# Patient Record
Sex: MALE | Race: WHITE | NOT HISPANIC OR LATINO | Employment: OTHER | URBAN - METROPOLITAN AREA
[De-identification: names, ages, dates, MRNs, and addresses within clinical notes are randomized per-mention and may not be internally consistent; named-entity substitution may affect disease eponyms.]

---

## 2017-01-20 ENCOUNTER — GENERIC CONVERSION - ENCOUNTER (OUTPATIENT)
Dept: OTHER | Facility: OTHER | Age: 82
End: 2017-01-20

## 2017-01-24 ENCOUNTER — ALLSCRIPTS OFFICE VISIT (OUTPATIENT)
Dept: OTHER | Facility: OTHER | Age: 82
End: 2017-01-24

## 2017-04-24 DIAGNOSIS — E78.5 HYPERLIPIDEMIA: ICD-10-CM

## 2017-04-24 DIAGNOSIS — I10 ESSENTIAL (PRIMARY) HYPERTENSION: ICD-10-CM

## 2017-04-24 DIAGNOSIS — E11.65 TYPE 2 DIABETES MELLITUS WITH HYPERGLYCEMIA (HCC): ICD-10-CM

## 2017-05-03 ENCOUNTER — ALLSCRIPTS OFFICE VISIT (OUTPATIENT)
Dept: OTHER | Facility: OTHER | Age: 82
End: 2017-05-03

## 2017-08-03 DIAGNOSIS — E78.5 HYPERLIPIDEMIA: ICD-10-CM

## 2017-08-03 DIAGNOSIS — I10 ESSENTIAL (PRIMARY) HYPERTENSION: ICD-10-CM

## 2017-08-03 DIAGNOSIS — E11.65 TYPE 2 DIABETES MELLITUS WITH HYPERGLYCEMIA (HCC): ICD-10-CM

## 2017-08-21 ENCOUNTER — ALLSCRIPTS OFFICE VISIT (OUTPATIENT)
Dept: OTHER | Facility: OTHER | Age: 82
End: 2017-08-21

## 2017-11-21 DIAGNOSIS — E11.65 TYPE 2 DIABETES MELLITUS WITH HYPERGLYCEMIA (HCC): ICD-10-CM

## 2017-11-21 DIAGNOSIS — I48.91 ATRIAL FIBRILLATION (HCC): ICD-10-CM

## 2017-12-05 ENCOUNTER — ALLSCRIPTS OFFICE VISIT (OUTPATIENT)
Dept: OTHER | Facility: OTHER | Age: 82
End: 2017-12-05

## 2017-12-07 NOTE — PROGRESS NOTES
Assessment    1  Hypertension (401 9) (I10)   · labile   2  Hyperlipidemia (272 4) (E78 5)   3  Atrial fibrillation (427 31) (I48 91)   4  Type 2 diabetes mellitus with hyperglycemia (250 00) (E11 65)    Plan  Atrial fibrillation, Hyperlipidemia, Hypertension, Type 2 diabetes mellitus with hyperglycemia    · (1) CBC/PLT/DIFF; Status:Active; Requested for:05Mar2018;    · (1) COMPREHENSIVE METABOLIC PANEL; Status:Active; Requested for:05Mar2018;    · (1) HEMOGLOBIN A1C; Status:Active; Requested for:05Mar2018;    · (1) LIPID PANEL, FASTING; Status:Active; Requested for:05Mar2018;    · Follow-up visit in 3 months Evaluation and Treatment  Follow-up  Status: Complete  Done:05Dec2017  Need for prophylactic vaccination and inoculation against influenza    · Fluzone High-Dose 0 5 ML Intramuscular Suspension Prefilled Syringe  Type 2 diabetes mellitus with hyperglycemia    · *VB - Foot Exam; Status:Active; Requested for:06Dec2017;     Discussion/Summary  Discussion Summary:   The patient is doing generally well  His blood pressure is well controlled  His heart rate is adequately controlled  He is on anticoagulation for stroke prophylaxis in light of his atrial fibrillation  His lipid status is favorable  His diabetic control is suboptimal  He is asymptomatic  He prefers to leave his therapy as it is for the moment  We will re-evaluate the patient in 3 months  He will update his lab work before his next visit  Chief Complaint  Chief Complaint Free Text Note Form: 3 month f/u for DMexam due todayflu vaccine todayto make an appointment with eye doctor for diabetic eye exam      History of Present Illness  HPI: The patient returned to the office for re-evaluation of hypertension, hyperlipidemia, type 2 diabetes, and chronic atrial fibrillation  Since his last visit he has been feeling well  He has no chest pain, shortness of breath, palpitations, or dizziness  He has had no symptoms of hypoglycemia or hyperglycemia   He is tolerating his medications well  Review of Systems  Complete-Male:  Constitutional: No fever or chills, feels well, no tiredness, no recent weight gain or weight loss  Eyes: No complaints of eye pain, no red eyes, no discharge from eyes, no itchy eyes  ENT: no complaints of earache, no hearing loss, no nosebleeds, no nasal discharge, no sore throat, no hoarseness  Cardiovascular: as noted in HPI  Respiratory: No complaints of shortness of breath, no wheezing, no cough, no SOB on exertion, no orthopnea or PND  Gastrointestinal: No complaints of abdominal pain, no constipation, no nausea or vomiting, no diarrhea or bloody stools  Genitourinary: No complaints of dysuria, no incontinence, no hesitancy, no nocturia, no genital lesion, no testicular pain  Musculoskeletal: No complaints of arthralgia, no myalgias, no joint swelling or stiffness, no limb pain or swelling  Neurological: No compliants of headache, no confusion, no convulsions, no numbness or tingling, no dizziness or fainting, no limb weakness, no difficulty walking  Psychiatric: as noted in HPI  Endocrine: muscle weakness, but-- No complaints of proptosis, no hot flashes, no muscle weakness, no erectile dysfunction, no deepening of the voice, no feelings of weakness-- and-- as noted in HPI  Hematologic/Lymphatic: no tendency for easy bruising  Active Problems  1  Atrial fibrillation (427 31) (I48 91)   2  Hyperlipidemia (272 4) (E78 5)   3  Hypertension (401 9) (I10)   4  Inguinal hernia (550 90) (K40 90)   5  Need for prophylactic vaccination and inoculation against influenza (V04 81) (Z23)   6  Screening for genitourinary condition (V81 6) (Z13 89)   7  Screening for neurological condition (V80 09) (Z13 89)   8  Type 2 diabetes mellitus with hyperglycemia (250 00) (E11 65)    Past Medical History  Active Problems And Past Medical History Reviewed: The active problems and past medical history were reviewed and updated today  Surgical History  1  History of Cataract Surgery    Family History  Mother    1  Family history of cerebrovascular accident (V17 1) (Z82 3)  Father    2  Family history of cerebrovascular accident (V17 1) (Z82 3)  Sister    3  Family history of atrial fibrillation (V17 49) (Z82 49)  Family History Reviewed: The family history was reviewed and updated today  Social History     · Former smoker (H41 27) (O02 659)   · Social alcohol use (Z78 9)  Social History Reviewed: The social history was reviewed and is unchanged  Current Meds   1  Daily Value Multivitamin TABS; Take one daily; Therapy: (Kateryna Nguyen) to Recorded   2  Metoprolol Succinate ER 50 MG Oral Tablet Extended Release 24 Hour; Take 1 tablet daily; Therapy: 19WDF1760 to (Last Rx:[id number])  Requested for: 58KTA7185 Ordered   3  Pioglitazone HCl - 30 MG Oral Tablet; Take 1 tablet daily; Therapy: 48PDT5024 to (Last Rx:54Uzs5922)  Requested for: 85HTS4079 Ordered   4  Pradaxa 150 MG Oral Capsule; Take one capsule twice a day; Therapy: 20YKI3965 to (Evaluate:09Jan2018)  Requested for: 79UET7488; Last Rx:49Vaw9763 Ordered   5  Simvastatin 40 MG Oral Tablet; Take 1 tablet daily; Therapy: 67Kzs3311 to (Last Rx:53Qkb8320)  Requested for: 08WNN0826 Ordered    Allergies  1  No Known Drug Allergies    Vitals  Vital Signs    Recorded: 35SSY7639 09:23AM   Temperature 96 3 F   Heart Rate 92   Pulse Quality Irregular   Respiration 16   Systolic 938   Diastolic 70   Height 5 ft 11 in   Weight 169 lb 8 oz   BMI Calculated 23 64   BSA Calculated 1 97       Physical Exam   Constitutional  General appearance: No acute distress, well appearing and well nourished  Pulmonary  Respiratory effort: No increased work of breathing or signs of respiratory distress  Auscultation of lungs: Clear to auscultation, equal breath sounds bilaterally, no wheezes, no rales, no rhonci  Cardiovascular  Palpation of heart: Normal PMI, no thrills     Auscultation of heart: Abnormal  -- The rhythm is irregular  I heard no murmur rub or gallop  Examination of extremities for edema and/or varicosities: Normal    Carotid pulses: Normal    Abdomen  Abdomen: Non-tender, no masses  Liver and spleen: No hepatomegaly or splenomegaly  Lymphatic  Palpation of lymph nodes in neck: No lymphadenopathy  Musculoskeletal  Gait and station: Normal    Inspection/palpation of joints, bones, and muscles: Normal    Psychiatric  Orientation to person, place and time: Normal    Mood and affect: Normal    Diabetic Foot Screen: Normal    Socks and shoes removed, the Right Foot: the foot was normal, no swelling, no erythema  The toes on the right were normal   The sensory exam showed  normal vibratory sensation at the level of the toes on the right  Normal tactile sensation with monofilament testing throughout the right foot  Socks and shoes removed, Left Foot: the foot was normal, no swelling, no erythema  The toes on the left were normal   The sensory exam showed  normal vibratory sensation at the level of the toes on the left , Normal tactile sensation with monofilament testing throughout the left foot  Pulses:  2+ in the posterior tibialis on the right  2+ in the dorsalis pedis on the right  Pulses:  2+ in the posterior tibialis on the left  2+ in the dorsalis pedis on the left  Assign Risk Category: 0: No loss of protective sensation, no deformity  No present risk  Future Appointments    Date/Time Provider Specialty Site   03/12/2018 09:45 AM BAYLEE Escobar   [facility]       Signatures   Electronically signed by : BAYLEE Stein ; Dec  6 2017  9:43AM EST                       (Author)

## 2018-01-12 VITALS
TEMPERATURE: 96.6 F | BODY MASS INDEX: 21.77 KG/M2 | DIASTOLIC BLOOD PRESSURE: 80 MMHG | HEIGHT: 71 IN | HEART RATE: 88 BPM | RESPIRATION RATE: 15 BRPM | WEIGHT: 155.5 LBS | SYSTOLIC BLOOD PRESSURE: 140 MMHG

## 2018-01-13 VITALS
RESPIRATION RATE: 16 BRPM | TEMPERATURE: 98.6 F | BODY MASS INDEX: 23.31 KG/M2 | DIASTOLIC BLOOD PRESSURE: 70 MMHG | SYSTOLIC BLOOD PRESSURE: 124 MMHG | HEIGHT: 71 IN | WEIGHT: 166.5 LBS | HEART RATE: 88 BPM

## 2018-01-15 VITALS
DIASTOLIC BLOOD PRESSURE: 52 MMHG | RESPIRATION RATE: 15 BRPM | BODY MASS INDEX: 23.14 KG/M2 | SYSTOLIC BLOOD PRESSURE: 102 MMHG | HEART RATE: 88 BPM | HEIGHT: 71 IN | TEMPERATURE: 97.5 F | WEIGHT: 165.25 LBS

## 2018-01-22 VITALS
HEIGHT: 71 IN | RESPIRATION RATE: 16 BRPM | BODY MASS INDEX: 23.73 KG/M2 | TEMPERATURE: 96.3 F | WEIGHT: 169.5 LBS | HEART RATE: 92 BPM | SYSTOLIC BLOOD PRESSURE: 142 MMHG | DIASTOLIC BLOOD PRESSURE: 70 MMHG

## 2018-03-05 DIAGNOSIS — I48.91 ATRIAL FIBRILLATION (HCC): ICD-10-CM

## 2018-03-05 DIAGNOSIS — E11.65 TYPE 2 DIABETES MELLITUS WITH HYPERGLYCEMIA (HCC): ICD-10-CM

## 2018-03-05 DIAGNOSIS — I10 ESSENTIAL (PRIMARY) HYPERTENSION: ICD-10-CM

## 2018-03-05 DIAGNOSIS — E78.5 HYPERLIPIDEMIA: ICD-10-CM

## 2018-03-08 RX ORDER — DABIGATRAN ETEXILATE 150 MG/1
1 CAPSULE, COATED PELLETS ORAL 2 TIMES DAILY
COMMUNITY
Start: 2013-03-12 | End: 2018-07-09 | Stop reason: SDUPTHER

## 2018-03-08 RX ORDER — ERGOCALCIFEROL (VITAMIN D2) 10 MCG
1 TABLET ORAL DAILY
COMMUNITY

## 2018-03-08 RX ORDER — SIMVASTATIN 40 MG
1 TABLET ORAL DAILY
COMMUNITY
Start: 2011-04-12 | End: 2018-10-31 | Stop reason: SDUPTHER

## 2018-03-08 RX ORDER — METOPROLOL SUCCINATE 50 MG/1
1 TABLET, EXTENDED RELEASE ORAL DAILY
COMMUNITY
Start: 2013-03-12 | End: 2018-10-31 | Stop reason: SDUPTHER

## 2018-03-08 RX ORDER — PIOGLITAZONEHYDROCHLORIDE 30 MG/1
1 TABLET ORAL DAILY
COMMUNITY
Start: 2012-01-09 | End: 2018-05-12 | Stop reason: SDUPTHER

## 2018-03-12 ENCOUNTER — OFFICE VISIT (OUTPATIENT)
Dept: INTERNAL MEDICINE CLINIC | Facility: CLINIC | Age: 83
End: 2018-03-12
Payer: MEDICARE

## 2018-03-12 VITALS
DIASTOLIC BLOOD PRESSURE: 82 MMHG | HEART RATE: 72 BPM | TEMPERATURE: 97 F | BODY MASS INDEX: 24.08 KG/M2 | WEIGHT: 172 LBS | HEIGHT: 71 IN | RESPIRATION RATE: 16 BRPM | SYSTOLIC BLOOD PRESSURE: 140 MMHG

## 2018-03-12 DIAGNOSIS — E78.2 MIXED HYPERLIPIDEMIA: ICD-10-CM

## 2018-03-12 DIAGNOSIS — E11.65 TYPE 2 DIABETES MELLITUS WITH HYPERGLYCEMIA, WITHOUT LONG-TERM CURRENT USE OF INSULIN (HCC): ICD-10-CM

## 2018-03-12 DIAGNOSIS — I48.21 PERMANENT ATRIAL FIBRILLATION (HCC): ICD-10-CM

## 2018-03-12 DIAGNOSIS — I10 ESSENTIAL HYPERTENSION: Primary | ICD-10-CM

## 2018-03-12 PROCEDURE — 99214 OFFICE O/P EST MOD 30 MIN: CPT | Performed by: INTERNAL MEDICINE

## 2018-03-12 NOTE — PROGRESS NOTES
[facility] [place]      NAME: Mitchell Duggan  AGE: 80 y o  SEX: male  : 1933   MRN: [id number]    DATE: 3/12/2018  TIME: 11:34 AM    Assessment and Plan   1  Essential hypertension    Well controlled    2  Type 2 diabetes mellitus with hyperglycemia, without long-term current use of insulin (HCC)   the patient's last hemoglobin A1c was somewhat elevated  He did not want to adjust his medications at that time  This will be repeated in the near future  He will maintain his current medications for now  - HEMOGLOBIN A1C W/ EAG ESTIMATION  - Comprehensive metabolic panel    3  Permanent atrial fibrillation (HCC)    On rate control and anticoagulation with Pradaxa    4  Mixed hyperlipidemia   well controlled on simvastatin  - Lipid panel          Return to office in:  3 months    Chief Complaint    routine interval follow-up    History of Present Illness      the patient returned to the office for re-evaluation of hypertension, hyperlipidemia, type 2 diabetes, and atrial fibrillation  Since his last visit he has been feeling well  He has had no chest pain, shortness of breath, palpitations, or dizziness  He has had no symptoms of hyperglycemia or hypoglycemia  He is tolerating his medications well  The following portions of the patient's history were reviewed and updated as appropriate: allergies, current medications, past family history, past medical history, past social history, past surgical history and problem list     Review of Systems   Review of Systems   Constitutional: Negative  HENT: Negative for congestion, ear pain, postnasal drip, rhinorrhea, sore throat and trouble swallowing  Eyes: Negative for pain, discharge, redness and visual disturbance  Respiratory: Negative for cough, shortness of breath and wheezing  Cardiovascular: Negative  Gastrointestinal: Negative  Endocrine: Negative      Genitourinary: Negative for difficulty urinating, dysuria, frequency, hematuria and urgency  Musculoskeletal: Negative for arthralgias, gait problem, joint swelling and myalgias  Skin: Negative for rash  Neurological: Negative for dizziness, speech difficulty, weakness, light-headedness, numbness and headaches  Hematological: Negative  Psychiatric/Behavioral: Negative for confusion, decreased concentration, dysphoric mood and sleep disturbance  The patient is not nervous/anxious  Active Problem List     Patient Active Problem List   Diagnosis    Hypertension    Type 2 diabetes mellitus with hyperglycemia (HCC)    Hyperlipidemia    Atrial fibrillation (HCC)    Inguinal hernia       Objective   /82 (BP Location: Left arm, Patient Position: Sitting, Cuff Size: Standard)   Pulse 72   Temp (!) 97 °F (36 1 °C) (Tympanic)   Resp 16   Ht 5' 11" (1 803 m)   Wt 78 kg (172 lb)   BMI 23 99 kg/m²     Physical Exam   Constitutional: He is oriented to person, place, and time  He appears well-developed and well-nourished  No distress  HENT:   Head: Normocephalic and atraumatic  Neck: Neck supple  No JVD present  No tracheal deviation present  No thyromegaly present  Cardiovascular: Normal rate, normal heart sounds and intact distal pulses  Exam reveals no gallop and no friction rub  No murmur heard  The cardiac rhythm is irregular  Pulmonary/Chest: Effort normal and breath sounds normal  He has no wheezes  He has no rales  He exhibits no tenderness  Abdominal: Soft  Bowel sounds are normal  He exhibits no distension and no mass  There is no tenderness  There is no rebound  Musculoskeletal: Normal range of motion  He exhibits no edema or tenderness  Lymphadenopathy:     He has no cervical adenopathy  Neurological: He is alert and oriented to person, place, and time  Skin: Skin is warm and dry  Psychiatric: He has a normal mood and affect   His behavior is normal  Judgment and thought content normal        Pertinent Laboratory/Diagnostic Studies:  No visits with results within 3 Month(s) from this visit  Latest known visit with results is:   No results found for any previous visit             Current Medications     Current Outpatient Prescriptions:     dabigatran etexilate (PRADAXA) 150 mg capsu, Take 1 capsule by mouth 2 (two) times a day, Disp: , Rfl:     metoprolol succinate (TOPROL-XL) 50 mg 24 hr tablet, Take 1 tablet by mouth daily, Disp: , Rfl:     Multiple Vitamin (DAILY VALUE MULTIVITAMIN) TABS, Take 1 tablet by mouth daily, Disp: , Rfl:     pioglitazone (ACTOS) 30 mg tablet, Take 1 tablet by mouth daily, Disp: , Rfl:     simvastatin (ZOCOR) 40 mg tablet, Take 1 tablet by mouth daily, Disp: , Rfl:       Josiephine Lundborg, MD

## 2018-05-12 DIAGNOSIS — E11.9 TYPE 2 DIABETES MELLITUS WITHOUT COMPLICATION, WITHOUT LONG-TERM CURRENT USE OF INSULIN (HCC): Primary | ICD-10-CM

## 2018-05-15 RX ORDER — PIOGLITAZONEHYDROCHLORIDE 30 MG/1
TABLET ORAL
Qty: 90 TABLET | Refills: 1 | Status: SHIPPED | OUTPATIENT
Start: 2018-05-15 | End: 2018-10-25 | Stop reason: SDUPTHER

## 2018-06-12 LAB
ALBUMIN SERPL BCP-MCNC: 3.3 G/DL (ref 3–5.2)
ALP SERPL-CCNC: 57 U/L (ref 43–122)
ALT SERPL W P-5'-P-CCNC: 22 U/L (ref 9–52)
ANION GAP SERPL CALCULATED.3IONS-SCNC: 8 MMOL/L (ref 5–14)
AST SERPL W P-5'-P-CCNC: 22 U/L (ref 17–59)
BILIRUB SERPL-MCNC: 0.7 MG/DL
BUN SERPL-MCNC: 23 MG/DL (ref 5–25)
CALCIUM SERPL-MCNC: 8.8 MG/DL (ref 8.4–10.2)
CHLORIDE SERPL-SCNC: 106 MEQ/L (ref 97–108)
CHOLEST SERPL-MCNC: 87 MG/DL
CHOLEST/HDLC SERPL: 2.9 {RATIO}
CO2 SERPL-SCNC: 27 MMOL/L (ref 22–30)
CREATINE, SERUM (HISTORICAL): 1.18 MG/DL (ref 0.7–1.5)
DEPRECATED RDW RBC AUTO: 12.9 %
EGFR (HISTORICAL): 59 ML/MIN/1.73 M2
EST. AVERAGE GLUCOSE BLD GHB EST-MCNC: 246 MG/DL
GLUCOSE FASTING (HISTORICAL): 188 MG/DL (ref 70–99)
HBA1C MFR BLD HPLC: 10.2 %
HCT VFR BLD AUTO: 37.2 % (ref 41–53)
HDLC SERPL-MCNC: 30 MG/DL
HGB BLD-MCNC: 12.6 G/DL (ref 13.5–17.5)
LDL/HDL RATIO (HISTORICAL): 1.4
LDLC SERPL CALC-MCNC: 43 MG/DL
MCH RBC QN AUTO: 34.7 PG (ref 26–34)
MCHC RBC AUTO-ENTMCNC: 33.9 % (ref 31–36)
MCV RBC AUTO: 103 FL (ref 80–100)
PLATELET # BLD AUTO: 175 K/MCL (ref 150–450)
POTASSIUM SERPL-SCNC: 4.7 MEQ/L (ref 3.6–5)
RBC # BLD AUTO: 3.63 M/MCL (ref 4.5–5.9)
SODIUM SERPL-SCNC: 142 MEQ/L (ref 137–147)
TOTAL PROTEIN (HISTORICAL): 6.5 G/DL (ref 5.9–8.4)
TRIGL SERPL-MCNC: 72 MG/DL
VLDLC SERPL CALC-MCNC: 14 MG/DL (ref 0–40)
WBC # BLD AUTO: 6.6 K/MCL (ref 4.5–11)

## 2018-06-14 LAB — HBA1C MFR BLD HPLC: 10.2 %

## 2018-06-20 ENCOUNTER — OFFICE VISIT (OUTPATIENT)
Dept: INTERNAL MEDICINE CLINIC | Facility: CLINIC | Age: 83
End: 2018-06-20
Payer: MEDICARE

## 2018-06-20 VITALS
BODY MASS INDEX: 23.24 KG/M2 | DIASTOLIC BLOOD PRESSURE: 70 MMHG | SYSTOLIC BLOOD PRESSURE: 142 MMHG | WEIGHT: 166 LBS | TEMPERATURE: 97.8 F | RESPIRATION RATE: 16 BRPM | HEART RATE: 92 BPM | HEIGHT: 71 IN

## 2018-06-20 DIAGNOSIS — E78.2 MIXED HYPERLIPIDEMIA: ICD-10-CM

## 2018-06-20 DIAGNOSIS — I10 ESSENTIAL HYPERTENSION: ICD-10-CM

## 2018-06-20 DIAGNOSIS — I48.21 PERMANENT ATRIAL FIBRILLATION (HCC): ICD-10-CM

## 2018-06-20 DIAGNOSIS — E11.65 TYPE 2 DIABETES MELLITUS WITH HYPERGLYCEMIA, WITHOUT LONG-TERM CURRENT USE OF INSULIN (HCC): Primary | ICD-10-CM

## 2018-06-20 PROCEDURE — 99214 OFFICE O/P EST MOD 30 MIN: CPT | Performed by: INTERNAL MEDICINE

## 2018-06-21 NOTE — PROGRESS NOTES
[facility] Internal Medicine [place]      NAME: Shaka Chino  AGE: 80 y o  SEX: male  : 1933   MRN: [id number]    DATE: 2018  TIME: 1:39 PM    Assessment and Plan   1  Type 2 diabetes mellitus with hyperglycemia, without long-term current use of insulin (HCC)    The patient's diabetic control has deteriorated  I advised him to start metformin  He watches his diet well and is at at his ideal body weight  - metFORMIN (GLUCOPHAGE) 500 mg tablet; Take 1 tablet (500 mg total) by mouth 2 (two) times a day with meals  Dispense: 180 tablet; Refill: 0  - Comprehensive metabolic panel  - Hemoglobin A1C; Future  - Microalbumin, Random Urine (W/Creatinine)    2  Essential hypertension    Well controlled  3  Permanent atrial fibrillation (HCC)    On rate control and anticoagulation  4  Mixed hyperlipidemia    At goal on simvastatin  Return to office in: Three months  Chief Complaint     Chief Complaint   Patient presents with    Follow-up     3 months, foot exam today, has not made an appointment with eye doctor       History of Present Illness       The patient returned to the office for re-evaluation of type 2 diabetes, hypertension, hyperlipidemia, and permanent atrial fibrillation  Since his last visit he has been feeling well  He denies chest pain, shortness of breath, palpitations, or dizziness  He denies polyuria, polydipsia, or polyphagia  He is tolerating his medications well  The following portions of the patient's history were reviewed and updated as appropriate: allergies, current medications, past family history, past medical history, past social history, past surgical history and problem list     Review of Systems   Review of Systems   Constitutional: Negative  HENT: Negative for congestion, ear pain, postnasal drip, rhinorrhea, sore throat and trouble swallowing  Eyes: Negative for pain, discharge, redness and visual disturbance     Respiratory: Negative for cough, shortness of breath and wheezing  Cardiovascular: Negative  Gastrointestinal: Negative  Endocrine: Negative  Genitourinary: Negative for difficulty urinating, dysuria, frequency, hematuria and urgency  Musculoskeletal: Negative for arthralgias, gait problem, joint swelling and myalgias  Skin: Negative for rash  Neurological: Negative for dizziness, speech difficulty, weakness, light-headedness, numbness and headaches  Hematological: Negative  Psychiatric/Behavioral: Negative for confusion, decreased concentration, dysphoric mood and sleep disturbance  The patient is not nervous/anxious  Active Problem List     Patient Active Problem List   Diagnosis    Hypertension    Type 2 diabetes mellitus with hyperglycemia (HCC)    Hyperlipidemia    Atrial fibrillation (HCC)    Inguinal hernia       Objective   /70 (BP Location: Left arm, Patient Position: Sitting, Cuff Size: Standard)   Pulse 92   Temp 97 8 °F (36 6 °C) (Tympanic)   Resp 16   Ht 5' 11" (1 803 m)   Wt 75 3 kg (166 lb)   BMI 23 15 kg/m²     Physical Exam   Constitutional: He is oriented to person, place, and time  He appears well-developed and well-nourished  No distress  HENT:   Head: Normocephalic and atraumatic  Neck: Neck supple  No JVD present  No tracheal deviation present  No thyromegaly present  Cardiovascular: Normal rate, regular rhythm, normal heart sounds and intact distal pulses  Exam reveals no gallop and no friction rub  Pulses are no weak pulses  No murmur heard  Pulses:       Dorsalis pedis pulses are 2+ on the right side, and 2+ on the left side  Posterior tibial pulses are 2+ on the right side, and 2+ on the left side  Pulmonary/Chest: Effort normal and breath sounds normal  He has no wheezes  He has no rales  He exhibits no tenderness  Abdominal: Soft  Bowel sounds are normal  He exhibits no distension and no mass  There is no tenderness  There is no rebound  Musculoskeletal: Normal range of motion  He exhibits no edema or tenderness  Feet:   Right Foot:   Skin Integrity: Negative for ulcer, skin breakdown, erythema, warmth, callus or dry skin  Left Foot:   Skin Integrity: Negative for ulcer, skin breakdown, erythema, warmth, callus or dry skin  Lymphadenopathy:     He has no cervical adenopathy  Neurological: He is alert and oriented to person, place, and time  Skin: Skin is warm and dry  Psychiatric: He has a normal mood and affect  His behavior is normal  Judgment and thought content normal      Patient's shoes and socks removed  Right Foot/Ankle   Right Foot Inspection  Skin Exam: skin normal and skin intact no dry skin, no warmth, no callus, no erythema, no maceration, no abnormal color, no pre-ulcer, no ulcer and no callus                          Toe Exam: ROM and strength within normal limitsno swelling, no tenderness, erythema and  no right toe deformity  Sensory   Vibration: intact  Proprioception: intact   Monofilament testing: intact  Vascular  Capillary refills: < 3 seconds  The right DP pulse is 2+  The right PT pulse is 2+  Left Foot/Ankle  Left Foot Inspection  Skin Exam: skin normal and skin intactno dry skin, no warmth, no erythema, no maceration, normal color, no pre-ulcer, no ulcer and no callus                         Toe Exam: ROM and strength within normal limitsno swelling, no tenderness, no erythema and no left toe deformity                   Sensory   Vibration: intact  Proprioception: intact  Monofilament: intact  Vascular  Capillary refills: < 3 seconds  The left DP pulse is 2+  The left PT pulse is 2+  Assign Risk Category:  No deformity present; No loss of protective sensation;  No weak pulses       Risk: 0    Pertinent Laboratory/Diagnostic Studies:  Orders Only on 06/14/2018   Component Date Value Ref Range Status    EXT Hemoglobin A1C 06/12/2018 10 2   Final           Current Medications     Current Outpatient Prescriptions:     dabigatran etexilate (PRADAXA) 150 mg capsu, Take 1 capsule by mouth 2 (two) times a day, Disp: , Rfl:     metoprolol succinate (TOPROL-XL) 50 mg 24 hr tablet, Take 1 tablet by mouth daily, Disp: , Rfl:     Multiple Vitamin (DAILY VALUE MULTIVITAMIN) TABS, Take 1 tablet by mouth daily, Disp: , Rfl:     pioglitazone (ACTOS) 30 mg tablet, TAKE 1 TABLET DAILY, Disp: 90 tablet, Rfl: 1    simvastatin (ZOCOR) 40 mg tablet, Take 1 tablet by mouth daily, Disp: , Rfl:     metFORMIN (GLUCOPHAGE) 500 mg tablet, Take 1 tablet (500 mg total) by mouth 2 (two) times a day with meals, Disp: 180 tablet, Rfl: 0      Yuly Yao MD

## 2018-07-09 DIAGNOSIS — I48.21 PERMANENT ATRIAL FIBRILLATION (HCC): Primary | ICD-10-CM

## 2018-07-10 RX ORDER — DABIGATRAN ETEXILATE 150 MG/1
150 CAPSULE, COATED PELLETS ORAL 2 TIMES DAILY
Qty: 180 CAPSULE | Refills: 1 | Status: SHIPPED | OUTPATIENT
Start: 2018-07-10 | End: 2019-01-08 | Stop reason: SDUPTHER

## 2018-08-29 DIAGNOSIS — E11.65 TYPE 2 DIABETES MELLITUS WITH HYPERGLYCEMIA, WITHOUT LONG-TERM CURRENT USE OF INSULIN (HCC): ICD-10-CM

## 2018-09-20 ENCOUNTER — TRANSCRIBE ORDERS (OUTPATIENT)
Dept: ADMINISTRATIVE | Facility: HOSPITAL | Age: 83
End: 2018-09-20

## 2018-09-20 ENCOUNTER — APPOINTMENT (OUTPATIENT)
Dept: LAB | Facility: HOSPITAL | Age: 83
End: 2018-09-20
Payer: MEDICARE

## 2018-09-20 DIAGNOSIS — E11.65 TYPE 2 DIABETES MELLITUS WITH HYPERGLYCEMIA, WITHOUT LONG-TERM CURRENT USE OF INSULIN (HCC): ICD-10-CM

## 2018-09-20 LAB
ALBUMIN SERPL BCP-MCNC: 3.7 G/DL (ref 3–5.2)
ALP SERPL-CCNC: 51 U/L (ref 43–122)
ALT SERPL W P-5'-P-CCNC: 23 U/L (ref 9–52)
ANION GAP SERPL CALCULATED.3IONS-SCNC: 6 MMOL/L (ref 5–14)
AST SERPL W P-5'-P-CCNC: 26 U/L (ref 17–59)
BILIRUB SERPL-MCNC: 0.7 MG/DL
BUN SERPL-MCNC: 16 MG/DL (ref 5–25)
CALCIUM SERPL-MCNC: 9.2 MG/DL (ref 8.4–10.2)
CHLORIDE SERPL-SCNC: 105 MMOL/L (ref 97–108)
CHOLEST SERPL-MCNC: 110 MG/DL
CO2 SERPL-SCNC: 29 MMOL/L (ref 22–30)
CREAT SERPL-MCNC: 1.2 MG/DL (ref 0.7–1.5)
CREAT UR-MCNC: 60.9 MG/DL
ERYTHROCYTE [DISTWIDTH] IN BLOOD BY AUTOMATED COUNT: 13.3 %
GFR SERPL CREATININE-BSD FRML MDRD: 55 ML/MIN/1.73SQ M
GLUCOSE P FAST SERPL-MCNC: 151 MG/DL (ref 70–99)
HCT VFR BLD AUTO: 36.4 % (ref 41–53)
HDLC SERPL-MCNC: 31 MG/DL (ref 40–59)
HGB BLD-MCNC: 12.3 G/DL (ref 13.5–17.5)
LDLC SERPL CALC-MCNC: 57 MG/DL
MCH RBC QN AUTO: 35.3 PG (ref 26.8–34.3)
MCHC RBC AUTO-ENTMCNC: 33.8 G/DL (ref 31.4–37.4)
MCV RBC AUTO: 105 FL (ref 80–100)
MICROALBUMIN UR-MCNC: 6.5 MG/L (ref 0–20)
MICROALBUMIN/CREAT 24H UR: 11 MG/G CREATININE (ref 0–30)
NONHDLC SERPL-MCNC: 79 MG/DL
PLATELET # BLD AUTO: 183 THOUSANDS/UL (ref 150–450)
PMV BLD AUTO: 8.9 FL (ref 8.9–12.7)
POTASSIUM SERPL-SCNC: 5.3 MMOL/L (ref 3.6–5)
PROT SERPL-MCNC: 6.9 G/DL (ref 5.9–8.4)
RBC # BLD AUTO: 3.48 MILLION/UL (ref 4.5–5.9)
SODIUM SERPL-SCNC: 140 MMOL/L (ref 137–147)
TRIGL SERPL-MCNC: 111 MG/DL
WBC # BLD AUTO: 7.3 THOUSAND/UL (ref 4.31–10.16)

## 2018-09-20 PROCEDURE — 85027 COMPLETE CBC AUTOMATED: CPT | Performed by: INTERNAL MEDICINE

## 2018-09-20 PROCEDURE — 82570 ASSAY OF URINE CREATININE: CPT

## 2018-09-20 PROCEDURE — 80053 COMPREHEN METABOLIC PANEL: CPT | Performed by: INTERNAL MEDICINE

## 2018-09-20 PROCEDURE — 36415 COLL VENOUS BLD VENIPUNCTURE: CPT | Performed by: INTERNAL MEDICINE

## 2018-09-20 PROCEDURE — 82043 UR ALBUMIN QUANTITATIVE: CPT

## 2018-09-20 PROCEDURE — 80061 LIPID PANEL: CPT | Performed by: INTERNAL MEDICINE

## 2018-09-20 PROCEDURE — 83036 HEMOGLOBIN GLYCOSYLATED A1C: CPT | Performed by: INTERNAL MEDICINE

## 2018-09-21 LAB
EST. AVERAGE GLUCOSE BLD GHB EST-MCNC: 177 MG/DL
HBA1C MFR BLD: 7.8 % (ref 4.2–6.3)

## 2018-09-26 ENCOUNTER — OFFICE VISIT (OUTPATIENT)
Dept: INTERNAL MEDICINE CLINIC | Facility: CLINIC | Age: 83
End: 2018-09-26
Payer: MEDICARE

## 2018-09-26 VITALS
SYSTOLIC BLOOD PRESSURE: 142 MMHG | TEMPERATURE: 97.2 F | RESPIRATION RATE: 15 BRPM | BODY MASS INDEX: 23.1 KG/M2 | WEIGHT: 165 LBS | HEIGHT: 71 IN | DIASTOLIC BLOOD PRESSURE: 80 MMHG | HEART RATE: 88 BPM

## 2018-09-26 DIAGNOSIS — I10 ESSENTIAL HYPERTENSION: ICD-10-CM

## 2018-09-26 DIAGNOSIS — E78.2 MIXED HYPERLIPIDEMIA: ICD-10-CM

## 2018-09-26 DIAGNOSIS — I48.21 PERMANENT ATRIAL FIBRILLATION (HCC): ICD-10-CM

## 2018-09-26 DIAGNOSIS — Z23 NEED FOR INFLUENZA VACCINATION: Primary | ICD-10-CM

## 2018-09-26 DIAGNOSIS — D64.9 ANEMIA, UNSPECIFIED TYPE: ICD-10-CM

## 2018-09-26 DIAGNOSIS — E11.65 TYPE 2 DIABETES MELLITUS WITH HYPERGLYCEMIA, WITHOUT LONG-TERM CURRENT USE OF INSULIN (HCC): ICD-10-CM

## 2018-09-26 PROCEDURE — 90662 IIV NO PRSV INCREASED AG IM: CPT | Performed by: INTERNAL MEDICINE

## 2018-09-26 PROCEDURE — 99214 OFFICE O/P EST MOD 30 MIN: CPT | Performed by: INTERNAL MEDICINE

## 2018-09-26 PROCEDURE — G0008 ADMIN INFLUENZA VIRUS VAC: HCPCS | Performed by: INTERNAL MEDICINE

## 2018-09-26 NOTE — PROGRESS NOTES
[address] [facility] Internal Medicine [place]      NAME: Kathie Recinos  AGE: 80 y o  SEX: male  : 1933   MRN: [id number]    DATE: 2018  TIME: 5:42 PM    Assessment and Plan   1  Need for influenza vaccination  - influenza vaccine, [id number], high-dose, PF 0 5 mL, for patients 72 yr+ (FLUZONE HIGH-DOSE)    2  Essential hypertension  Well controlled  3  Mixed hyperlipidemia  At goal on simvastatin  4  Permanent atrial fibrillation (HCC)  On metoprolol for rate control and Pradaxa for stroke prophylaxis    5  Type 2 diabetes mellitus with hyperglycemia, without long-term current use of insulin ([address] )  Satisfactorily controlled  - Comprehensive metabolic panel  - Hemoglobin A1C    6  Anemia, unspecified type  Anemia studies will be checked with the patient's next lab work  - CBC  - Vitamin B12  - Folate    The patient is doing generally well  His blood pressure, lipids, and diabetes are under good control  He is tolerating his medications  He will continue his current medications for the moment  Return to office in:   3 months    Chief Complaint     Chief Complaint   Patient presents with    Follow-up     3 months, reminded again to make an appointment with eye doctor for diabetic eye exam       History of Present Illness     The patient returned to the office for re-evaluation of hypertension, hyperlipidemia, type 2 diabetes, and atrial fibrillation  Since his last visit he has been feeling well  He has had no chest pain, shortness of breath, palpitations, or dizziness  He has been tolerating his medications well  His last lab work showed mild anemia  He has noticed no evidence of any blood loss      The following portions of the patient's history were reviewed and updated as appropriate: allergies, current medications, past family history, past medical history, past social history, past surgical history and problem list     Review of Systems   Review of Systems   Constitutional: Negative  HENT: Negative for congestion, ear pain, postnasal drip, rhinorrhea, sore throat and trouble swallowing  Eyes: Negative for pain, discharge, redness and visual disturbance  Respiratory: Negative for cough, shortness of breath and wheezing  Cardiovascular: Negative  Gastrointestinal: Negative  Endocrine: Negative  Genitourinary: Negative for difficulty urinating, dysuria, frequency, hematuria and urgency  Musculoskeletal: Negative for arthralgias, gait problem, joint swelling and myalgias  Skin: Negative for rash  Neurological: Negative for dizziness, speech difficulty, weakness, light-headedness, numbness and headaches  Hematological: Negative  Psychiatric/Behavioral: Negative for confusion, decreased concentration, dysphoric mood and sleep disturbance  The patient is not nervous/anxious  Active Problem List     Patient Active Problem List   Diagnosis    Hypertension    Type 2 diabetes mellitus with hyperglycemia (HCC)    Hyperlipidemia    Atrial fibrillation (HCC)    Inguinal hernia    Anemia       Objective   /80 (BP Location: Left arm, Patient Position: Sitting, Cuff Size: Standard)   Pulse 88 Comment: irregular  Temp (!) 97 2 °F (36 2 °C) (Tympanic)   Resp 15   Ht 5' 11" (1 803 m)   Wt 74 8 kg (165 lb)   BMI 23 01 kg/m²     Physical Exam   Constitutional: He is oriented to person, place, and time  He appears well-developed and well-nourished  No distress  HENT:   Head: Normocephalic and atraumatic  Neck: Neck supple  No JVD present  No tracheal deviation present  No thyromegaly present  Cardiovascular: Normal rate, normal heart sounds and intact distal pulses  Exam reveals no gallop and no friction rub  No murmur heard  The cardiac rhythm is irregular  Pulmonary/Chest: Effort normal and breath sounds normal  He has no wheezes  He has no rales  He exhibits no tenderness  Abdominal: Soft   Bowel sounds are normal  He exhibits no distension and no mass  There is no tenderness  There is no rebound  Musculoskeletal: Normal range of motion  He exhibits no edema or tenderness  Lymphadenopathy:     He has no cervical adenopathy  Neurological: He is alert and oriented to person, place, and time  Skin: Skin is warm and dry  Psychiatric: He has a normal mood and affect  His behavior is normal  Judgment and thought content normal        Pertinent Laboratory/Diagnostic Studies:  Appointment on 09/20/2018   Component Date Value Ref Range Status    Creatinine, Ur 09/20/2018 60 9  mg/dL Final    Microalbum  ,U,Random 09/20/2018 6 5  0 0 - 20 0 mg/L Final    Microalb Creat Ratio 09/20/2018 11  0 - 30 mg/g creatinine Final           Current Medications     Current Outpatient Prescriptions:     dabigatran etexilate (PRADAXA) 150 mg capsu, Take 1 capsule (150 mg total) by mouth 2 (two) times a day, Disp: 180 capsule, Rfl: 1    metFORMIN (GLUCOPHAGE) 500 mg tablet, TAKE 1 TABLET TWICE A DAY WITH MEALS, Disp: 180 tablet, Rfl: 0    metoprolol succinate (TOPROL-XL) 50 mg 24 hr tablet, Take 1 tablet by mouth daily, Disp: , Rfl:     Multiple Vitamin (DAILY VALUE MULTIVITAMIN) TABS, Take 1 tablet by mouth daily, Disp: , Rfl:     pioglitazone (ACTOS) 30 mg tablet, TAKE 1 TABLET DAILY, Disp: 90 tablet, Rfl: 1    simvastatin (ZOCOR) 40 mg tablet, Take 1 tablet by mouth daily, Disp: , Rfl:       Carmel Fernandez MD

## 2018-10-25 DIAGNOSIS — E11.9 TYPE 2 DIABETES MELLITUS WITHOUT COMPLICATION, WITHOUT LONG-TERM CURRENT USE OF INSULIN (HCC): ICD-10-CM

## 2018-10-25 RX ORDER — PIOGLITAZONEHYDROCHLORIDE 30 MG/1
TABLET ORAL
Qty: 90 TABLET | Refills: 1 | Status: SHIPPED | OUTPATIENT
Start: 2018-10-25 | End: 2019-05-20 | Stop reason: SDUPTHER

## 2018-10-31 DIAGNOSIS — I48.21 PERMANENT ATRIAL FIBRILLATION (HCC): Primary | ICD-10-CM

## 2018-10-31 DIAGNOSIS — E78.2 MIXED HYPERLIPIDEMIA: ICD-10-CM

## 2018-11-01 RX ORDER — METOPROLOL SUCCINATE 50 MG/1
TABLET, EXTENDED RELEASE ORAL
Qty: 90 TABLET | Refills: 3 | Status: SHIPPED | OUTPATIENT
Start: 2018-11-01 | End: 2019-12-11 | Stop reason: SDUPTHER

## 2018-11-01 RX ORDER — SIMVASTATIN 40 MG
TABLET ORAL
Qty: 90 TABLET | Refills: 3 | Status: SHIPPED | OUTPATIENT
Start: 2018-11-01 | End: 2019-12-11 | Stop reason: SDUPTHER

## 2018-11-27 DIAGNOSIS — E11.65 TYPE 2 DIABETES MELLITUS WITH HYPERGLYCEMIA, WITHOUT LONG-TERM CURRENT USE OF INSULIN (HCC): ICD-10-CM

## 2019-01-08 ENCOUNTER — TRANSCRIBE ORDERS (OUTPATIENT)
Dept: ADMINISTRATIVE | Facility: HOSPITAL | Age: 84
End: 2019-01-08

## 2019-01-08 ENCOUNTER — APPOINTMENT (OUTPATIENT)
Dept: LAB | Facility: HOSPITAL | Age: 84
End: 2019-01-08
Payer: MEDICARE

## 2019-01-08 DIAGNOSIS — I48.91 ATRIAL FIBRILLATION (HCC): ICD-10-CM

## 2019-01-08 DIAGNOSIS — E78.5 HYPERLIPIDEMIA: ICD-10-CM

## 2019-01-08 DIAGNOSIS — I48.21 PERMANENT ATRIAL FIBRILLATION (HCC): ICD-10-CM

## 2019-01-08 DIAGNOSIS — E11.65 TYPE 2 DIABETES MELLITUS WITH HYPERGLYCEMIA (HCC): ICD-10-CM

## 2019-01-08 DIAGNOSIS — I10 ESSENTIAL (PRIMARY) HYPERTENSION: ICD-10-CM

## 2019-01-08 LAB
ALBUMIN SERPL BCP-MCNC: 3.9 G/DL (ref 3–5.2)
ALP SERPL-CCNC: 52 U/L (ref 43–122)
ALT SERPL W P-5'-P-CCNC: 21 U/L (ref 9–52)
ANION GAP SERPL CALCULATED.3IONS-SCNC: 8 MMOL/L (ref 5–14)
AST SERPL W P-5'-P-CCNC: 25 U/L (ref 17–59)
BILIRUB SERPL-MCNC: 0.5 MG/DL
BUN SERPL-MCNC: 23 MG/DL (ref 5–25)
CALCIUM SERPL-MCNC: 9.4 MG/DL (ref 8.4–10.2)
CHLORIDE SERPL-SCNC: 101 MMOL/L (ref 97–108)
CHOLEST SERPL-MCNC: 118 MG/DL
CO2 SERPL-SCNC: 29 MMOL/L (ref 22–30)
CREAT SERPL-MCNC: 1.3 MG/DL (ref 0.7–1.5)
ERYTHROCYTE [DISTWIDTH] IN BLOOD BY AUTOMATED COUNT: 12.5 %
EST. AVERAGE GLUCOSE BLD GHB EST-MCNC: 206 MG/DL
FOLATE SERPL-MCNC: >20 NG/ML (ref 3.1–17.5)
GFR SERPL CREATININE-BSD FRML MDRD: 50 ML/MIN/1.73SQ M
GLUCOSE P FAST SERPL-MCNC: 293 MG/DL (ref 70–99)
HBA1C MFR BLD: 8.8 % (ref 4.2–6.3)
HCT VFR BLD AUTO: 38.8 % (ref 41–53)
HDLC SERPL-MCNC: 34 MG/DL (ref 40–59)
HGB BLD-MCNC: 12.8 G/DL (ref 13.5–17.5)
LDLC SERPL CALC-MCNC: 59 MG/DL
MCH RBC QN AUTO: 34.3 PG (ref 26.8–34.3)
MCHC RBC AUTO-ENTMCNC: 33.1 G/DL (ref 31.4–37.4)
MCV RBC AUTO: 104 FL (ref 80–100)
NONHDLC SERPL-MCNC: 84 MG/DL
PLATELET # BLD AUTO: 186 THOUSANDS/UL (ref 150–450)
PMV BLD AUTO: 8.4 FL (ref 8.9–12.7)
POTASSIUM SERPL-SCNC: 4.9 MMOL/L (ref 3.6–5)
PROT SERPL-MCNC: 7.2 G/DL (ref 5.9–8.4)
RBC # BLD AUTO: 3.74 MILLION/UL (ref 4.5–5.9)
SODIUM SERPL-SCNC: 138 MMOL/L (ref 137–147)
TRIGL SERPL-MCNC: 125 MG/DL
VIT B12 SERPL-MCNC: 456 PG/ML (ref 100–900)
WBC # BLD AUTO: 7 THOUSAND/UL (ref 4.31–10.16)

## 2019-01-08 PROCEDURE — 82746 ASSAY OF FOLIC ACID SERUM: CPT | Performed by: INTERNAL MEDICINE

## 2019-01-08 PROCEDURE — 85027 COMPLETE CBC AUTOMATED: CPT | Performed by: INTERNAL MEDICINE

## 2019-01-08 PROCEDURE — 80053 COMPREHEN METABOLIC PANEL: CPT | Performed by: INTERNAL MEDICINE

## 2019-01-08 PROCEDURE — 83036 HEMOGLOBIN GLYCOSYLATED A1C: CPT | Performed by: INTERNAL MEDICINE

## 2019-01-08 PROCEDURE — 36415 COLL VENOUS BLD VENIPUNCTURE: CPT | Performed by: INTERNAL MEDICINE

## 2019-01-08 PROCEDURE — 80061 LIPID PANEL: CPT

## 2019-01-08 PROCEDURE — 82607 VITAMIN B-12: CPT | Performed by: INTERNAL MEDICINE

## 2019-01-08 RX ORDER — DABIGATRAN ETEXILATE 150 MG/1
150 CAPSULE, COATED PELLETS ORAL 2 TIMES DAILY
Qty: 180 CAPSULE | Refills: 1 | Status: SHIPPED | OUTPATIENT
Start: 2019-01-08 | End: 2019-07-08 | Stop reason: SDUPTHER

## 2019-01-14 ENCOUNTER — OFFICE VISIT (OUTPATIENT)
Dept: INTERNAL MEDICINE CLINIC | Facility: CLINIC | Age: 84
End: 2019-01-14
Payer: MEDICARE

## 2019-01-14 VITALS
WEIGHT: 168 LBS | TEMPERATURE: 97 F | HEART RATE: 96 BPM | BODY MASS INDEX: 23.52 KG/M2 | SYSTOLIC BLOOD PRESSURE: 124 MMHG | DIASTOLIC BLOOD PRESSURE: 72 MMHG | RESPIRATION RATE: 15 BRPM | HEIGHT: 71 IN

## 2019-01-14 DIAGNOSIS — E11.65 TYPE 2 DIABETES MELLITUS WITH HYPERGLYCEMIA, WITHOUT LONG-TERM CURRENT USE OF INSULIN (HCC): Primary | ICD-10-CM

## 2019-01-14 DIAGNOSIS — I48.21 PERMANENT ATRIAL FIBRILLATION (HCC): ICD-10-CM

## 2019-01-14 DIAGNOSIS — I10 ESSENTIAL HYPERTENSION: ICD-10-CM

## 2019-01-14 DIAGNOSIS — E78.2 MIXED HYPERLIPIDEMIA: ICD-10-CM

## 2019-01-14 PROCEDURE — G0439 PPPS, SUBSEQ VISIT: HCPCS | Performed by: INTERNAL MEDICINE

## 2019-01-14 PROCEDURE — 99214 OFFICE O/P EST MOD 30 MIN: CPT | Performed by: INTERNAL MEDICINE

## 2019-01-14 NOTE — PROGRESS NOTES
Assessment and Plan:    Problem List Items Addressed This Visit     None        Health Maintenance Due   Topic Date Due    Medicare Annual Wellness Visit (AWV)  1933    DM Eye Exam  04/22/1943    DTaP,Tdap,and Td Vaccines (1 - Tdap) 04/22/1954         HPI:  Nisha Romero  is a 80 y o  male here for his Subsequent Wellness Visit  Patient Active Problem List   Diagnosis    Hypertension    Type 2 diabetes mellitus with hyperglycemia (HCC)    Hyperlipidemia    Atrial fibrillation ([address] )    Inguinal hernia    Anemia     No past medical history on file  Past Surgical History:   Procedure Laterality Date    CATARACT EXTRACTION      HERNIA REPAIR  1985    HERNIA REPAIR  1999     Family History   Problem Relation Age of Onset    Stroke Mother     Stroke Father     Atrial fibrillation Sister      History   Smoking Status    Former Smoker    Quit date: 1970   Smokeless Tobacco    Never Used     History   Alcohol Use    Yes     Comment: Social       History   Drug use: Unknown       Current Outpatient Prescriptions   Medication Sig Dispense Refill    dabigatran etexilate (PRADAXA) 150 mg capsu Take 1 capsule (150 mg total) by mouth 2 (two) times a day 180 capsule 1    metFORMIN (GLUCOPHAGE) 500 mg tablet TAKE 1 TABLET TWICE A DAY WITH MEALS 180 tablet 0    metoprolol succinate (TOPROL-XL) 50 mg 24 hr tablet TAKE 1 TABLET DAILY 90 tablet 3    Multiple Vitamin (DAILY VALUE MULTIVITAMIN) TABS Take 1 tablet by mouth daily      pioglitazone (ACTOS) 30 mg tablet TAKE 1 TABLET DAILY 90 tablet 1    simvastatin (ZOCOR) 40 mg tablet TAKE 1 TABLET DAILY 90 tablet 3     No current facility-administered medications for this visit        No Known Allergies  Immunization History   Administered Date(s) Administered    Influenza 11/25/2014, 09/09/2015, 10/18/2016, 12/05/2017    Influenza Quadrivalent Preservative Free 3 years and older IM 11/25/2014    Influenza Quadrivalent, 6-35 Months IM 09/09/2015  Influenza Split High Dose Preservative Free IM 10/18/2016, 12/05/2017    Influenza TIV (IM) 11/06/2012, 10/24/2013    Influenza, high dose seasonal 0 5 mL 09/26/2018    Pneumococcal Conjugate 13-Valent 07/12/2016    Pneumococcal Polysaccharide PPV23 08/27/2014       Patient Care Team:  Farhana Ram MD as PCP - General  Farhana Ram MD    Medicare Screening Tests and Risk Assessments:  Naveed Sutton is here for his Subsequent Wellness visit  Health Risk Assessment:  Patient rates overall health as very good  Patient feels that their physical health rating is Same  Eyesight was rated as Same  Hearing was rated as Same  Patient feels that their emotional and mental health rating is Same  Pain experienced by patient in the last 7 days has been None  Patient states that he has experienced no weight loss or gain in last 6 months  Emotional/Mental Health:  Patient has been feeling nervous/anxious  PHQ-9 Depression Screening:    Frequency of the following problems over the past two weeks:      1  Little interest or pleasure in doing things: 0 - not at all      2  Feeling down, depressed, or hopeless: 0 - not at all  PHQ-2 Score: 0          Broken Bones/Falls: Fall Risk Assessment:    In the past year, patient has experienced: No history of falling in past year          Bladder/Bowel:  Patient has not leaked urine accidently in the last six months  Patient reports no loss of bowel control  Immunizations:  Patient has had a flu vaccination within the last year  Patient has received a pneumonia shot  Patient has not received a shingles shot  Patient has not received tetanus/diphtheria shot  Home Safety:  Patient does not have trouble with stairs inside or outside of their home  Patient currently reports that there are no safety hazards present in home, working smoke alarms, no working carbon monoxide detectors        Preventative Screenings:   no prostate cancer screen performed, no colon cancer screen completed, cholesterol screen completed, no glaucoma eye exam completed    Nutrition:  Current diet: Regular with servings of the following:    Medications:  Patient is currently taking over-the-counter supplements  List of OTC medications includes: MVI  Patient is able to manage medications  Lifestyle Choices:  Patient reports no tobacco use  Patient has smoked or used tobacco in the past   Patient has stopped his tobacco use  Tobacco use quit date: 1970  Patient reports alcohol use  Alcohol use per week: 2 drinks/wk  Patient drives a vehicle  Patient wears seat belt  Activities of Daily Living:  Can get out of bed by his or her self, able to dress self, able to make own meals, able to do own shopping, able to bathe self, can do own laundry/housekeeping, can manage own money, pay bills and track expenses    Previous Hospitalizations:  No hospitalization or ED visit in past 12 months        Advanced Directives:  Patient has decided on a power of   Patient has spoken to designated power of   Patient has not completed advanced directive  Preventative Screening/Counseling:      Cardiovascular:      General: Screening Current          Diabetes:      General: Screening Current          Colorectal Cancer:      General: Screening Not Indicated          Prostate Cancer:      General: Screening Not Indicated          Osteoporosis:      General: Screening Not Indicated          AAA:      General: Screening Not Indicated          Glaucoma:      General: Risks and Benefits Discussed          HIV:      General: Screening Not Indicated          Hepatitis C:      General: Screening Not Indicated        Advanced Directives:   Patient has living will for healthcare, has durable POA for healthcare, Information on ACP and/or AD provided  End of life assessment reviewed with patient  Provider agrees with end of life decisions        Immunizations:      Influenza: Influenza UTD This Year      Pneumococcal: Lifetime Vaccine Completed      Shingrix: Risks & Benefits Discussed      TDAP: Risks & Benefits Discussed        Referrals: Additional Comments: The patient is doing well  He is maintaining a healthy lifestyle  He has no symptoms of psychiatric or cognitive dysfunction  His home environment is safe  He has not fallen  He is not due for any health screening studies at present  He is up-to-date with his pneumococcal and influenza vaccines  I discussed zoster vaccine with him  I reviewed tetanus booster recommendations with him  I discussed advance care planning with the patient  He does have a living will  His nephew will be his durable power of

## 2019-01-14 NOTE — PROGRESS NOTES
[facility] Medicine [place]      NAME: Ryan Perez  AGE: 80 y o  SEX: male  : 1933   MRN: [id number]    DATE: 2019  TIME: 3:28 PM    Assessment and Plan   1  Type 2 diabetes mellitus with hyperglycemia, without long-term current use of insulin ([address] )  This is adequately controlled considering the patient's age in overall health  - CBC; Future  - Comprehensive metabolic panel; Future  - Hemoglobin A1C; Future    2  Essential hypertension  Well controlled    3  Permanent atrial fibrillation (HCC)  On rate control and anticoagulation    4  Mixed hyperlipidemia  Well controlled on simvastatin  - Lipid panel; Future    The patient is doing well overall  His hemoglobin A1c is not optimal but I think is adequate all things considered  His blood pressure and lipids are good  He is tolerating anticoagulation well  Return to office in:   3 months    Chief Complaint     Chief Complaint   Patient presents with    Medicare Wellness Visit    Follow-up     3 months, reminded to make appt for diabetic eye exam       History of Present Illness     The patient returned to the office for re-evaluation of hypertension, hypercholesterolemia, type 2 diabetes, and permanent atrial fibrillation  Since his last visit he has been feeling well  He has had no chest pain, shortness of breath, palpitations, or dizziness  He has had no symptoms of hyperglycemia or hypoglycemia  He is tolerating his medications well  The following portions of the patient's history were reviewed and updated as appropriate: allergies, current medications, past family history, past medical history, past social history, past surgical history and problem list     Review of Systems   Review of Systems   Constitutional: Negative  HENT: Negative for congestion, ear pain, postnasal drip, rhinorrhea, sore throat and trouble swallowing  Eyes: Negative for pain, discharge, redness and visual disturbance     Respiratory: Negative for cough, shortness of breath and wheezing  Cardiovascular: Negative  Gastrointestinal: Negative  Endocrine: Negative  Genitourinary: Negative for difficulty urinating, dysuria, frequency, hematuria and urgency  Musculoskeletal: Negative for arthralgias, gait problem, joint swelling and myalgias  Skin: Negative for rash  Neurological: Negative for dizziness, speech difficulty, weakness, light-headedness, numbness and headaches  Hematological: Negative  Psychiatric/Behavioral: Negative for confusion, decreased concentration, dysphoric mood and sleep disturbance  The patient is not nervous/anxious  Active Problem List     Patient Active Problem List   Diagnosis    Hypertension    Type 2 diabetes mellitus with hyperglycemia (HCC)    Hyperlipidemia    Atrial fibrillation (HCC)    Inguinal hernia    Anemia       Objective   /72 (BP Location: Left arm, Patient Position: Sitting, Cuff Size: Standard)   Pulse 96   Temp (!) 97 °F (36 1 °C) (Tympanic)   Resp 15   Ht 5' 11" (1 803 m)   Wt 76 2 kg (168 lb)   BMI 23 43 kg/m²     Physical Exam   Constitutional: He is oriented to person, place, and time  He appears well-developed and well-nourished  HENT:   Head: Normocephalic and atraumatic  Neck: Neck supple  No JVD present  No tracheal deviation present  No thyromegaly present  Cardiovascular: Normal rate, normal heart sounds and intact distal pulses  An irregular rhythm present  Exam reveals no gallop and no friction rub  No murmur heard  Pulmonary/Chest: Effort normal and breath sounds normal  He has no wheezes  He has no rales  He exhibits no tenderness  Abdominal: Soft  Bowel sounds are normal  He exhibits no distension and no mass  There is no tenderness  There is no rebound  Musculoskeletal: Normal range of motion  He exhibits no edema or tenderness  Lymphadenopathy:     He has no cervical adenopathy     Neurological: He is alert and oriented to person, place, and time  Skin: Skin is warm and dry  Psychiatric: He has a normal mood and affect         Pertinent Laboratory/Diagnostic Studies:  Appointment on 01/08/2019   Component Date Value Ref Range Status    Cholesterol 01/08/2019 118  <200 mg/dL Final    Triglycerides 01/08/2019 125  <150 mg/dL Final    HDL, Direct 01/08/2019 34* 40 - 59 mg/dL Final    LDL Calculated 01/08/2019 59  <130 mg/dL Final    Non-HDL-Chol (CHOL-HDL) 01/08/2019 84  mg/dl Final           Current Medications     Current Outpatient Prescriptions:     dabigatran etexilate (PRADAXA) 150 mg capsu, Take 1 capsule (150 mg total) by mouth 2 (two) times a day, Disp: 180 capsule, Rfl: 1    metFORMIN (GLUCOPHAGE) 500 mg tablet, TAKE 1 TABLET TWICE A DAY WITH MEALS, Disp: 180 tablet, Rfl: 0    metoprolol succinate (TOPROL-XL) 50 mg 24 hr tablet, TAKE 1 TABLET DAILY, Disp: 90 tablet, Rfl: 3    Multiple Vitamin (DAILY VALUE MULTIVITAMIN) TABS, Take 1 tablet by mouth daily, Disp: , Rfl:     pioglitazone (ACTOS) 30 mg tablet, TAKE 1 TABLET DAILY, Disp: 90 tablet, Rfl: 1    simvastatin (ZOCOR) 40 mg tablet, TAKE 1 TABLET DAILY, Disp: 90 tablet, Rfl: 3      Alexis Cee MD

## 2019-02-26 DIAGNOSIS — E11.65 TYPE 2 DIABETES MELLITUS WITH HYPERGLYCEMIA, WITHOUT LONG-TERM CURRENT USE OF INSULIN (HCC): ICD-10-CM

## 2019-04-09 ENCOUNTER — TRANSCRIBE ORDERS (OUTPATIENT)
Dept: ADMINISTRATIVE | Facility: HOSPITAL | Age: 84
End: 2019-04-09

## 2019-04-09 ENCOUNTER — APPOINTMENT (OUTPATIENT)
Dept: LAB | Facility: HOSPITAL | Age: 84
End: 2019-04-09
Payer: MEDICARE

## 2019-04-09 DIAGNOSIS — E78.2 MIXED HYPERLIPIDEMIA: ICD-10-CM

## 2019-04-09 DIAGNOSIS — E11.65 TYPE 2 DIABETES MELLITUS WITH HYPERGLYCEMIA, WITHOUT LONG-TERM CURRENT USE OF INSULIN (HCC): ICD-10-CM

## 2019-04-09 LAB
ALBUMIN SERPL BCP-MCNC: 3.7 G/DL (ref 3–5.2)
ALP SERPL-CCNC: 48 U/L (ref 43–122)
ALT SERPL W P-5'-P-CCNC: 14 U/L (ref 9–52)
ANION GAP SERPL CALCULATED.3IONS-SCNC: 8 MMOL/L (ref 5–14)
AST SERPL W P-5'-P-CCNC: 26 U/L (ref 17–59)
BILIRUB SERPL-MCNC: 0.8 MG/DL
BUN SERPL-MCNC: 20 MG/DL (ref 5–25)
CALCIUM SERPL-MCNC: 9.4 MG/DL (ref 8.4–10.2)
CHLORIDE SERPL-SCNC: 103 MMOL/L (ref 97–108)
CHOLEST SERPL-MCNC: 106 MG/DL
CO2 SERPL-SCNC: 27 MMOL/L (ref 22–30)
CREAT SERPL-MCNC: 1.26 MG/DL (ref 0.7–1.5)
ERYTHROCYTE [DISTWIDTH] IN BLOOD BY AUTOMATED COUNT: 13 %
EST. AVERAGE GLUCOSE BLD GHB EST-MCNC: 186 MG/DL
GFR SERPL CREATININE-BSD FRML MDRD: 52 ML/MIN/1.73SQ M
GLUCOSE P FAST SERPL-MCNC: 170 MG/DL (ref 70–99)
HBA1C MFR BLD: 8.1 % (ref 4.2–6.3)
HCT VFR BLD AUTO: 37.5 % (ref 41–53)
HDLC SERPL-MCNC: 32 MG/DL (ref 40–59)
HGB BLD-MCNC: 12.5 G/DL (ref 13.5–17.5)
LDLC SERPL CALC-MCNC: 52 MG/DL
MCH RBC QN AUTO: 34.7 PG (ref 26.8–34.3)
MCHC RBC AUTO-ENTMCNC: 33.4 G/DL (ref 31.4–37.4)
MCV RBC AUTO: 104 FL (ref 80–100)
NONHDLC SERPL-MCNC: 74 MG/DL
PLATELET # BLD AUTO: 186 THOUSANDS/UL (ref 150–450)
PMV BLD AUTO: 8.3 FL (ref 8.9–12.7)
POTASSIUM SERPL-SCNC: 4.8 MMOL/L (ref 3.6–5)
PROT SERPL-MCNC: 6.7 G/DL (ref 5.9–8.4)
RBC # BLD AUTO: 3.6 MILLION/UL (ref 4.5–5.9)
SODIUM SERPL-SCNC: 138 MMOL/L (ref 137–147)
TRIGL SERPL-MCNC: 112 MG/DL
WBC # BLD AUTO: 6.8 THOUSAND/UL (ref 4.31–10.16)

## 2019-04-09 PROCEDURE — 83036 HEMOGLOBIN GLYCOSYLATED A1C: CPT

## 2019-04-09 PROCEDURE — 80053 COMPREHEN METABOLIC PANEL: CPT

## 2019-04-09 PROCEDURE — 80061 LIPID PANEL: CPT

## 2019-04-09 PROCEDURE — 36415 COLL VENOUS BLD VENIPUNCTURE: CPT

## 2019-04-09 PROCEDURE — 85027 COMPLETE CBC AUTOMATED: CPT

## 2019-04-16 ENCOUNTER — OFFICE VISIT (OUTPATIENT)
Dept: INTERNAL MEDICINE CLINIC | Facility: CLINIC | Age: 84
End: 2019-04-16
Payer: MEDICARE

## 2019-04-16 VITALS
SYSTOLIC BLOOD PRESSURE: 124 MMHG | BODY MASS INDEX: 23.1 KG/M2 | DIASTOLIC BLOOD PRESSURE: 80 MMHG | HEIGHT: 71 IN | HEART RATE: 88 BPM | RESPIRATION RATE: 15 BRPM | TEMPERATURE: 96.8 F | WEIGHT: 165 LBS

## 2019-04-16 DIAGNOSIS — E11.65 TYPE 2 DIABETES MELLITUS WITH HYPERGLYCEMIA, WITHOUT LONG-TERM CURRENT USE OF INSULIN (HCC): Primary | ICD-10-CM

## 2019-04-16 DIAGNOSIS — E78.2 MIXED HYPERLIPIDEMIA: ICD-10-CM

## 2019-04-16 DIAGNOSIS — D64.9 ANEMIA, UNSPECIFIED TYPE: ICD-10-CM

## 2019-04-16 DIAGNOSIS — I48.21 PERMANENT ATRIAL FIBRILLATION (HCC): ICD-10-CM

## 2019-04-16 DIAGNOSIS — I10 ESSENTIAL HYPERTENSION: ICD-10-CM

## 2019-04-16 PROCEDURE — 99214 OFFICE O/P EST MOD 30 MIN: CPT | Performed by: INTERNAL MEDICINE

## 2019-05-07 DIAGNOSIS — E11.65 TYPE 2 DIABETES MELLITUS WITH HYPERGLYCEMIA, WITHOUT LONG-TERM CURRENT USE OF INSULIN (HCC): ICD-10-CM

## 2019-05-20 DIAGNOSIS — E11.9 TYPE 2 DIABETES MELLITUS WITHOUT COMPLICATION, WITHOUT LONG-TERM CURRENT USE OF INSULIN (HCC): ICD-10-CM

## 2019-05-20 RX ORDER — PIOGLITAZONEHYDROCHLORIDE 30 MG/1
TABLET ORAL
Qty: 90 TABLET | Refills: 1 | Status: SHIPPED | OUTPATIENT
Start: 2019-05-20 | End: 2019-11-26 | Stop reason: SDUPTHER

## 2019-07-08 DIAGNOSIS — I48.21 PERMANENT ATRIAL FIBRILLATION (HCC): ICD-10-CM

## 2019-07-09 RX ORDER — ATENOLOL 100 MG/1
TABLET ORAL
Qty: 180 CAPSULE | Refills: 1 | Status: SHIPPED | OUTPATIENT
Start: 2019-07-09 | End: 2020-01-07

## 2019-07-11 ENCOUNTER — APPOINTMENT (OUTPATIENT)
Dept: LAB | Facility: HOSPITAL | Age: 84
End: 2019-07-11
Attending: INTERNAL MEDICINE
Payer: MEDICARE

## 2019-07-11 LAB
ALBUMIN SERPL BCP-MCNC: 3.8 G/DL (ref 3–5.2)
ALP SERPL-CCNC: 50 U/L (ref 43–122)
ALT SERPL W P-5'-P-CCNC: 21 U/L (ref 9–52)
ANION GAP SERPL CALCULATED.3IONS-SCNC: 9 MMOL/L (ref 5–14)
AST SERPL W P-5'-P-CCNC: 23 U/L (ref 17–59)
BILIRUB SERPL-MCNC: 0.7 MG/DL
BUN SERPL-MCNC: 22 MG/DL (ref 5–25)
CALCIUM SERPL-MCNC: 9.2 MG/DL (ref 8.4–10.2)
CHLORIDE SERPL-SCNC: 106 MMOL/L (ref 97–108)
CHOLEST SERPL-MCNC: 106 MG/DL
CO2 SERPL-SCNC: 27 MMOL/L (ref 22–30)
CREAT SERPL-MCNC: 1.41 MG/DL (ref 0.7–1.5)
ERYTHROCYTE [DISTWIDTH] IN BLOOD BY AUTOMATED COUNT: 12.7 %
GFR SERPL CREATININE-BSD FRML MDRD: 45 ML/MIN/1.73SQ M
GLUCOSE P FAST SERPL-MCNC: 172 MG/DL (ref 70–99)
HCT VFR BLD AUTO: 37.2 % (ref 41–53)
HDLC SERPL-MCNC: 28 MG/DL (ref 40–59)
HGB BLD-MCNC: 12.4 G/DL (ref 13.5–17.5)
LDLC SERPL CALC-MCNC: 57 MG/DL
MCH RBC QN AUTO: 34.6 PG (ref 26.8–34.3)
MCHC RBC AUTO-ENTMCNC: 33.2 G/DL (ref 31.4–37.4)
MCV RBC AUTO: 104 FL (ref 80–100)
NONHDLC SERPL-MCNC: 78 MG/DL
PLATELET # BLD AUTO: 173 THOUSANDS/UL (ref 150–450)
PMV BLD AUTO: 8.8 FL (ref 8.9–12.7)
POTASSIUM SERPL-SCNC: 5.2 MMOL/L (ref 3.6–5)
PROT SERPL-MCNC: 7.1 G/DL (ref 5.9–8.4)
RBC # BLD AUTO: 3.58 MILLION/UL (ref 4.5–5.9)
SODIUM SERPL-SCNC: 142 MMOL/L (ref 137–147)
TRIGL SERPL-MCNC: 105 MG/DL
WBC # BLD AUTO: 6.7 THOUSAND/UL (ref 4.31–10.16)

## 2019-07-11 PROCEDURE — 36415 COLL VENOUS BLD VENIPUNCTURE: CPT | Performed by: INTERNAL MEDICINE

## 2019-07-11 PROCEDURE — 85027 COMPLETE CBC AUTOMATED: CPT | Performed by: INTERNAL MEDICINE

## 2019-07-11 PROCEDURE — 80053 COMPREHEN METABOLIC PANEL: CPT | Performed by: INTERNAL MEDICINE

## 2019-07-11 PROCEDURE — 83036 HEMOGLOBIN GLYCOSYLATED A1C: CPT | Performed by: INTERNAL MEDICINE

## 2019-07-11 PROCEDURE — 80061 LIPID PANEL: CPT | Performed by: INTERNAL MEDICINE

## 2019-07-12 LAB
EST. AVERAGE GLUCOSE BLD GHB EST-MCNC: 192 MG/DL
HBA1C MFR BLD: 8.3 % (ref 4.2–6.3)

## 2019-07-18 ENCOUNTER — OFFICE VISIT (OUTPATIENT)
Dept: INTERNAL MEDICINE CLINIC | Facility: CLINIC | Age: 84
End: 2019-07-18
Payer: MEDICARE

## 2019-07-18 VITALS
BODY MASS INDEX: 22.9 KG/M2 | WEIGHT: 163.6 LBS | DIASTOLIC BLOOD PRESSURE: 70 MMHG | HEIGHT: 71 IN | SYSTOLIC BLOOD PRESSURE: 110 MMHG | HEART RATE: 97 BPM | OXYGEN SATURATION: 98 %

## 2019-07-18 DIAGNOSIS — E11.65 TYPE 2 DIABETES MELLITUS WITH HYPERGLYCEMIA, WITHOUT LONG-TERM CURRENT USE OF INSULIN (HCC): ICD-10-CM

## 2019-07-18 DIAGNOSIS — I48.21 PERMANENT ATRIAL FIBRILLATION (HCC): ICD-10-CM

## 2019-07-18 DIAGNOSIS — I10 ESSENTIAL HYPERTENSION: Primary | ICD-10-CM

## 2019-07-18 DIAGNOSIS — E78.2 MIXED HYPERLIPIDEMIA: ICD-10-CM

## 2019-07-18 DIAGNOSIS — D64.9 ANEMIA, UNSPECIFIED TYPE: ICD-10-CM

## 2019-07-18 PROCEDURE — 99214 OFFICE O/P EST MOD 30 MIN: CPT | Performed by: INTERNAL MEDICINE

## 2019-07-19 NOTE — PROGRESS NOTES
[facility] Medicine [place]      NAME: Rowan Rivas  AGE: 80 y o  SEX: male  : 1933   MRN: [id number]    DATE: 2019  TIME: 6:44 PM    Assessment and Plan   1  Essential hypertension  Well controlled  2  Type 2 diabetes mellitus with hyperglycemia, without long-term current use of insulin (HCC)  Not well controlled  I recommend that he increase metformin  - Comprehensive metabolic panel  - Hemoglobin A1C  - metFORMIN (GLUCOPHAGE) 1000 MG tablet; Take 1 tablet (1,000 mg total) by mouth 2 (two) times a day with meals  Dispense: 180 tablet; Refill: 3    3  Mixed hyperlipidemia  Stable on simvastatin    4  Permanent atrial fibrillation (HCC)  On metoprolol for rate control and Pradaxa for stroke prophylaxis    5  Anemia, unspecified type  This is mild and has been stable  Clinically, the patient continues to do well  His hemoglobin A1c was significantly elevated so he will increase his metformin  We will repeat his labs at the appropriate interval       Return to office in:  3 months    Chief Complaint     Chief Complaint   Patient presents with    Follow-up     3 month follow up        History of Present Illness     The patient returned to the office for re-evaluation of hypertension, hypercholesterolemia, type 2 diabetes, and chronic atrial fibrillation  Since his last visit he has been feeling well  He denies chest pain, shortness of breath, palpitations, or dizziness  He has no symptoms of hyperglycemia or hypoglycemia  He is tolerating his medications well  The following portions of the patient's history were reviewed and updated as appropriate: allergies, current medications, past family history, past medical history, past social history, past surgical history and problem list     Review of Systems   Review of Systems   Constitutional: Negative  HENT: Negative for congestion, ear pain, postnasal drip, rhinorrhea, sore throat and trouble swallowing      Eyes: Negative for pain, discharge, redness and visual disturbance  Respiratory: Negative for cough, shortness of breath and wheezing  Cardiovascular: Negative  Gastrointestinal: Negative  Endocrine: Negative  Genitourinary: Negative for difficulty urinating, dysuria, frequency, hematuria and urgency  Musculoskeletal: Negative for arthralgias, gait problem, joint swelling and myalgias  Skin: Negative for rash  Neurological: Negative for dizziness, speech difficulty, weakness, light-headedness, numbness and headaches  Hematological: Negative  Psychiatric/Behavioral: Negative for confusion, decreased concentration, dysphoric mood and sleep disturbance  The patient is not nervous/anxious  Active Problem List     Patient Active Problem List   Diagnosis    Hypertension    Type 2 diabetes mellitus with hyperglycemia (HCC)    Hyperlipidemia    Atrial fibrillation (HCC)    Inguinal hernia    Anemia       Objective   /70 (BP Location: Left arm, Patient Position: Sitting, Cuff Size: Standard)   Pulse 97   Ht 5' 11" (1 803 m)   Wt 74 2 kg (163 lb 9 6 oz)   SpO2 98%   BMI 22 82 kg/m²     Physical Exam   Constitutional: He is oriented to person, place, and time  He appears well-developed and well-nourished  No distress  HENT:   Head: Normocephalic and atraumatic  Neck: Neck supple  No JVD present  No tracheal deviation present  No thyromegaly present  Cardiovascular: Normal rate, regular rhythm, normal heart sounds and intact distal pulses  Exam reveals no gallop and no friction rub  Pulses are no weak pulses  No murmur heard  Pulses:       Dorsalis pedis pulses are 2+ on the right side, and 2+ on the left side  Posterior tibial pulses are 2+ on the right side, and 2+ on the left side  Pulmonary/Chest: Effort normal and breath sounds normal  He has no wheezes  He has no rales  He exhibits no tenderness  Abdominal: Soft   Bowel sounds are normal  He exhibits no distension and no mass  There is no tenderness  There is no rebound  Musculoskeletal: Normal range of motion  He exhibits no edema or tenderness  Feet:   Right Foot:   Skin Integrity: Negative for ulcer, skin breakdown, erythema, warmth, callus or dry skin  Left Foot:   Skin Integrity: Negative for ulcer, skin breakdown, erythema, warmth, callus or dry skin  Lymphadenopathy:     He has no cervical adenopathy  Neurological: He is alert and oriented to person, place, and time  Skin: Skin is warm and dry  Psychiatric: He has a normal mood and affect  His behavior is normal  Judgment and thought content normal    Patient's shoes and socks removed  Right Foot/Ankle   Right Foot Inspection  Skin Exam: skin normal and skin intact no dry skin, no warmth, no callus, no erythema, no maceration, no abnormal color, no pre-ulcer, no ulcer and no callus                          Toe Exam: ROM and strength within normal limitsno swelling, no tenderness, erythema and  no right toe deformity  Sensory   Vibration: intact  Proprioception: intact   Monofilament testing: intact  Vascular  Capillary refills: < 3 seconds  The right DP pulse is 2+  The right PT pulse is 2+  Left Foot/Ankle  Left Foot Inspection  Skin Exam: skin normal and skin intactno dry skin, no warmth, no erythema, no maceration, normal color, no pre-ulcer, no ulcer and no callus                         Toe Exam: ROM and strength within normal limitsno swelling, no tenderness, no erythema and no left toe deformity                   Sensory   Vibration: intact  Proprioception: intact  Monofilament: intact  Vascular  Capillary refills: < 3 seconds  The left DP pulse is 2+  The left PT pulse is 2+  Assign Risk Category:  No deformity present; No loss of protective sensation; No weak pulses       Risk: 0    Pertinent Laboratory/Diagnostic Studies:  No visits with results within 3 Month(s) from this visit     Latest known visit with results is:   Office Visit on 04/16/2019   Component Date Value Ref Range Status    WBC 07/11/2019 6 70  4 31 - 10 16 Thousand/uL Final    RBC 07/11/2019 3 58* 4 50 - 5 90 Million/uL Final    Hemoglobin 07/11/2019 12 4* 13 5 - 17 5 g/dL Final    Hematocrit 07/11/2019 37 2* 41 0 - 53 0 % Final    MCV 07/11/2019 104* 80 - 100 fL Final    MCH 07/11/2019 34 6* 26 8 - 34 3 pg Final    MCHC 07/11/2019 33 2  31 4 - 37 4 g/dL Final    RDW 07/11/2019 12 7  <15 3 % Final    Platelets 81/58/5424 173  150 - 450 Thousands/uL Final    MPV 07/11/2019 8 8* 8 9 - 12 7 fL Final    Sodium 07/11/2019 142  137 - 147 mmol/L Final    Potassium 07/11/2019 5 2* 3 6 - 5 0 mmol/L Final    Chloride 07/11/2019 106  97 - 108 mmol/L Final    CO2 07/11/2019 27  22 - 30 mmol/L Final    ANION GAP 07/11/2019 9  5 - 14 mmol/L Final    BUN 07/11/2019 22  5 - 25 mg/dL Final    Creatinine 07/11/2019 1 41  0 70 - 1 50 mg/dL Final    Glucose, Fasting 07/11/2019 172* 70 - 99 mg/dL Final    Calcium 07/11/2019 9 2  8 4 - 10 2 mg/dL Final    AST 07/11/2019 23  17 - 59 U/L Final    ALT 07/11/2019 21  9 - 52 U/L Final    Alkaline Phosphatase 07/11/2019 50  43 - 122 U/L Final    Total Protein 07/11/2019 7 1  5 9 - 8 4 g/dL Final    Albumin 07/11/2019 3 8  3 0 - 5 2 g/dL Final    Total Bilirubin 07/11/2019 0 70  <1 30 mg/dL Final    eGFR 07/11/2019 45* >60 ml/min/1 73sq m Final    Hemoglobin A1C 07/11/2019 8 3* 4 2 - 6 3 % Final    EAG 07/11/2019 192  mg/dl Final    Cholesterol 07/11/2019 106  <200 mg/dL Final    Triglycerides 07/11/2019 105  <150 mg/dL Final    HDL, Direct 07/11/2019 28* 40 - 59 mg/dL Final    LDL Calculated 07/11/2019 57  <130 mg/dL Final    Non-HDL-Chol (CHOL-HDL) 07/11/2019 78  mg/dl Final           Current Medications     Current Outpatient Medications:     metFORMIN (GLUCOPHAGE) 1000 MG tablet, Take 1 tablet (1,000 mg total) by mouth 2 (two) times a day with meals, Disp: 180 tablet, Rfl: 3    metoprolol succinate (TOPROL-XL) 50 mg 24 hr tablet, TAKE 1 TABLET DAILY, Disp: 90 tablet, Rfl: 3    Multiple Vitamin (DAILY VALUE MULTIVITAMIN) TABS, Take 1 tablet by mouth daily, Disp: , Rfl:     pioglitazone (ACTOS) 30 mg tablet, TAKE 1 TABLET DAILY, Disp: 90 tablet, Rfl: 1    PRADAXA 150 MG capsu, TAKE 1 CAPSULE TWICE A DAY, Disp: 180 capsule, Rfl: 1    simvastatin (ZOCOR) 40 mg tablet, TAKE 1 TABLET DAILY, Disp: 90 tablet, Rfl: 3      Tatum Devine MD

## 2019-08-06 DIAGNOSIS — E11.65 TYPE 2 DIABETES MELLITUS WITH HYPERGLYCEMIA, WITHOUT LONG-TERM CURRENT USE OF INSULIN (HCC): Primary | ICD-10-CM

## 2019-09-18 DIAGNOSIS — E11.65 TYPE 2 DIABETES MELLITUS WITH HYPERGLYCEMIA, WITHOUT LONG-TERM CURRENT USE OF INSULIN (HCC): ICD-10-CM

## 2019-09-18 NOTE — TELEPHONE ENCOUNTER
Patient requested this medication be sent to [facility] not [facility]

## 2019-10-16 ENCOUNTER — APPOINTMENT (OUTPATIENT)
Dept: LAB | Facility: HOSPITAL | Age: 84
End: 2019-10-16
Attending: INTERNAL MEDICINE
Payer: MEDICARE

## 2019-10-16 LAB
ALBUMIN SERPL BCP-MCNC: 3.9 G/DL (ref 3–5.2)
ALP SERPL-CCNC: 51 U/L (ref 43–122)
ALT SERPL W P-5'-P-CCNC: 19 U/L (ref 9–52)
ANION GAP SERPL CALCULATED.3IONS-SCNC: 7 MMOL/L (ref 5–14)
AST SERPL W P-5'-P-CCNC: 32 U/L (ref 17–59)
BILIRUB SERPL-MCNC: 0.6 MG/DL
BUN SERPL-MCNC: 26 MG/DL (ref 5–25)
CALCIUM SERPL-MCNC: 9.1 MG/DL (ref 8.4–10.2)
CHLORIDE SERPL-SCNC: 104 MMOL/L (ref 97–108)
CO2 SERPL-SCNC: 28 MMOL/L (ref 22–30)
CREAT SERPL-MCNC: 1.32 MG/DL (ref 0.7–1.5)
EST. AVERAGE GLUCOSE BLD GHB EST-MCNC: 171 MG/DL
GFR SERPL CREATININE-BSD FRML MDRD: 49 ML/MIN/1.73SQ M
GLUCOSE P FAST SERPL-MCNC: 162 MG/DL (ref 70–99)
HBA1C MFR BLD: 7.6 % (ref 4.2–6.3)
POTASSIUM SERPL-SCNC: 5 MMOL/L (ref 3.6–5)
PROT SERPL-MCNC: 7.2 G/DL (ref 5.9–8.4)
SODIUM SERPL-SCNC: 139 MMOL/L (ref 137–147)

## 2019-10-16 PROCEDURE — 36415 COLL VENOUS BLD VENIPUNCTURE: CPT | Performed by: INTERNAL MEDICINE

## 2019-10-16 PROCEDURE — 83036 HEMOGLOBIN GLYCOSYLATED A1C: CPT | Performed by: INTERNAL MEDICINE

## 2019-10-16 PROCEDURE — 80053 COMPREHEN METABOLIC PANEL: CPT | Performed by: INTERNAL MEDICINE

## 2019-10-24 ENCOUNTER — OFFICE VISIT (OUTPATIENT)
Dept: INTERNAL MEDICINE CLINIC | Facility: CLINIC | Age: 84
End: 2019-10-24
Payer: MEDICARE

## 2019-10-24 VITALS
OXYGEN SATURATION: 99 % | SYSTOLIC BLOOD PRESSURE: 131 MMHG | HEART RATE: 84 BPM | BODY MASS INDEX: 22.85 KG/M2 | DIASTOLIC BLOOD PRESSURE: 77 MMHG | WEIGHT: 163.2 LBS | TEMPERATURE: 97.6 F | HEIGHT: 71 IN

## 2019-10-24 DIAGNOSIS — E11.65 TYPE 2 DIABETES MELLITUS WITH HYPERGLYCEMIA, WITHOUT LONG-TERM CURRENT USE OF INSULIN (HCC): ICD-10-CM

## 2019-10-24 DIAGNOSIS — E78.2 MIXED HYPERLIPIDEMIA: ICD-10-CM

## 2019-10-24 DIAGNOSIS — I10 ESSENTIAL HYPERTENSION: ICD-10-CM

## 2019-10-24 DIAGNOSIS — I48.21 PERMANENT ATRIAL FIBRILLATION (HCC): ICD-10-CM

## 2019-10-24 DIAGNOSIS — Z23 FLU VACCINE NEED: Primary | ICD-10-CM

## 2019-10-24 PROCEDURE — 99214 OFFICE O/P EST MOD 30 MIN: CPT | Performed by: INTERNAL MEDICINE

## 2019-10-24 PROCEDURE — G0008 ADMIN INFLUENZA VIRUS VAC: HCPCS | Performed by: INTERNAL MEDICINE

## 2019-10-24 PROCEDURE — 90662 IIV NO PRSV INCREASED AG IM: CPT | Performed by: INTERNAL MEDICINE

## 2019-10-24 NOTE — PROGRESS NOTES
[facility] [place]      NAME: Hipolito Mills  AGE: 80 y o  SEX: male  : 1933   MRN: [id number]    DATE: 10/24/2019  TIME: 2:24 PM    Assessment and Plan   1  Flu vaccine need  - influenza vaccine, [id number], high-dose, PF 0 5 mL (FLUZONE HIGH-DOSE)    2  Essential hypertension  Well controlled  - CBC  - Comprehensive metabolic panel    3  Type 2 diabetes mellitus with hyperglycemia, without long-term current use of insulin (HCC)  The patient's hemoglobin A1c has improved  Considering his age and other comorbidities, I believe that his diabetic control is appropriate  - Hemoglobin A1C  - Microalbumin, Random Urine (W/Creatinine)    4  Mixed hyperlipidemia  Stable on simvastatin  - Lipid panel    5  Permanent atrial fibrillation  On rate control and Pradaxa for stroke prophylaxis    Overall, the patient is doing rather well  His blood pressure, lipids, and glucose are all satisfactory  He had his flu shot today  I reminded him to schedule an eye exam as soon as possible  Return to office in:  3 months    Chief Complaint     Chief Complaint   Patient presents with    Follow-up     3 month        History of Present Illness     The patient returned to the office for re-evaluation of hypertension, hypercholesterolemia, type 2 diabetes, and permanent atrial fibrillation  Since his last visit he has been feeling well  He has had no chest pain, shortness of breath, palpitations, or dizziness  He has had no symptoms to suggest hypoglycemia or hyperglycemia  He is tolerating his medications well  The following portions of the patient's history were reviewed and updated as appropriate: allergies, current medications, past family history, past medical history, past social history, past surgical history and problem list     Review of Systems   Review of Systems   Constitutional: Negative      HENT: Negative for congestion, ear pain, postnasal drip, rhinorrhea, sore throat and trouble swallowing  Eyes: Negative for pain, discharge, redness and visual disturbance  Respiratory: Negative for cough, shortness of breath and wheezing  Cardiovascular: Negative  Gastrointestinal: Negative  Endocrine: Negative  Genitourinary: Negative for difficulty urinating, dysuria, frequency, hematuria and urgency  Musculoskeletal: Negative for arthralgias, gait problem, joint swelling and myalgias  Skin: Negative for rash  Neurological: Negative for dizziness, speech difficulty, weakness, light-headedness, numbness and headaches  Hematological: Negative  Psychiatric/Behavioral: Negative for confusion, decreased concentration, dysphoric mood and sleep disturbance  The patient is not nervous/anxious  Active Problem List     Patient Active Problem List   Diagnosis    Hypertension    Type 2 diabetes mellitus with hyperglycemia (HCC)    Hyperlipidemia    Atrial fibrillation (HCC)    Inguinal hernia    Anemia       Objective   /77 (BP Location: Left arm, Patient Position: Sitting, Cuff Size: Standard)   Pulse 84   Temp 97 6 °F (36 4 °C) (Tympanic)   Ht 5' 11" (1 803 m)   Wt 74 kg (163 lb 3 2 oz)   SpO2 99%   BMI 22 76 kg/m²     Physical Exam   Constitutional: He is oriented to person, place, and time  He appears well-developed and well-nourished  No distress  HENT:   Head: Normocephalic and atraumatic  Neck: Neck supple  No JVD present  No tracheal deviation present  No thyromegaly present  Cardiovascular: Normal rate, normal heart sounds and intact distal pulses  Exam reveals no gallop and no friction rub  No murmur heard  Cardiac rhythm is irregular  Pulmonary/Chest: Effort normal and breath sounds normal  He has no wheezes  He has no rales  He exhibits no tenderness  Abdominal: Soft  Bowel sounds are normal  He exhibits no distension and no mass  There is no tenderness  There is no rebound  Musculoskeletal: Normal range of motion   He exhibits no edema or tenderness  Lymphadenopathy:     He has no cervical adenopathy  Neurological: He is alert and oriented to person, place, and time  Skin: Skin is warm and dry  Psychiatric: He has a normal mood and affect  His behavior is normal  Judgment and thought content normal        Pertinent Laboratory/Diagnostic Studies:  No visits with results within 3 Month(s) from this visit     Latest known visit with results is:   Office Visit on 07/18/2019   Component Date Value Ref Range Status    Sodium 10/16/2019 139  137 - 147 mmol/L Final    Potassium 10/16/2019 5 0  3 6 - 5 0 mmol/L Final    Chloride 10/16/2019 104  97 - 108 mmol/L Final    CO2 10/16/2019 28  22 - 30 mmol/L Final    ANION GAP 10/16/2019 7  5 - 14 mmol/L Final    BUN 10/16/2019 26* 5 - 25 mg/dL Final    Creatinine 10/16/2019 1 32  0 70 - 1 50 mg/dL Final    Glucose, Fasting 10/16/2019 162* 70 - 99 mg/dL Final    Calcium 10/16/2019 9 1  8 4 - 10 2 mg/dL Final    AST 10/16/2019 32  17 - 59 U/L Final    ALT 10/16/2019 19  9 - 52 U/L Final    Alkaline Phosphatase 10/16/2019 51  43 - 122 U/L Final    Total Protein 10/16/2019 7 2  5 9 - 8 4 g/dL Final    Albumin 10/16/2019 3 9  3 0 - 5 2 g/dL Final    Total Bilirubin 10/16/2019 0 60  <1 30 mg/dL Final    eGFR 10/16/2019 49* >60 ml/min/1 73sq m Final    Hemoglobin A1C 10/16/2019 7 6* 4 2 - 6 3 % Final    EAG 10/16/2019 171  mg/dl Final           Current Medications     Current Outpatient Medications:     metFORMIN (GLUCOPHAGE) 1000 MG tablet, Take 1 tablet (1,000 mg total) by mouth 2 (two) times a day with meals, Disp: 180 tablet, Rfl: 1    metoprolol succinate (TOPROL-XL) 50 mg 24 hr tablet, TAKE 1 TABLET DAILY, Disp: 90 tablet, Rfl: 3    Multiple Vitamin (DAILY VALUE MULTIVITAMIN) TABS, Take 1 tablet by mouth daily, Disp: , Rfl:     pioglitazone (ACTOS) 30 mg tablet, TAKE 1 TABLET DAILY, Disp: 90 tablet, Rfl: 1    PRADAXA 150 MG capsu, TAKE 1 CAPSULE TWICE A DAY, Disp: 180 capsule, Rfl: 1    simvastatin (ZOCOR) 40 mg tablet, TAKE 1 TABLET DAILY, Disp: 90 tablet, Rfl: 3      Porsche Benson MD

## 2019-11-26 DIAGNOSIS — E11.9 TYPE 2 DIABETES MELLITUS WITHOUT COMPLICATION, WITHOUT LONG-TERM CURRENT USE OF INSULIN (HCC): ICD-10-CM

## 2019-11-26 RX ORDER — PIOGLITAZONEHYDROCHLORIDE 30 MG/1
TABLET ORAL
Qty: 90 TABLET | Refills: 4 | Status: SHIPPED | OUTPATIENT
Start: 2019-11-26 | End: 2021-01-28 | Stop reason: SDUPTHER

## 2019-12-11 DIAGNOSIS — I48.21 PERMANENT ATRIAL FIBRILLATION (HCC): ICD-10-CM

## 2019-12-11 DIAGNOSIS — E78.2 MIXED HYPERLIPIDEMIA: ICD-10-CM

## 2019-12-11 RX ORDER — METOPROLOL SUCCINATE 50 MG/1
TABLET, EXTENDED RELEASE ORAL
Qty: 90 TABLET | Refills: 4 | Status: SHIPPED | OUTPATIENT
Start: 2019-12-11 | End: 2021-02-22 | Stop reason: SDUPTHER

## 2019-12-11 RX ORDER — SIMVASTATIN 40 MG
TABLET ORAL
Qty: 90 TABLET | Refills: 4 | Status: SHIPPED | OUTPATIENT
Start: 2019-12-11 | End: 2021-02-22 | Stop reason: SDUPTHER

## 2020-01-06 DIAGNOSIS — I48.21 PERMANENT ATRIAL FIBRILLATION (HCC): ICD-10-CM

## 2020-01-07 RX ORDER — ATENOLOL 100 MG/1
TABLET ORAL
Qty: 180 CAPSULE | Refills: 3 | Status: SHIPPED | OUTPATIENT
Start: 2020-01-07 | End: 2020-04-07 | Stop reason: ALTCHOICE

## 2020-01-22 ENCOUNTER — APPOINTMENT (OUTPATIENT)
Dept: LAB | Facility: HOSPITAL | Age: 85
End: 2020-01-22
Attending: INTERNAL MEDICINE
Payer: MEDICARE

## 2020-01-22 DIAGNOSIS — E11.65 TYPE II DIABETES MELLITUS WITH HYPEROSMOLARITY, UNCONTROLLED (HCC): Primary | ICD-10-CM

## 2020-01-22 DIAGNOSIS — E11.00 TYPE II DIABETES MELLITUS WITH HYPEROSMOLARITY, UNCONTROLLED (HCC): Primary | ICD-10-CM

## 2020-01-22 LAB
ALBUMIN SERPL BCP-MCNC: 3.9 G/DL (ref 3–5.2)
ALP SERPL-CCNC: 53 U/L (ref 43–122)
ALT SERPL W P-5'-P-CCNC: 26 U/L (ref 9–52)
ANION GAP SERPL CALCULATED.3IONS-SCNC: 8 MMOL/L (ref 5–14)
AST SERPL W P-5'-P-CCNC: 24 U/L (ref 17–59)
BILIRUB SERPL-MCNC: 0.6 MG/DL
BUN SERPL-MCNC: 21 MG/DL (ref 5–25)
CALCIUM SERPL-MCNC: 9.2 MG/DL (ref 8.4–10.2)
CHLORIDE SERPL-SCNC: 105 MMOL/L (ref 97–108)
CHOLEST SERPL-MCNC: 106 MG/DL
CO2 SERPL-SCNC: 27 MMOL/L (ref 22–30)
CREAT SERPL-MCNC: 1.29 MG/DL (ref 0.7–1.5)
CREAT UR-MCNC: 54.4 MG/DL
ERYTHROCYTE [DISTWIDTH] IN BLOOD BY AUTOMATED COUNT: 12.9 %
EST. AVERAGE GLUCOSE BLD GHB EST-MCNC: 192 MG/DL
GFR SERPL CREATININE-BSD FRML MDRD: 50 ML/MIN/1.73SQ M
GLUCOSE P FAST SERPL-MCNC: 181 MG/DL (ref 70–99)
HBA1C MFR BLD: 8.3 % (ref 4.2–6.3)
HCT VFR BLD AUTO: 38.1 % (ref 41–53)
HDLC SERPL-MCNC: 37 MG/DL
HGB BLD-MCNC: 12.7 G/DL (ref 13.5–17.5)
LDLC SERPL CALC-MCNC: 51 MG/DL
MCH RBC QN AUTO: 34.5 PG (ref 26.8–34.3)
MCHC RBC AUTO-ENTMCNC: 33.3 G/DL (ref 31.4–37.4)
MCV RBC AUTO: 104 FL (ref 80–100)
MICROALBUMIN UR-MCNC: 10.5 MG/L (ref 0–20)
MICROALBUMIN/CREAT 24H UR: 19 MG/G CREATININE (ref 0–30)
NONHDLC SERPL-MCNC: 69 MG/DL
PLATELET # BLD AUTO: 201 THOUSANDS/UL (ref 150–450)
PMV BLD AUTO: 9.1 FL (ref 8.9–12.7)
POTASSIUM SERPL-SCNC: 4.6 MMOL/L (ref 3.6–5)
PROT SERPL-MCNC: 7.2 G/DL (ref 5.9–8.4)
RBC # BLD AUTO: 3.67 MILLION/UL (ref 4.5–5.9)
SODIUM SERPL-SCNC: 140 MMOL/L (ref 137–147)
TRIGL SERPL-MCNC: 89 MG/DL
WBC # BLD AUTO: 7.3 THOUSAND/UL (ref 4.31–10.16)

## 2020-01-22 PROCEDURE — 83036 HEMOGLOBIN GLYCOSYLATED A1C: CPT | Performed by: INTERNAL MEDICINE

## 2020-01-22 PROCEDURE — 85027 COMPLETE CBC AUTOMATED: CPT | Performed by: INTERNAL MEDICINE

## 2020-01-22 PROCEDURE — 80061 LIPID PANEL: CPT | Performed by: INTERNAL MEDICINE

## 2020-01-22 PROCEDURE — 36415 COLL VENOUS BLD VENIPUNCTURE: CPT | Performed by: INTERNAL MEDICINE

## 2020-01-22 PROCEDURE — 82570 ASSAY OF URINE CREATININE: CPT

## 2020-01-22 PROCEDURE — 82043 UR ALBUMIN QUANTITATIVE: CPT

## 2020-01-22 PROCEDURE — 80053 COMPREHEN METABOLIC PANEL: CPT | Performed by: INTERNAL MEDICINE

## 2020-01-30 ENCOUNTER — OFFICE VISIT (OUTPATIENT)
Dept: INTERNAL MEDICINE CLINIC | Facility: CLINIC | Age: 85
End: 2020-01-30
Payer: MEDICARE

## 2020-01-30 VITALS
SYSTOLIC BLOOD PRESSURE: 142 MMHG | HEIGHT: 71 IN | BODY MASS INDEX: 22.73 KG/M2 | HEART RATE: 80 BPM | DIASTOLIC BLOOD PRESSURE: 80 MMHG | WEIGHT: 162.4 LBS | TEMPERATURE: 98 F | OXYGEN SATURATION: 95 %

## 2020-01-30 DIAGNOSIS — I48.21 PERMANENT ATRIAL FIBRILLATION (HCC): ICD-10-CM

## 2020-01-30 DIAGNOSIS — D64.9 ANEMIA, UNSPECIFIED TYPE: ICD-10-CM

## 2020-01-30 DIAGNOSIS — E11.65 TYPE 2 DIABETES MELLITUS WITH HYPERGLYCEMIA, WITHOUT LONG-TERM CURRENT USE OF INSULIN (HCC): Primary | ICD-10-CM

## 2020-01-30 DIAGNOSIS — E78.2 MIXED HYPERLIPIDEMIA: ICD-10-CM

## 2020-01-30 DIAGNOSIS — I10 ESSENTIAL HYPERTENSION: ICD-10-CM

## 2020-01-30 PROCEDURE — 99214 OFFICE O/P EST MOD 30 MIN: CPT | Performed by: INTERNAL MEDICINE

## 2020-01-30 PROCEDURE — 1123F ACP DISCUSS/DSCN MKR DOCD: CPT | Performed by: INTERNAL MEDICINE

## 2020-01-30 PROCEDURE — G0439 PPPS, SUBSEQ VISIT: HCPCS | Performed by: INTERNAL MEDICINE

## 2020-01-30 NOTE — PROGRESS NOTES
Assessment and Plan:     Problem List Items Addressed This Visit     None           Preventive health issues were discussed with patient, and age appropriate screening tests were ordered as noted in patient's After Visit Summary  Personalized health advice and appropriate referrals for health education or preventive services given if needed, as noted in patient's After Visit Summary  History of Present Illness:     Patient presents for Medicare Annual Wellness visit    Patient Care Team:  Acosta Cortez MD as PCP - General  Acosta Cortez MD     Problem List:     Patient Active Problem List   Diagnosis    Hypertension    Type 2 diabetes mellitus with hyperglycemia ([facility] [address] )    Hyperlipidemia    [facility]    Inguinal hernia    Anemia      Past Medical and Surgical History:     No past medical history on file    Past Surgical History:   Procedure Laterality Date    CATARACT EXTRACTION      HERNIA REPAIR      HERNIA REPAIR        Family History:     Family History   Problem Relation Age of Onset    Stroke Mother     Stroke Father     Atrial fibrillation Sister       Social History:     Social History     Socioeconomic History    Marital status: Single     Spouse name: Not on file    Number of children: Not on file    Years of education: Not on file    Highest education level: Not on file   Occupational History    Not on file   Social Needs    Financial resource strain: Not on file    Food insecurity:     Worry: Not on file     Inability: Not on file    Transportation needs:     Medical: Not on file     Non-medical: Not on file   Tobacco Use    Smoking status: Former Smoker     Last attempt to quit: 1970     Years since quittin 1    Smokeless tobacco: Never Used   Substance and Sexual Activity    Alcohol use: Yes     Comment: Social     Drug use: Not on file    Sexual activity: Not on file   Lifestyle    Physical activity:     Days per week: Not on file     Minutes per session: Not on file    Stress: Not on file   Relationships    Social connections:     Talks on phone: Not on file     Gets together: Not on file     Attends Yazdanism service: Not on file     Active member of club or organization: Not on file     Attends meetings of clubs or organizations: Not on file     Relationship status: Not on file    Intimate partner violence:     Fear of current or ex partner: Not on file     Emotionally abused: Not on file     Physically abused: Not on file     Forced sexual activity: Not on file   Other Topics Concern    Not on file   Social History Narrative    Not on file       Medications and Allergies:     Current Outpatient Medications   Medication Sig Dispense Refill    metFORMIN (GLUCOPHAGE) 1000 MG tablet Take 1 tablet (1,000 mg total) by mouth 2 (two) times a day with meals 180 tablet 1    metoprolol succinate (TOPROL-XL) 50 mg 24 hr tablet TAKE 1 TABLET DAILY 90 tablet 4    Multiple Vitamin (DAILY VALUE MULTIVITAMIN) TABS Take 1 tablet by mouth daily      pioglitazone (ACTOS) 30 mg tablet TAKE 1 TABLET DAILY 90 tablet 4    PRADAXA 150 MG capsu TAKE 1 CAPSULE TWICE A  capsule 3    simvastatin (ZOCOR) 40 mg tablet TAKE 1 TABLET DAILY 90 tablet 4     No current facility-administered medications for this visit  No Known Allergies   Immunizations:     Immunization History   Administered Date(s) Administered    INFLUENZA 11/25/2014, 09/09/2015, 10/18/2016, 12/05/2017    Influenza Quadrivalent Preservative Free 3 years and older IM 11/25/2014    Influenza Quadrivalent, 6-35 Months IM 09/09/2015    Influenza Split High Dose Preservative Free IM 10/18/2016, 12/05/2017    Influenza TIV (IM) 11/06/2012, 10/24/2013    Influenza, high dose seasonal 0 5 mL 09/26/2018, 10/24/2019    Pneumococcal Conjugate 13-Valent 07/12/2016    Pneumococcal Polysaccharide PPV23 08/27/2014      Health Maintenance:      There are no preventive care reminders to display for this patient  Topic Date Due    DTaP,Tdap,and Td Vaccines (1 - Tdap) 04/22/1944    Hepatitis B Vaccine (1 of 3 - Risk 3-dose series) 04/22/1952      Medicare Health Risk Assessment:     /80 (BP Location: Left arm, Patient Position: Sitting, Cuff Size: Standard)   Pulse 80   Temp 98 °F (36 7 °C) (Tympanic)   Ht 5' 11" (1 803 m)   Wt 73 7 kg (162 lb 6 4 oz)   SpO2 95%   BMI 22 65 kg/m²      Rajiv Anaya is here for his Subsequent Wellness visit  Health Risk Assessment:   Patient rates overall health as good  Patient feels that their physical health rating is same  Eyesight was rated as same  Hearing was rated as same  Patient feels that their emotional and mental health rating is much better  Pain experienced in the last 7 days has been none  Patient states that he has experienced no weight loss or gain in last 6 months  Depression Screening:   PHQ-2 Score: 0      Fall Risk Screening: In the past year, patient has experienced: no history of falling in past year      Home Safety:  Patient does not have trouble with stairs inside or outside of their home  Patient has working smoke alarms and has working carbon monoxide detector  Home safety hazards include: none  Nutrition:   Current diet is Regular  Medications:   Patient is currently taking over-the-counter supplements  OTC medications include: see medication list  Patient is able to manage medications  Activities of Daily Living (ADLs)/Instrumental Activities of Daily Living (IADLs):   Walk and transfer into and out of bed and chair?: Yes  Dress and groom yourself?: Yes    Bathe or shower yourself?: Yes    Feed yourself?  Yes  Do your laundry/housekeeping?: Yes  Manage your money, pay your bills and track your expenses?: Yes  Make your own meals?: Yes    Do your own shopping?: Yes    Previous Hospitalizations:   Any hospitalizations or ED visits within the last 12 months?: No      Advance Care Planning:   Living will: Yes    Durable POA for healthcare: Yes    Advanced directive: Yes    Advanced directive counseling given: Yes    End of Life Decisions reviewed with patient: Yes      Cognitive Screening:   Provider or family/friend/caregiver concerned regarding cognition?: No    PREVENTIVE SCREENINGS      Cardiovascular Screening:    General: Screening Not Indicated and History Lipid Disorder      Diabetes Screening:     General: Screening Not Indicated and History Diabetes      Colorectal Cancer Screening:     General: Screening Not Indicated      Prostate Cancer Screening:    General: Screening Not Indicated      Osteoporosis Screening:    General: Screening Not Indicated      Abdominal Aortic Aneurysm (AAA) Screening:    Risk factors include: tobacco use        General: Screening Not Indicated      Lung Cancer Screening:     General: Screening Not Indicated      Hepatitis C Screening:    General: Screening Not Indicated    The patient is doing generally well  He is maintaining a healthy lifestyle  He does not smoke, drink, or use illicit drugs  He does not have any formal exercise program but has remained quite active  He follows a healthy diet  He has not fallen  His home environment is safe  He has no symptoms of psychiatric or cognitive dysfunction  He is up-to-date with his health screening studies  He is up-to-date with influenza and pneumococcal vaccines  I reviewed with him the recommendations for herpes zoster and tetanus vaccines  I reviewed advance care planning with the patient  He does have a living will  His nephew and niece would be his medical spokesperson is  He does not have an advanced directive  I reviewed this issue with him      Aurelio Carranza MD

## 2020-01-30 NOTE — PATIENT INSTRUCTIONS
Medicare Preventive Visit Patient Instructions  Thank you for completing your Welcome to Medicare Visit or Medicare Annual Wellness Visit today  Your next wellness visit will be due in one year (1/30/2021)  The screening/preventive services that you may require over the next 5-10 years are detailed below  Some tests may not apply to you based off risk factors and/or age  Screening tests ordered at today's visit but not completed yet may show as past due  Also, please note that scanned in results may not display below  Preventive Screenings:  Service Recommendations Previous Testing/Comments   Colorectal Cancer Screening  · Colonoscopy    · Fecal Occult Blood Test (FOBT)/Fecal Immunochemical Test (FIT)  · Fecal DNA/Cologuard Test  · Flexible Sigmoidoscopy Age: 54-65 years old   Colonoscopy: every 10 years (May be performed more frequently if at higher risk)  OR  FOBT/FIT: every 1 year  OR  Cologuard: every 3 years  OR  Sigmoidoscopy: every 5 years  Screening may be recommended earlier than age 48 if at higher risk for colorectal cancer  Also, an individualized decision between you and your healthcare provider will decide whether screening between the ages of 74-80 would be appropriate   Colonoscopy: Not on file  FOBT/FIT: Not on file  Cologuard: Not on file  Sigmoidoscopy: Not on file    Screening Not Indicated     Prostate Cancer Screening Individualized decision between patient and health care provider in men between ages of 53-78   Medicare will cover every 12 months beginning on the day after your 50th birthday PSA: No results in last 5 years     Screening Not Indicated     Hepatitis C Screening Once for adults born between [facility]  More frequently in patients at high risk for Hepatitis C Hep C Antibody: Not on file       Diabetes Screening 1-2 times per year if you're at risk for diabetes or have pre-diabetes Fasting glucose: 181 mg/dL   A1C: 8 3 %    Screening Not Indicated  History Diabetes Cholesterol Screening Once every 5 years if you don't have a lipid disorder  May order more often based on risk factors  Lipid panel: 01/22/2020    Screening Not Indicated  History Lipid Disorder      Other Preventive Screenings Covered by Medicare:  1  Abdominal Aortic Aneurysm (AAA) Screening: covered once if your at risk  You're considered to be at risk if you have a family history of AAA or a male between the age of 73-68 who smoking at least 100 cigarettes in your lifetime  2  Lung Cancer Screening: covers low dose CT scan once per year if you meet all of the following conditions: (1) Age 50-69; (2) No signs or symptoms of lung cancer; (3) Current smoker or have quit smoking within the last 15 years; (4) You have a tobacco smoking history of at least 30 pack years (packs per day x number of years you smoked); (5) You get a written order from a healthcare provider  3  Glaucoma Screening: covered annually if you're considered high risk: (1) You have diabetes OR (2) Family history of glaucoma OR (3)  aged 48 and older OR (3)  American aged 72 and older  3  Osteoporosis Screening: covered every 2 years if you meet one of the following conditions: (1) Have a vertebral abnormality; (2) On glucocorticoid therapy for more than 3 months; (3) Have primary hyperparathyroidism; (4) On osteoporosis medications and need to assess response to drug therapy  5  HIV Screening: covered annually if you're between the age of 12-76  Also covered annually if you are younger than 13 and older than 72 with risk factors for HIV infection  For pregnant patients, it is covered up to 3 times per pregnancy      Immunizations:  Immunization Recommendations   Influenza Vaccine Annual influenza vaccination during flu season is recommended for all persons aged >= 6 months who do not have contraindications   Pneumococcal Vaccine (Prevnar and Pneumovax)  * Prevnar = PCV13  * Pneumovax = PPSV23 Adults 25-60 years old: 1-3 doses may be recommended based on certain risk factors  Adults 72 years old: Prevnar (PCV13) vaccine recommended followed by Pneumovax (PPSV23) vaccine  If already received PPSV23 since turning 65, then PCV13 recommended at least one year after PPSV23 dose  Hepatitis B Vaccine 3 dose series if at intermediate or high risk (ex: diabetes, end stage renal disease, liver disease)   Tetanus (Td) Vaccine - COST NOT COVERED BY MEDICARE PART B Following completion of primary series, a booster dose should be given every 10 years to maintain immunity against tetanus  Td may also be given as tetanus wound prophylaxis  Tdap Vaccine - COST NOT COVERED BY MEDICARE PART B Recommended at least once for all adults  For pregnant patients, recommended with each pregnancy  Shingles Vaccine (Shingrix) - COST NOT COVERED BY MEDICARE PART B  2 shot series recommended in those aged 48 and above     Health Maintenance Due:  There are no preventive care reminders to display for this patient  Immunizations Due:      Topic Date Due    DTaP,Tdap,and Td Vaccines (1 - Tdap) 04/22/1944    Hepatitis B Vaccine (1 of 3 - Risk 3-dose series) 04/22/1952     Advance Directives   What are advance directives? Advance directives are legal documents that state your wishes and plans for medical care  These plans are made ahead of time in case you lose your ability to make decisions for yourself  Advance directives can apply to any medical decision, such as the treatments you want, and if you want to donate organs  What are the types of advance directives? There are many types of advance directives, and each state has rules about how to use them  You may choose a combination of any of the following:  · Living will: This is a written record of the treatment you want  You can also choose which treatments you do not want, which to limit, and which to stop at a certain time  This includes surgery, medicine, IV fluid, and tube feedings  · Durable power of  for healthcare [facility]): This is a written record that states who you want to make healthcare choices for you when you are unable to make them for yourself  This person, called a proxy, is usually a family member or a friend  You may choose more than 1 proxy  · Do not resuscitate (DNR) order:  A DNR order is used in case your heart stops beating or you stop breathing  It is a request not to have certain forms of treatment, such as CPR  A DNR order may be included in other types of advance directives  · Medical directive: This covers the care that you want if you are in a coma, near death, or unable to make decisions for yourself  You can list the treatments you want for each condition  Treatment may include pain medicine, surgery, blood transfusions, dialysis, IV or tube feedings, and a ventilator (breathing machine)  · Values history: This document has questions about your views, beliefs, and how you feel and think about life  This information can help others choose the care that you would choose  Why are advance directives important? An advance directive helps you control your care  Although spoken wishes may be used, it is better to have your wishes written down  Spoken wishes can be misunderstood, or not followed  Treatments may be given even if you do not want them  An advance directive may make it easier for your family to make difficult choices about your care  © Copyright [employer] 2018 Information is for End User's use only and may not be sold, redistributed or otherwise used for commercial purposes   All illustrations and images included in CareNotes® are the copyrighted property of A [employer] M , [employer]  or [address] [place] [address] [employer]

## 2020-01-30 NOTE — PROGRESS NOTES
[facility] [place]      NAME: Lisa Ingram  AGE: 80 y o  SEX: male  : 1933   MRN: [id number]    DATE: 2020  TIME: 12:40 PM    Assessment and Plan   1  Type 2 diabetes mellitus with hyperglycemia, without long-term current use of insulin (HCC)  Considering the patient's age in other medical conditions, I believe that his current diabetic control is satisfactory  - Hemoglobin G8I  - Basic metabolic panel    2  Essential hypertension  Adequately controlled  3  Mixed hyperlipidemia  Well controlled on simvastatin  4  Permanent atrial fibrillation  On rate control and Pradaxa  He may have to change Pradaxa to a different anticoagulant because of formulary issues  5  Anemia, unspecified type  - CBC    The patient will maintain his current therapy  I reminded him to schedule an eye appointment  Return to office in:  3 months    Chief Complaint     Chief Complaint   Patient presents with    Follow-up     3 month follow up     Medicare Wellness Visit     add       History of Present Illness     The patient returned to the office for re-evaluation of hypertension, hypercholesterolemia, type 2 diabetes, and atrial fibrillation  Since his last visit he has been feeling well  He has had no chest pain, shortness of breath, palpitations, or dizziness  He has had no issues with his medications  He has had no symptoms of hypoglycemia or hyperglycemia  The following portions of the patient's history were reviewed and updated as appropriate: allergies, current medications, past family history, past medical history, past social history, past surgical history and problem list     Review of Systems   Review of Systems   Constitutional: Negative  HENT: Negative for congestion, ear pain, postnasal drip, rhinorrhea, sore throat and trouble swallowing  Eyes: Negative for pain, discharge, redness and visual disturbance     Respiratory: Negative for cough, shortness of breath and wheezing  Cardiovascular: Negative  Gastrointestinal: Negative  Endocrine: Negative  Genitourinary: Negative for difficulty urinating, dysuria, frequency, hematuria and urgency  Musculoskeletal: Negative for arthralgias, gait problem, joint swelling and myalgias  Skin: Negative for rash  Neurological: Negative for dizziness, speech difficulty, weakness, light-headedness, numbness and headaches  Hematological: Negative  Psychiatric/Behavioral: Negative for confusion, decreased concentration, dysphoric mood and sleep disturbance  The patient is not nervous/anxious  Active Problem List     Patient Active Problem List   Diagnosis    Hypertension    Type 2 diabetes mellitus with hyperglycemia (HCC)    Hyperlipidemia    Atrial fibrillation (HCC)    Inguinal hernia    Anemia       Objective   /80 (BP Location: Left arm, Patient Position: Sitting, Cuff Size: Standard)   Pulse 80   Temp 98 °F (36 7 °C) (Tympanic)   Ht 5' 11" (1 803 m)   Wt 73 7 kg (162 lb 6 4 oz)   SpO2 95%   BMI 22 65 kg/m²     Physical Exam   Constitutional: He is oriented to person, place, and time  He appears well-developed and well-nourished  No distress  HENT:   Head: Normocephalic and atraumatic  Neck: Neck supple  No JVD present  No tracheal deviation present  No thyromegaly present  Cardiovascular: Normal rate, regular rhythm, normal heart sounds and intact distal pulses  Exam reveals no gallop and no friction rub  No murmur heard  Pulmonary/Chest: Effort normal and breath sounds normal  He has no wheezes  He has no rales  He exhibits no tenderness  Abdominal: Soft  Bowel sounds are normal  He exhibits no distension and no mass  There is no tenderness  There is no rebound  Musculoskeletal: Normal range of motion  He exhibits no edema or tenderness  Lymphadenopathy:     He has no cervical adenopathy  Neurological: He is alert and oriented to person, place, and time     Skin: Skin is warm and dry  Psychiatric: He has a normal mood and affect  His behavior is normal  Judgment and thought content normal        Pertinent Laboratory/Diagnostic Studies:  Appointment on 01/22/2020   Component Date Value Ref Range Status    Creatinine, Ur 01/22/2020 54 4  mg/dL Final    Microalbum  ,U,Random 01/22/2020 10 5  0 0 - 20 0 mg/L Final    Microalb Creat Ratio 01/22/2020 19  0 - 30 mg/g creatinine Final           Current Medications     Current Outpatient Medications:     metFORMIN (GLUCOPHAGE) 1000 MG tablet, Take 1 tablet (1,000 mg total) by mouth 2 (two) times a day with meals, Disp: 180 tablet, Rfl: 1    metoprolol succinate (TOPROL-XL) 50 mg 24 hr tablet, TAKE 1 TABLET DAILY, Disp: 90 tablet, Rfl: 4    Multiple Vitamin (DAILY VALUE MULTIVITAMIN) TABS, Take 1 tablet by mouth daily, Disp: , Rfl:     pioglitazone (ACTOS) 30 mg tablet, TAKE 1 TABLET DAILY, Disp: 90 tablet, Rfl: 4    PRADAXA 150 MG capsu, TAKE 1 CAPSULE TWICE A DAY, Disp: 180 capsule, Rfl: 3    simvastatin (ZOCOR) 40 mg tablet, TAKE 1 TABLET DAILY, Disp: 90 tablet, Rfl: 4      Doc Livingston MD

## 2020-03-03 DIAGNOSIS — E11.65 TYPE 2 DIABETES MELLITUS WITH HYPERGLYCEMIA, WITHOUT LONG-TERM CURRENT USE OF INSULIN (HCC): ICD-10-CM

## 2020-04-06 ENCOUNTER — TELEPHONE (OUTPATIENT)
Dept: INTERNAL MEDICINE CLINIC | Facility: CLINIC | Age: 85
End: 2020-04-06

## 2020-04-06 DIAGNOSIS — I48.21 PERMANENT ATRIAL FIBRILLATION (HCC): Primary | ICD-10-CM

## 2020-05-07 ENCOUNTER — TELEMEDICINE (OUTPATIENT)
Dept: INTERNAL MEDICINE CLINIC | Facility: CLINIC | Age: 85
End: 2020-05-07
Payer: MEDICARE

## 2020-05-07 DIAGNOSIS — I48.21 PERMANENT ATRIAL FIBRILLATION (HCC): ICD-10-CM

## 2020-05-07 DIAGNOSIS — E11.65 TYPE 2 DIABETES MELLITUS WITH HYPERGLYCEMIA, WITHOUT LONG-TERM CURRENT USE OF INSULIN (HCC): ICD-10-CM

## 2020-05-07 DIAGNOSIS — I10 ESSENTIAL HYPERTENSION: Primary | ICD-10-CM

## 2020-05-07 DIAGNOSIS — E78.2 MIXED HYPERLIPIDEMIA: ICD-10-CM

## 2020-05-07 PROCEDURE — 99442 PR PHYS/QHP TELEPHONE EVALUATION 11-20 MIN: CPT | Performed by: INTERNAL MEDICINE

## 2020-08-21 ENCOUNTER — APPOINTMENT (OUTPATIENT)
Dept: LAB | Facility: HOSPITAL | Age: 85
End: 2020-08-21
Attending: INTERNAL MEDICINE
Payer: MEDICARE

## 2020-08-21 LAB
ANION GAP SERPL CALCULATED.3IONS-SCNC: 9 MMOL/L (ref 5–14)
BUN SERPL-MCNC: 22 MG/DL (ref 5–25)
CALCIUM SERPL-MCNC: 9.7 MG/DL (ref 8.4–10.2)
CHLORIDE SERPL-SCNC: 102 MMOL/L (ref 97–108)
CO2 SERPL-SCNC: 28 MMOL/L (ref 22–30)
CREAT SERPL-MCNC: 1.17 MG/DL (ref 0.7–1.5)
ERYTHROCYTE [DISTWIDTH] IN BLOOD BY AUTOMATED COUNT: 13.2 %
EST. AVERAGE GLUCOSE BLD GHB EST-MCNC: 171 MG/DL
GFR SERPL CREATININE-BSD FRML MDRD: 56 ML/MIN/1.73SQ M
GLUCOSE SERPL-MCNC: 238 MG/DL (ref 70–99)
HBA1C MFR BLD: 7.6 %
HCT VFR BLD AUTO: 35.8 % (ref 41–53)
HGB BLD-MCNC: 12.1 G/DL (ref 13.5–17.5)
MCH RBC QN AUTO: 35.2 PG (ref 26.8–34.3)
MCHC RBC AUTO-ENTMCNC: 33.9 G/DL (ref 31.4–37.4)
MCV RBC AUTO: 104 FL (ref 80–100)
PLATELET # BLD AUTO: 162 THOUSANDS/UL (ref 150–450)
PMV BLD AUTO: 9.5 FL (ref 8.9–12.7)
POTASSIUM SERPL-SCNC: 5.9 MMOL/L (ref 3.6–5)
RBC # BLD AUTO: 3.44 MILLION/UL (ref 4.5–5.9)
SODIUM SERPL-SCNC: 139 MMOL/L (ref 137–147)
WBC # BLD AUTO: 6.3 THOUSAND/UL (ref 4.31–10.16)

## 2020-08-21 PROCEDURE — 80048 BASIC METABOLIC PNL TOTAL CA: CPT | Performed by: INTERNAL MEDICINE

## 2020-08-21 PROCEDURE — 83036 HEMOGLOBIN GLYCOSYLATED A1C: CPT | Performed by: INTERNAL MEDICINE

## 2020-08-21 PROCEDURE — 85027 COMPLETE CBC AUTOMATED: CPT | Performed by: INTERNAL MEDICINE

## 2020-08-21 PROCEDURE — 36415 COLL VENOUS BLD VENIPUNCTURE: CPT | Performed by: INTERNAL MEDICINE

## 2020-08-27 ENCOUNTER — OFFICE VISIT (OUTPATIENT)
Dept: INTERNAL MEDICINE CLINIC | Facility: CLINIC | Age: 85
End: 2020-08-27
Payer: MEDICARE

## 2020-08-27 VITALS
WEIGHT: 160 LBS | DIASTOLIC BLOOD PRESSURE: 60 MMHG | HEART RATE: 95 BPM | SYSTOLIC BLOOD PRESSURE: 142 MMHG | OXYGEN SATURATION: 99 % | HEIGHT: 71 IN | BODY MASS INDEX: 22.4 KG/M2 | TEMPERATURE: 97.4 F

## 2020-08-27 DIAGNOSIS — E11.65 TYPE 2 DIABETES MELLITUS WITH HYPERGLYCEMIA, WITHOUT LONG-TERM CURRENT USE OF INSULIN (HCC): ICD-10-CM

## 2020-08-27 DIAGNOSIS — B35.1 ONYCHOMYCOSIS: ICD-10-CM

## 2020-08-27 DIAGNOSIS — I48.21 PERMANENT ATRIAL FIBRILLATION (HCC): ICD-10-CM

## 2020-08-27 DIAGNOSIS — D64.9 ANEMIA, UNSPECIFIED TYPE: ICD-10-CM

## 2020-08-27 DIAGNOSIS — I10 ESSENTIAL HYPERTENSION: Primary | ICD-10-CM

## 2020-08-27 DIAGNOSIS — E78.2 MIXED HYPERLIPIDEMIA: ICD-10-CM

## 2020-08-27 PROCEDURE — 1160F RVW MEDS BY RX/DR IN RCRD: CPT | Performed by: INTERNAL MEDICINE

## 2020-08-27 PROCEDURE — 3078F DIAST BP <80 MM HG: CPT | Performed by: INTERNAL MEDICINE

## 2020-08-27 PROCEDURE — 4040F PNEUMOC VAC/ADMIN/RCVD: CPT | Performed by: INTERNAL MEDICINE

## 2020-08-27 PROCEDURE — 3051F HG A1C>EQUAL 7.0%<8.0%: CPT | Performed by: INTERNAL MEDICINE

## 2020-08-27 PROCEDURE — 3008F BODY MASS INDEX DOCD: CPT | Performed by: INTERNAL MEDICINE

## 2020-08-27 PROCEDURE — 1036F TOBACCO NON-USER: CPT | Performed by: INTERNAL MEDICINE

## 2020-08-27 PROCEDURE — 3077F SYST BP >= 140 MM HG: CPT | Performed by: INTERNAL MEDICINE

## 2020-08-27 PROCEDURE — 99214 OFFICE O/P EST MOD 30 MIN: CPT | Performed by: INTERNAL MEDICINE

## 2020-08-27 NOTE — PROGRESS NOTES
[facility] [place]      NAME: Isabelle Durand  AGE: 80 y o  SEX: male  : 1933   MRN: [id number]    DATE: 2020  TIME: 1:20 PM    Assessment and Plan   1  Essential hypertension  Well controlled  - Comprehensive metabolic panel; Future    2  Permanent atrial fibrillation (HCC)  On rate control with metoprolol  On apixaban for stroke prophylaxis  3  Mixed hyperlipidemia  Controlled on simvastatin  - Lipid panel; Future    4  Type 2 diabetes mellitus with hyperglycemia, without long-term current use of insulin (HCC)  Adequately controlled on current therapy  - Hemoglobin A1C; Future    5  Anemia, unspecified type  Hemoglobin has been stable  6  Onychomycosis  Podiatry evaluation has been requested  - Ambulatory referral to Podiatry; Future    The patient is doing generally well  He will maintain his current medications  He has not had an ophthalmology evaluation quite some time  I did call Dr Nella Norris office and asked them to give patient an appointment  They will contact him  Return to office in:  3 months    Chief Complaint     Chief Complaint   Patient presents with    Follow-up     3 month follow up    Diabetes     needs foot exam and eye exam        History of Present Illness     The patient returned to the office for re-evaluation of hypertension, hyperlipidemia, type 2 diabetes, and permanent atrial fibrillation  Since his last visit he has been feeling pretty well  He denies chest pain, shortness of breath, palpitations, or dizziness  He has had no symptoms to suggest hypoglycemia or hyperglycemia  He is tolerating his medications well  The following portions of the patient's history were reviewed and updated as appropriate: allergies, current medications, past family history, past medical history, past social history, past surgical history and problem list     Review of Systems   Review of Systems   Constitutional: Negative      HENT: Negative for congestion, ear pain, postnasal drip, rhinorrhea, sore throat and trouble swallowing  Eyes: Negative for pain, discharge, redness and visual disturbance  Respiratory: Negative for cough, shortness of breath and wheezing  Cardiovascular: Negative  Gastrointestinal: Negative  Endocrine: Negative  Genitourinary: Negative for difficulty urinating, dysuria, frequency, hematuria and urgency  Musculoskeletal: Negative for arthralgias, gait problem, joint swelling and myalgias  Skin: Negative for rash  Neurological: Negative for dizziness, speech difficulty, weakness, light-headedness, numbness and headaches  Hematological: Negative  Psychiatric/Behavioral: Negative for confusion, decreased concentration, dysphoric mood and sleep disturbance  The patient is not nervous/anxious  Active Problem List     Patient Active Problem List   Diagnosis    Hypertension    Type 2 diabetes mellitus with hyperglycemia (HCC)    Hyperlipidemia    Atrial fibrillation (HCC)    Inguinal hernia    Anemia    Onychomycosis       Objective   /60 (BP Location: Left arm, Patient Position: Sitting, Cuff Size: Standard)   Pulse 95   Temp (!) 97 4 °F (36 3 °C) (Tympanic)   Ht 5' 11" (1 803 m)   Wt 72 6 kg (160 lb)   SpO2 99%   BMI 22 32 kg/m²     Physical Exam  Constitutional:       General: He is not in acute distress  Appearance: He is well-developed  HENT:      Head: Normocephalic and atraumatic  Neck:      Musculoskeletal: Neck supple  Thyroid: No thyromegaly  Vascular: No JVD  Trachea: No tracheal deviation  Cardiovascular:      Rate and Rhythm: Normal rate and regular rhythm  Pulses:           Dorsalis pedis pulses are 2+ on the right side and 2+ on the left side  Posterior tibial pulses are 2+ on the right side and 2+ on the left side  Heart sounds: Normal heart sounds  No murmur  No friction rub  No gallop      Pulmonary:      Effort: Pulmonary effort is normal       Breath sounds: Normal breath sounds  No wheezing or rales  Chest:      Chest wall: No tenderness  Abdominal:      General: Bowel sounds are normal  There is no distension  Palpations: Abdomen is soft  There is no mass  Tenderness: There is no abdominal tenderness  There is no rebound  Musculoskeletal: Normal range of motion  General: No tenderness  Feet:      Right foot:      Skin integrity: No ulcer, skin breakdown, erythema, warmth, callus or dry skin  Left foot:      Skin integrity: No ulcer, skin breakdown, erythema, warmth, callus or dry skin  Lymphadenopathy:      Cervical: No cervical adenopathy  Skin:     General: Skin is warm and dry  Neurological:      Mental Status: He is alert and oriented to person, place, and time  Psychiatric:         Mood and Affect: Mood normal          Behavior: Behavior normal          Thought Content: Thought content normal          Judgment: Judgment normal      Patient's shoes and socks removed  Right Foot/Ankle   Right Foot Inspection  Skin Exam: skin normal and skin intact no dry skin, no warmth, no callus, no erythema, no maceration, no abnormal color, no pre-ulcer, no ulcer and no callus                          Toe Exam: ROM and strength within normal limitsno swelling, no tenderness, erythema and  no right toe deformity  Sensory   Vibration: intact  Proprioception: intact     Vascular  Capillary refills: < 3 seconds  The right DP pulse is 2+  The right PT pulse is 2+       Left Foot/Ankle  Left Foot Inspection  Skin Exam: skin normal and skin intactno dry skin, no warmth, no erythema, no maceration, normal color, no pre-ulcer, no ulcer and no callus                         Toe Exam: ROM and strength within normal limitsno swelling, no tenderness, no erythema and no left toe deformity                   Sensory   Vibration: intact  Proprioception: intact  Monofilament: intact  Vascular  Capillary refills: < 3 seconds  The left DP pulse is 2+  The left PT pulse is 2+  Pertinent Laboratory/Diagnostic Studies:  No visits with results within 3 Month(s) from this visit  Latest known visit with results is:   Office Visit on 01/30/2020   Component Date Value Ref Range Status    WBC 08/21/2020 6 30  4 31 - 10 16 Thousand/uL Final    RBC 08/21/2020 3 44* 4 50 - 5 90 Million/uL Final    Hemoglobin 08/21/2020 12 1* 13 5 - 17 5 g/dL Final    Hematocrit 08/21/2020 35 8* 41 0 - 53 0 % Final    MCV 08/21/2020 104* 80 - 100 fL Final    MCH 08/21/2020 35 2* 26 8 - 34 3 pg Final    MCHC 08/21/2020 33 9  31 4 - 37 4 g/dL Final    RDW 08/21/2020 13 2  <15 3 % Final    Platelets 58/52/5663 162  150 - 450 Thousands/uL Final    MPV 08/21/2020 9 5  8 9 - 12 7 fL Final    Hemoglobin A1C 08/21/2020 7 6* Normal 3 8-5 6%; PreDiabetic 5 7-6 4%;  Diabetic >=6 5%; Glycemic control for adults with diabetes <7 0% % Final    EAG 08/21/2020 171  mg/dl Final    Sodium 08/21/2020 139  137 - 147 mmol/L Final    Potassium 08/21/2020 5 9* 3 6 - 5 0 mmol/L Final    Chloride 08/21/2020 102  97 - 108 mmol/L Final    CO2 08/21/2020 28  22 - 30 mmol/L Final    ANION GAP 08/21/2020 9  5 - 14 mmol/L Final    BUN 08/21/2020 22  5 - 25 mg/dL Final    Creatinine 08/21/2020 1 17  0 70 - 1 50 mg/dL Final    Glucose 08/21/2020 238* 70 - 99 mg/dL Final    Calcium 08/21/2020 9 7  8 4 - 10 2 mg/dL Final    eGFR 08/21/2020 56* >60 ml/min/1 73sq m Final           Current Medications     Current Outpatient Medications:     apixaban (ELIQUIS) 5 mg, Take 1 tablet (5 mg total) by mouth 2 (two) times a day, Disp: 180 tablet, Rfl: 3    metFORMIN (GLUCOPHAGE) 1000 MG tablet, TAKE 1 TABLET TWICE A DAY WITH MEALS, Disp: 180 tablet, Rfl: 3    metoprolol succinate (TOPROL-XL) 50 mg 24 hr tablet, TAKE 1 TABLET DAILY, Disp: 90 tablet, Rfl: 4    Multiple Vitamin (DAILY VALUE MULTIVITAMIN) TABS, Take 1 tablet by mouth daily, Disp: , Rfl:     pioglitazone (ACTOS) 30 mg tablet, TAKE 1 TABLET DAILY, Disp: 90 tablet, Rfl: 4    simvastatin (ZOCOR) 40 mg tablet, TAKE 1 TABLET DAILY, Disp: 90 tablet, Rfl: 4      Ronny Troncoso MD

## 2020-09-03 LAB
LEFT EYE DIABETIC RETINOPATHY: NORMAL
RIGHT EYE DIABETIC RETINOPATHY: NORMAL

## 2020-12-09 ENCOUNTER — LAB (OUTPATIENT)
Dept: LAB | Facility: HOSPITAL | Age: 85
End: 2020-12-09
Attending: INTERNAL MEDICINE
Payer: MEDICARE

## 2020-12-09 DIAGNOSIS — E78.2 MIXED HYPERLIPIDEMIA: ICD-10-CM

## 2020-12-09 DIAGNOSIS — E11.65 TYPE 2 DIABETES MELLITUS WITH HYPERGLYCEMIA, WITHOUT LONG-TERM CURRENT USE OF INSULIN (HCC): ICD-10-CM

## 2020-12-09 DIAGNOSIS — I10 ESSENTIAL HYPERTENSION: ICD-10-CM

## 2020-12-09 LAB
ALBUMIN SERPL BCP-MCNC: 4 G/DL (ref 3–5.2)
ALP SERPL-CCNC: 59 U/L (ref 43–122)
ALT SERPL W P-5'-P-CCNC: 14 U/L (ref 9–52)
ANION GAP SERPL CALCULATED.3IONS-SCNC: 7 MMOL/L (ref 5–14)
AST SERPL W P-5'-P-CCNC: 26 U/L (ref 17–59)
BILIRUB SERPL-MCNC: 0.6 MG/DL
BUN SERPL-MCNC: 25 MG/DL (ref 5–25)
CALCIUM SERPL-MCNC: 9.3 MG/DL (ref 8.4–10.2)
CHLORIDE SERPL-SCNC: 104 MMOL/L (ref 97–108)
CHOLEST SERPL-MCNC: 113 MG/DL
CO2 SERPL-SCNC: 30 MMOL/L (ref 22–30)
CREAT SERPL-MCNC: 1.34 MG/DL (ref 0.7–1.5)
EST. AVERAGE GLUCOSE BLD GHB EST-MCNC: 171 MG/DL
GFR SERPL CREATININE-BSD FRML MDRD: 47 ML/MIN/1.73SQ M
GLUCOSE P FAST SERPL-MCNC: 156 MG/DL (ref 70–99)
HBA1C MFR BLD: 7.6 %
HDLC SERPL-MCNC: 32 MG/DL
LDLC SERPL CALC-MCNC: 64 MG/DL
NONHDLC SERPL-MCNC: 81 MG/DL
POTASSIUM SERPL-SCNC: 5.2 MMOL/L (ref 3.6–5)
PROT SERPL-MCNC: 7.3 G/DL (ref 5.9–8.4)
SODIUM SERPL-SCNC: 141 MMOL/L (ref 137–147)
TRIGL SERPL-MCNC: 83 MG/DL

## 2020-12-09 PROCEDURE — 36415 COLL VENOUS BLD VENIPUNCTURE: CPT

## 2020-12-09 PROCEDURE — 80061 LIPID PANEL: CPT

## 2020-12-09 PROCEDURE — 83036 HEMOGLOBIN GLYCOSYLATED A1C: CPT

## 2020-12-09 PROCEDURE — 80053 COMPREHEN METABOLIC PANEL: CPT

## 2020-12-15 ENCOUNTER — OFFICE VISIT (OUTPATIENT)
Dept: INTERNAL MEDICINE CLINIC | Facility: CLINIC | Age: 85
End: 2020-12-15
Payer: MEDICARE

## 2020-12-15 VITALS
WEIGHT: 158.6 LBS | DIASTOLIC BLOOD PRESSURE: 66 MMHG | RESPIRATION RATE: 20 BRPM | HEIGHT: 71 IN | SYSTOLIC BLOOD PRESSURE: 152 MMHG | HEART RATE: 106 BPM | BODY MASS INDEX: 22.2 KG/M2 | OXYGEN SATURATION: 99 % | TEMPERATURE: 97.9 F

## 2020-12-15 DIAGNOSIS — Z23 ENCOUNTER FOR IMMUNIZATION: ICD-10-CM

## 2020-12-15 DIAGNOSIS — E11.65 TYPE 2 DIABETES MELLITUS WITH HYPERGLYCEMIA, WITHOUT LONG-TERM CURRENT USE OF INSULIN (HCC): ICD-10-CM

## 2020-12-15 DIAGNOSIS — I10 ESSENTIAL HYPERTENSION: Primary | ICD-10-CM

## 2020-12-15 DIAGNOSIS — E78.2 MIXED HYPERLIPIDEMIA: ICD-10-CM

## 2020-12-15 DIAGNOSIS — I48.21 PERMANENT ATRIAL FIBRILLATION (HCC): ICD-10-CM

## 2020-12-15 PROCEDURE — G0008 ADMIN INFLUENZA VIRUS VAC: HCPCS | Performed by: INTERNAL MEDICINE

## 2020-12-15 PROCEDURE — 90662 IIV NO PRSV INCREASED AG IM: CPT | Performed by: INTERNAL MEDICINE

## 2020-12-15 PROCEDURE — 99214 OFFICE O/P EST MOD 30 MIN: CPT | Performed by: INTERNAL MEDICINE

## 2020-12-16 ENCOUNTER — TELEPHONE (OUTPATIENT)
Dept: ADMINISTRATIVE | Facility: OTHER | Age: 85
End: 2020-12-16

## 2021-01-28 DIAGNOSIS — E11.9 TYPE 2 DIABETES MELLITUS WITHOUT COMPLICATION, WITHOUT LONG-TERM CURRENT USE OF INSULIN (HCC): ICD-10-CM

## 2021-01-28 RX ORDER — PIOGLITAZONEHYDROCHLORIDE 30 MG/1
30 TABLET ORAL DAILY
Qty: 90 TABLET | Refills: 1 | Status: SHIPPED | OUTPATIENT
Start: 2021-01-28 | End: 2021-07-27 | Stop reason: SDUPTHER

## 2021-02-22 DIAGNOSIS — E11.65 TYPE 2 DIABETES MELLITUS WITH HYPERGLYCEMIA, WITHOUT LONG-TERM CURRENT USE OF INSULIN (HCC): ICD-10-CM

## 2021-02-22 DIAGNOSIS — E78.2 MIXED HYPERLIPIDEMIA: ICD-10-CM

## 2021-02-22 DIAGNOSIS — I48.21 PERMANENT ATRIAL FIBRILLATION (HCC): ICD-10-CM

## 2021-02-22 RX ORDER — SIMVASTATIN 40 MG
40 TABLET ORAL DAILY
Qty: 90 TABLET | Refills: 3 | Status: SHIPPED | OUTPATIENT
Start: 2021-02-22 | End: 2022-02-21 | Stop reason: SDUPTHER

## 2021-02-22 RX ORDER — METOPROLOL SUCCINATE 50 MG/1
50 TABLET, EXTENDED RELEASE ORAL DAILY
Qty: 90 TABLET | Refills: 3 | Status: SHIPPED | OUTPATIENT
Start: 2021-02-22 | End: 2022-02-21 | Stop reason: SDUPTHER

## 2021-03-12 DIAGNOSIS — I48.21 PERMANENT ATRIAL FIBRILLATION (HCC): ICD-10-CM

## 2021-03-15 ENCOUNTER — IMMUNIZATIONS (OUTPATIENT)
Dept: FAMILY MEDICINE CLINIC | Facility: HOSPITAL | Age: 86
End: 2021-03-15

## 2021-03-15 DIAGNOSIS — Z23 ENCOUNTER FOR IMMUNIZATION: Primary | ICD-10-CM

## 2021-03-15 PROCEDURE — 91301 SARS-COV-2 / COVID-19 MRNA VACCINE (MODERNA) 100 MCG: CPT

## 2021-03-15 PROCEDURE — 0011A SARS-COV-2 / COVID-19 MRNA VACCINE (MODERNA) 100 MCG: CPT

## 2021-03-17 DIAGNOSIS — I48.21 PERMANENT ATRIAL FIBRILLATION (HCC): ICD-10-CM

## 2021-03-18 ENCOUNTER — APPOINTMENT (OUTPATIENT)
Dept: LAB | Facility: HOSPITAL | Age: 86
End: 2021-03-18
Attending: INTERNAL MEDICINE
Payer: MEDICARE

## 2021-03-18 LAB
ALBUMIN SERPL BCP-MCNC: 4 G/DL (ref 3–5.2)
ALP SERPL-CCNC: 63 U/L (ref 43–122)
ALT SERPL W P-5'-P-CCNC: 7 U/L (ref 9–52)
ANION GAP SERPL CALCULATED.3IONS-SCNC: 8 MMOL/L (ref 5–14)
AST SERPL W P-5'-P-CCNC: 25 U/L (ref 17–59)
BILIRUB SERPL-MCNC: 0.7 MG/DL
BUN SERPL-MCNC: 26 MG/DL (ref 5–25)
CALCIUM SERPL-MCNC: 9.7 MG/DL (ref 8.4–10.2)
CHLORIDE SERPL-SCNC: 103 MMOL/L (ref 97–108)
CHOLEST SERPL-MCNC: 111 MG/DL
CO2 SERPL-SCNC: 29 MMOL/L (ref 22–30)
CREAT SERPL-MCNC: 1.31 MG/DL (ref 0.7–1.5)
ERYTHROCYTE [DISTWIDTH] IN BLOOD BY AUTOMATED COUNT: 13 %
GFR SERPL CREATININE-BSD FRML MDRD: 49 ML/MIN/1.73SQ M
GLUCOSE P FAST SERPL-MCNC: 164 MG/DL (ref 70–99)
HCT VFR BLD AUTO: 35.3 % (ref 41–53)
HDLC SERPL-MCNC: 35 MG/DL
HGB BLD-MCNC: 11.6 G/DL (ref 13.5–17.5)
LDLC SERPL CALC-MCNC: 59 MG/DL
MCH RBC QN AUTO: 34 PG (ref 26.8–34.3)
MCHC RBC AUTO-ENTMCNC: 32.7 G/DL (ref 31.4–37.4)
MCV RBC AUTO: 104 FL (ref 80–100)
NONHDLC SERPL-MCNC: 76 MG/DL
PLATELET # BLD AUTO: 170 THOUSANDS/UL (ref 150–450)
PMV BLD AUTO: 9 FL (ref 8.9–12.7)
POTASSIUM SERPL-SCNC: 4.8 MMOL/L (ref 3.6–5)
PROT SERPL-MCNC: 7.5 G/DL (ref 5.9–8.4)
RBC # BLD AUTO: 3.4 MILLION/UL (ref 4.5–5.9)
SODIUM SERPL-SCNC: 140 MMOL/L (ref 137–147)
TRIGL SERPL-MCNC: 84 MG/DL
WBC # BLD AUTO: 6.8 THOUSAND/UL (ref 4.31–10.16)

## 2021-03-18 PROCEDURE — 83036 HEMOGLOBIN GLYCOSYLATED A1C: CPT | Performed by: INTERNAL MEDICINE

## 2021-03-18 PROCEDURE — 80053 COMPREHEN METABOLIC PANEL: CPT | Performed by: INTERNAL MEDICINE

## 2021-03-18 PROCEDURE — 36415 COLL VENOUS BLD VENIPUNCTURE: CPT | Performed by: INTERNAL MEDICINE

## 2021-03-18 PROCEDURE — 80061 LIPID PANEL: CPT | Performed by: INTERNAL MEDICINE

## 2021-03-18 PROCEDURE — 85027 COMPLETE CBC AUTOMATED: CPT | Performed by: INTERNAL MEDICINE

## 2021-03-19 LAB
EST. AVERAGE GLUCOSE BLD GHB EST-MCNC: 169 MG/DL
HBA1C MFR BLD: 7.5 %

## 2021-03-23 ENCOUNTER — OFFICE VISIT (OUTPATIENT)
Dept: INTERNAL MEDICINE CLINIC | Facility: CLINIC | Age: 86
End: 2021-03-23
Payer: MEDICARE

## 2021-03-23 VITALS
BODY MASS INDEX: 21.7 KG/M2 | RESPIRATION RATE: 20 BRPM | TEMPERATURE: 97.2 F | DIASTOLIC BLOOD PRESSURE: 72 MMHG | WEIGHT: 155 LBS | HEART RATE: 95 BPM | HEIGHT: 71 IN | SYSTOLIC BLOOD PRESSURE: 120 MMHG | OXYGEN SATURATION: 98 %

## 2021-03-23 DIAGNOSIS — I48.21 PERMANENT ATRIAL FIBRILLATION (HCC): ICD-10-CM

## 2021-03-23 DIAGNOSIS — E11.65 TYPE 2 DIABETES MELLITUS WITH HYPERGLYCEMIA, WITHOUT LONG-TERM CURRENT USE OF INSULIN (HCC): ICD-10-CM

## 2021-03-23 DIAGNOSIS — E78.2 MIXED HYPERLIPIDEMIA: ICD-10-CM

## 2021-03-23 DIAGNOSIS — I10 ESSENTIAL HYPERTENSION: Primary | ICD-10-CM

## 2021-03-23 PROCEDURE — 99214 OFFICE O/P EST MOD 30 MIN: CPT | Performed by: INTERNAL MEDICINE

## 2021-03-24 NOTE — PROGRESS NOTES
[facility] [place]      NAME: Harriett Ding  AGE: 80 y o  SEX: male  : 1933   MRN: [id number]    DATE: 3/24/2021  TIME: 7:31 AM    Assessment and Plan   1  Essential hypertension    Well controlled  - CBC  - Comprehensive metabolic panel    2  Type 2 diabetes mellitus with hyperglycemia, without long-term current use of insulin (HCC)    Adequately controlled on current therapy  - Hemoglobin A1C    3  Mixed hyperlipidemia    Controlled on simvastatin  4  Permanent atrial fibrillation (HCC)    On metoprolol for rate control  On apixaban for stroke prophylaxis  The patient is doing rather well  He will maintain his current therapy  He was successful in obtaining a COVID-19 vaccine  Return to office in: Three months    Chief Complaint     Chief Complaint   Patient presents with    Follow-up     3 month        History of Present Illness      the patient returned to the office for re-evaluation of hypertension, hypercholesterolemia, type 2 diabetes, and atrial fibrillation  Since his last visit he has been feeling well  He has had no chest pain, shortness of breath, palpitations, or dizziness  He has had no symptoms of hypoglycemia or hyperglycemia  He is having no problems with his medications  He is sad today because his nephew  suddenly  He had chronic kidney disease and had undergone a previous kidney transplant but his death was unexpected  The following portions of the patient's history were reviewed and updated as appropriate: allergies, current medications, past family history, past medical history, past social history, past surgical history and problem list     Review of Systems   Review of Systems   Constitutional: Negative  HENT: Negative for congestion, ear pain, postnasal drip, rhinorrhea, sore throat and trouble swallowing  Eyes: Negative for pain, discharge, redness and visual disturbance     Respiratory: Negative for cough, shortness of breath and wheezing  Cardiovascular: Negative  Gastrointestinal: Negative  Endocrine: Negative  Genitourinary: Negative for difficulty urinating, dysuria, frequency, hematuria and urgency  Musculoskeletal: Negative for arthralgias, gait problem, joint swelling and myalgias  Skin: Negative for rash  Neurological: Negative for dizziness, speech difficulty, weakness, light-headedness, numbness and headaches  Hematological: Negative  Psychiatric/Behavioral: Negative for confusion, decreased concentration, dysphoric mood and sleep disturbance  The patient is not nervous/anxious  Active Problem List     Patient Active Problem List   Diagnosis    Hypertension    Type 2 diabetes mellitus with hyperglycemia (HCC)    Hyperlipidemia    Atrial fibrillation (HCC)    Inguinal hernia    Anemia    Onychomycosis       Objective   /72 (BP Location: Left arm, Patient Position: Sitting, Cuff Size: Standard)   Pulse 95   Temp (!) 97 2 °F (36 2 °C)   Resp 20   Ht 5' 11" (1 803 m)   Wt 70 3 kg (155 lb)   SpO2 98%   BMI 21 62 kg/m²     Physical Exam  Constitutional:       General: He is not in acute distress  Appearance: He is well-developed  HENT:      Head: Normocephalic and atraumatic  Neck:      Musculoskeletal: Neck supple  Thyroid: No thyromegaly  Vascular: No JVD  Trachea: No tracheal deviation  Cardiovascular:      Rate and Rhythm: Normal rate and regular rhythm  Heart sounds: Normal heart sounds  No murmur  No friction rub  No gallop  Pulmonary:      Effort: Pulmonary effort is normal       Breath sounds: Normal breath sounds  No wheezing or rales  Chest:      Chest wall: No tenderness  Abdominal:      General: Bowel sounds are normal  There is no distension  Palpations: Abdomen is soft  There is no mass  Tenderness: There is no abdominal tenderness  There is no rebound  Musculoskeletal: Normal range of motion           General: No tenderness  Lymphadenopathy:      Cervical: No cervical adenopathy  Skin:     General: Skin is warm and dry  Neurological:      Mental Status: He is alert and oriented to person, place, and time  Psychiatric:         Mood and Affect: Mood normal          Behavior: Behavior normal          Thought Content: Thought content normal          Judgment: Judgment normal          Pertinent Laboratory/Diagnostic Studies:  No visits with results within 3 Month(s) from this visit  Latest known visit with results is:   Office Visit on 12/15/2020   Component Date Value Ref Range Status    Sodium 03/18/2021 140  137 - 147 mmol/L Final    Potassium 03/18/2021 4 8  3 6 - 5 0 mmol/L Final    Chloride 03/18/2021 103  97 - 108 mmol/L Final    CO2 03/18/2021 29  22 - 30 mmol/L Final    ANION GAP 03/18/2021 8  5 - 14 mmol/L Final    BUN 03/18/2021 26* 5 - 25 mg/dL Final    Creatinine 03/18/2021 1 31  0 70 - 1 50 mg/dL Final    Glucose, Fasting 03/18/2021 164* 70 - 99 mg/dL Final    Calcium 03/18/2021 9 7  8 4 - 10 2 mg/dL Final    AST 03/18/2021 25  17 - 59 U/L Final    ALT 03/18/2021 7* 9 - 52 U/L Final    Alkaline Phosphatase 03/18/2021 63  43 - 122 U/L Final    Total Protein 03/18/2021 7 5  5 9 - 8 4 g/dL Final    Albumin 03/18/2021 4 0  3 0 - 5 2 g/dL Final    Total Bilirubin 03/18/2021 0 70  <1 30 mg/dL Final    eGFR 03/18/2021 49* >60 ml/min/1 73sq m Final    Hemoglobin A1C 03/18/2021 7 5* Normal 3 8-5 6%; PreDiabetic 5 7-6 4%;  Diabetic >=6 5%; Glycemic control for adults with diabetes <7 0% % Final    EAG 03/18/2021 169  mg/dl Final    Cholesterol 03/18/2021 111  <200 mg/dL Final    Triglycerides 03/18/2021 84  <150 mg/dL Final    HDL, Direct 03/18/2021 35* >=40 mg/dL Final    LDL Calculated 03/18/2021 59  <130 mg/dL Final    Non-HDL-Chol (CHOL-HDL) 03/18/2021 76  mg/dl Final    WBC 03/18/2021 6 80  4 31 - 10 16 Thousand/uL Final    RBC 03/18/2021 3 40* 4 50 - 5 90 Million/uL Final    Hemoglobin 03/18/2021 11 6* 13 5 - 17 5 g/dL Final    Hematocrit 03/18/2021 35 3* 41 0 - 53 0 % Final    MCV 03/18/2021 104* 80 - 100 fL Final    MCH 03/18/2021 34 0  26 8 - 34 3 pg Final    MCHC 03/18/2021 32 7  31 4 - 37 4 g/dL Final    RDW 03/18/2021 13 0  <15 3 % Final    Platelets 02/74/1717 170  150 - 450 Thousands/uL Final    MPV 03/18/2021 9 0  8 9 - 12 7 fL Final           Current Medications     Current Outpatient Medications:     apixaban (ELIQUIS) 5 mg, Take 1 tablet (5 mg total) by mouth 2 (two) times a day, Disp: 180 tablet, Rfl: 3    metFORMIN (GLUCOPHAGE) 1000 MG tablet, Take 1 tablet (1,000 mg total) by mouth 2 (two) times a day with meals, Disp: 180 tablet, Rfl: 3    metoprolol succinate (TOPROL-XL) 50 mg 24 hr tablet, Take 1 tablet (50 mg total) by mouth daily, Disp: 90 tablet, Rfl: 3    Multiple Vitamin (DAILY VALUE MULTIVITAMIN) TABS, Take 1 tablet by mouth daily, Disp: , Rfl:     pioglitazone (ACTOS) 30 mg tablet, Take 1 tablet (30 mg total) by mouth daily, Disp: 90 tablet, Rfl: 1    simvastatin (ZOCOR) 40 mg tablet, Take 1 tablet (40 mg total) by mouth daily, Disp: 90 tablet, Rfl: 3      Davin Hutchinson MD

## 2021-04-14 ENCOUNTER — IMMUNIZATIONS (OUTPATIENT)
Dept: FAMILY MEDICINE CLINIC | Facility: HOSPITAL | Age: 86
End: 2021-04-14

## 2021-04-14 DIAGNOSIS — Z23 ENCOUNTER FOR IMMUNIZATION: Primary | ICD-10-CM

## 2021-04-14 PROCEDURE — 0012A SARS-COV-2 / COVID-19 MRNA VACCINE (MODERNA) 100 MCG: CPT

## 2021-04-14 PROCEDURE — 91301 SARS-COV-2 / COVID-19 MRNA VACCINE (MODERNA) 100 MCG: CPT

## 2021-06-22 ENCOUNTER — APPOINTMENT (OUTPATIENT)
Dept: LAB | Facility: HOSPITAL | Age: 86
End: 2021-06-22
Attending: INTERNAL MEDICINE
Payer: MEDICARE

## 2021-06-22 LAB
ALBUMIN SERPL BCP-MCNC: 3.9 G/DL (ref 3–5.2)
ALP SERPL-CCNC: 53 U/L (ref 43–122)
ALT SERPL W P-5'-P-CCNC: 11 U/L
ANION GAP SERPL CALCULATED.3IONS-SCNC: 6 MMOL/L (ref 5–14)
AST SERPL W P-5'-P-CCNC: 29 U/L (ref 17–59)
BILIRUB SERPL-MCNC: 0.65 MG/DL
BUN SERPL-MCNC: 25 MG/DL (ref 5–25)
CALCIUM SERPL-MCNC: 9.5 MG/DL (ref 8.4–10.2)
CHLORIDE SERPL-SCNC: 109 MMOL/L (ref 97–108)
CO2 SERPL-SCNC: 28 MMOL/L (ref 22–30)
CREAT SERPL-MCNC: 1.45 MG/DL (ref 0.7–1.5)
ERYTHROCYTE [DISTWIDTH] IN BLOOD BY AUTOMATED COUNT: 13.1 %
EST. AVERAGE GLUCOSE BLD GHB EST-MCNC: 171 MG/DL
GFR SERPL CREATININE-BSD FRML MDRD: 43 ML/MIN/1.73SQ M
GLUCOSE P FAST SERPL-MCNC: 158 MG/DL (ref 70–99)
HBA1C MFR BLD: 7.6 %
HCT VFR BLD AUTO: 35.2 % (ref 41–53)
HGB BLD-MCNC: 11.8 G/DL (ref 13.5–17.5)
MCH RBC QN AUTO: 35.1 PG (ref 26.8–34.3)
MCHC RBC AUTO-ENTMCNC: 33.6 G/DL (ref 31.4–37.4)
MCV RBC AUTO: 105 FL (ref 80–100)
PLATELET # BLD AUTO: 158 THOUSANDS/UL (ref 150–450)
PMV BLD AUTO: 8.3 FL (ref 8.9–12.7)
POTASSIUM SERPL-SCNC: 5.2 MMOL/L (ref 3.6–5)
PROT SERPL-MCNC: 7.3 G/DL (ref 5.9–8.4)
RBC # BLD AUTO: 3.37 MILLION/UL (ref 4.5–5.9)
SODIUM SERPL-SCNC: 143 MMOL/L (ref 137–147)
WBC # BLD AUTO: 6.6 THOUSAND/UL (ref 4.31–10.16)

## 2021-06-22 PROCEDURE — 80053 COMPREHEN METABOLIC PANEL: CPT | Performed by: INTERNAL MEDICINE

## 2021-06-22 PROCEDURE — 36415 COLL VENOUS BLD VENIPUNCTURE: CPT | Performed by: INTERNAL MEDICINE

## 2021-06-22 PROCEDURE — 83036 HEMOGLOBIN GLYCOSYLATED A1C: CPT | Performed by: INTERNAL MEDICINE

## 2021-06-22 PROCEDURE — 85027 COMPLETE CBC AUTOMATED: CPT | Performed by: INTERNAL MEDICINE

## 2021-06-24 ENCOUNTER — TELEPHONE (OUTPATIENT)
Dept: INTERNAL MEDICINE CLINIC | Facility: CLINIC | Age: 86
End: 2021-06-24

## 2021-06-24 NOTE — TELEPHONE ENCOUNTER
----- Message from Justen Long MD sent at 6/23/2021  3:36 PM EDT -----  Please call patient   Labs satisfactory

## 2021-06-30 ENCOUNTER — OFFICE VISIT (OUTPATIENT)
Dept: INTERNAL MEDICINE CLINIC | Facility: CLINIC | Age: 86
End: 2021-06-30
Payer: MEDICARE

## 2021-06-30 VITALS
BODY MASS INDEX: 21.11 KG/M2 | DIASTOLIC BLOOD PRESSURE: 70 MMHG | TEMPERATURE: 97.1 F | HEART RATE: 109 BPM | HEIGHT: 71 IN | SYSTOLIC BLOOD PRESSURE: 120 MMHG | WEIGHT: 150.8 LBS | RESPIRATION RATE: 12 BRPM

## 2021-06-30 DIAGNOSIS — I48.21 PERMANENT ATRIAL FIBRILLATION (HCC): ICD-10-CM

## 2021-06-30 DIAGNOSIS — E78.2 MIXED HYPERLIPIDEMIA: ICD-10-CM

## 2021-06-30 DIAGNOSIS — E11.65 TYPE 2 DIABETES MELLITUS WITH HYPERGLYCEMIA, WITHOUT LONG-TERM CURRENT USE OF INSULIN (HCC): Primary | ICD-10-CM

## 2021-06-30 DIAGNOSIS — I10 ESSENTIAL HYPERTENSION: ICD-10-CM

## 2021-06-30 DIAGNOSIS — D64.9 ANEMIA, UNSPECIFIED TYPE: ICD-10-CM

## 2021-06-30 PROCEDURE — G0439 PPPS, SUBSEQ VISIT: HCPCS | Performed by: INTERNAL MEDICINE

## 2021-06-30 PROCEDURE — 1123F ACP DISCUSS/DSCN MKR DOCD: CPT | Performed by: INTERNAL MEDICINE

## 2021-06-30 PROCEDURE — 99214 OFFICE O/P EST MOD 30 MIN: CPT | Performed by: INTERNAL MEDICINE

## 2021-06-30 NOTE — PATIENT INSTRUCTIONS
Medicare Preventive Visit Patient Instructions  Thank you for completing your Welcome to Medicare Visit or Medicare Annual Wellness Visit today  Your next wellness visit will be due in one year (7/1/2022)  The screening/preventive services that you may require over the next 5-10 years are detailed below  Some tests may not apply to you based off risk factors and/or age  Screening tests ordered at today's visit but not completed yet may show as past due  Also, please note that scanned in results may not display below  Preventive Screenings:  Service Recommendations Previous Testing/Comments   Colorectal Cancer Screening  · Colonoscopy    · Fecal Occult Blood Test (FOBT)/Fecal Immunochemical Test (FIT)  · Fecal DNA/Cologuard Test  · Flexible Sigmoidoscopy Age: 54-65 years old   Colonoscopy: every 10 years (May be performed more frequently if at higher risk)  OR  FOBT/FIT: every 1 year  OR  Cologuard: every 3 years  OR  Sigmoidoscopy: every 5 years  Screening may be recommended earlier than age 48 if at higher risk for colorectal cancer  Also, an individualized decision between you and your healthcare provider will decide whether screening between the ages of 74-80 would be appropriate   Colonoscopy: Not on file  FOBT/FIT: Not on file  Cologuard: Not on file  Sigmoidoscopy: Not on file    Screening Not Indicated     Prostate Cancer Screening Individualized decision between patient and health care provider in men between ages of 53-78   Medicare will cover every 12 months beginning on the day after your 50th birthday PSA: No results in last 5 years     Screening Not Indicated     Hepatitis C Screening Once for adults born between [facility]  More frequently in patients at high risk for Hepatitis C Hep C Antibody: Not on file        Diabetes Screening 1-2 times per year if you're at risk for diabetes or have pre-diabetes Fasting glucose: 158 mg/dL   A1C: 7 6 %    Screening Not Indicated  History Diabetes Cholesterol Screening Once every 5 years if you don't have a lipid disorder  May order more often based on risk factors  Lipid panel: 03/18/2021    Screening Not Indicated  History Lipid Disorder      Other Preventive Screenings Covered by Medicare:  1  Abdominal Aortic Aneurysm (AAA) Screening: covered once if your at risk  You're considered to be at risk if you have a family history of AAA or a male between the age of 73-68 who smoking at least 100 cigarettes in your lifetime  2  Lung Cancer Screening: covers low dose CT scan once per year if you meet all of the following conditions: (1) Age 50-69; (2) No signs or symptoms of lung cancer; (3) Current smoker or have quit smoking within the last 15 years; (4) You have a tobacco smoking history of at least 30 pack years (packs per day x number of years you smoked); (5) You get a written order from a healthcare provider  3  Glaucoma Screening: covered annually if you're considered high risk: (1) You have diabetes OR (2) Family history of glaucoma OR (3)  aged 48 and older OR (3)  American aged 72 and older  3  Osteoporosis Screening: covered every 2 years if you meet one of the following conditions: (1) Have a vertebral abnormality; (2) On glucocorticoid therapy for more than 3 months; (3) Have primary hyperparathyroidism; (4) On osteoporosis medications and need to assess response to drug therapy  5  HIV Screening: covered annually if you're between the age of 12-76  Also covered annually if you are younger than 13 and older than 72 with risk factors for HIV infection  For pregnant patients, it is covered up to 3 times per pregnancy      Immunizations:  Immunization Recommendations   Influenza Vaccine Annual influenza vaccination during flu season is recommended for all persons aged >= 6 months who do not have contraindications   Pneumococcal Vaccine (Prevnar and Pneumovax)  * Prevnar = PCV13  * Pneumovax = PPSV23 Adults 25-60 years old: 1-3 doses may be recommended based on certain risk factors  Adults 72 years old: Prevnar (PCV13) vaccine recommended followed by Pneumovax (PPSV23) vaccine  If already received PPSV23 since turning 65, then PCV13 recommended at least one year after PPSV23 dose  Hepatitis B Vaccine 3 dose series if at intermediate or high risk (ex: diabetes, end stage renal disease, liver disease)   Tetanus (Td) Vaccine - COST NOT COVERED BY MEDICARE PART B Following completion of primary series, a booster dose should be given every 10 years to maintain immunity against tetanus  Td may also be given as tetanus wound prophylaxis  Tdap Vaccine - COST NOT COVERED BY MEDICARE PART B Recommended at least once for all adults  For pregnant patients, recommended with each pregnancy  Shingles Vaccine (Shingrix) - COST NOT COVERED BY MEDICARE PART B  2 shot series recommended in those aged 48 and above     Health Maintenance Due:  There are no preventive care reminders to display for this patient  Immunizations Due:      Topic Date Due    DTaP,Tdap,and Td Vaccines (1 - Tdap) Never done     Advance Directives   What are advance directives? Advance directives are legal documents that state your wishes and plans for medical care  These plans are made ahead of time in case you lose your ability to make decisions for yourself  Advance directives can apply to any medical decision, such as the treatments you want, and if you want to donate organs  What are the types of advance directives? There are many types of advance directives, and each state has rules about how to use them  You may choose a combination of any of the following:  · Living will: This is a written record of the treatment you want  You can also choose which treatments you do not want, which to limit, and which to stop at a certain time  This includes surgery, medicine, IV fluid, and tube feedings  · Durable power of  for healthcare [facility]):   This is a written record that states who you want to make healthcare choices for you when you are unable to make them for yourself  This person, called a proxy, is usually a family member or a friend  You may choose more than 1 proxy  · Do not resuscitate (DNR) order:  A DNR order is used in case your heart stops beating or you stop breathing  It is a request not to have certain forms of treatment, such as CPR  A DNR order may be included in other types of advance directives  · Medical directive: This covers the care that you want if you are in a coma, near death, or unable to make decisions for yourself  You can list the treatments you want for each condition  Treatment may include pain medicine, surgery, blood transfusions, dialysis, IV or tube feedings, and a ventilator (breathing machine)  · Values history: This document has questions about your views, beliefs, and how you feel and think about life  This information can help others choose the care that you would choose  Why are advance directives important? An advance directive helps you control your care  Although spoken wishes may be used, it is better to have your wishes written down  Spoken wishes can be misunderstood, or not followed  Treatments may be given even if you do not want them  An advance directive may make it easier for your family to make difficult choices about your care  © Copyright [employer] 2018 Information is for End User's use only and may not be sold, redistributed or otherwise used for commercial purposes   All illustrations and images included in CareNotes® are the copyrighted property of [employer]  or [address] [place] [address] [employer]

## 2021-06-30 NOTE — PROGRESS NOTES
Assessment and Plan:      1  Medicare wellness evaluation     The patient is doing reasonably well  He is maintaining a healthy lifestyle  He does not smoke, drink alcohol to excess, or use illicit drugs  He watches his diet carefully  He is at his ideal body weight  He has remained quite active though he does not do much formal exercise  He has not fallen  He has no symptoms of psychiatric or cognitive dysfunction  His home environment is safe  He has no problems with his activities of daily living  He is up-to-date with appropriate health screening studies  He is up-to-date with influenza, pneumococcal, and COVID-19 vaccinations  I reviewed with him the recommendations for herpes zoster and tetanus vaccinations  I discussed advance care planning with the patient  He does have a living will  His niece Segun Farley will be his durable power of   We reviewed advance directives  Preventive health issues were discussed with patient, and age appropriate screening tests were ordered as noted in patient's After Visit Summary  Personalized health advice and appropriate referrals for health education or preventive services given if needed, as noted in patient's After Visit Summary  History of Present Illness:     Patient presents for Medicare Annual Wellness visit    Patient Care Team:  Carmel Fernandez MD as PCP - General  Carmel Fernandez MD     Problem List:     Patient Active Problem List   Diagnosis    Hypertension    Type 2 diabetes mellitus with hyperglycemia ([facility] [address] )    Hyperlipidemia    Atrial fibrillation ([facility] [address] )    Inguinal hernia    Anemia    Onychomycosis      Past Medical and Surgical History:     No past medical history on file    Past Surgical History:   Procedure Laterality Date    CATARACT EXTRACTION      HERNIA REPAIR  1985    HERNIA REPAIR  1999      Family History:     Family History   Problem Relation Age of Onset    Stroke Mother     Stroke Father     Atrial fibrillation Sister       Social History:     Social History     Socioeconomic History    Marital status: Single     Spouse name: Not on file    Number of children: Not on file    Years of education: Not on file    Highest education level: Not on file   Occupational History    Not on file   Tobacco Use    Smoking status: Former Smoker     Quit date: 1970     Years since quittin 5    Smokeless tobacco: Never Used   Substance and Sexual Activity    Alcohol use: Yes     Comment: Social     Drug use: Not on file    Sexual activity: Not on file   Other Topics Concern    Not on file   Social History Narrative    Not on file     Social Determinants of Health     Financial Resource Strain:     Difficulty of Paying Living Expenses:    Food Insecurity:     Worried About Running Out of Food in the Last Year:     [address] in the Last Year:    Transportation Needs:     Lack of Transportation (Medical):      Lack of Transportation (Non-Medical):    Physical Activity:     Days of Exercise per Week:     Minutes of Exercise per Session:    Stress:     Feeling of Stress :    Social Connections:     Frequency of Communication with Friends and Family:     Frequency of Social Gatherings with Friends and Family:     Attends [employer] Services:     Active Member of Clubs or Organizations:     Attends Club or Organization Meetings:     Marital Status:    Intimate Partner Violence:     Fear of Current or Ex-Partner:     Emotionally Abused:     Physically Abused:     Sexually Abused:       Medications and Allergies:     Current Outpatient Medications   Medication Sig Dispense Refill    apixaban (ELIQUIS) 5 mg Take 1 tablet (5 mg total) by mouth 2 (two) times a day 180 tablet 3    metFORMIN (GLUCOPHAGE) 1000 MG tablet Take 1 tablet (1,000 mg total) by mouth 2 (two) times a day with meals 180 tablet 3    metoprolol succinate (TOPROL-XL) 50 mg 24 hr tablet Take 1 tablet (50 mg total) by mouth daily 90 tablet 3    Multiple Vitamin (DAILY VALUE MULTIVITAMIN) TABS Take 1 tablet by mouth daily      pioglitazone (ACTOS) 30 mg tablet Take 1 tablet (30 mg total) by mouth daily 90 tablet 1    simvastatin (ZOCOR) 40 mg tablet Take 1 tablet (40 mg total) by mouth daily 90 tablet 3     No current facility-administered medications for this visit  Allergies   Allergen Reactions    Penicillin G       Immunizations:     Immunization History   Administered Date(s) Administered    INFLUENZA 11/25/2014, 09/09/2015, 10/18/2016, 12/05/2017    Influenza Quadrivalent Preservative Free 3 years and older IM 11/25/2014    Influenza Quadrivalent, 6-35 Months IM 09/09/2015    Influenza Split High Dose Preservative Free IM 10/18/2016, 12/05/2017    Influenza, high dose seasonal 0 7 mL 09/26/2018, 10/24/2019, 12/15/2020    Influenza, seasonal, injectable 11/06/2012, 10/24/2013, 09/20/2019    Pneumococcal Conjugate 13-Valent 07/12/2016    Pneumococcal Polysaccharide PPV23 08/27/2014    SARS-CoV-2 / COVID-19 mRNA IM ([facility]) 03/15/2021, 04/14/2021      Health Maintenance: There are no preventive care reminders to display for this patient  Topic Date Due    DTaP,Tdap,and Td Vaccines (1 - Tdap) Never done      Medicare Health Risk Assessment:     There were no vitals taken for this visit  Uziel Griffin is here for his Subsequent Wellness visit  Health Risk Assessment:   Patient rates overall health as good  Patient feels that their physical health rating is same  Patient is satisfied with their life  Eyesight was rated as same  Hearing was rated as same  Patient feels that their emotional and mental health rating is same  Patients states they are never, rarely angry  Patient states they are never, rarely unusually tired/fatigued  Pain experienced in the last 7 days has been none  Patient states that he has experienced no weight loss or gain in last 6 months       Depression Screening:   PHQ-2 Score: 0      Fall Risk Screening: In the past year, patient has experienced: no history of falling in past year      Home Safety:  Patient does not have trouble with stairs inside or outside of their home  Patient has working smoke alarms and has working carbon monoxide detector  Home safety hazards include: none  Nutrition:   Current diet is Regular  Medications:   Patient is currently taking over-the-counter supplements  OTC medications include: see medication list  Patient is not able to manage medications  Activities of Daily Living (ADLs)/Instrumental Activities of Daily Living (IADLs):   Walk and transfer into and out of bed and chair?: Yes  Dress and groom yourself?: Yes    Bathe or shower yourself?: Yes    Feed yourself? Yes  Do your laundry/housekeeping?: Yes  Manage your money, pay your bills and track your expenses?: Yes  Make your own meals?: Yes    Do your own shopping?: Yes    Previous Hospitalizations:   Any hospitalizations or ED visits within the last 12 months?: No      Advance Care Planning:   Living will: Yes    Durable POA for healthcare:  Yes    Advanced directive: No    Advanced directive counseling given: Yes    End of Life Decisions reviewed with patient: Yes      Cognitive Screening:   Provider or family/friend/caregiver concerned regarding cognition?: No    PREVENTIVE SCREENINGS      Cardiovascular Screening:    General: Screening Not Indicated and History Lipid Disorder      Diabetes Screening:     General: Screening Not Indicated and History Diabetes      Colorectal Cancer Screening:     General: Screening Not Indicated      Prostate Cancer Screening:    General: Screening Not Indicated      Osteoporosis Screening:    General: Screening Not Indicated      Abdominal Aortic Aneurysm (AAA) Screening:    Risk factors include: tobacco use        Lung Cancer Screening:     General: Screening Not Indicated      Hepatitis C Screening:    General: Screening Not Indicated    Screening, Brief Intervention, and Referral to Treatment (SBIRT)    Screening  Typical number of drinks in a day: 0  Typical number of drinks in a week: 1  Interpretation: Low risk drinking behavior        Melissa Yang MD

## 2021-07-07 NOTE — PROGRESS NOTES
[facility]      NAME: Victorina Phillips  AGE: 80 y o  SEX: male  : 1933   MRN: [id number]    DATE: 2021  TIME: 7:46 AM    Assessment and Plan   1  Type 2 diabetes mellitus with hyperglycemia, without long-term current use of insulin (HCC)    Adequately controlled  - Comprehensive metabolic panel; Future  - Hemoglobin A1C; Future  - Microalbumin / creatinine urine ratio; Future    2  Permanent atrial fibrillation (HCC)    On metoprolol for rate control  On apixaban for stroke prophylaxis  3  Essential hypertension    Adequately controlled on metoprolol  4  Mixed hyperlipidemia    Controlled on simvastatin  - Lipid panel; Future    5  Anemia, unspecified type    This has been stable  The patient is doing generally well  He will maintain his current medications  He will update his lab work before his next visit  Return to office in: Three months    Chief Complaint     Chief Complaint   Patient presents with   [facility] Wellness Visit     Subsequent       History of Present Illness      the patient returned to the office for re-evaluation of type 2 diabetes, hypertension, hypercholesterolemia, and permanent atrial fibrillation  Since his last visit he has been feeling well  He denies chest pain, shortness of breath, palpitations, or dizziness  He is having no issues with his medications  The following portions of the patient's history were reviewed and updated as appropriate: allergies, current medications, past family history, past medical history, past social history, past surgical history and problem list     Review of Systems   Review of Systems   Constitutional: Negative  HENT: Negative for congestion, ear pain, postnasal drip, rhinorrhea, sore throat and trouble swallowing  Eyes: Negative for pain, discharge, redness and visual disturbance  Respiratory: Negative for cough, shortness of breath and wheezing  Cardiovascular: Negative  Gastrointestinal: Negative  Endocrine: Negative  Genitourinary: Negative for difficulty urinating, dysuria, frequency, hematuria and urgency  Musculoskeletal: Negative for arthralgias, gait problem, joint swelling and myalgias  Skin: Negative for rash  Neurological: Negative for dizziness, speech difficulty, weakness, light-headedness, numbness and headaches  Hematological: Negative  Psychiatric/Behavioral: Negative for confusion, decreased concentration, dysphoric mood and sleep disturbance  The patient is not nervous/anxious  Active Problem List     Patient Active Problem List   Diagnosis    Hypertension    Type 2 diabetes mellitus with hyperglycemia (HCC)    Hyperlipidemia    Atrial fibrillation (HCC)    Inguinal hernia    Anemia    Onychomycosis       Objective   /70 (BP Location: Left arm, Patient Position: Sitting, Cuff Size: Standard)   Pulse (!) 109   Temp (!) 97 1 °F (36 2 °C)   Resp 12   Ht 5' 11" (1 803 m)   Wt 68 4 kg (150 lb 12 8 oz)   BMI 21 03 kg/m²     Physical Exam  Constitutional:       Appearance: He is well-developed  HENT:      Head: Normocephalic and atraumatic  Neck:      Thyroid: No thyromegaly  Vascular: No JVD  Trachea: No tracheal deviation  Cardiovascular:      Rate and Rhythm: Normal rate  Rhythm irregular  Pulses: no weak pulses          Dorsalis pedis pulses are 2+ on the right side and 2+ on the left side  Posterior tibial pulses are 2+ on the right side and 2+ on the left side  Heart sounds: Normal heart sounds  No murmur heard  No friction rub  No gallop  Pulmonary:      Effort: Pulmonary effort is normal       Breath sounds: Normal breath sounds  No wheezing or rales  Chest:      Chest wall: No tenderness  Abdominal:      General: Bowel sounds are normal  There is no distension  Palpations: Abdomen is soft  There is no mass  Tenderness: There is no abdominal tenderness   There is no rebound  Musculoskeletal:         General: No tenderness  Normal range of motion  Cervical back: Neck supple  Feet:      Right foot:      Skin integrity: No ulcer, skin breakdown, erythema, warmth, callus or dry skin  Left foot:      Skin integrity: No ulcer, skin breakdown, erythema, warmth, callus or dry skin  Lymphadenopathy:      Cervical: No cervical adenopathy  Skin:     General: Skin is warm and dry  Neurological:      Mental Status: He is alert and oriented to person, place, and time  Psychiatric:         Mood and Affect: Mood normal          Behavior: Behavior normal          Thought Content: Thought content normal          Judgment: Judgment normal      Patient's shoes and socks removed  Right Foot/Ankle   Right Foot Inspection  Skin Exam: skin normal and skin intact no dry skin, no warmth, no callus, no erythema, no maceration, no abnormal color, no pre-ulcer, no ulcer and no callus                          Toe Exam: no swelling, no tenderness, erythema and  no right toe deformity  Sensory   Vibration: intact  Proprioception: intact   Monofilament testing: intact  Vascular  Capillary refills: < 3 seconds  The right DP pulse is 2+  The right PT pulse is 2+  Left Foot/Ankle  Left Foot Inspection  Skin Exam: skin normal and skin intactno dry skin, no warmth, no erythema, no maceration, normal color, no pre-ulcer, no ulcer and no callus                         Toe Exam: ROM and strength within normal limitsno swelling, no tenderness, no erythema and no left toe deformity                   Sensory   Vibration: intact  Proprioception: intact  Monofilament: intact  Vascular  Capillary refills: < 3 seconds  The left DP pulse is 2+  The left PT pulse is 2+  Assign Risk Category:  No deformity present; No loss of protective sensation; No weak pulses       Risk: 0      Pertinent Laboratory/Diagnostic Studies:  No visits with results within 3 Month(s) from this visit     Latest known visit with results is:   Office Visit on 03/23/2021   Component Date Value Ref Range Status    WBC 06/22/2021 6 60  4 31 - 10 16 Thousand/uL Final    RBC 06/22/2021 3 37* 4 50 - 5 90 Million/uL Final    Hemoglobin 06/22/2021 11 8* 13 5 - 17 5 g/dL Final    Hematocrit 06/22/2021 35 2* 41 0 - 53 0 % Final    MCV 06/22/2021 105* 80 - 100 fL Final    MCH 06/22/2021 35 1* 26 8 - 34 3 pg Final    MCHC 06/22/2021 33 6  31 4 - 37 4 g/dL Final    RDW 06/22/2021 13 1  <15 3 % Final    Platelets 84/71/6573 158  150 - 450 Thousands/uL Final    MPV 06/22/2021 8 3* 8 9 - 12 7 fL Final    Sodium 06/22/2021 143  137 - 147 mmol/L Final    Potassium 06/22/2021 5 2* 3 6 - 5 0 mmol/L Final    Chloride 06/22/2021 109* 97 - 108 mmol/L Final    CO2 06/22/2021 28  22 - 30 mmol/L Final    ANION GAP 06/22/2021 6  5 - 14 mmol/L Final    BUN 06/22/2021 25  5 - 25 mg/dL Final    Creatinine 06/22/2021 1 45  0 70 - 1 50 mg/dL Final    Glucose, Fasting 06/22/2021 158* 70 - 99 mg/dL Final    Calcium 06/22/2021 9 5  8 4 - 10 2 mg/dL Final    AST 06/22/2021 29  17 - 59 U/L Final    ALT 06/22/2021 11  <50 U/L Final    Alkaline Phosphatase 06/22/2021 53  43 - 122 U/L Final    Total Protein 06/22/2021 7 3  5 9 - 8 4 g/dL Final    Albumin 06/22/2021 3 9  3 0 - 5 2 g/dL Final    Total Bilirubin 06/22/2021 0 65  <1 30 mg/dL Final    eGFR 06/22/2021 43* >60 ml/min/1 73sq m Final    Hemoglobin A1C 06/22/2021 7 6* Normal 3 8-5 6%; PreDiabetic 5 7-6 4%;  Diabetic >=6 5%; Glycemic control for adults with diabetes <7 0% % Final    EAG 06/22/2021 171  mg/dl Final           Current Medications     Current Outpatient Medications:     apixaban (ELIQUIS) 5 mg, Take 1 tablet (5 mg total) by mouth 2 (two) times a day, Disp: 180 tablet, Rfl: 3    metFORMIN (GLUCOPHAGE) 1000 MG tablet, Take 1 tablet (1,000 mg total) by mouth 2 (two) times a day with meals, Disp: 180 tablet, Rfl: 3    metoprolol succinate (TOPROL-XL) 50 mg 24 hr tablet, Take 1 tablet (50 mg total) by mouth daily, Disp: 90 tablet, Rfl: 3    Multiple Vitamin (DAILY VALUE MULTIVITAMIN) TABS, Take 1 tablet by mouth daily, Disp: , Rfl:     pioglitazone (ACTOS) 30 mg tablet, Take 1 tablet (30 mg total) by mouth daily, Disp: 90 tablet, Rfl: 1    simvastatin (ZOCOR) 40 mg tablet, Take 1 tablet (40 mg total) by mouth daily, Disp: 90 tablet, Rfl: 3      Tatum Devine MD

## 2021-07-27 DIAGNOSIS — E11.9 TYPE 2 DIABETES MELLITUS WITHOUT COMPLICATION, WITHOUT LONG-TERM CURRENT USE OF INSULIN (HCC): ICD-10-CM

## 2021-07-28 RX ORDER — PIOGLITAZONEHYDROCHLORIDE 30 MG/1
30 TABLET ORAL DAILY
Qty: 90 TABLET | Refills: 3 | Status: SHIPPED | OUTPATIENT
Start: 2021-07-28 | End: 2022-07-25 | Stop reason: SDUPTHER

## 2021-09-27 ENCOUNTER — LAB (OUTPATIENT)
Dept: LAB | Facility: HOSPITAL | Age: 86
End: 2021-09-27
Attending: INTERNAL MEDICINE
Payer: MEDICARE

## 2021-09-27 DIAGNOSIS — E78.2 MIXED HYPERLIPIDEMIA: ICD-10-CM

## 2021-09-27 DIAGNOSIS — E11.65 TYPE 2 DIABETES MELLITUS WITH HYPERGLYCEMIA, WITHOUT LONG-TERM CURRENT USE OF INSULIN (HCC): ICD-10-CM

## 2021-09-27 LAB
ALBUMIN SERPL BCP-MCNC: 4 G/DL (ref 3–5.2)
ALP SERPL-CCNC: 58 U/L (ref 43–122)
ALT SERPL W P-5'-P-CCNC: 12 U/L
ANION GAP SERPL CALCULATED.3IONS-SCNC: 7 MMOL/L (ref 5–14)
AST SERPL W P-5'-P-CCNC: 25 U/L (ref 17–59)
BILIRUB SERPL-MCNC: 0.48 MG/DL
BUN SERPL-MCNC: 28 MG/DL (ref 5–25)
CALCIUM SERPL-MCNC: 9.4 MG/DL (ref 8.4–10.2)
CHLORIDE SERPL-SCNC: 107 MMOL/L (ref 97–108)
CHOLEST SERPL-MCNC: 103 MG/DL
CO2 SERPL-SCNC: 28 MMOL/L (ref 22–30)
CREAT SERPL-MCNC: 1.4 MG/DL (ref 0.7–1.5)
CREAT UR-MCNC: 43.1 MG/DL
EST. AVERAGE GLUCOSE BLD GHB EST-MCNC: 171 MG/DL
GFR SERPL CREATININE-BSD FRML MDRD: 45 ML/MIN/1.73SQ M
GLUCOSE P FAST SERPL-MCNC: 148 MG/DL (ref 70–99)
HBA1C MFR BLD: 7.6 %
HDLC SERPL-MCNC: 34 MG/DL
LDLC SERPL CALC-MCNC: 50 MG/DL
MICROALBUMIN UR-MCNC: 10.3 MG/L (ref 0–20)
MICROALBUMIN/CREAT 24H UR: 24 MG/G CREATININE (ref 0–30)
NONHDLC SERPL-MCNC: 69 MG/DL
POTASSIUM SERPL-SCNC: 5 MMOL/L (ref 3.6–5)
PROT SERPL-MCNC: 7.1 G/DL (ref 5.9–8.4)
SODIUM SERPL-SCNC: 142 MMOL/L (ref 137–147)
TRIGL SERPL-MCNC: 96 MG/DL

## 2021-09-27 PROCEDURE — 80053 COMPREHEN METABOLIC PANEL: CPT

## 2021-09-27 PROCEDURE — 82570 ASSAY OF URINE CREATININE: CPT

## 2021-09-27 PROCEDURE — 36415 COLL VENOUS BLD VENIPUNCTURE: CPT

## 2021-09-27 PROCEDURE — 83036 HEMOGLOBIN GLYCOSYLATED A1C: CPT

## 2021-09-27 PROCEDURE — 82043 UR ALBUMIN QUANTITATIVE: CPT

## 2021-09-27 PROCEDURE — 80061 LIPID PANEL: CPT

## 2021-10-06 ENCOUNTER — OFFICE VISIT (OUTPATIENT)
Dept: INTERNAL MEDICINE CLINIC | Facility: CLINIC | Age: 86
End: 2021-10-06
Payer: MEDICARE

## 2021-10-06 VITALS
TEMPERATURE: 98.6 F | SYSTOLIC BLOOD PRESSURE: 120 MMHG | WEIGHT: 150 LBS | DIASTOLIC BLOOD PRESSURE: 72 MMHG | RESPIRATION RATE: 16 BRPM | HEIGHT: 71 IN | BODY MASS INDEX: 21 KG/M2 | HEART RATE: 94 BPM

## 2021-10-06 DIAGNOSIS — I10 PRIMARY HYPERTENSION: Primary | ICD-10-CM

## 2021-10-06 DIAGNOSIS — E11.65 TYPE 2 DIABETES MELLITUS WITH HYPERGLYCEMIA, WITHOUT LONG-TERM CURRENT USE OF INSULIN (HCC): ICD-10-CM

## 2021-10-06 DIAGNOSIS — Z23 ENCOUNTER FOR IMMUNIZATION: ICD-10-CM

## 2021-10-06 DIAGNOSIS — E78.2 MIXED HYPERLIPIDEMIA: ICD-10-CM

## 2021-10-06 DIAGNOSIS — I48.21 PERMANENT ATRIAL FIBRILLATION (HCC): ICD-10-CM

## 2021-10-06 PROCEDURE — 99214 OFFICE O/P EST MOD 30 MIN: CPT | Performed by: INTERNAL MEDICINE

## 2021-10-06 PROCEDURE — 90662 IIV NO PRSV INCREASED AG IM: CPT | Performed by: INTERNAL MEDICINE

## 2021-10-06 PROCEDURE — G0008 ADMIN INFLUENZA VIRUS VAC: HCPCS | Performed by: INTERNAL MEDICINE

## 2021-10-26 ENCOUNTER — HOSPITAL ENCOUNTER (INPATIENT)
Facility: HOSPITAL | Age: 86
LOS: 4 days | Discharge: HOME HEALTH | DRG: 394 | End: 2021-10-30
Attending: EMERGENCY MEDICINE | Admitting: INTERNAL MEDICINE
Payer: MEDICARE

## 2021-10-26 ENCOUNTER — APPOINTMENT (EMERGENCY)
Dept: RADIOLOGY | Facility: HOSPITAL | Age: 86
DRG: 394 | End: 2021-10-26
Payer: MEDICARE

## 2021-10-26 DIAGNOSIS — D64.9 ANEMIA, UNSPECIFIED TYPE: Primary | ICD-10-CM

## 2021-10-26 DIAGNOSIS — K92.0 HEMATEMESIS: ICD-10-CM

## 2021-10-26 DIAGNOSIS — K92.2 UPPER GI BLEED: ICD-10-CM

## 2021-10-26 DIAGNOSIS — I48.21 PERMANENT ATRIAL FIBRILLATION (HCC): ICD-10-CM

## 2021-10-26 DIAGNOSIS — K92.0 COFFEE GROUND EMESIS: ICD-10-CM

## 2021-10-26 DIAGNOSIS — D62 ABLA (ACUTE BLOOD LOSS ANEMIA): ICD-10-CM

## 2021-10-26 DIAGNOSIS — R31.0 GROSS HEMATURIA: ICD-10-CM

## 2021-10-26 PROBLEM — N18.9 ACUTE RENAL FAILURE SUPERIMPOSED ON CHRONIC KIDNEY DISEASE (HCC): Status: ACTIVE | Noted: 2021-10-26

## 2021-10-26 PROBLEM — I95.9 HYPOTENSION: Status: ACTIVE | Noted: 2021-10-26

## 2021-10-26 PROBLEM — N17.9 ACUTE RENAL FAILURE SUPERIMPOSED ON CHRONIC KIDNEY DISEASE (HCC): Status: ACTIVE | Noted: 2021-10-26

## 2021-10-26 LAB
ABO GROUP BLD: NORMAL
ABO GROUP BLD: NORMAL
ALBUMIN SERPL BCP-MCNC: 2.4 G/DL (ref 3.5–5)
ALP SERPL-CCNC: 38 U/L (ref 46–116)
ALT SERPL W P-5'-P-CCNC: 11 U/L (ref 12–78)
ANION GAP SERPL CALCULATED.3IONS-SCNC: 17 MMOL/L (ref 4–13)
AST SERPL W P-5'-P-CCNC: 13 U/L (ref 5–45)
ATRIAL RATE: 416 BPM
BASOPHILS # BLD AUTO: 0.02 THOUSANDS/ΜL (ref 0–0.1)
BASOPHILS NFR BLD AUTO: 0 % (ref 0–1)
BILIRUB SERPL-MCNC: 0.39 MG/DL (ref 0.2–1)
BLD GP AB SCN SERPL QL: NEGATIVE
BUN SERPL-MCNC: 60 MG/DL (ref 5–25)
CALCIUM ALBUM COR SERPL-MCNC: 10.1 MG/DL (ref 8.3–10.1)
CALCIUM SERPL-MCNC: 8.8 MG/DL (ref 8.3–10.1)
CHLORIDE SERPL-SCNC: 105 MMOL/L (ref 100–108)
CO2 SERPL-SCNC: 21 MMOL/L (ref 21–32)
CREAT SERPL-MCNC: 1.95 MG/DL (ref 0.6–1.3)
EOSINOPHIL # BLD AUTO: 0 THOUSAND/ΜL (ref 0–0.61)
EOSINOPHIL NFR BLD AUTO: 0 % (ref 0–6)
ERYTHROCYTE [DISTWIDTH] IN BLOOD BY AUTOMATED COUNT: 12.8 % (ref 11.6–15.1)
GFR SERPL CREATININE-BSD FRML MDRD: 30 ML/MIN/1.73SQ M
GLUCOSE SERPL-MCNC: 369 MG/DL (ref 65–140)
HCT VFR BLD AUTO: 22.9 % (ref 36.5–49.3)
HGB BLD-MCNC: 7.3 G/DL (ref 12–17)
IMM GRANULOCYTES # BLD AUTO: 0.05 THOUSAND/UL (ref 0–0.2)
IMM GRANULOCYTES NFR BLD AUTO: 1 % (ref 0–2)
LYMPHOCYTES # BLD AUTO: 0.92 THOUSANDS/ΜL (ref 0.6–4.47)
LYMPHOCYTES NFR BLD AUTO: 10 % (ref 14–44)
MCH RBC QN AUTO: 35.3 PG (ref 26.8–34.3)
MCHC RBC AUTO-ENTMCNC: 31.9 G/DL (ref 31.4–37.4)
MCV RBC AUTO: 111 FL (ref 82–98)
MONOCYTES # BLD AUTO: 0.39 THOUSAND/ΜL (ref 0.17–1.22)
MONOCYTES NFR BLD AUTO: 4 % (ref 4–12)
NEUTROPHILS # BLD AUTO: 8.35 THOUSANDS/ΜL (ref 1.85–7.62)
NEUTS SEG NFR BLD AUTO: 85 % (ref 43–75)
NRBC BLD AUTO-RTO: 0 /100 WBCS
PLATELET # BLD AUTO: 137 THOUSANDS/UL (ref 149–390)
PMV BLD AUTO: 11.1 FL (ref 8.9–12.7)
POTASSIUM SERPL-SCNC: 5.4 MMOL/L (ref 3.5–5.3)
PROT SERPL-MCNC: 6.1 G/DL (ref 6.4–8.2)
QRS AXIS: -22 DEGREES
QRSD INTERVAL: 70 MS
QT INTERVAL: 270 MS
QTC INTERVAL: 398 MS
RBC # BLD AUTO: 2.07 MILLION/UL (ref 3.88–5.62)
RH BLD: POSITIVE
RH BLD: POSITIVE
SODIUM SERPL-SCNC: 143 MMOL/L (ref 136–145)
SPECIMEN EXPIRATION DATE: NORMAL
T WAVE AXIS: -61 DEGREES
TROPONIN I SERPL-MCNC: 0.02 NG/ML
VENTRICULAR RATE: 131 BPM
WBC # BLD AUTO: 9.73 THOUSAND/UL (ref 4.31–10.16)

## 2021-10-26 PROCEDURE — 99291 CRITICAL CARE FIRST HOUR: CPT | Performed by: EMERGENCY MEDICINE

## 2021-10-26 PROCEDURE — 30233N1 TRANSFUSION OF NONAUTOLOGOUS RED BLOOD CELLS INTO PERIPHERAL VEIN, PERCUTANEOUS APPROACH: ICD-10-PCS | Performed by: INTERNAL MEDICINE

## 2021-10-26 PROCEDURE — 85025 COMPLETE CBC W/AUTO DIFF WBC: CPT | Performed by: EMERGENCY MEDICINE

## 2021-10-26 PROCEDURE — 36415 COLL VENOUS BLD VENIPUNCTURE: CPT | Performed by: EMERGENCY MEDICINE

## 2021-10-26 PROCEDURE — 86900 BLOOD TYPING SEROLOGIC ABO: CPT | Performed by: EMERGENCY MEDICINE

## 2021-10-26 PROCEDURE — C9113 INJ PANTOPRAZOLE SODIUM, VIA: HCPCS | Performed by: EMERGENCY MEDICINE

## 2021-10-26 PROCEDURE — 86901 BLOOD TYPING SEROLOGIC RH(D): CPT | Performed by: EMERGENCY MEDICINE

## 2021-10-26 PROCEDURE — 85730 THROMBOPLASTIN TIME PARTIAL: CPT | Performed by: PHYSICIAN ASSISTANT

## 2021-10-26 PROCEDURE — 71046 X-RAY EXAM CHEST 2 VIEWS: CPT

## 2021-10-26 PROCEDURE — P9016 RBC LEUKOCYTES REDUCED: HCPCS

## 2021-10-26 PROCEDURE — 99285 EMERGENCY DEPT VISIT HI MDM: CPT

## 2021-10-26 PROCEDURE — 36430 TRANSFUSION BLD/BLD COMPNT: CPT

## 2021-10-26 PROCEDURE — 86920 COMPATIBILITY TEST SPIN: CPT

## 2021-10-26 PROCEDURE — 96365 THER/PROPH/DIAG IV INF INIT: CPT

## 2021-10-26 PROCEDURE — 99223 1ST HOSP IP/OBS HIGH 75: CPT | Performed by: INTERNAL MEDICINE

## 2021-10-26 PROCEDURE — 80053 COMPREHEN METABOLIC PANEL: CPT | Performed by: EMERGENCY MEDICINE

## 2021-10-26 PROCEDURE — 93010 ELECTROCARDIOGRAM REPORT: CPT | Performed by: INTERNAL MEDICINE

## 2021-10-26 PROCEDURE — 96375 TX/PRO/DX INJ NEW DRUG ADDON: CPT

## 2021-10-26 PROCEDURE — 86850 RBC ANTIBODY SCREEN: CPT | Performed by: EMERGENCY MEDICINE

## 2021-10-26 PROCEDURE — 85610 PROTHROMBIN TIME: CPT | Performed by: PHYSICIAN ASSISTANT

## 2021-10-26 PROCEDURE — 84484 ASSAY OF TROPONIN QUANT: CPT | Performed by: EMERGENCY MEDICINE

## 2021-10-26 PROCEDURE — 93005 ELECTROCARDIOGRAM TRACING: CPT

## 2021-10-26 RX ORDER — METOPROLOL TARTRATE 5 MG/5ML
2.5 INJECTION INTRAVENOUS EVERY 6 HOURS PRN
Status: DISCONTINUED | OUTPATIENT
Start: 2021-10-26 | End: 2021-10-30

## 2021-10-26 RX ORDER — ONDANSETRON 2 MG/ML
4 INJECTION INTRAMUSCULAR; INTRAVENOUS ONCE
Status: COMPLETED | OUTPATIENT
Start: 2021-10-26 | End: 2021-10-26

## 2021-10-26 RX ADMIN — SODIUM CHLORIDE 80 MG: 9 INJECTION, SOLUTION INTRAVENOUS at 20:12

## 2021-10-26 RX ADMIN — SODIUM CHLORIDE 500 ML: 0.9 INJECTION, SOLUTION INTRAVENOUS at 19:52

## 2021-10-26 RX ADMIN — ONDANSETRON 4 MG: 2 INJECTION INTRAMUSCULAR; INTRAVENOUS at 19:49

## 2021-10-26 RX ADMIN — SODIUM CHLORIDE 8 MG/HR: 9 INJECTION, SOLUTION INTRAVENOUS at 22:28

## 2021-10-27 LAB
ANION GAP SERPL CALCULATED.3IONS-SCNC: 11 MMOL/L (ref 4–13)
ANION GAP SERPL CALCULATED.3IONS-SCNC: 12 MMOL/L (ref 4–13)
ANION GAP SERPL CALCULATED.3IONS-SCNC: 12 MMOL/L (ref 4–13)
APTT PPP: 31 SECONDS (ref 23–37)
BUN SERPL-MCNC: 71 MG/DL (ref 5–25)
BUN SERPL-MCNC: 71 MG/DL (ref 5–25)
BUN SERPL-MCNC: 73 MG/DL (ref 5–25)
CALCIUM SERPL-MCNC: 7.4 MG/DL (ref 8.3–10.1)
CALCIUM SERPL-MCNC: 7.9 MG/DL (ref 8.3–10.1)
CALCIUM SERPL-MCNC: 7.9 MG/DL (ref 8.3–10.1)
CHLORIDE SERPL-SCNC: 109 MMOL/L (ref 100–108)
CHLORIDE SERPL-SCNC: 109 MMOL/L (ref 100–108)
CHLORIDE SERPL-SCNC: 114 MMOL/L (ref 100–108)
CO2 SERPL-SCNC: 20 MMOL/L (ref 21–32)
CO2 SERPL-SCNC: 22 MMOL/L (ref 21–32)
CO2 SERPL-SCNC: 24 MMOL/L (ref 21–32)
CREAT SERPL-MCNC: 2.14 MG/DL (ref 0.6–1.3)
CREAT SERPL-MCNC: 2.15 MG/DL (ref 0.6–1.3)
CREAT SERPL-MCNC: 2.23 MG/DL (ref 0.6–1.3)
GFR SERPL CREATININE-BSD FRML MDRD: 25 ML/MIN/1.73SQ M
GFR SERPL CREATININE-BSD FRML MDRD: 27 ML/MIN/1.73SQ M
GFR SERPL CREATININE-BSD FRML MDRD: 27 ML/MIN/1.73SQ M
GLUCOSE SERPL-MCNC: 150 MG/DL (ref 65–140)
GLUCOSE SERPL-MCNC: 197 MG/DL (ref 65–140)
GLUCOSE SERPL-MCNC: 259 MG/DL (ref 65–140)
GLUCOSE SERPL-MCNC: 300 MG/DL (ref 65–140)
GLUCOSE SERPL-MCNC: 306 MG/DL (ref 65–140)
GLUCOSE SERPL-MCNC: 362 MG/DL (ref 65–140)
HCT VFR BLD AUTO: 22.1 % (ref 36.5–49.3)
HCT VFR BLD AUTO: 24 % (ref 36.5–49.3)
HCT VFR BLD AUTO: 28.2 % (ref 36.5–49.3)
HGB BLD-MCNC: 7.4 G/DL (ref 12–17)
HGB BLD-MCNC: 7.8 G/DL (ref 12–17)
HGB BLD-MCNC: 8.8 G/DL (ref 12–17)
INR PPP: 1.77 (ref 0.84–1.19)
POTASSIUM SERPL-SCNC: 4.3 MMOL/L (ref 3.5–5.3)
POTASSIUM SERPL-SCNC: 6.6 MMOL/L (ref 3.5–5.3)
POTASSIUM SERPL-SCNC: 6.7 MMOL/L (ref 3.5–5.3)
PROTHROMBIN TIME: 20.1 SECONDS (ref 11.6–14.5)
SODIUM SERPL-SCNC: 141 MMOL/L (ref 136–145)
SODIUM SERPL-SCNC: 143 MMOL/L (ref 136–145)
SODIUM SERPL-SCNC: 149 MMOL/L (ref 136–145)

## 2021-10-27 PROCEDURE — 85014 HEMATOCRIT: CPT | Performed by: PHYSICIAN ASSISTANT

## 2021-10-27 PROCEDURE — C9113 INJ PANTOPRAZOLE SODIUM, VIA: HCPCS | Performed by: PHYSICIAN ASSISTANT

## 2021-10-27 PROCEDURE — 85018 HEMOGLOBIN: CPT | Performed by: PHYSICIAN ASSISTANT

## 2021-10-27 PROCEDURE — 99223 1ST HOSP IP/OBS HIGH 75: CPT | Performed by: INTERNAL MEDICINE

## 2021-10-27 PROCEDURE — 82948 REAGENT STRIP/BLOOD GLUCOSE: CPT

## 2021-10-27 PROCEDURE — 80048 BASIC METABOLIC PNL TOTAL CA: CPT | Performed by: PHYSICIAN ASSISTANT

## 2021-10-27 PROCEDURE — P9016 RBC LEUKOCYTES REDUCED: HCPCS

## 2021-10-27 PROCEDURE — 99232 SBSQ HOSP IP/OBS MODERATE 35: CPT | Performed by: INTERNAL MEDICINE

## 2021-10-27 RX ORDER — METOCLOPRAMIDE HYDROCHLORIDE 5 MG/ML
10 INJECTION INTRAMUSCULAR; INTRAVENOUS ONCE
Status: COMPLETED | OUTPATIENT
Start: 2021-10-27 | End: 2021-10-27

## 2021-10-27 RX ORDER — METOPROLOL TARTRATE 5 MG/5ML
2.5 INJECTION INTRAVENOUS ONCE
Status: COMPLETED | OUTPATIENT
Start: 2021-10-27 | End: 2021-10-27

## 2021-10-27 RX ORDER — CALCIUM GLUCONATE 20 MG/ML
1 INJECTION, SOLUTION INTRAVENOUS ONCE
Status: COMPLETED | OUTPATIENT
Start: 2021-10-27 | End: 2021-10-27

## 2021-10-27 RX ORDER — PRAVASTATIN SODIUM 80 MG/1
80 TABLET ORAL
Status: DISCONTINUED | OUTPATIENT
Start: 2021-10-27 | End: 2021-10-30 | Stop reason: HOSPADM

## 2021-10-27 RX ORDER — ACETAMINOPHEN 325 MG/1
650 TABLET ORAL EVERY 6 HOURS PRN
Status: DISCONTINUED | OUTPATIENT
Start: 2021-10-27 | End: 2021-10-30

## 2021-10-27 RX ORDER — ONDANSETRON 2 MG/ML
4 INJECTION INTRAMUSCULAR; INTRAVENOUS EVERY 6 HOURS PRN
Status: DISCONTINUED | OUTPATIENT
Start: 2021-10-27 | End: 2021-10-30

## 2021-10-27 RX ORDER — DEXTROSE MONOHYDRATE 25 G/50ML
25 INJECTION, SOLUTION INTRAVENOUS ONCE
Status: COMPLETED | OUTPATIENT
Start: 2021-10-27 | End: 2021-10-27

## 2021-10-27 RX ADMIN — PRAVASTATIN SODIUM 80 MG: 80 TABLET ORAL at 17:30

## 2021-10-27 RX ADMIN — SODIUM CHLORIDE 500 ML: 0.9 INJECTION, SOLUTION INTRAVENOUS at 01:00

## 2021-10-27 RX ADMIN — METOROPROLOL TARTRATE 2.5 MG: 5 INJECTION, SOLUTION INTRAVENOUS at 04:41

## 2021-10-27 RX ADMIN — SODIUM CHLORIDE 8 MG/HR: 9 INJECTION, SOLUTION INTRAVENOUS at 17:23

## 2021-10-27 RX ADMIN — CALCIUM GLUCONATE 1 G: 20 INJECTION, SOLUTION INTRAVENOUS at 06:41

## 2021-10-27 RX ADMIN — INSULIN LISPRO 1 UNITS: 100 INJECTION, SOLUTION INTRAVENOUS; SUBCUTANEOUS at 13:38

## 2021-10-27 RX ADMIN — SODIUM CHLORIDE 10 UNITS: 9 INJECTION INTRAMUSCULAR; INTRAVENOUS; SUBCUTANEOUS at 06:40

## 2021-10-27 RX ADMIN — METOROPROLOL TARTRATE 2.5 MG: 5 INJECTION, SOLUTION INTRAVENOUS at 01:15

## 2021-10-27 RX ADMIN — INSULIN LISPRO 1 UNITS: 100 INJECTION, SOLUTION INTRAVENOUS; SUBCUTANEOUS at 17:32

## 2021-10-27 RX ADMIN — SODIUM CHLORIDE 8 MG/HR: 9 INJECTION, SOLUTION INTRAVENOUS at 06:47

## 2021-10-27 RX ADMIN — DEXTROSE MONOHYDRATE 25 ML: 25 INJECTION, SOLUTION INTRAVENOUS at 06:33

## 2021-10-27 RX ADMIN — SODIUM CHLORIDE 250 ML: 0.9 INJECTION, SOLUTION INTRAVENOUS at 02:26

## 2021-10-27 RX ADMIN — SODIUM BICARBONATE 50 MEQ: 84 INJECTION INTRAVENOUS at 06:33

## 2021-10-27 RX ADMIN — METOCLOPRAMIDE 10 MG: 5 INJECTION, SOLUTION INTRAMUSCULAR; INTRAVENOUS at 08:42

## 2021-10-27 RX ADMIN — METOROPROLOL TARTRATE 2.5 MG: 5 INJECTION, SOLUTION INTRAVENOUS at 21:00

## 2021-10-27 RX ADMIN — METOCLOPRAMIDE 10 MG: 5 INJECTION, SOLUTION INTRAMUSCULAR; INTRAVENOUS at 06:03

## 2021-10-27 RX ADMIN — SODIUM CHLORIDE 250 ML: 0.9 INJECTION, SOLUTION INTRAVENOUS at 05:36

## 2021-10-28 ENCOUNTER — APPOINTMENT (INPATIENT)
Dept: GASTROENTEROLOGY | Facility: HOSPITAL | Age: 86
DRG: 394 | End: 2021-10-28
Payer: MEDICARE

## 2021-10-28 ENCOUNTER — ANESTHESIA EVENT (INPATIENT)
Dept: GASTROENTEROLOGY | Facility: HOSPITAL | Age: 86
DRG: 394 | End: 2021-10-28
Payer: MEDICARE

## 2021-10-28 ENCOUNTER — ANESTHESIA (INPATIENT)
Dept: GASTROENTEROLOGY | Facility: HOSPITAL | Age: 86
DRG: 394 | End: 2021-10-28
Payer: MEDICARE

## 2021-10-28 PROBLEM — I95.9 HYPOTENSION: Status: RESOLVED | Noted: 2021-10-26 | Resolved: 2021-10-28

## 2021-10-28 PROBLEM — R31.0 GROSS HEMATURIA: Status: ACTIVE | Noted: 2021-10-28

## 2021-10-28 LAB
ABO GROUP BLD BPU: NORMAL
ANION GAP SERPL CALCULATED.3IONS-SCNC: 10 MMOL/L (ref 4–13)
BACTERIA UR QL AUTO: ABNORMAL /HPF
BASOPHILS # BLD AUTO: 0.03 THOUSANDS/ΜL (ref 0–0.1)
BASOPHILS NFR BLD AUTO: 0 % (ref 0–1)
BILIRUB UR QL STRIP: NEGATIVE
BPU ID: NORMAL
BUN SERPL-MCNC: 77 MG/DL (ref 5–25)
CALCIUM SERPL-MCNC: 8 MG/DL (ref 8.3–10.1)
CHLORIDE SERPL-SCNC: 111 MMOL/L (ref 100–108)
CLARITY UR: CLEAR
CO2 SERPL-SCNC: 25 MMOL/L (ref 21–32)
COLOR UR: YELLOW
CREAT SERPL-MCNC: 2.16 MG/DL (ref 0.6–1.3)
CROSSMATCH: NORMAL
EOSINOPHIL # BLD AUTO: 0.05 THOUSAND/ΜL (ref 0–0.61)
EOSINOPHIL NFR BLD AUTO: 0 % (ref 0–6)
ERYTHROCYTE [DISTWIDTH] IN BLOOD BY AUTOMATED COUNT: 17.6 % (ref 11.6–15.1)
GFR SERPL CREATININE-BSD FRML MDRD: 26 ML/MIN/1.73SQ M
GLUCOSE SERPL-MCNC: 172 MG/DL (ref 65–140)
GLUCOSE SERPL-MCNC: 182 MG/DL (ref 65–140)
GLUCOSE SERPL-MCNC: 187 MG/DL (ref 65–140)
GLUCOSE SERPL-MCNC: 188 MG/DL (ref 65–140)
GLUCOSE SERPL-MCNC: 191 MG/DL (ref 65–140)
GLUCOSE SERPL-MCNC: 227 MG/DL (ref 65–140)
GLUCOSE UR STRIP-MCNC: NEGATIVE MG/DL
HCT VFR BLD AUTO: 21.8 % (ref 36.5–49.3)
HCT VFR BLD AUTO: 26.4 % (ref 36.5–49.3)
HGB BLD-MCNC: 7.3 G/DL (ref 12–17)
HGB BLD-MCNC: 8.7 G/DL (ref 12–17)
HGB UR QL STRIP.AUTO: ABNORMAL
IMM GRANULOCYTES # BLD AUTO: 0.07 THOUSAND/UL (ref 0–0.2)
IMM GRANULOCYTES NFR BLD AUTO: 1 % (ref 0–2)
KETONES UR STRIP-MCNC: NEGATIVE MG/DL
LEUKOCYTE ESTERASE UR QL STRIP: NEGATIVE
LYMPHOCYTES # BLD AUTO: 2.43 THOUSANDS/ΜL (ref 0.6–4.47)
LYMPHOCYTES NFR BLD AUTO: 20 % (ref 14–44)
MCH RBC QN AUTO: 32 PG (ref 26.8–34.3)
MCHC RBC AUTO-ENTMCNC: 33.5 G/DL (ref 31.4–37.4)
MCV RBC AUTO: 96 FL (ref 82–98)
MONOCYTES # BLD AUTO: 1.5 THOUSAND/ΜL (ref 0.17–1.22)
MONOCYTES NFR BLD AUTO: 12 % (ref 4–12)
NEUTROPHILS # BLD AUTO: 8.06 THOUSANDS/ΜL (ref 1.85–7.62)
NEUTS SEG NFR BLD AUTO: 67 % (ref 43–75)
NITRITE UR QL STRIP: NEGATIVE
NON-SQ EPI CELLS URNS QL MICRO: ABNORMAL /HPF
NRBC BLD AUTO-RTO: 0 /100 WBCS
PH UR STRIP.AUTO: 7 [PH]
PLATELET # BLD AUTO: 75 THOUSANDS/UL (ref 149–390)
PMV BLD AUTO: 10.7 FL (ref 8.9–12.7)
POTASSIUM SERPL-SCNC: 4.1 MMOL/L (ref 3.5–5.3)
PROT UR STRIP-MCNC: NEGATIVE MG/DL
RBC # BLD AUTO: 2.28 MILLION/UL (ref 3.88–5.62)
RBC #/AREA URNS AUTO: ABNORMAL /HPF
SODIUM SERPL-SCNC: 146 MMOL/L (ref 136–145)
SP GR UR STRIP.AUTO: 1.01 (ref 1–1.03)
UNIT DISPENSE STATUS: NORMAL
UNIT PRODUCT CODE: NORMAL
UNIT PRODUCT VOLUME: 350 ML
UNIT RH: NORMAL
UROBILINOGEN UR QL STRIP.AUTO: 0.2 E.U./DL
WBC # BLD AUTO: 12.14 THOUSAND/UL (ref 4.31–10.16)
WBC #/AREA URNS AUTO: ABNORMAL /HPF

## 2021-10-28 PROCEDURE — 81001 URINALYSIS AUTO W/SCOPE: CPT | Performed by: PHYSICIAN ASSISTANT

## 2021-10-28 PROCEDURE — 0DJ08ZZ INSPECTION OF UPPER INTESTINAL TRACT, VIA NATURAL OR ARTIFICIAL OPENING ENDOSCOPIC: ICD-10-PCS | Performed by: INTERNAL MEDICINE

## 2021-10-28 PROCEDURE — 43235 EGD DIAGNOSTIC BRUSH WASH: CPT | Performed by: INTERNAL MEDICINE

## 2021-10-28 PROCEDURE — 99222 1ST HOSP IP/OBS MODERATE 55: CPT | Performed by: PHYSICIAN ASSISTANT

## 2021-10-28 PROCEDURE — 85025 COMPLETE CBC W/AUTO DIFF WBC: CPT | Performed by: INTERNAL MEDICINE

## 2021-10-28 PROCEDURE — P9016 RBC LEUKOCYTES REDUCED: HCPCS

## 2021-10-28 PROCEDURE — 80048 BASIC METABOLIC PNL TOTAL CA: CPT | Performed by: INTERNAL MEDICINE

## 2021-10-28 PROCEDURE — 85014 HEMATOCRIT: CPT | Performed by: PHYSICIAN ASSISTANT

## 2021-10-28 PROCEDURE — 82948 REAGENT STRIP/BLOOD GLUCOSE: CPT

## 2021-10-28 PROCEDURE — 97163 PT EVAL HIGH COMPLEX 45 MIN: CPT

## 2021-10-28 PROCEDURE — NC001 PR NO CHARGE: Performed by: PHYSICIAN ASSISTANT

## 2021-10-28 PROCEDURE — 99232 SBSQ HOSP IP/OBS MODERATE 35: CPT | Performed by: INTERNAL MEDICINE

## 2021-10-28 PROCEDURE — 85018 HEMOGLOBIN: CPT | Performed by: PHYSICIAN ASSISTANT

## 2021-10-28 RX ORDER — METOCLOPRAMIDE HYDROCHLORIDE 5 MG/ML
10 INJECTION INTRAMUSCULAR; INTRAVENOUS ONCE
Status: COMPLETED | OUTPATIENT
Start: 2021-10-28 | End: 2021-10-28

## 2021-10-28 RX ORDER — PANTOPRAZOLE SODIUM 40 MG/1
40 TABLET, DELAYED RELEASE ORAL
Status: DISCONTINUED | OUTPATIENT
Start: 2021-10-29 | End: 2021-10-29

## 2021-10-28 RX ORDER — SODIUM CHLORIDE 9 MG/ML
75 INJECTION, SOLUTION INTRAVENOUS CONTINUOUS
Status: DISCONTINUED | OUTPATIENT
Start: 2021-10-28 | End: 2021-10-29

## 2021-10-28 RX ORDER — PROPOFOL 10 MG/ML
INJECTION, EMULSION INTRAVENOUS AS NEEDED
Status: DISCONTINUED | OUTPATIENT
Start: 2021-10-28 | End: 2021-10-28

## 2021-10-28 RX ORDER — POLYETHYLENE GLYCOL 3350, SODIUM CHLORIDE, SODIUM BICARBONATE, POTASSIUM CHLORIDE 420; 11.2; 5.72; 1.48 G/4L; G/4L; G/4L; G/4L
4000 POWDER, FOR SOLUTION ORAL ONCE
Status: COMPLETED | OUTPATIENT
Start: 2021-10-28 | End: 2021-10-28

## 2021-10-28 RX ADMIN — INSULIN LISPRO 2 UNITS: 100 INJECTION, SOLUTION INTRAVENOUS; SUBCUTANEOUS at 11:28

## 2021-10-28 RX ADMIN — PRAVASTATIN SODIUM 80 MG: 80 TABLET ORAL at 16:48

## 2021-10-28 RX ADMIN — SODIUM CHLORIDE 75 ML/HR: 0.9 INJECTION, SOLUTION INTRAVENOUS at 10:09

## 2021-10-28 RX ADMIN — LIDOCAINE HYDROCHLORIDE 60 MG: 20 INJECTION INTRAVENOUS at 14:29

## 2021-10-28 RX ADMIN — METOCLOPRAMIDE 10 MG: 5 INJECTION, SOLUTION INTRAMUSCULAR; INTRAVENOUS at 12:52

## 2021-10-28 RX ADMIN — INSULIN LISPRO 2 UNITS: 100 INJECTION, SOLUTION INTRAVENOUS; SUBCUTANEOUS at 00:37

## 2021-10-28 RX ADMIN — BISACODYL 10 MG: 5 TABLET, COATED ORAL at 16:48

## 2021-10-28 RX ADMIN — PROPOFOL 20 MG: 10 INJECTION, EMULSION INTRAVENOUS at 14:31

## 2021-10-28 RX ADMIN — PROPOFOL 50 MG: 10 INJECTION, EMULSION INTRAVENOUS at 14:29

## 2021-10-28 RX ADMIN — POLYETHYLENE GLYCOL 3350, SODIUM CHLORIDE, SODIUM BICARBONATE AND POTASSIUM CHLORIDE 4000 ML: 420; 5.72; 11.2; 1.48 POWDER, FOR SOLUTION ORAL at 16:48

## 2021-10-29 ENCOUNTER — ANESTHESIA EVENT (INPATIENT)
Dept: GASTROENTEROLOGY | Facility: HOSPITAL | Age: 86
DRG: 394 | End: 2021-10-29
Payer: MEDICARE

## 2021-10-29 ENCOUNTER — ANESTHESIA (INPATIENT)
Dept: GASTROENTEROLOGY | Facility: HOSPITAL | Age: 86
DRG: 394 | End: 2021-10-29
Payer: MEDICARE

## 2021-10-29 ENCOUNTER — APPOINTMENT (INPATIENT)
Dept: GASTROENTEROLOGY | Facility: HOSPITAL | Age: 86
DRG: 394 | End: 2021-10-29
Payer: MEDICARE

## 2021-10-29 LAB
ABO GROUP BLD BPU: NORMAL
ANION GAP SERPL CALCULATED.3IONS-SCNC: 9 MMOL/L (ref 4–13)
BASOPHILS # BLD AUTO: 0.02 THOUSANDS/ΜL (ref 0–0.1)
BASOPHILS # BLD AUTO: 0.05 THOUSANDS/ΜL (ref 0–0.1)
BASOPHILS NFR BLD AUTO: 0 % (ref 0–1)
BASOPHILS NFR BLD AUTO: 1 % (ref 0–1)
BPU ID: NORMAL
BUN SERPL-MCNC: 48 MG/DL (ref 5–25)
CALCIUM SERPL-MCNC: 8.2 MG/DL (ref 8.3–10.1)
CHLORIDE SERPL-SCNC: 114 MMOL/L (ref 100–108)
CO2 SERPL-SCNC: 26 MMOL/L (ref 21–32)
CREAT SERPL-MCNC: 1.72 MG/DL (ref 0.6–1.3)
CROSSMATCH: NORMAL
EOSINOPHIL # BLD AUTO: 0.05 THOUSAND/ΜL (ref 0–0.61)
EOSINOPHIL # BLD AUTO: 0.06 THOUSAND/ΜL (ref 0–0.61)
EOSINOPHIL NFR BLD AUTO: 1 % (ref 0–6)
EOSINOPHIL NFR BLD AUTO: 1 % (ref 0–6)
ERYTHROCYTE [DISTWIDTH] IN BLOOD BY AUTOMATED COUNT: 17.6 % (ref 11.6–15.1)
ERYTHROCYTE [DISTWIDTH] IN BLOOD BY AUTOMATED COUNT: 17.7 % (ref 11.6–15.1)
GFR SERPL CREATININE-BSD FRML MDRD: 35 ML/MIN/1.73SQ M
GLUCOSE SERPL-MCNC: 163 MG/DL (ref 65–140)
GLUCOSE SERPL-MCNC: 166 MG/DL (ref 65–140)
GLUCOSE SERPL-MCNC: 167 MG/DL (ref 65–140)
GLUCOSE SERPL-MCNC: 178 MG/DL (ref 65–140)
GLUCOSE SERPL-MCNC: 182 MG/DL (ref 65–140)
GLUCOSE SERPL-MCNC: 298 MG/DL (ref 65–140)
HCT VFR BLD AUTO: 24.6 % (ref 36.5–49.3)
HCT VFR BLD AUTO: 28.8 % (ref 36.5–49.3)
HGB BLD-MCNC: 8.1 G/DL (ref 12–17)
HGB BLD-MCNC: 9.5 G/DL (ref 12–17)
IMM GRANULOCYTES # BLD AUTO: 0.07 THOUSAND/UL (ref 0–0.2)
IMM GRANULOCYTES # BLD AUTO: 0.07 THOUSAND/UL (ref 0–0.2)
IMM GRANULOCYTES NFR BLD AUTO: 1 % (ref 0–2)
IMM GRANULOCYTES NFR BLD AUTO: 1 % (ref 0–2)
LYMPHOCYTES # BLD AUTO: 1.59 THOUSANDS/ΜL (ref 0.6–4.47)
LYMPHOCYTES # BLD AUTO: 1.72 THOUSANDS/ΜL (ref 0.6–4.47)
LYMPHOCYTES NFR BLD AUTO: 15 % (ref 14–44)
LYMPHOCYTES NFR BLD AUTO: 17 % (ref 14–44)
MCH RBC QN AUTO: 30.9 PG (ref 26.8–34.3)
MCH RBC QN AUTO: 31.8 PG (ref 26.8–34.3)
MCHC RBC AUTO-ENTMCNC: 32.9 G/DL (ref 31.4–37.4)
MCHC RBC AUTO-ENTMCNC: 33 G/DL (ref 31.4–37.4)
MCV RBC AUTO: 94 FL (ref 82–98)
MCV RBC AUTO: 96 FL (ref 82–98)
MONOCYTES # BLD AUTO: 1.33 THOUSAND/ΜL (ref 0.17–1.22)
MONOCYTES # BLD AUTO: 1.38 THOUSAND/ΜL (ref 0.17–1.22)
MONOCYTES NFR BLD AUTO: 13 % (ref 4–12)
MONOCYTES NFR BLD AUTO: 14 % (ref 4–12)
NEUTROPHILS # BLD AUTO: 6.65 THOUSANDS/ΜL (ref 1.85–7.62)
NEUTROPHILS # BLD AUTO: 7.43 THOUSANDS/ΜL (ref 1.85–7.62)
NEUTS SEG NFR BLD AUTO: 67 % (ref 43–75)
NEUTS SEG NFR BLD AUTO: 69 % (ref 43–75)
NRBC BLD AUTO-RTO: 0 /100 WBCS
NRBC BLD AUTO-RTO: 1 /100 WBCS
PLATELET # BLD AUTO: 75 THOUSANDS/UL (ref 149–390)
PLATELET # BLD AUTO: 78 THOUSANDS/UL (ref 149–390)
PMV BLD AUTO: 10.2 FL (ref 8.9–12.7)
PMV BLD AUTO: 10.6 FL (ref 8.9–12.7)
POTASSIUM SERPL-SCNC: 4.2 MMOL/L (ref 3.5–5.3)
RBC # BLD AUTO: 2.62 MILLION/UL (ref 3.88–5.62)
RBC # BLD AUTO: 2.99 MILLION/UL (ref 3.88–5.62)
SODIUM SERPL-SCNC: 149 MMOL/L (ref 136–145)
UNIT DISPENSE STATUS: NORMAL
UNIT PRODUCT CODE: NORMAL
UNIT PRODUCT VOLUME: 350 ML
UNIT RH: NORMAL
WBC # BLD AUTO: 10.52 THOUSAND/UL (ref 4.31–10.16)
WBC # BLD AUTO: 9.9 THOUSAND/UL (ref 4.31–10.16)

## 2021-10-29 PROCEDURE — 99232 SBSQ HOSP IP/OBS MODERATE 35: CPT | Performed by: INTERNAL MEDICINE

## 2021-10-29 PROCEDURE — 0DJD8ZZ INSPECTION OF LOWER INTESTINAL TRACT, VIA NATURAL OR ARTIFICIAL OPENING ENDOSCOPIC: ICD-10-PCS | Performed by: INTERNAL MEDICINE

## 2021-10-29 PROCEDURE — 97166 OT EVAL MOD COMPLEX 45 MIN: CPT

## 2021-10-29 PROCEDURE — 85025 COMPLETE CBC W/AUTO DIFF WBC: CPT | Performed by: PHYSICIAN ASSISTANT

## 2021-10-29 PROCEDURE — 97116 GAIT TRAINING THERAPY: CPT | Performed by: PHYSICAL THERAPIST

## 2021-10-29 PROCEDURE — 82948 REAGENT STRIP/BLOOD GLUCOSE: CPT

## 2021-10-29 PROCEDURE — 85025 COMPLETE CBC W/AUTO DIFF WBC: CPT | Performed by: INTERNAL MEDICINE

## 2021-10-29 PROCEDURE — 45378 DIAGNOSTIC COLONOSCOPY: CPT | Performed by: INTERNAL MEDICINE

## 2021-10-29 PROCEDURE — 99232 SBSQ HOSP IP/OBS MODERATE 35: CPT | Performed by: NURSE PRACTITIONER

## 2021-10-29 PROCEDURE — 97535 SELF CARE MNGMENT TRAINING: CPT | Performed by: PHYSICAL THERAPIST

## 2021-10-29 PROCEDURE — 80048 BASIC METABOLIC PNL TOTAL CA: CPT | Performed by: INTERNAL MEDICINE

## 2021-10-29 RX ORDER — PANTOPRAZOLE SODIUM 20 MG/1
20 TABLET, DELAYED RELEASE ORAL
Status: DISCONTINUED | OUTPATIENT
Start: 2021-10-29 | End: 2021-10-30 | Stop reason: HOSPADM

## 2021-10-29 RX ORDER — PROPOFOL 10 MG/ML
INJECTION, EMULSION INTRAVENOUS CONTINUOUS PRN
Status: DISCONTINUED | OUTPATIENT
Start: 2021-10-29 | End: 2021-10-29

## 2021-10-29 RX ORDER — SODIUM CHLORIDE 9 MG/ML
75 INJECTION, SOLUTION INTRAVENOUS CONTINUOUS
Status: DISCONTINUED | OUTPATIENT
Start: 2021-10-29 | End: 2021-10-30

## 2021-10-29 RX ORDER — PROPOFOL 10 MG/ML
INJECTION, EMULSION INTRAVENOUS AS NEEDED
Status: DISCONTINUED | OUTPATIENT
Start: 2021-10-29 | End: 2021-10-29

## 2021-10-29 RX ADMIN — PANTOPRAZOLE SODIUM 40 MG: 40 TABLET, DELAYED RELEASE ORAL at 06:21

## 2021-10-29 RX ADMIN — METOPROLOL TARTRATE 25 MG: 25 TABLET, FILM COATED ORAL at 22:23

## 2021-10-29 RX ADMIN — PROPOFOL 90 MCG/KG/MIN: 10 INJECTION, EMULSION INTRAVENOUS at 12:54

## 2021-10-29 RX ADMIN — SODIUM CHLORIDE 75 ML/HR: 0.9 INJECTION, SOLUTION INTRAVENOUS at 15:35

## 2021-10-29 RX ADMIN — PROPOFOL 50 MG: 10 INJECTION, EMULSION INTRAVENOUS at 12:54

## 2021-10-29 RX ADMIN — PRAVASTATIN SODIUM 80 MG: 80 TABLET ORAL at 16:44

## 2021-10-29 RX ADMIN — METOPROLOL TARTRATE 25 MG: 25 TABLET, FILM COATED ORAL at 15:35

## 2021-10-29 RX ADMIN — PANTOPRAZOLE SODIUM 20 MG: 20 TABLET, DELAYED RELEASE ORAL at 16:44

## 2021-10-30 VITALS
DIASTOLIC BLOOD PRESSURE: 59 MMHG | WEIGHT: 150.79 LBS | RESPIRATION RATE: 20 BRPM | SYSTOLIC BLOOD PRESSURE: 122 MMHG | OXYGEN SATURATION: 93 % | HEART RATE: 96 BPM | TEMPERATURE: 98 F | BODY MASS INDEX: 21.03 KG/M2

## 2021-10-30 LAB
ABO GROUP BLD BPU: NORMAL
ABO GROUP BLD BPU: NORMAL
ANION GAP SERPL CALCULATED.3IONS-SCNC: 7 MMOL/L (ref 4–13)
BASOPHILS # BLD AUTO: 0.03 THOUSANDS/ΜL (ref 0–0.1)
BASOPHILS NFR BLD AUTO: 0 % (ref 0–1)
BPU ID: NORMAL
BPU ID: NORMAL
BUN SERPL-MCNC: 37 MG/DL (ref 5–25)
CALCIUM SERPL-MCNC: 7.9 MG/DL (ref 8.3–10.1)
CHLORIDE SERPL-SCNC: 113 MMOL/L (ref 100–108)
CO2 SERPL-SCNC: 26 MMOL/L (ref 21–32)
CREAT SERPL-MCNC: 1.49 MG/DL (ref 0.6–1.3)
CROSSMATCH: NORMAL
CROSSMATCH: NORMAL
EOSINOPHIL # BLD AUTO: 0.15 THOUSAND/ΜL (ref 0–0.61)
EOSINOPHIL NFR BLD AUTO: 2 % (ref 0–6)
ERYTHROCYTE [DISTWIDTH] IN BLOOD BY AUTOMATED COUNT: 17.1 % (ref 11.6–15.1)
GFR SERPL CREATININE-BSD FRML MDRD: 41 ML/MIN/1.73SQ M
GLUCOSE SERPL-MCNC: 156 MG/DL (ref 65–140)
GLUCOSE SERPL-MCNC: 166 MG/DL (ref 65–140)
GLUCOSE SERPL-MCNC: 226 MG/DL (ref 65–140)
HCT VFR BLD AUTO: 26.2 % (ref 36.5–49.3)
HGB BLD-MCNC: 8.4 G/DL (ref 12–17)
IMM GRANULOCYTES # BLD AUTO: 0.08 THOUSAND/UL (ref 0–0.2)
IMM GRANULOCYTES NFR BLD AUTO: 1 % (ref 0–2)
LYMPHOCYTES # BLD AUTO: 2.01 THOUSANDS/ΜL (ref 0.6–4.47)
LYMPHOCYTES NFR BLD AUTO: 22 % (ref 14–44)
MCH RBC QN AUTO: 31.3 PG (ref 26.8–34.3)
MCHC RBC AUTO-ENTMCNC: 32.1 G/DL (ref 31.4–37.4)
MCV RBC AUTO: 98 FL (ref 82–98)
MONOCYTES # BLD AUTO: 1.2 THOUSAND/ΜL (ref 0.17–1.22)
MONOCYTES NFR BLD AUTO: 13 % (ref 4–12)
NEUTROPHILS # BLD AUTO: 5.82 THOUSANDS/ΜL (ref 1.85–7.62)
NEUTS SEG NFR BLD AUTO: 62 % (ref 43–75)
NRBC BLD AUTO-RTO: 0 /100 WBCS
PLATELET # BLD AUTO: 82 THOUSANDS/UL (ref 149–390)
PMV BLD AUTO: 10.5 FL (ref 8.9–12.7)
POTASSIUM SERPL-SCNC: 3.5 MMOL/L (ref 3.5–5.3)
RBC # BLD AUTO: 2.68 MILLION/UL (ref 3.88–5.62)
SODIUM SERPL-SCNC: 146 MMOL/L (ref 136–145)
UNIT DISPENSE STATUS: NORMAL
UNIT DISPENSE STATUS: NORMAL
UNIT PRODUCT CODE: NORMAL
UNIT PRODUCT CODE: NORMAL
UNIT PRODUCT VOLUME: 350 ML
UNIT PRODUCT VOLUME: 350 ML
UNIT RH: NORMAL
UNIT RH: NORMAL
WBC # BLD AUTO: 9.29 THOUSAND/UL (ref 4.31–10.16)

## 2021-10-30 PROCEDURE — 80048 BASIC METABOLIC PNL TOTAL CA: CPT | Performed by: INTERNAL MEDICINE

## 2021-10-30 PROCEDURE — 99239 HOSP IP/OBS DSCHRG MGMT >30: CPT | Performed by: INTERNAL MEDICINE

## 2021-10-30 PROCEDURE — 85025 COMPLETE CBC W/AUTO DIFF WBC: CPT | Performed by: INTERNAL MEDICINE

## 2021-10-30 PROCEDURE — 99232 SBSQ HOSP IP/OBS MODERATE 35: CPT | Performed by: INTERNAL MEDICINE

## 2021-10-30 PROCEDURE — 82948 REAGENT STRIP/BLOOD GLUCOSE: CPT

## 2021-10-30 RX ORDER — PANTOPRAZOLE SODIUM 20 MG/1
20 TABLET, DELAYED RELEASE ORAL
Qty: 30 TABLET | Refills: 0 | Status: SHIPPED | OUTPATIENT
Start: 2021-10-30 | End: 2021-11-08 | Stop reason: SDUPTHER

## 2021-10-30 RX ADMIN — METOPROLOL TARTRATE 25 MG: 25 TABLET, FILM COATED ORAL at 06:55

## 2021-10-30 RX ADMIN — APIXABAN 2.5 MG: 2.5 TABLET, FILM COATED ORAL at 09:44

## 2021-10-30 RX ADMIN — SODIUM CHLORIDE 75 ML/HR: 0.9 INJECTION, SOLUTION INTRAVENOUS at 04:11

## 2021-10-30 RX ADMIN — PANTOPRAZOLE SODIUM 20 MG: 20 TABLET, DELAYED RELEASE ORAL at 06:55

## 2021-11-01 ENCOUNTER — TRANSITIONAL CARE MANAGEMENT (OUTPATIENT)
Dept: INTERNAL MEDICINE CLINIC | Facility: CLINIC | Age: 86
End: 2021-11-01

## 2021-11-02 ENCOUNTER — OFFICE VISIT (OUTPATIENT)
Dept: INTERNAL MEDICINE CLINIC | Facility: CLINIC | Age: 86
End: 2021-11-02
Payer: MEDICARE

## 2021-11-02 VITALS
OXYGEN SATURATION: 99 % | HEIGHT: 71 IN | BODY MASS INDEX: 22.4 KG/M2 | SYSTOLIC BLOOD PRESSURE: 130 MMHG | RESPIRATION RATE: 16 BRPM | TEMPERATURE: 97.8 F | WEIGHT: 160 LBS | DIASTOLIC BLOOD PRESSURE: 72 MMHG | HEART RATE: 115 BPM

## 2021-11-02 DIAGNOSIS — E78.2 MIXED HYPERLIPIDEMIA: ICD-10-CM

## 2021-11-02 DIAGNOSIS — D62 ABLA (ACUTE BLOOD LOSS ANEMIA): Primary | ICD-10-CM

## 2021-11-02 DIAGNOSIS — I48.21 PERMANENT ATRIAL FIBRILLATION (HCC): ICD-10-CM

## 2021-11-02 DIAGNOSIS — D69.6 THROMBOCYTOPENIA (HCC): ICD-10-CM

## 2021-11-02 DIAGNOSIS — I10 PRIMARY HYPERTENSION: ICD-10-CM

## 2021-11-02 DIAGNOSIS — E11.65 TYPE 2 DIABETES MELLITUS WITH HYPERGLYCEMIA, WITHOUT LONG-TERM CURRENT USE OF INSULIN (HCC): ICD-10-CM

## 2021-11-02 DIAGNOSIS — K92.0 HEMATEMESIS WITHOUT NAUSEA: ICD-10-CM

## 2021-11-02 PROCEDURE — 99496 TRANSJ CARE MGMT HIGH F2F 7D: CPT | Performed by: INTERNAL MEDICINE

## 2021-11-03 ENCOUNTER — TELEPHONE (OUTPATIENT)
Dept: GASTROENTEROLOGY | Facility: MEDICAL CENTER | Age: 86
End: 2021-11-03

## 2021-11-08 DIAGNOSIS — D64.9 ANEMIA, UNSPECIFIED TYPE: ICD-10-CM

## 2021-11-08 RX ORDER — PANTOPRAZOLE SODIUM 20 MG/1
20 TABLET, DELAYED RELEASE ORAL
Qty: 180 TABLET | Refills: 0 | Status: SHIPPED | OUTPATIENT
Start: 2021-11-08 | End: 2021-11-08 | Stop reason: SDUPTHER

## 2021-11-08 RX ORDER — PANTOPRAZOLE SODIUM 20 MG/1
20 TABLET, DELAYED RELEASE ORAL
Qty: 15 TABLET | Refills: 0 | Status: SHIPPED | OUTPATIENT
Start: 2021-11-08 | End: 2021-11-11 | Stop reason: SDUPTHER

## 2021-11-11 DIAGNOSIS — D64.9 ANEMIA, UNSPECIFIED TYPE: ICD-10-CM

## 2021-11-13 RX ORDER — PANTOPRAZOLE SODIUM 20 MG/1
20 TABLET, DELAYED RELEASE ORAL
Qty: 15 TABLET | Refills: 0 | Status: SHIPPED | OUTPATIENT
Start: 2021-11-13 | End: 2021-11-24 | Stop reason: SDUPTHER

## 2021-11-24 DIAGNOSIS — D64.9 ANEMIA, UNSPECIFIED TYPE: ICD-10-CM

## 2021-11-24 RX ORDER — PANTOPRAZOLE SODIUM 20 MG/1
20 TABLET, DELAYED RELEASE ORAL
Qty: 180 TABLET | Refills: 0 | Status: SHIPPED | OUTPATIENT
Start: 2021-11-24 | End: 2022-02-24 | Stop reason: DRUGHIGH

## 2021-12-08 ENCOUNTER — CONSULT (OUTPATIENT)
Dept: UROLOGY | Facility: MEDICAL CENTER | Age: 86
End: 2021-12-08
Payer: MEDICARE

## 2021-12-08 VITALS
BODY MASS INDEX: 22.4 KG/M2 | DIASTOLIC BLOOD PRESSURE: 60 MMHG | HEIGHT: 71 IN | HEART RATE: 111 BPM | WEIGHT: 160 LBS | SYSTOLIC BLOOD PRESSURE: 110 MMHG

## 2021-12-08 DIAGNOSIS — R31.29 OTHER MICROSCOPIC HEMATURIA: Primary | ICD-10-CM

## 2021-12-08 LAB
SL AMB  POCT GLUCOSE, UA: ABNORMAL
SL AMB LEUKOCYTE ESTERASE,UA: ABNORMAL
SL AMB POCT BILIRUBIN,UA: ABNORMAL
SL AMB POCT BLOOD,UA: ABNORMAL
SL AMB POCT CLARITY,UA: ABNORMAL
SL AMB POCT COLOR,UA: YELLOW
SL AMB POCT KETONES,UA: ABNORMAL
SL AMB POCT NITRITE,UA: ABNORMAL
SL AMB POCT PH,UA: 6.5
SL AMB POCT SPECIFIC GRAVITY,UA: 1.01
SL AMB POCT URINE PROTEIN: ABNORMAL
SL AMB POCT UROBILINOGEN: 0.2

## 2021-12-08 PROCEDURE — 81003 URINALYSIS AUTO W/O SCOPE: CPT | Performed by: PHYSICIAN ASSISTANT

## 2021-12-08 PROCEDURE — 99214 OFFICE O/P EST MOD 30 MIN: CPT | Performed by: PHYSICIAN ASSISTANT

## 2021-12-13 ENCOUNTER — OFFICE VISIT (OUTPATIENT)
Dept: CARDIOLOGY CLINIC | Facility: CLINIC | Age: 86
End: 2021-12-13
Payer: MEDICARE

## 2021-12-13 VITALS
WEIGHT: 154.6 LBS | SYSTOLIC BLOOD PRESSURE: 140 MMHG | BODY MASS INDEX: 21.56 KG/M2 | DIASTOLIC BLOOD PRESSURE: 80 MMHG | HEART RATE: 94 BPM

## 2021-12-13 DIAGNOSIS — E78.2 MIXED HYPERLIPIDEMIA: ICD-10-CM

## 2021-12-13 DIAGNOSIS — I12.9 HYPERTENSION WITH RENAL DISEASE: ICD-10-CM

## 2021-12-13 DIAGNOSIS — I48.21 PERMANENT ATRIAL FIBRILLATION (HCC): ICD-10-CM

## 2021-12-13 PROCEDURE — 99203 OFFICE O/P NEW LOW 30 MIN: CPT | Performed by: INTERNAL MEDICINE

## 2021-12-13 PROCEDURE — 93000 ELECTROCARDIOGRAM COMPLETE: CPT | Performed by: INTERNAL MEDICINE

## 2022-01-01 ENCOUNTER — HOME CARE VISIT (OUTPATIENT)
Dept: HOME HEALTH SERVICES | Facility: HOME HEALTHCARE | Age: 87
End: 2022-01-01

## 2022-01-01 VITALS — RESPIRATION RATE: 16 BRPM | HEART RATE: 130 BPM

## 2022-02-10 DIAGNOSIS — D64.9 ANEMIA, UNSPECIFIED TYPE: ICD-10-CM

## 2022-02-10 DIAGNOSIS — D69.6 THROMBOCYTOPENIA (HCC): ICD-10-CM

## 2022-02-10 DIAGNOSIS — D62 ABLA (ACUTE BLOOD LOSS ANEMIA): ICD-10-CM

## 2022-02-10 DIAGNOSIS — E78.2 MIXED HYPERLIPIDEMIA: ICD-10-CM

## 2022-02-10 DIAGNOSIS — I12.9 HYPERTENSION WITH RENAL DISEASE: ICD-10-CM

## 2022-02-10 DIAGNOSIS — E11.65 TYPE 2 DIABETES MELLITUS WITH HYPERGLYCEMIA, WITHOUT LONG-TERM CURRENT USE OF INSULIN (HCC): Primary | ICD-10-CM

## 2022-02-10 DIAGNOSIS — I48.21 PERMANENT ATRIAL FIBRILLATION (HCC): ICD-10-CM

## 2022-02-16 ENCOUNTER — APPOINTMENT (OUTPATIENT)
Dept: LAB | Facility: HOSPITAL | Age: 87
End: 2022-02-16
Attending: INTERNAL MEDICINE
Payer: MEDICARE

## 2022-02-16 DIAGNOSIS — D64.9 ANEMIA, UNSPECIFIED TYPE: ICD-10-CM

## 2022-02-16 DIAGNOSIS — I48.21 PERMANENT ATRIAL FIBRILLATION (HCC): ICD-10-CM

## 2022-02-16 DIAGNOSIS — E78.2 MIXED HYPERLIPIDEMIA: ICD-10-CM

## 2022-02-16 DIAGNOSIS — D69.6 THROMBOCYTOPENIA (HCC): ICD-10-CM

## 2022-02-16 DIAGNOSIS — E11.65 TYPE 2 DIABETES MELLITUS WITH HYPERGLYCEMIA, WITHOUT LONG-TERM CURRENT USE OF INSULIN (HCC): ICD-10-CM

## 2022-02-16 DIAGNOSIS — I12.9 HYPERTENSION WITH RENAL DISEASE: ICD-10-CM

## 2022-02-16 LAB
ALBUMIN SERPL BCP-MCNC: 3.9 G/DL (ref 3–5.2)
ALP SERPL-CCNC: 64 U/L (ref 43–122)
ALT SERPL W P-5'-P-CCNC: 12 U/L
ANION GAP SERPL CALCULATED.3IONS-SCNC: 7 MMOL/L (ref 5–14)
ANISOCYTOSIS BLD QL SMEAR: PRESENT
AST SERPL W P-5'-P-CCNC: 28 U/L (ref 17–59)
BASOPHILS # BLD AUTO: 0.12 THOUSAND/UL (ref 0–0.1)
BASOPHILS NFR MAR MANUAL: 2 % (ref 0–1)
BILIRUB SERPL-MCNC: 0.6 MG/DL
BUN SERPL-MCNC: 33 MG/DL (ref 5–25)
CALCIUM SERPL-MCNC: 9 MG/DL (ref 8.4–10.2)
CHLORIDE SERPL-SCNC: 107 MMOL/L (ref 97–108)
CHOLEST SERPL-MCNC: 123 MG/DL
CO2 SERPL-SCNC: 27 MMOL/L (ref 22–30)
CREAT SERPL-MCNC: 1.54 MG/DL (ref 0.7–1.5)
EOSINOPHIL # BLD AUTO: 0.12 THOUSAND/UL (ref 0–0.4)
EOSINOPHIL NFR BLD MANUAL: 2 % (ref 0–6)
ERYTHROCYTE [DISTWIDTH] IN BLOOD BY AUTOMATED COUNT: 13.9 %
EST. AVERAGE GLUCOSE BLD GHB EST-MCNC: 192 MG/DL
GFR SERPL CREATININE-BSD FRML MDRD: 39 ML/MIN/1.73SQ M
GLUCOSE P FAST SERPL-MCNC: 171 MG/DL (ref 70–99)
HBA1C MFR BLD: 8.3 %
HCT VFR BLD AUTO: 32.9 % (ref 41–53)
HDLC SERPL-MCNC: 33 MG/DL
HGB BLD-MCNC: 10.8 G/DL (ref 13.5–17.5)
LDLC SERPL CALC-MCNC: 70 MG/DL
LYMPHOCYTES # BLD AUTO: 1.62 THOUSAND/UL (ref 0.5–4)
LYMPHOCYTES # BLD AUTO: 28 % (ref 25–45)
MACROCYTES BLD QL AUTO: PRESENT
MCH RBC QN AUTO: 33.3 PG (ref 26.8–34.3)
MCHC RBC AUTO-ENTMCNC: 32.7 G/DL (ref 31.4–37.4)
MCV RBC AUTO: 102 FL (ref 80–100)
MONOCYTES # BLD AUTO: 0.93 THOUSAND/UL (ref 0.2–0.9)
MONOCYTES NFR BLD AUTO: 16 % (ref 1–10)
NEUTS SEG # BLD: 3.02 THOUSAND/UL (ref 1.8–7.8)
NEUTS SEG NFR BLD AUTO: 52 %
PLATELET # BLD AUTO: 145 THOUSANDS/UL (ref 150–450)
PLATELET BLD QL SMEAR: ABNORMAL
PMV BLD AUTO: 9.2 FL (ref 8.9–12.7)
POTASSIUM SERPL-SCNC: 5.4 MMOL/L (ref 3.6–5)
PROT SERPL-MCNC: 7.7 G/DL (ref 5.9–8.4)
RBC # BLD AUTO: 3.24 MILLION/UL (ref 4.5–5.9)
RBC MORPH BLD: ABNORMAL
SODIUM SERPL-SCNC: 141 MMOL/L (ref 137–147)
TOTAL CELLS COUNTED SPEC: 100
TRIGL SERPL-MCNC: 101 MG/DL
WBC # BLD AUTO: 5.8 THOUSAND/UL (ref 4.31–10.16)

## 2022-02-16 PROCEDURE — 80053 COMPREHEN METABOLIC PANEL: CPT

## 2022-02-16 PROCEDURE — 85027 COMPLETE CBC AUTOMATED: CPT

## 2022-02-16 PROCEDURE — 80061 LIPID PANEL: CPT

## 2022-02-16 PROCEDURE — 83036 HEMOGLOBIN GLYCOSYLATED A1C: CPT

## 2022-02-16 PROCEDURE — 85007 BL SMEAR W/DIFF WBC COUNT: CPT

## 2022-02-16 PROCEDURE — 36415 COLL VENOUS BLD VENIPUNCTURE: CPT

## 2022-02-21 DIAGNOSIS — E11.65 TYPE 2 DIABETES MELLITUS WITH HYPERGLYCEMIA, WITHOUT LONG-TERM CURRENT USE OF INSULIN (HCC): ICD-10-CM

## 2022-02-21 DIAGNOSIS — I48.21 PERMANENT ATRIAL FIBRILLATION (HCC): ICD-10-CM

## 2022-02-21 DIAGNOSIS — E78.2 MIXED HYPERLIPIDEMIA: ICD-10-CM

## 2022-02-21 RX ORDER — METOPROLOL SUCCINATE 50 MG/1
50 TABLET, EXTENDED RELEASE ORAL DAILY
Qty: 90 TABLET | Refills: 0 | Status: SHIPPED | OUTPATIENT
Start: 2022-02-21 | End: 2022-02-24 | Stop reason: SDUPTHER

## 2022-02-21 RX ORDER — SIMVASTATIN 40 MG
40 TABLET ORAL DAILY
Qty: 90 TABLET | Refills: 0 | Status: SHIPPED | OUTPATIENT
Start: 2022-02-21 | End: 2022-02-24 | Stop reason: SDUPTHER

## 2022-02-24 ENCOUNTER — OFFICE VISIT (OUTPATIENT)
Dept: INTERNAL MEDICINE CLINIC | Facility: CLINIC | Age: 87
End: 2022-02-24
Payer: MEDICARE

## 2022-02-24 VITALS
HEART RATE: 100 BPM | SYSTOLIC BLOOD PRESSURE: 144 MMHG | TEMPERATURE: 95.5 F | OXYGEN SATURATION: 99 % | WEIGHT: 152.6 LBS | DIASTOLIC BLOOD PRESSURE: 82 MMHG | BODY MASS INDEX: 21.36 KG/M2 | HEIGHT: 71 IN

## 2022-02-24 DIAGNOSIS — I12.9 HYPERTENSION WITH RENAL DISEASE: Primary | ICD-10-CM

## 2022-02-24 DIAGNOSIS — D69.6 THROMBOCYTOPENIA (HCC): ICD-10-CM

## 2022-02-24 DIAGNOSIS — D50.0 IRON DEFICIENCY ANEMIA DUE TO CHRONIC BLOOD LOSS: ICD-10-CM

## 2022-02-24 DIAGNOSIS — E11.65 TYPE 2 DIABETES MELLITUS WITH HYPERGLYCEMIA, WITHOUT LONG-TERM CURRENT USE OF INSULIN (HCC): ICD-10-CM

## 2022-02-24 DIAGNOSIS — I48.21 PERMANENT ATRIAL FIBRILLATION (HCC): ICD-10-CM

## 2022-02-24 DIAGNOSIS — N18.31 STAGE 3A CHRONIC KIDNEY DISEASE (HCC): ICD-10-CM

## 2022-02-24 DIAGNOSIS — E78.2 MIXED HYPERLIPIDEMIA: ICD-10-CM

## 2022-02-24 PROCEDURE — 99214 OFFICE O/P EST MOD 30 MIN: CPT | Performed by: INTERNAL MEDICINE

## 2022-02-24 RX ORDER — PANTOPRAZOLE SODIUM 20 MG/1
20 TABLET, DELAYED RELEASE ORAL
Qty: 180 TABLET | Refills: 0 | Status: CANCELLED | OUTPATIENT
Start: 2022-02-24

## 2022-02-24 RX ORDER — METOPROLOL SUCCINATE 50 MG/1
50 TABLET, EXTENDED RELEASE ORAL DAILY
Qty: 90 TABLET | Refills: 3 | Status: SHIPPED | OUTPATIENT
Start: 2022-02-24

## 2022-02-24 RX ORDER — SIMVASTATIN 40 MG
40 TABLET ORAL DAILY
Qty: 90 TABLET | Refills: 3 | Status: SHIPPED | OUTPATIENT
Start: 2022-02-24

## 2022-02-24 RX ORDER — PANTOPRAZOLE SODIUM 20 MG/1
20 TABLET, DELAYED RELEASE ORAL DAILY
Qty: 90 TABLET | Refills: 3 | Status: SHIPPED | OUTPATIENT
Start: 2022-02-24

## 2022-02-24 NOTE — PROGRESS NOTES
[facility] [place]      NAME: Ryan Perez  AGE: 80 y o  SEX: male  : 1933   MRN: [id number]    DATE: 2022  TIME: 4:44 PM    Assessment and Plan   1  Iron deficiency anemia due to chronic blood loss    The patient's hemoglobin has been improving  He has had no further sign of bleeding  2  Type 2 diabetes mellitus with hyperglycemia, without long-term current use of insulin (HCC)    Currently asymptomatic  The patient's hemoglobin A1c is somewhat elevated  He does not desire any change in therapy at the moment  3  Permanent atrial fibrillation (HCC)    On metoprolol for rate control  On apixaban for stroke prophylaxis  4  Mixed hyperlipidemia    Controlled on simvastatin  5  Hypertension with renal disease    Well controlled  6  Thrombocytopenia ([address] )    This has improved on the patient's last CBC  Platelet count will be recheck with his next lab work  7  Stage 3a chronic kidney disease (HCC)    Creatinine has been generally stable  The patient is doing pretty well overall  He will maintain his current regimen  We did discuss COVID-19 vaccination  He has had 2 doses of the Moderna vaccine  He was unsure about the benefits a booster  I strongly encouraged him to proceed with this at his earliest convenience  Return to office in: Three months  Chief Complaint     Chief Complaint   Patient presents with    Follow-up     3 month       History of Present Illness       The patient returned to the office for re-evaluation of hypertension, type 2 diabetes, hyperlipidemia, and iron deficiency anemia related to GI bleeding  He had hematemesis and was admitted to the hospital   EGD and colonoscopy were unrevealing  The patient has had no further sign of bleeding since his discharge  He has no chest pain, shortness of breath, palpitations, or dizziness  He has been tolerating his medications well      The following portions of the patient's history were reviewed and updated as appropriate: allergies, current medications, past family history, past medical history, past social history, past surgical history and problem list     Review of Systems   Review of Systems   Constitutional: Negative  HENT: Negative for congestion, ear pain, postnasal drip, rhinorrhea, sore throat and trouble swallowing  Eyes: Negative for pain, discharge, redness and visual disturbance  Respiratory: Negative for cough, shortness of breath and wheezing  Cardiovascular: Negative  Gastrointestinal: Negative  Endocrine: Negative  Genitourinary: Negative for difficulty urinating, dysuria, frequency, hematuria and urgency  Musculoskeletal: Negative for arthralgias, gait problem, joint swelling and myalgias  Skin: Negative for rash  Neurological: Negative for dizziness, speech difficulty, weakness, light-headedness, numbness and headaches  Hematological: Negative  Psychiatric/Behavioral: Negative for confusion, decreased concentration, dysphoric mood and sleep disturbance  The patient is not nervous/anxious  Active Problem List     Patient Active Problem List   Diagnosis    Hypertension with renal disease    Type 2 diabetes mellitus with hyperglycemia (HCC)    Hyperlipidemia    Atrial fibrillation (HCC)    Inguinal hernia    Anemia    Onychomycosis    Hematemesis    Gross hematuria    Thrombocytopenia (HCC)    Stage 3a chronic kidney disease (HCC)       Objective   /82 (BP Location: Left arm, Patient Position: Sitting, Cuff Size: Adult)   Pulse 100   Temp (!) 95 5 °F (35 3 °C)   Ht 5' 11" (1 803 m)   Wt 69 2 kg (152 lb 9 6 oz)   SpO2 99%   BMI 21 28 kg/m²     Physical Exam  Constitutional:       General: He is not in acute distress  Appearance: He is well-developed  HENT:      Head: Normocephalic and atraumatic  Neck:      Thyroid: No thyromegaly  Vascular: No JVD  Trachea: No tracheal deviation  Cardiovascular:      Rate and Rhythm: Normal rate  Rhythm irregular  Heart sounds: Normal heart sounds  No murmur heard  No friction rub  No gallop  Pulmonary:      Effort: Pulmonary effort is normal       Breath sounds: Normal breath sounds  No wheezing or rales  Chest:      Chest wall: No tenderness  Abdominal:      General: Bowel sounds are normal  There is no distension  Palpations: Abdomen is soft  There is no mass  Tenderness: There is no abdominal tenderness  There is no rebound  Musculoskeletal:         General: No tenderness  Normal range of motion  Cervical back: Neck supple  Lymphadenopathy:      Cervical: No cervical adenopathy  Skin:     General: Skin is warm and dry  Neurological:      Mental Status: He is alert and oriented to person, place, and time  Psychiatric:         Mood and Affect: Mood normal          Behavior: Behavior normal          Thought Content:  Thought content normal          Judgment: Judgment normal          Pertinent Laboratory/Diagnostic Studies:  Appointment on 02/16/2022   Component Date Value Ref Range Status    WBC 02/16/2022 5 80  4 31 - 10 16 Thousand/uL Final    RBC 02/16/2022 3 24* 4 50 - 5 90 Million/uL Final    Hemoglobin 02/16/2022 10 8* 13 5 - 17 5 g/dL Final    Hematocrit 02/16/2022 32 9* 41 0 - 53 0 % Final    MCV 02/16/2022 102* 80 - 100 fL Final    MCH 02/16/2022 33 3  26 8 - 34 3 pg Final    MCHC 02/16/2022 32 7  31 4 - 37 4 g/dL Final    RDW 02/16/2022 13 9  <15 3 % Final    MPV 02/16/2022 9 2  8 9 - 12 7 fL Final    Platelets 81/16/9497 145* 150 - 450 Thousands/uL Final    Sodium 02/16/2022 141  137 - 147 mmol/L Final    Potassium 02/16/2022 5 4* 3 6 - 5 0 mmol/L Final    Chloride 02/16/2022 107  97 - 108 mmol/L Final    CO2 02/16/2022 27  22 - 30 mmol/L Final    ANION GAP 02/16/2022 7  5 - 14 mmol/L Final    BUN 02/16/2022 33* 5 - 25 mg/dL Final    Creatinine 02/16/2022 1 54* 0 70 - 1 50 mg/dL Final    Glucose, Fasting 02/16/2022 171* 70 - 99 mg/dL Final    Calcium 02/16/2022 9 0  8 4 - 10 2 mg/dL Final    AST 02/16/2022 28  17 - 59 U/L Final    ALT 02/16/2022 12  <50 U/L Final    Alkaline Phosphatase 02/16/2022 64  43 - 122 U/L Final    Total Protein 02/16/2022 7 7  5 9 - 8 4 g/dL Final    Albumin 02/16/2022 3 9  3 0 - 5 2 g/dL Final    Total Bilirubin 02/16/2022 0 60  <1 30 mg/dL Final    eGFR 02/16/2022 39  ml/min/1 73sq m Final    Cholesterol 02/16/2022 123  See Comment mg/dL Final    Triglycerides 02/16/2022 101  See Comment mg/dL Final    HDL, Direct 02/16/2022 33* >=40 mg/dL Final    LDL Calculated 02/16/2022 70  <130 mg/dL Final    Hemoglobin A1C 02/16/2022 8 3* Normal 3 8-5 6%; PreDiabetic 5 7-6 4%;  Diabetic >=6 5%; Glycemic control for adults with diabetes <7 0% % Final    EAG 02/16/2022 192  mg/dl Final    Segmented % 02/16/2022 52  45 - 65 % Final    Lymphocytes % 02/16/2022 28  25 - 45 % Final    Monocytes % 02/16/2022 16* 1 - 10 % Final    Eosinophils, % 02/16/2022 2  0 - 6 % Final    Basophils % 02/16/2022 2* 0 - 1 % Final    Neutrophils Absolute 02/16/2022 3 02  1 80 - 7 80 Thousand/uL Final    Lymphocytes Absolute 02/16/2022 1 62  0 50 - 4 00 Thousand/uL Final    Monocytes Absolute 02/16/2022 0 93* 0 20 - 0 90 Thousand/uL Final    Eosinophils Absolute 02/16/2022 0 12  0 00 - 0 40 Thousand/uL Final    Basophils Absolute 02/16/2022 0 12* 0 00 - 0 10 Thousand/uL Final    Total Counted 02/16/2022 100   Final    RBC Morphology 02/16/2022 abnormal   Final    Anisocytosis 02/16/2022 Present   Final    Macrocytes 02/16/2022 Present   Final    Platelet Estimate 81/26/6400 Borderline* Adequate Final   Consult on 12/08/2021   Component Date Value Ref Range Status     COLOR,UA 12/08/2021 yellow   Final    CLARITY,UA 12/08/2021 jez   Final    SPECIFIC GRAVITY,UA 12/08/2021 1 015   Final     PH,UA 12/08/2021 6 5   Final    LEUKOCYTE ESTERASE,UA 12/08/2021 trace   Final    NITRITE,UA 12/08/2021 neg   Final    GLUCOSE, UA 12/08/2021 neg   Final    KETONES,UA 12/08/2021 neg   Final    BILIRUBIN,UA 12/08/2021 neg   Final    BLOOD,UA 12/08/2021 neg   Final    POCT URINE PROTEIN 12/08/2021 neg   Final    SL AMB POCT UROBILINOGEN 12/08/2021 0 2   Final           Current Medications     Current Outpatient Medications:     apixaban (ELIQUIS) 2 5 mg, Take 1 tablet (2 5 mg total) by mouth 2 (two) times a day, Disp: 60 tablet, Rfl: 0    metoprolol succinate (TOPROL-XL) 50 mg 24 hr tablet, Take 1 tablet (50 mg total) by mouth daily, Disp: 90 tablet, Rfl: 3    Multiple Vitamin (DAILY VALUE MULTIVITAMIN) TABS, Take 1 tablet by mouth daily, Disp: , Rfl:     pantoprazole (PROTONIX) 20 mg tablet, Take 1 tablet (20 mg total) by mouth daily, Disp: 90 tablet, Rfl: 3    pioglitazone (ACTOS) 30 mg tablet, Take 1 tablet (30 mg total) by mouth daily, Disp: 90 tablet, Rfl: 3    simvastatin (ZOCOR) 40 mg tablet, Take 1 tablet (40 mg total) by mouth daily, Disp: 90 tablet, Rfl: 3    metFORMIN (GLUCOPHAGE) 1000 MG tablet, Take 1 tablet (1,000 mg total) by mouth 2 (two) times a day with meals, Disp: 180 tablet, Rfl: 3      Alvarez Conner MD

## 2022-05-09 DIAGNOSIS — I48.21 PERMANENT ATRIAL FIBRILLATION (HCC): ICD-10-CM

## 2022-05-11 ENCOUNTER — APPOINTMENT (OUTPATIENT)
Dept: LAB | Facility: HOSPITAL | Age: 87
End: 2022-05-11
Attending: INTERNAL MEDICINE
Payer: MEDICARE

## 2022-05-11 ENCOUNTER — RA CDI HCC (OUTPATIENT)
Dept: OTHER | Facility: HOSPITAL | Age: 87
End: 2022-05-11

## 2022-05-11 LAB
ALBUMIN SERPL BCP-MCNC: 4.2 G/DL (ref 3–5.2)
ALP SERPL-CCNC: 73 U/L (ref 43–122)
ALT SERPL W P-5'-P-CCNC: 13 U/L
ANION GAP SERPL CALCULATED.3IONS-SCNC: 9 MMOL/L (ref 5–14)
AST SERPL W P-5'-P-CCNC: 28 U/L (ref 17–59)
BASOPHILS # BLD AUTO: 0.05 THOUSANDS/ΜL (ref 0–0.1)
BASOPHILS NFR BLD AUTO: 1 % (ref 0–1)
BILIRUB SERPL-MCNC: 0.69 MG/DL
BUN SERPL-MCNC: 30 MG/DL (ref 5–25)
CALCIUM SERPL-MCNC: 9.2 MG/DL (ref 8.4–10.2)
CHLORIDE SERPL-SCNC: 105 MMOL/L (ref 97–108)
CO2 SERPL-SCNC: 27 MMOL/L (ref 22–30)
CREAT SERPL-MCNC: 1.37 MG/DL (ref 0.7–1.5)
EOSINOPHIL # BLD AUTO: 0.2 THOUSAND/ΜL (ref 0–0.61)
EOSINOPHIL NFR BLD AUTO: 3 % (ref 0–6)
ERYTHROCYTE [DISTWIDTH] IN BLOOD BY AUTOMATED COUNT: 13 % (ref 11.6–15.1)
GFR SERPL CREATININE-BSD FRML MDRD: 45 ML/MIN/1.73SQ M
GLUCOSE P FAST SERPL-MCNC: 166 MG/DL (ref 70–99)
HCT VFR BLD AUTO: 34.6 % (ref 36.5–49.3)
HGB BLD-MCNC: 11.1 G/DL (ref 12–17)
IMM GRANULOCYTES # BLD AUTO: 0.02 THOUSAND/UL (ref 0–0.2)
IMM GRANULOCYTES NFR BLD AUTO: 0 % (ref 0–2)
LYMPHOCYTES # BLD AUTO: 1.6 THOUSANDS/ΜL (ref 0.6–4.47)
LYMPHOCYTES NFR BLD AUTO: 23 % (ref 14–44)
MCH RBC QN AUTO: 33.4 PG (ref 26.8–34.3)
MCHC RBC AUTO-ENTMCNC: 32.1 G/DL (ref 31.4–37.4)
MCV RBC AUTO: 104 FL (ref 82–98)
MONOCYTES # BLD AUTO: 1.02 THOUSAND/ΜL (ref 0.17–1.22)
MONOCYTES NFR BLD AUTO: 14 % (ref 4–12)
NEUTROPHILS # BLD AUTO: 4.21 THOUSANDS/ΜL (ref 1.85–7.62)
NEUTS SEG NFR BLD AUTO: 59 % (ref 43–75)
NRBC BLD AUTO-RTO: 0 /100 WBCS
PLATELET # BLD AUTO: 162 THOUSANDS/UL (ref 149–390)
PMV BLD AUTO: 10.5 FL (ref 8.9–12.7)
POTASSIUM SERPL-SCNC: 4.7 MMOL/L (ref 3.6–5)
PROT SERPL-MCNC: 8 G/DL (ref 5.9–8.4)
RBC # BLD AUTO: 3.32 MILLION/UL (ref 3.88–5.62)
SODIUM SERPL-SCNC: 141 MMOL/L (ref 137–147)
WBC # BLD AUTO: 7.1 THOUSAND/UL (ref 4.31–10.16)

## 2022-05-11 PROCEDURE — 85025 COMPLETE CBC W/AUTO DIFF WBC: CPT | Performed by: INTERNAL MEDICINE

## 2022-05-11 PROCEDURE — 80053 COMPREHEN METABOLIC PANEL: CPT | Performed by: INTERNAL MEDICINE

## 2022-05-11 PROCEDURE — 36415 COLL VENOUS BLD VENIPUNCTURE: CPT | Performed by: INTERNAL MEDICINE

## 2022-05-11 PROCEDURE — 83036 HEMOGLOBIN GLYCOSYLATED A1C: CPT | Performed by: INTERNAL MEDICINE

## 2022-05-11 NOTE — PROGRESS NOTES
[address]  coding opportunities     E11 22     Chart Reviewed number of suggestions sent to Provider: 1     Patients Insurance     Medicare Insurance: [employer]

## 2022-05-12 LAB
EST. AVERAGE GLUCOSE BLD GHB EST-MCNC: 192 MG/DL
HBA1C MFR BLD: 8.3 %

## 2022-05-17 ENCOUNTER — OFFICE VISIT (OUTPATIENT)
Dept: INTERNAL MEDICINE CLINIC | Facility: CLINIC | Age: 87
End: 2022-05-17
Payer: MEDICARE

## 2022-05-17 VITALS
SYSTOLIC BLOOD PRESSURE: 128 MMHG | BODY MASS INDEX: 20.75 KG/M2 | DIASTOLIC BLOOD PRESSURE: 80 MMHG | HEART RATE: 96 BPM | HEIGHT: 71 IN | WEIGHT: 148.2 LBS | OXYGEN SATURATION: 92 % | TEMPERATURE: 97.2 F

## 2022-05-17 DIAGNOSIS — N18.31 STAGE 3A CHRONIC KIDNEY DISEASE (HCC): ICD-10-CM

## 2022-05-17 DIAGNOSIS — E78.2 MIXED HYPERLIPIDEMIA: ICD-10-CM

## 2022-05-17 DIAGNOSIS — E11.65 TYPE 2 DIABETES MELLITUS WITH HYPERGLYCEMIA, WITHOUT LONG-TERM CURRENT USE OF INSULIN (HCC): Primary | ICD-10-CM

## 2022-05-17 DIAGNOSIS — I10 PRIMARY HYPERTENSION: ICD-10-CM

## 2022-05-17 DIAGNOSIS — I48.21 PERMANENT ATRIAL FIBRILLATION (HCC): ICD-10-CM

## 2022-05-17 PROCEDURE — 99214 OFFICE O/P EST MOD 30 MIN: CPT | Performed by: INTERNAL MEDICINE

## 2022-05-17 NOTE — PROGRESS NOTES
[facility] [place]      NAME: Luisa Brown  AGE: 80 y o  SEX: male  : 1933   MRN: [id number]    DATE: 2022  TIME: 1:23 PM    Assessment and Plan   1  Type 2 diabetes mellitus with hyperglycemia, without long-term current use of insulin (HCC)   Adequately controlled considering the patient's age in overall condition   - Basic metabolic panel  - Hemoglobin A1C    2  Permanent atrial fibrillation (HCC)    On apixaban for stroke prophylaxis  On metoprolol for rate control  3  Stage 3a chronic kidney disease (HCC)    Creatinine has been stable  4  Primary hypertension    Well controlled  - CBC and differential    5  Mixed hyperlipidemia    Controlled on simvastatin  The patient is doing well overall  His blood pressure, lipids, and diabetes are satisfactorily controlled  He is on appropriate therapy for atrial fibrillation  The patient just received his 3rd COVID vaccination last week  He will consider a 4th vaccination in the fall depending on the circumstances  Return to office in: Three months    Chief Complaint     Chief Complaint   Patient presents with    Follow-up     3 month    Results     Labs 22       History of Present Illness      the patient returned to the office for re-evaluation of hypertension, hyperlipidemia, permanent atrial fibrillation, type 2 diabetes, and chronic kidney disease stage 3  Since his last visit he has been feeling well  He has had no chest pain, shortness of breath, palpitations, or dizziness  His appetite has been good  He has had no symptoms of hypoglycemia or hyperglycemia  He is tolerating his medications  The following portions of the patient's history were reviewed and updated as appropriate: allergies, current medications, past family history, past medical history, past social history, past surgical history and problem list     Review of Systems   Review of Systems   Constitutional: Negative      HENT: Negative for congestion, ear pain, postnasal drip, rhinorrhea, sore throat and trouble swallowing  Eyes: Negative for pain, discharge, redness and visual disturbance  Respiratory: Negative for cough, shortness of breath and wheezing  Cardiovascular: Negative  Gastrointestinal: Negative  Endocrine: Negative  Genitourinary: Negative for difficulty urinating, dysuria, frequency, hematuria and urgency  Musculoskeletal: Negative for arthralgias, gait problem, joint swelling and myalgias  Skin: Negative for rash  Neurological: Negative for dizziness, speech difficulty, weakness, light-headedness, numbness and headaches  Hematological: Negative  Psychiatric/Behavioral: Negative for confusion, decreased concentration, dysphoric mood and sleep disturbance  The patient is not nervous/anxious  Active Problem List     Patient Active Problem List   Diagnosis    Primary hypertension    Type 2 diabetes mellitus with hyperglycemia (HCC)    Hyperlipidemia    Atrial fibrillation (HCC)    Inguinal hernia    Anemia    Onychomycosis    Hematemesis    Gross hematuria    Thrombocytopenia (HCC)    Stage 3a chronic kidney disease (HCC)       Objective   /80 (BP Location: Left arm, Patient Position: Sitting, Cuff Size: Standard)   Pulse 96   Temp (!) 97 2 °F (36 2 °C) (Tympanic)   Ht 5' 11" (1 803 m)   Wt 67 2 kg (148 lb 3 2 oz)   SpO2 92%   BMI 20 67 kg/m²     Physical Exam  Constitutional:       General: He is not in acute distress  Appearance: He is well-developed  HENT:      Head: Normocephalic and atraumatic  Neck:      Thyroid: No thyromegaly  Vascular: No JVD  Trachea: No tracheal deviation  Cardiovascular:      Rate and Rhythm: Normal rate  Rhythm irregular  Heart sounds: Normal heart sounds  No murmur heard  No friction rub  No gallop  Pulmonary:      Effort: Pulmonary effort is normal       Breath sounds: Normal breath sounds   No wheezing or rales    Chest:      Chest wall: No tenderness  Abdominal:      General: Bowel sounds are normal  There is no distension  Palpations: Abdomen is soft  There is no mass  Tenderness: There is no abdominal tenderness  There is no rebound  Musculoskeletal:         General: No tenderness  Normal range of motion  Cervical back: Neck supple  Lymphadenopathy:      Cervical: No cervical adenopathy  Skin:     General: Skin is warm and dry  Neurological:      Mental Status: He is alert and oriented to person, place, and time  Psychiatric:         Mood and Affect: Mood normal          Behavior: Behavior normal          Thought Content: Thought content normal          Judgment: Judgment normal          Pertinent Laboratory/Diagnostic Studies:  Office Visit on 02/24/2022   Component Date Value Ref Range Status    Sodium 05/11/2022 141  137 - 147 mmol/L Final    Potassium 05/11/2022 4 7  3 6 - 5 0 mmol/L Final    Chloride 05/11/2022 105  97 - 108 mmol/L Final    CO2 05/11/2022 27  22 - 30 mmol/L Final    ANION GAP 05/11/2022 9  5 - 14 mmol/L Final    BUN 05/11/2022 30 (A) 5 - 25 mg/dL Final    Creatinine 05/11/2022 1 37  0 70 - 1 50 mg/dL Final    Glucose, Fasting 05/11/2022 166 (A) 70 - 99 mg/dL Final    Calcium 05/11/2022 9 2  8 4 - 10 2 mg/dL Final    AST 05/11/2022 28  17 - 59 U/L Final    ALT 05/11/2022 13  <50 U/L Final    Alkaline Phosphatase 05/11/2022 73  43 - 122 U/L Final    Total Protein 05/11/2022 8 0  5 9 - 8 4 g/dL Final    Albumin 05/11/2022 4 2  3 0 - 5 2 g/dL Final    Total Bilirubin 05/11/2022 0 69  <1 30 mg/dL Final    eGFR 05/11/2022 45  ml/min/1 73sq m Final    Hemoglobin A1C 05/11/2022 8 3 (A) Normal 3 8-5 6%; PreDiabetic 5 7-6 4%;  Diabetic >=6 5%; Glycemic control for adults with diabetes <7 0% % Final    EAG 05/11/2022 192  mg/dl Final           Current Medications     Current Outpatient Medications:     apixaban (ELIQUIS) 2 5 mg, Take 1 tablet (2 5 mg total) by mouth 2 (two) times a day, Disp: 180 tablet, Rfl: 0    metFORMIN (GLUCOPHAGE) 1000 MG tablet, Take 1 tablet (1,000 mg total) by mouth 2 (two) times a day with meals, Disp: 180 tablet, Rfl: 3    metoprolol succinate (TOPROL-XL) 50 mg 24 hr tablet, Take 1 tablet (50 mg total) by mouth daily, Disp: 90 tablet, Rfl: 3    Multiple Vitamin (DAILY VALUE MULTIVITAMIN) TABS, Take 1 tablet by mouth daily, Disp: , Rfl:     pantoprazole (PROTONIX) 20 mg tablet, Take 1 tablet (20 mg total) by mouth daily, Disp: 90 tablet, Rfl: 3    pioglitazone (ACTOS) 30 mg tablet, Take 1 tablet (30 mg total) by mouth daily, Disp: 90 tablet, Rfl: 3    simvastatin (ZOCOR) 40 mg tablet, Take 1 tablet (40 mg total) by mouth daily, Disp: 90 tablet, Rfl: 3      Ching Forman MD

## 2022-07-25 DIAGNOSIS — E11.9 TYPE 2 DIABETES MELLITUS WITHOUT COMPLICATION, WITHOUT LONG-TERM CURRENT USE OF INSULIN (HCC): ICD-10-CM

## 2022-07-25 RX ORDER — PIOGLITAZONEHYDROCHLORIDE 30 MG/1
30 TABLET ORAL DAILY
Qty: 90 TABLET | Refills: 0 | Status: SHIPPED | OUTPATIENT
Start: 2022-07-25 | End: 2022-09-28

## 2022-08-04 DIAGNOSIS — I48.21 PERMANENT ATRIAL FIBRILLATION (HCC): ICD-10-CM

## 2022-08-09 ENCOUNTER — APPOINTMENT (OUTPATIENT)
Dept: LAB | Facility: HOSPITAL | Age: 87
End: 2022-08-09
Attending: INTERNAL MEDICINE
Payer: MEDICARE

## 2022-08-09 LAB
ANION GAP SERPL CALCULATED.3IONS-SCNC: 9 MMOL/L (ref 5–14)
BASOPHILS # BLD AUTO: 0.05 THOUSANDS/ΜL (ref 0–0.1)
BASOPHILS NFR BLD AUTO: 1 % (ref 0–1)
BUN SERPL-MCNC: 32 MG/DL (ref 5–25)
CALCIUM SERPL-MCNC: 9 MG/DL (ref 8.4–10.2)
CHLORIDE SERPL-SCNC: 103 MMOL/L (ref 96–108)
CO2 SERPL-SCNC: 26 MMOL/L (ref 21–32)
CREAT SERPL-MCNC: 1.54 MG/DL (ref 0.7–1.5)
EOSINOPHIL # BLD AUTO: 0.19 THOUSAND/ΜL (ref 0–0.61)
EOSINOPHIL NFR BLD AUTO: 2 % (ref 0–6)
ERYTHROCYTE [DISTWIDTH] IN BLOOD BY AUTOMATED COUNT: 12.8 % (ref 11.6–15.1)
EST. AVERAGE GLUCOSE BLD GHB EST-MCNC: 186 MG/DL
GFR SERPL CREATININE-BSD FRML MDRD: 39 ML/MIN/1.73SQ M
GLUCOSE SERPL-MCNC: 287 MG/DL (ref 70–99)
HBA1C MFR BLD: 8.1 %
HCT VFR BLD AUTO: 37 % (ref 36.5–49.3)
HGB BLD-MCNC: 12.1 G/DL (ref 12–17)
IMM GRANULOCYTES # BLD AUTO: 0.04 THOUSAND/UL (ref 0–0.2)
IMM GRANULOCYTES NFR BLD AUTO: 1 % (ref 0–2)
LYMPHOCYTES # BLD AUTO: 1.78 THOUSANDS/ΜL (ref 0.6–4.47)
LYMPHOCYTES NFR BLD AUTO: 20 % (ref 14–44)
MCH RBC QN AUTO: 34.5 PG (ref 26.8–34.3)
MCHC RBC AUTO-ENTMCNC: 32.7 G/DL (ref 31.4–37.4)
MCV RBC AUTO: 105 FL (ref 82–98)
MONOCYTES # BLD AUTO: 1.05 THOUSAND/ΜL (ref 0.17–1.22)
MONOCYTES NFR BLD AUTO: 12 % (ref 4–12)
NEUTROPHILS # BLD AUTO: 5.7 THOUSANDS/ΜL (ref 1.85–7.62)
NEUTS SEG NFR BLD AUTO: 64 % (ref 43–75)
NRBC BLD AUTO-RTO: 0 /100 WBCS
PLATELET # BLD AUTO: 169 THOUSANDS/UL (ref 149–390)
PMV BLD AUTO: 10.5 FL (ref 8.9–12.7)
POTASSIUM SERPL-SCNC: 5 MMOL/L (ref 3.5–5.3)
RBC # BLD AUTO: 3.51 MILLION/UL (ref 3.88–5.62)
SODIUM SERPL-SCNC: 138 MMOL/L (ref 135–147)
WBC # BLD AUTO: 8.81 THOUSAND/UL (ref 4.31–10.16)

## 2022-08-09 PROCEDURE — 80048 BASIC METABOLIC PNL TOTAL CA: CPT | Performed by: INTERNAL MEDICINE

## 2022-08-09 PROCEDURE — 85025 COMPLETE CBC W/AUTO DIFF WBC: CPT | Performed by: INTERNAL MEDICINE

## 2022-08-09 PROCEDURE — 36415 COLL VENOUS BLD VENIPUNCTURE: CPT | Performed by: INTERNAL MEDICINE

## 2022-08-09 PROCEDURE — 83036 HEMOGLOBIN GLYCOSYLATED A1C: CPT | Performed by: INTERNAL MEDICINE

## 2022-08-16 NOTE — ASSESSMENT & PLAN NOTE
Had prior hospitalization at [facility]  Admitted with hematemesis/anemia  Underwent EGD and colonoscopy which showed only hemorrhoids    No evidence of bleeding noted

## 2022-08-16 NOTE — ASSESSMENT & PLAN NOTE
Baseline creatinine seems to be around 1 3 - 1 5  Continue to monitor  He was not familiar with this diagnosis today, had a brief discussion about causes of CKD, potential for progression over time, importance of control of diabetes, hypertension, avoiding nephrotoxins, staying adequately hydrated

## 2022-08-16 NOTE — PROGRESS NOTES
INTERNAL MEDICINE OFFICE VISIT  [facility] Internal Medicine- [place]    NAME: Hao Santa  AGE: 80 y o  SEX: male    DATE OF ENCOUNTER: 8/23/2022    Assessment and Plan     1  Type 2 diabetes mellitus with hyperglycemia, without long-term current use of insulin [facility])  Assessment & Plan:  HbA1c:  8 1 August 2022, previously 8 3 May 2022  Current medications:  Metformin 1000 mg 2 times daily with meals, pioglitazone 30 mg daily  Statin and ACE-inhibitor:  Simvastatin, not on ACEi/ARB  Urine MACR:  24 September 2021  Foot exam:  Completed today  Eye Exam:  Due soon, no evidence of retinopathy on prior exam    A1c acceptable given his age and other comorbidities  Continue with current regimen for now  Most recent GFR 39, closely monitor kidney function in the setting of metformin use    Orders:  -     Microalbumin / creatinine urine ratio  -     Hemoglobin A1C; Future    2  Primary hypertension  Assessment & Plan:  Well controlled      3  Permanent atrial fibrillation (HCC)  Assessment & Plan:  -Rate initially in the 110s today  In EPIC, it is stated he is taking Toprol-XL 50 mg daily  However, the patient states he is actually taking 50 mg 2 times daily  -ECG completed today redemonstrates Afib, rate improved to 96  -Will continue with current dose of Toprol-XL for now  -Continue Eliquis for stroke prevention    Orders:  -     POCT ECG  -     metoprolol succinate (TOPROL-XL) 50 mg 24 hr tablet; Take 1 tablet (50 mg total) by mouth 2 (two) times a day    4  Stage 3a chronic kidney disease ([address] )  Assessment & Plan:  Baseline creatinine seems to be around 1 3 - 1 5  Continue to monitor  He was not familiar with this diagnosis today, had a brief discussion about causes of CKD, potential for progression over time, importance of control of diabetes, hypertension, avoiding nephrotoxins, staying adequately hydrated    Orders:  -     Basic metabolic panel; Future    5   Mixed hyperlipidemia  Assessment & Plan:  Well controlled, continue statin      6  Tachycardia               Orders Placed This Encounter   Procedures    Microalbumin / creatinine urine ratio    Basic metabolic panel    Hemoglobin A1C    POCT ECG         Chief Complaint     Chief Complaint   Patient presents with    Medicare Wellness Visit       History of Present Illness     Here today for follow-up  He has a medical history of hypertension, type 2 diabetes, hyperlipidemia, iron deficiency anemia in the setting of GI bleed, permanent atrial fibrillation, chronic kidney disease stage 3    No major concerns today  He lives alone  Has 2 cousins in [place] and [place]  Also has grand nephews and a niece who visit him      The following portions of the patient's history were reviewed and updated as appropriate: allergies, current medications, past family history, past medical history, past social history, past surgical history and problem list     Review of Systems     10 point ROS negative except per HPI    Active Problem List     Patient Active Problem List   Diagnosis    Primary hypertension    Type 2 diabetes mellitus with hyperglycemia ([address] )    Hyperlipidemia    Atrial fibrillation (HCC)    Inguinal hernia    Anemia    Onychomycosis    Hematemesis    Gross hematuria    Thrombocytopenia (HCC)    Stage 3a chronic kidney disease (HCC)       Objective     /80 (BP Location: Left arm, Patient Position: Sitting, Cuff Size: Standard)   Pulse (!) 110   Temp 98 4 °F (36 9 °C)   Resp 13   Ht 5' 11" (1 803 m)   Wt 61 7 kg (136 lb)   SpO2 99%   BMI 18 97 kg/m²     Physical Exam  Constitutional:       Appearance: Normal appearance  He is not ill-appearing  HENT:      Head: Normocephalic and atraumatic  Eyes:      General: No scleral icterus  Right eye: No discharge  Left eye: No discharge  Cardiovascular:      Rate and Rhythm: Normal rate and regular rhythm        Pulses: no weak pulses          Dorsalis pedis pulses are 2+ on the right side and 2+ on the left side  Posterior tibial pulses are 2+ on the right side and 2+ on the left side  Heart sounds: No murmur heard  No friction rub  Pulmonary:      Effort: Pulmonary effort is normal       Breath sounds: Normal breath sounds  No wheezing or rales  Abdominal:      General: Abdomen is flat  There is no distension  Palpations: Abdomen is soft  Tenderness: There is no abdominal tenderness  Musculoskeletal:         General: No swelling or tenderness  Feet:      Right foot:      Skin integrity: No ulcer, skin breakdown, erythema, warmth, callus or dry skin  Left foot:      Skin integrity: No ulcer, skin breakdown, erythema, warmth, callus or dry skin  Skin:     General: Skin is warm and dry  Findings: No erythema  Neurological:      Mental Status: He is alert  Mental status is at baseline  Motor: No weakness  Psychiatric:         Mood and Affect: Mood normal          Behavior: Behavior normal      Diabetic Foot Exam    Patient's shoes and socks removed  Right Foot/Ankle   Right Foot Inspection  Skin Exam: skin normal and skin intact  No dry skin, no warmth, no callus, no erythema, no maceration, no abnormal color, no pre-ulcer, no ulcer and no callus  Toe Exam: ROM and strength within normal limits  Sensory   Vibration: intact  Monofilament testing: intact    Vascular  The right DP pulse is 2+  The right PT pulse is 2+  Left Foot/Ankle  Left Foot Inspection  Skin Exam: skin normal and skin intact  No dry skin, no warmth, no erythema, no maceration, normal color, no pre-ulcer, no ulcer and no callus  Toe Exam: ROM and strength within normal limits  Sensory   Vibration: intact  Monofilament testing: intact    Vascular  The left DP pulse is 2+  The left PT pulse is 2+       Assign Risk Category  No deformity present  No loss of protective sensation  No weak pulses  Risk: 0      Pertinent Laboratory/Diagnostic Studies:  EGD    Result Date: 10/28/2021  Impression: No upper GI bleeding  No explanation for reported CGE  Likely duodenal polyp, not resected  RECOMMENDATION: Continue to follow CBC and resuscitate patient  Prep for colonoscopy (I will discuss with him)  Clear liquid diet assuming he agrees to prep  Once daily PPI  Outpatient follow up re  duodenal findings  Yoni Messer MD     Colonoscopy    Result Date: 10/29/2021  Impression: Two subcentimeter rectal polyps not removed  RECOMMENDATION: Will resume diet now and can resume oral anticoagulant tomorrow as long as Hb remains stable  Randal Oneil DO     XR chest 2 views    Result Date: 10/27/2021  Impression:  Bibasilar scarring or atelectasis   Workstation performed: [id number]       Images and diagnostics reviewed     Current Medications     Current Outpatient Medications:     apixaban (ELIQUIS) 2 5 mg, Take 1 tablet (2 5 mg total) by mouth 2 (two) times a day, Disp: 180 tablet, Rfl: 0    metFORMIN (GLUCOPHAGE) 1000 MG tablet, Take 1 tablet (1,000 mg total) by mouth 2 (two) times a day with meals, Disp: 180 tablet, Rfl: 3    metoprolol succinate (TOPROL-XL) 50 mg 24 hr tablet, Take 1 tablet (50 mg total) by mouth 2 (two) times a day, Disp: 90 tablet, Rfl: 3    Multiple Vitamin (DAILY VALUE MULTIVITAMIN) TABS, Take 1 tablet by mouth daily, Disp: , Rfl:     pantoprazole (PROTONIX) 20 mg tablet, Take 1 tablet (20 mg total) by mouth daily, Disp: 90 tablet, Rfl: 3    pioglitazone (ACTOS) 30 mg tablet, Take 1 tablet (30 mg total) by mouth daily, Disp: 90 tablet, Rfl: 0    simvastatin (ZOCOR) 40 mg tablet, Take 1 tablet (40 mg total) by mouth daily, Disp: 90 tablet, Rfl: 3    Health Maintenance     Health Maintenance   Topic Date Due    COVID-19 Vaccine (3 - Booster for Moderna series) 09/14/2021    Influenza Vaccine (1) 09/01/2022    DM Eye Exam  09/03/2022    URINE MICROALBUMIN  09/27/2022    HEMOGLOBIN A1C  02/09/2023  Fall Risk  08/17/2023    Depression Screening  08/17/2023    Medicare Annual Wellness Visit (AWV)  08/17/2023    BMI: Adult  08/17/2023    Diabetic Foot Exam  08/17/2023    Pneumococcal Vaccine: 65+ Years  Completed    HIB Vaccine  Aged Out    Hepatitis B Vaccine  Aged Out    IPV Vaccine  Aged Out    Hepatitis A Vaccine  Aged Out    Meningococcal ACWY Vaccine  Aged Out    HPV Vaccine  Aged Out     Immunization History   Administered Date(s) Administered    COVID-19 MODERNA VACC 0 5 ML IM 03/15/2021, 04/14/2021    INFLUENZA 11/25/2014, 09/09/2015, 10/18/2016, 12/05/2017    Influenza Quadrivalent Preservative Free 3 years and older IM 11/25/2014    Influenza Quadrivalent, 6-35 Months IM 09/09/2015    Influenza Split High Dose Preservative Free IM 10/18/2016, 12/05/2017    Influenza, high dose seasonal 0 7 mL 09/26/2018, 10/24/2019, 12/15/2020, 10/06/2021    Influenza, seasonal, injectable 11/06/2012, 10/24/2013, 09/20/2019    Pneumococcal Conjugate 13-Valent 07/12/2016    Pneumococcal Polysaccharide PPV23 08/27/2014       DONTE Richmond  Internal Medicine - [place] [id number], [facility] [address]  [facility], [address]  Office: [phone]  Fax: [phone]

## 2022-08-16 NOTE — ASSESSMENT & PLAN NOTE
-Rate initially in the 110s today  In EPIC, it is stated he is taking Toprol-XL 50 mg daily   However, the patient states he is actually taking 50 mg 2 times daily  -ECG completed today redemonstrates Afib, rate improved to 96  -Will continue with current dose of Toprol-XL for now  -Continue Eliquis for stroke prevention

## 2022-08-16 NOTE — ASSESSMENT & PLAN NOTE
HbA1c:  8 1 August 2022, previously 8 3 May 2022  Current medications:  Metformin 1000 mg 2 times daily with meals, pioglitazone 30 mg daily  Statin and ACE-inhibitor:  Simvastatin, not on ACEi/ARB  Urine MACR:  24 September 2021  Foot exam:  Completed today  Eye Exam:  Due soon, no evidence of retinopathy on prior exam    A1c acceptable given his age and other comorbidities  Continue with current regimen for now    Most recent GFR 39, closely monitor kidney function in the setting of metformin use

## 2022-08-17 ENCOUNTER — OFFICE VISIT (OUTPATIENT)
Dept: INTERNAL MEDICINE CLINIC | Facility: CLINIC | Age: 87
End: 2022-08-17
Payer: MEDICARE

## 2022-08-17 VITALS
DIASTOLIC BLOOD PRESSURE: 80 MMHG | WEIGHT: 136 LBS | HEIGHT: 71 IN | TEMPERATURE: 98.4 F | SYSTOLIC BLOOD PRESSURE: 148 MMHG | OXYGEN SATURATION: 99 % | BODY MASS INDEX: 19.04 KG/M2 | HEART RATE: 110 BPM | RESPIRATION RATE: 13 BRPM

## 2022-08-17 DIAGNOSIS — I48.21 PERMANENT ATRIAL FIBRILLATION (HCC): ICD-10-CM

## 2022-08-17 DIAGNOSIS — I10 PRIMARY HYPERTENSION: ICD-10-CM

## 2022-08-17 DIAGNOSIS — R00.0 TACHYCARDIA: ICD-10-CM

## 2022-08-17 DIAGNOSIS — E11.65 TYPE 2 DIABETES MELLITUS WITH HYPERGLYCEMIA, WITHOUT LONG-TERM CURRENT USE OF INSULIN (HCC): Primary | ICD-10-CM

## 2022-08-17 DIAGNOSIS — N18.31 STAGE 3A CHRONIC KIDNEY DISEASE (HCC): ICD-10-CM

## 2022-08-17 DIAGNOSIS — E78.2 MIXED HYPERLIPIDEMIA: ICD-10-CM

## 2022-08-17 PROCEDURE — 99214 OFFICE O/P EST MOD 30 MIN: CPT | Performed by: INTERNAL MEDICINE

## 2022-08-17 PROCEDURE — G0439 PPPS, SUBSEQ VISIT: HCPCS | Performed by: INTERNAL MEDICINE

## 2022-08-17 PROCEDURE — 93000 ELECTROCARDIOGRAM COMPLETE: CPT | Performed by: INTERNAL MEDICINE

## 2022-08-17 RX ORDER — METOPROLOL SUCCINATE 50 MG/1
50 TABLET, EXTENDED RELEASE ORAL 2 TIMES DAILY
Qty: 90 TABLET | Refills: 3
Start: 2022-08-17 | End: 2022-10-01

## 2022-08-17 NOTE — PROGRESS NOTES
Assessment and Plan:     Problem List Items Addressed This Visit        Endocrine    Type 2 diabetes mellitus with hyperglycemia ([facility] ) - Primary     HbA1c:  8 1 August 2022, previously 8 3 May 2022  Current medications:  Metformin 1000 mg 2 times daily with meals, pioglitazone 30 mg daily  Statin and ACE-inhibitor:  Simvastatin, not on ACEi/ARB  Urine MACR:  24 September 2021  Foot exam:  Completed today  Eye Exam:  Due soon, no evidence of retinopathy on prior exam    A1c acceptable given his age and other comorbidities  Continue with current regimen for now  Most recent GFR 39, closely monitor kidney function in the setting of metformin use         Relevant Orders    Microalbumin / creatinine urine ratio    Hemoglobin A1C       Cardiovascular and Mediastinum    Primary hypertension     Well controlled         Relevant Medications    metoprolol succinate (TOPROL-XL) 50 mg 24 hr tablet    Atrial fibrillation (HCC)     -Rate initially in the 110s today  In EPIC, it is stated he is taking Toprol-XL 50 mg daily   However, the patient states he is actually taking 50 mg 2 times daily  -ECG completed today redemonstrates Afib, rate of 96  -Will continue with current dose of Toprol-XL for now  -Continue Eliquis for stroke prevention         Relevant Medications    metoprolol succinate (TOPROL-XL) 50 mg 24 hr tablet    Other Relevant Orders    POCT ECG       Genitourinary    Stage 3a chronic kidney disease ([facility] )     Baseline creatinine seems to be around 1 3 - 1 5  Continue to monitor  He was not familiar with this diagnosis today, had a brief discussion about causes of CKD, potential for progression over time, importance of control of diabetes, hypertension, avoiding nephrotoxins, staying adequately hydrated         Relevant Orders    Basic metabolic panel       Other    Hyperlipidemia     Well controlled, continue statin           Other Visit Diagnoses     Tachycardia               Preventive health issues were discussed with patient, and age appropriate screening tests were ordered as noted in patient's After Visit Summary  Personalized health advice and appropriate referrals for health education or preventive services given if needed, as noted in patient's After Visit Summary  History of Present Illness:     Patient presents for a Medicare Wellness Visit    HPI   Patient Care Team:  Dwayne Levin DO as PCP - General (Internal Medicine)  Felipe Borrero DPM (Podiatry)  Cyndie Page MD (Ophthalmology)     Review of Systems:     Review of Systems     Problem List:     Patient Active Problem List   Diagnosis    Primary hypertension    Type 2 diabetes mellitus with hyperglycemia ([facility] )    Hyperlipidemia    Atrial fibrillation ([facility] )    Inguinal hernia    Anemia    Onychomycosis    Hematemesis    Gross hematuria    Thrombocytopenia ([facility] )    Stage 3a chronic kidney disease ([facility] )      Past Medical and Surgical History:     No past medical history on file    Past Surgical History:   Procedure Laterality Date    CATARACT EXTRACTION      HERNIA REPAIR  1985    HERNIA REPAIR        Family History:     Family History   Problem Relation Age of Onset    Stroke Mother     Stroke Father     Atrial fibrillation Sister       Social History:     Social History     Socioeconomic History    Marital status: Single     Spouse name: None    Number of children: None    Years of education: None    Highest education level: None   Occupational History    None   Tobacco Use    Smoking status: Former Smoker     Quit date:      Years since quittin 6    Smokeless tobacco: Never Used   Vaping Use    Vaping Use: Never used   Substance and Sexual Activity    Alcohol use: Yes     Comment: Social     Drug use: None    Sexual activity: None   Other Topics Concern    None   Social History Narrative    None     Social Determinants of Health     Financial Resource Strain: Low Risk     Difficulty of Paying Living Expenses: Not hard at all   Food Insecurity: Not on file   Transportation Needs: No Transportation Needs    Lack of Transportation (Medical): No    Lack of Transportation (Non-Medical): No   Physical Activity: Not on file   Stress: Not on file   Social Connections: Not on file   Intimate Partner Violence: Not on file   Housing Stability: Not on file      Medications and Allergies:     Current Outpatient Medications   Medication Sig Dispense Refill    apixaban (ELIQUIS) 2 5 mg Take 1 tablet (2 5 mg total) by mouth 2 (two) times a day 180 tablet 0    metFORMIN (GLUCOPHAGE) 1000 MG tablet Take 1 tablet (1,000 mg total) by mouth 2 (two) times a day with meals 180 tablet 3    metoprolol succinate (TOPROL-XL) 50 mg 24 hr tablet Take 1 tablet (50 mg total) by mouth 2 (two) times a day 90 tablet 3    Multiple Vitamin (DAILY VALUE MULTIVITAMIN) TABS Take 1 tablet by mouth daily      pantoprazole (PROTONIX) 20 mg tablet Take 1 tablet (20 mg total) by mouth daily 90 tablet 3    pioglitazone (ACTOS) 30 mg tablet Take 1 tablet (30 mg total) by mouth daily 90 tablet 0    simvastatin (ZOCOR) 40 mg tablet Take 1 tablet (40 mg total) by mouth daily 90 tablet 3     No current facility-administered medications for this visit       Allergies   Allergen Reactions    Penicillin G       Immunizations:     Immunization History   Administered Date(s) Administered    COVID-19 MODERNA VACC 0 5 ML IM 03/15/2021, 04/14/2021    INFLUENZA 11/25/2014, 09/09/2015, 10/18/2016, 12/05/2017    Influenza Quadrivalent Preservative Free 3 years and older IM 11/25/2014    Influenza Quadrivalent, 6-35 Months IM 09/09/2015    Influenza Split High Dose Preservative Free IM 10/18/2016, 12/05/2017    Influenza, high dose seasonal 0 7 mL 09/26/2018, 10/24/2019, 12/15/2020, 10/06/2021    Influenza, seasonal, injectable 11/06/2012, 10/24/2013, 09/20/2019    Pneumococcal Conjugate 13-Valent 07/12/2016    Pneumococcal Polysaccharide PPV23 08/27/2014      Health Maintenance: There are no preventive care reminders to display for this patient  Topic Date Due    COVID-19 Vaccine (3 - Booster for Moderna series) 09/14/2021    Influenza Vaccine (1) 09/01/2022      Medicare Screening Tests and Risk Assessments:     Simon Bradley is here for his Subsequent Wellness visit  Health Risk Assessment:   Patient rates overall health as very good  Patient feels that their physical health rating is slightly worse  Patient is satisfied with their life  Eyesight was rated as slightly worse  Hearing was rated as same  Patient feels that their emotional and mental health rating is same  Patients states they are sometimes angry  Patient states they are sometimes unusually tired/fatigued  Pain experienced in the last 7 days has been none  Patient states that he has experienced weight loss or gain in last 6 months  Fall Risk Screening: In the past year, patient has experienced: no history of falling in past year      Home Safety:  Patient has trouble with stairs inside or outside of their home  Patient has working smoke alarms and has working carbon monoxide detector  Home safety hazards include: none  Nutrition:   Current diet is Regular and Limited junk food  Medications:   Patient is currently taking over-the-counter supplements  OTC medications include: see medication list  Patient is able to manage medications  Activities of Daily Living (ADLs)/Instrumental Activities of Daily Living (IADLs):   Walk and transfer into and out of bed and chair?: Yes  Dress and groom yourself?: Yes    Bathe or shower yourself?: Yes    Feed yourself?  Yes  Do your laundry/housekeeping?: Yes  Manage your money, pay your bills and track your expenses?: Yes  Make your own meals?: Yes    Do your own shopping?: Yes    Previous Hospitalizations:   Any hospitalizations or ED visits within the last 12 months?: Yes    How many hospitalizations have you had in the last year?: 1-2    Advance Care Planning:   Living will: No    Durable POA for healthcare: No    Advanced directive: No      Comments: Rajesh oDzier is working on [address]      Cardiovascular Screening:    General: Screening Not Indicated and History Lipid Disorder      Diabetes Screening:     General: Screening Not Indicated and History Diabetes      Colorectal Cancer Screening:     General: Screening Not Indicated      Prostate Cancer Screening:    General: Screening Not Indicated      Abdominal Aortic Aneurysm (AAA) Screening:    Risk factors include: tobacco use        Lung Cancer Screening:     General: Screening Not Indicated    Screening, Brief Intervention, and Referral to Treatment (SBIRT)    Screening  Typical number of drinks in a day: 0  Typical number of drinks in a week: 0  Interpretation: Low risk drinking behavior      Single Item Drug Screening:  How often have you used an illegal drug (including marijuana) or a prescription medication for non-medical reasons in the past year? never    Single Item Drug Screen Score: 0  Interpretation: Negative screen for possible drug use disorder    No exam data present     Physical Exam:     /80 (BP Location: Left arm, Patient Position: Sitting, Cuff Size: Standard)   Pulse (!) 110   Temp 98 4 °F (36 9 °C)   Resp 13   Ht 5' 11" (1 803 m)   Wt 61 7 kg (136 lb)   SpO2 99%   BMI 18 97 kg/m²     Physical Exam     Max Zuly Ramirez, DO

## 2022-08-17 NOTE — PATIENT INSTRUCTIONS
Please increase your metoprolol succinate (Toprol-XL) from 50 mg daily to 100 mg daily       Medicare Preventive Visit Patient Instructions  Thank you for completing your Welcome to Medicare Visit or Medicare Annual Wellness Visit today  Your next wellness visit will be due in one year (8/18/2023)  The screening/preventive services that you may require over the next 5-10 years are detailed below  Some tests may not apply to you based off risk factors and/or age  Screening tests ordered at today's visit but not completed yet may show as past due  Also, please note that scanned in results may not display below  Preventive Screenings:  Service Recommendations Previous Testing/Comments   Colorectal Cancer Screening  Colonoscopy    Fecal Occult Blood Test (FOBT)/Fecal Immunochemical Test (FIT)  Fecal DNA/Cologuard Test  Flexible Sigmoidoscopy Age: 39-70 years old   Colonoscopy: every 10 years (May be performed more frequently if at higher risk)  OR  FOBT/FIT: every 1 year  OR  Cologuard: every 3 years  OR  Sigmoidoscopy: every 5 years  Screening may be recommended earlier than age 39 if at higher risk for colorectal cancer  Also, an individualized decision between you and your healthcare provider will decide whether screening between the ages of 74-80 would be appropriate   Colonoscopy: 10/29/2021  FOBT/FIT: Not on file  Cologuard: Not on file  Sigmoidoscopy: Not on file    Screening Not Indicated     Prostate Cancer Screening Individualized decision between patient and health care provider in men between ages of 53-78   Medicare will cover every 12 months beginning on the day after your 50th birthday PSA: No results in last 5 years     Screening Not Indicated     Hepatitis C Screening Once for adults born between [facility]  More frequently in patients at high risk for Hepatitis C Hep C Antibody: Not on file        Diabetes Screening 1-2 times per year if you're at risk for diabetes or have pre-diabetes Fasting glucose: 166 mg/dL (5/11/2022)  A1C: 8 1 % (8/9/2022)  Screening Not Indicated  History Diabetes   Cholesterol Screening Once every 5 years if you don't have a lipid disorder  May order more often based on risk factors  Lipid panel: 02/16/2022  Screening Not Indicated  History Lipid Disorder      Other Preventive Screenings Covered by Medicare:  Abdominal Aortic Aneurysm (AAA) Screening: covered once if your at risk  You're considered to be at risk if you have a family history of AAA or a male between the age of 73-68 who smoking at least 100 cigarettes in your lifetime  Lung Cancer Screening: covers low dose CT scan once per year if you meet all of the following conditions: (1) Age 50-69; (2) No signs or symptoms of lung cancer; (3) Current smoker or have quit smoking within the last 15 years; (4) You have a tobacco smoking history of at least 20 pack years (packs per day x number of years you smoked); (5) You get a written order from a healthcare provider  Glaucoma Screening: covered annually if you're considered high risk: (1) You have diabetes OR (2) Family history of glaucoma OR (3)  aged 48 and older OR (3)  American aged 72 and older  Osteoporosis Screening: covered every 2 years if you meet one of the following conditions: (1) Have a vertebral abnormality; (2) On glucocorticoid therapy for more than 3 months; (3) Have primary hyperparathyroidism; (4) On osteoporosis medications and need to assess response to drug therapy  HIV Screening: covered annually if you're between the age of 12-76  Also covered annually if you are younger than 13 and older than 72 with risk factors for HIV infection  For pregnant patients, it is covered up to 3 times per pregnancy      Immunizations:  Immunization Recommendations   Influenza Vaccine Annual influenza vaccination during flu season is recommended for all persons aged >= 6 months who do not have contraindications   Pneumococcal Vaccine   * Pneumococcal conjugate vaccine = PCV13 (Prevnar 13), PCV15 (Vaxneuvance), PCV20 (Prevnar 20)  * Pneumococcal polysaccharide vaccine = PPSV23 (Pneumovax) Adults 2364 years old: 1-3 doses may be recommended based on certain risk factors  Adults 72 years old: 1-2 doses may be recommended based off what pneumonia vaccine you previously received   Hepatitis B Vaccine 3 dose series if at intermediate or high risk (ex: diabetes, end stage renal disease, liver disease)   Tetanus (Td) Vaccine - COST NOT COVERED BY MEDICARE PART B Following completion of primary series, a booster dose should be given every 10 years to maintain immunity against tetanus  Td may also be given as tetanus wound prophylaxis  Tdap Vaccine - COST NOT COVERED BY MEDICARE PART B Recommended at least once for all adults  For pregnant patients, recommended with each pregnancy  Shingles Vaccine (Shingrix) - COST NOT COVERED BY MEDICARE PART B  2 shot series recommended in those aged 48 and above     Health Maintenance Due:  There are no preventive care reminders to display for this patient  Immunizations Due:      Topic Date Due    COVID-19 Vaccine (3 - Booster for Moderna series) 09/14/2021    Influenza Vaccine (1) 09/01/2022     Advance Directives   What are advance directives? Advance directives are legal documents that state your wishes and plans for medical care  These plans are made ahead of time in case you lose your ability to make decisions for yourself  Advance directives can apply to any medical decision, such as the treatments you want, and if you want to donate organs  What are the types of advance directives? There are many types of advance directives, and each state has rules about how to use them  You may choose a combination of any of the following:  Living will: This is a written record of the treatment you want  You can also choose which treatments you do not want, which to limit, and which to stop at a certain time   This includes surgery, medicine, IV fluid, and tube feedings  Durable power of  for healthcare [facility]): This is a written record that states who you want to make healthcare choices for you when you are unable to make them for yourself  This person, called a proxy, is usually a family member or a friend  You may choose more than 1 proxy  Do not resuscitate (DNR) order:  A DNR order is used in case your heart stops beating or you stop breathing  It is a request not to have certain forms of treatment, such as CPR  A DNR order may be included in other types of advance directives  Medical directive: This covers the care that you want if you are in a coma, near death, or unable to make decisions for yourself  You can list the treatments you want for each condition  Treatment may include pain medicine, surgery, blood transfusions, dialysis, IV or tube feedings, and a ventilator (breathing machine)  Values history: This document has questions about your views, beliefs, and how you feel and think about life  This information can help others choose the care that you would choose  Why are advance directives important? An advance directive helps you control your care  Although spoken wishes may be used, it is better to have your wishes written down  Spoken wishes can be misunderstood, or not followed  Treatments may be given even if you do not want them  An advance directive may make it easier for your family to make difficult choices about your care  © Copyright [employer] 2018 Information is for End User's use only and may not be sold, redistributed or otherwise used for commercial purposes   All illustrations and images included in CareNotes® are the copyrighted property of A [employer] , Inc  or [address] [place], [place] [address]Guide

## 2022-08-27 ENCOUNTER — APPOINTMENT (OUTPATIENT)
Dept: RADIOLOGY | Facility: HOSPITAL | Age: 87
DRG: 884 | End: 2022-08-27
Payer: MEDICARE

## 2022-08-27 ENCOUNTER — HOSPITAL ENCOUNTER (INPATIENT)
Facility: HOSPITAL | Age: 87
LOS: 25 days | Discharge: HOME HEALTH | DRG: 884 | End: 2022-09-21
Attending: EMERGENCY MEDICINE | Admitting: INTERNAL MEDICINE
Payer: MEDICARE

## 2022-08-27 DIAGNOSIS — R41.0 CONFUSION: ICD-10-CM

## 2022-08-27 DIAGNOSIS — R29.6 UNWITNESSED FALL: ICD-10-CM

## 2022-08-27 DIAGNOSIS — E83.42 HYPOMAGNESEMIA: ICD-10-CM

## 2022-08-27 DIAGNOSIS — F32.A DEPRESSION: ICD-10-CM

## 2022-08-27 DIAGNOSIS — R53.1 GENERALIZED WEAKNESS: Primary | ICD-10-CM

## 2022-08-27 DIAGNOSIS — R26.2 AMBULATORY DYSFUNCTION: ICD-10-CM

## 2022-08-27 LAB
2HR DELTA HS TROPONIN: -2 NG/L
ALBUMIN SERPL BCP-MCNC: 3.9 G/DL (ref 3.5–5)
ALP SERPL-CCNC: 56 U/L (ref 43–122)
ALT SERPL W P-5'-P-CCNC: 21 U/L
ANION GAP SERPL CALCULATED.3IONS-SCNC: 7 MMOL/L (ref 5–14)
AST SERPL W P-5'-P-CCNC: 29 U/L (ref 17–59)
ATRIAL RATE: 234 BPM
BACTERIA UR QL AUTO: ABNORMAL /HPF
BASOPHILS # BLD AUTO: 0.04 THOUSANDS/ΜL (ref 0–0.1)
BASOPHILS NFR BLD AUTO: 1 % (ref 0–1)
BILIRUB SERPL-MCNC: 0.73 MG/DL (ref 0.2–1)
BILIRUB UR QL STRIP: NEGATIVE
BUN SERPL-MCNC: 36 MG/DL (ref 5–25)
CALCIUM SERPL-MCNC: 9.1 MG/DL (ref 8.4–10.2)
CARDIAC TROPONIN I PNL SERPL HS: 14 NG/L
CARDIAC TROPONIN I PNL SERPL HS: 16 NG/L
CHLORIDE SERPL-SCNC: 104 MMOL/L (ref 96–108)
CLARITY UR: CLEAR
CO2 SERPL-SCNC: 28 MMOL/L (ref 21–32)
COLOR UR: YELLOW
CREAT SERPL-MCNC: 1.32 MG/DL (ref 0.7–1.5)
EOSINOPHIL # BLD AUTO: 0.16 THOUSAND/ΜL (ref 0–0.61)
EOSINOPHIL NFR BLD AUTO: 2 % (ref 0–6)
ERYTHROCYTE [DISTWIDTH] IN BLOOD BY AUTOMATED COUNT: 12.9 % (ref 11.6–15.1)
FLUAV RNA RESP QL NAA+PROBE: NEGATIVE
FLUBV RNA RESP QL NAA+PROBE: NEGATIVE
GFR SERPL CREATININE-BSD FRML MDRD: 47 ML/MIN/1.73SQ M
GLUCOSE SERPL-MCNC: 177 MG/DL (ref 65–140)
GLUCOSE SERPL-MCNC: 181 MG/DL (ref 65–140)
GLUCOSE SERPL-MCNC: 194 MG/DL (ref 70–99)
GLUCOSE SERPL-MCNC: 208 MG/DL (ref 65–140)
GLUCOSE SERPL-MCNC: 311 MG/DL (ref 65–140)
GLUCOSE UR STRIP-MCNC: ABNORMAL MG/DL
HCT VFR BLD AUTO: 33.3 % (ref 36.5–49.3)
HGB BLD-MCNC: 10.9 G/DL (ref 12–17)
HGB UR QL STRIP.AUTO: 50
IMM GRANULOCYTES # BLD AUTO: 0.03 THOUSAND/UL (ref 0–0.2)
IMM GRANULOCYTES NFR BLD AUTO: 0 % (ref 0–2)
KETONES UR STRIP-MCNC: NEGATIVE MG/DL
LEUKOCYTE ESTERASE UR QL STRIP: NEGATIVE
LYMPHOCYTES # BLD AUTO: 1.85 THOUSANDS/ΜL (ref 0.6–4.47)
LYMPHOCYTES NFR BLD AUTO: 24 % (ref 14–44)
MAGNESIUM SERPL-MCNC: 1.5 MG/DL (ref 1.6–2.3)
MCH RBC QN AUTO: 34.7 PG (ref 26.8–34.3)
MCHC RBC AUTO-ENTMCNC: 32.7 G/DL (ref 31.4–37.4)
MCV RBC AUTO: 106 FL (ref 82–98)
MONOCYTES # BLD AUTO: 1.08 THOUSAND/ΜL (ref 0.17–1.22)
MONOCYTES NFR BLD AUTO: 14 % (ref 4–12)
NEUTROPHILS # BLD AUTO: 4.68 THOUSANDS/ΜL (ref 1.85–7.62)
NEUTS SEG NFR BLD AUTO: 59 % (ref 43–75)
NITRITE UR QL STRIP: NEGATIVE
NON-SQ EPI CELLS URNS QL MICRO: ABNORMAL /HPF
NRBC BLD AUTO-RTO: 0 /100 WBCS
PH UR STRIP.AUTO: 6 [PH]
PLATELET # BLD AUTO: 137 THOUSANDS/UL (ref 149–390)
PMV BLD AUTO: 10.2 FL (ref 8.9–12.7)
POTASSIUM SERPL-SCNC: 4.6 MMOL/L (ref 3.5–5.3)
PROT SERPL-MCNC: 7.3 G/DL (ref 6.4–8.4)
PROT UR STRIP-MCNC: ABNORMAL MG/DL
QRS AXIS: -46 DEGREES
QRSD INTERVAL: 80 MS
QT INTERVAL: 326 MS
QTC INTERVAL: 420 MS
RBC # BLD AUTO: 3.14 MILLION/UL (ref 3.88–5.62)
RBC #/AREA URNS AUTO: ABNORMAL /HPF
RSV RNA RESP QL NAA+PROBE: NEGATIVE
SARS-COV-2 RNA RESP QL NAA+PROBE: NEGATIVE
SODIUM SERPL-SCNC: 139 MMOL/L (ref 135–147)
SP GR UR STRIP.AUTO: 1.01 (ref 1–1.04)
T WAVE AXIS: 29 DEGREES
UROBILINOGEN UA: NEGATIVE MG/DL
VENTRICULAR RATE: 100 BPM
WBC # BLD AUTO: 7.84 THOUSAND/UL (ref 4.31–10.16)
WBC #/AREA URNS AUTO: ABNORMAL /HPF

## 2022-08-27 PROCEDURE — 84484 ASSAY OF TROPONIN QUANT: CPT | Performed by: PHYSICIAN ASSISTANT

## 2022-08-27 PROCEDURE — 80053 COMPREHEN METABOLIC PANEL: CPT | Performed by: PHYSICIAN ASSISTANT

## 2022-08-27 PROCEDURE — 85025 COMPLETE CBC W/AUTO DIFF WBC: CPT | Performed by: PHYSICIAN ASSISTANT

## 2022-08-27 PROCEDURE — 99285 EMERGENCY DEPT VISIT HI MDM: CPT

## 2022-08-27 PROCEDURE — 93005 ELECTROCARDIOGRAM TRACING: CPT

## 2022-08-27 PROCEDURE — 93010 ELECTROCARDIOGRAM REPORT: CPT | Performed by: STUDENT IN AN ORGANIZED HEALTH CARE EDUCATION/TRAINING PROGRAM

## 2022-08-27 PROCEDURE — 81001 URINALYSIS AUTO W/SCOPE: CPT | Performed by: INTERNAL MEDICINE

## 2022-08-27 PROCEDURE — 99285 EMERGENCY DEPT VISIT HI MDM: CPT | Performed by: PHYSICIAN ASSISTANT

## 2022-08-27 PROCEDURE — 0241U HB NFCT DS VIR RESP RNA 4 TRGT: CPT | Performed by: PHYSICIAN ASSISTANT

## 2022-08-27 PROCEDURE — 36415 COLL VENOUS BLD VENIPUNCTURE: CPT | Performed by: PHYSICIAN ASSISTANT

## 2022-08-27 PROCEDURE — 71045 X-RAY EXAM CHEST 1 VIEW: CPT

## 2022-08-27 PROCEDURE — 81003 URINALYSIS AUTO W/O SCOPE: CPT | Performed by: INTERNAL MEDICINE

## 2022-08-27 PROCEDURE — 82948 REAGENT STRIP/BLOOD GLUCOSE: CPT

## 2022-08-27 PROCEDURE — 84484 ASSAY OF TROPONIN QUANT: CPT | Performed by: INTERNAL MEDICINE

## 2022-08-27 PROCEDURE — 83735 ASSAY OF MAGNESIUM: CPT | Performed by: PHYSICIAN ASSISTANT

## 2022-08-27 PROCEDURE — 99223 1ST HOSP IP/OBS HIGH 75: CPT | Performed by: INTERNAL MEDICINE

## 2022-08-27 RX ORDER — INSULIN LISPRO 100 [IU]/ML
1-5 INJECTION, SOLUTION INTRAVENOUS; SUBCUTANEOUS
Status: DISCONTINUED | OUTPATIENT
Start: 2022-08-27 | End: 2022-09-21 | Stop reason: HOSPADM

## 2022-08-27 RX ORDER — HYDRALAZINE HYDROCHLORIDE 20 MG/ML
5 INJECTION INTRAMUSCULAR; INTRAVENOUS EVERY 6 HOURS PRN
Status: DISCONTINUED | OUTPATIENT
Start: 2022-08-27 | End: 2022-09-21 | Stop reason: HOSPADM

## 2022-08-27 RX ORDER — ESCITALOPRAM OXALATE 5 MG/1
5 TABLET ORAL DAILY
Status: DISCONTINUED | OUTPATIENT
Start: 2022-08-27 | End: 2022-09-21 | Stop reason: HOSPADM

## 2022-08-27 RX ORDER — ACETAMINOPHEN 325 MG/1
650 TABLET ORAL EVERY 4 HOURS PRN
Status: DISCONTINUED | OUTPATIENT
Start: 2022-08-27 | End: 2022-09-21 | Stop reason: HOSPADM

## 2022-08-27 RX ORDER — METOPROLOL SUCCINATE 50 MG/1
50 TABLET, EXTENDED RELEASE ORAL 2 TIMES DAILY
Status: DISCONTINUED | OUTPATIENT
Start: 2022-08-27 | End: 2022-09-21 | Stop reason: HOSPADM

## 2022-08-27 RX ORDER — ONDANSETRON 2 MG/ML
4 INJECTION INTRAMUSCULAR; INTRAVENOUS EVERY 6 HOURS PRN
Status: DISCONTINUED | OUTPATIENT
Start: 2022-08-27 | End: 2022-09-21 | Stop reason: HOSPADM

## 2022-08-27 RX ORDER — PRAVASTATIN SODIUM 40 MG
80 TABLET ORAL
Status: DISCONTINUED | OUTPATIENT
Start: 2022-08-27 | End: 2022-09-21 | Stop reason: HOSPADM

## 2022-08-27 RX ORDER — LORAZEPAM 0.5 MG/1
0.5 TABLET ORAL
Status: DISCONTINUED | OUTPATIENT
Start: 2022-08-27 | End: 2022-08-28

## 2022-08-27 RX ORDER — PANTOPRAZOLE SODIUM 20 MG/1
20 TABLET, DELAYED RELEASE ORAL DAILY
Status: DISCONTINUED | OUTPATIENT
Start: 2022-08-27 | End: 2022-09-21 | Stop reason: HOSPADM

## 2022-08-27 RX ADMIN — INSULIN LISPRO 1 UNITS: 100 INJECTION, SOLUTION INTRAVENOUS; SUBCUTANEOUS at 16:46

## 2022-08-27 RX ADMIN — PANTOPRAZOLE SODIUM 20 MG: 20 TABLET, DELAYED RELEASE ORAL at 09:37

## 2022-08-27 RX ADMIN — APIXABAN 2.5 MG: 2.5 TABLET, FILM COATED ORAL at 09:37

## 2022-08-27 RX ADMIN — METOPROLOL SUCCINATE 50 MG: 50 TABLET, EXTENDED RELEASE ORAL at 09:37

## 2022-08-27 RX ADMIN — LORAZEPAM 0.5 MG: 0.5 TABLET ORAL at 21:15

## 2022-08-27 RX ADMIN — INSULIN LISPRO 3 UNITS: 100 INJECTION, SOLUTION INTRAVENOUS; SUBCUTANEOUS at 12:22

## 2022-08-27 RX ADMIN — INSULIN LISPRO 1 UNITS: 100 INJECTION, SOLUTION INTRAVENOUS; SUBCUTANEOUS at 21:15

## 2022-08-27 RX ADMIN — METOPROLOL SUCCINATE 50 MG: 50 TABLET, EXTENDED RELEASE ORAL at 21:15

## 2022-08-27 RX ADMIN — INSULIN LISPRO 1 UNITS: 100 INJECTION, SOLUTION INTRAVENOUS; SUBCUTANEOUS at 09:37

## 2022-08-27 RX ADMIN — APIXABAN 2.5 MG: 2.5 TABLET, FILM COATED ORAL at 17:08

## 2022-08-27 RX ADMIN — PRAVASTATIN SODIUM 80 MG: 40 TABLET ORAL at 16:46

## 2022-08-27 RX ADMIN — ESCITALOPRAM OXALATE 5 MG: 5 TABLET, FILM COATED ORAL at 16:46

## 2022-08-27 NOTE — ASSESSMENT & PLAN NOTE
· Patient states he lives at home alone and has been having difficulty with ambulation  · No recent traumatic events  · Laboratory analysis grossly unremarkable  · Hemodynamically stable saturating well on room air  · Case management consultation for safe disposition planning  · PT/OT evaluation and treatment

## 2022-08-27 NOTE — ASSESSMENT & PLAN NOTE
· Blood pressures elevated on presentation  · Continue home beta-blocker for atrial fibrillation  · Hydralazine 5 mg IV q6 hours PRN SBP > 180 mmHg  · Monitor blood pressures

## 2022-08-27 NOTE — ED PROVIDER NOTES
History  Chief Complaint   Patient presents with    Weakness - Generalized     Pt states he was recently discharged from the hospital at [address]  He was recommended to go to rehab post discharge but did not go  Today pt had a BM and felt weaker afterwards (since this morning)  Pt is stating he is having ambulatory issues and is unable to get around his home  Pt answers questions appropriately but seems to veer off topic frequently  In example: this RN asked pt about his home medications and pt told me about a property his family has owned since 26       81 yo male PMH of HTN, DM, HLD, atrial fibrillation, CKD presents via EMS for evaluation of difficulty ambulating  Patient states he had a bowel movement this morning and then afterwards had difficulty ambulating throughout his house  States this has been progressing now for the past month or so and "no one believes me"  This is distressing to him  He denies any pain or injury  No falls at home  He is AOx3  Denies any chest pain, shortness of breath, headache, dizziness, syncope, palpitations, nausea, vomiting, diarrhea, blood in stool, numbness  He lives by himself  He states he does not have any additional help at home  Prior to Admission Medications   Prescriptions Last Dose Informant Patient Reported? Taking?    Multiple Vitamin (DAILY VALUE MULTIVITAMIN) TABS  Self Yes No   Sig: Take 1 tablet by mouth daily   apixaban (ELIQUIS) 2 5 mg   No No   Sig: Take 1 tablet (2 5 mg total) by mouth 2 (two) times a day   metFORMIN (GLUCOPHAGE) 1000 MG tablet  Self No No   Sig: Take 1 tablet (1,000 mg total) by mouth 2 (two) times a day with meals   metoprolol succinate (TOPROL-XL) 50 mg 24 hr tablet   No No   Sig: Take 1 tablet (50 mg total) by mouth 2 (two) times a day   pantoprazole (PROTONIX) 20 mg tablet  Self No No   Sig: Take 1 tablet (20 mg total) by mouth daily   pioglitazone (ACTOS) 30 mg tablet   No No   Sig: Take 1 tablet (30 mg total) by mouth daily simvastatin (ZOCOR) 40 mg tablet  Self No No   Sig: Take 1 tablet (40 mg total) by mouth daily      Facility-Administered Medications: None       History reviewed  No pertinent past medical history  Past Surgical History:   Procedure Laterality Date    CATARACT EXTRACTION      HERNIA REPAIR      HERNIA REPAIR         Family History   Problem Relation Age of Onset    Stroke Mother     Stroke Father     Atrial fibrillation Sister      I have reviewed and agree with the history as documented  E-Cigarette/Vaping    E-Cigarette Use Never User      E-Cigarette/Vaping Substances    Nicotine No     THC No     CBD No     Flavoring No     Other No     Unknown No      Social History     Tobacco Use    Smoking status: Former Smoker     Quit date:      Years since quittin 6    Smokeless tobacco: Never Used   Vaping Use    Vaping Use: Never used   Substance Use Topics    Alcohol use: Yes     Comment: Social        Review of Systems   Constitutional: Negative for chills and fever  HENT: Negative for congestion, ear pain and sore throat  Eyes: Negative for pain  Respiratory: Negative for cough and shortness of breath  Cardiovascular: Negative for chest pain  Gastrointestinal: Negative for abdominal pain, nausea and vomiting  Genitourinary: Negative for dysuria  Musculoskeletal: Negative for back pain  Skin: Negative for rash  Neurological: Positive for weakness  Negative for dizziness and numbness  Psychiatric/Behavioral: Negative for suicidal ideas  All other systems reviewed and are negative  Physical Exam  Physical Exam  Vitals reviewed  Constitutional:       General: He is not in acute distress  Appearance: Normal appearance  He is well-developed  He is not diaphoretic  HENT:      Head: Normocephalic and atraumatic        Right Ear: External ear normal       Left Ear: External ear normal       Nose: Nose normal       Mouth/Throat:      Mouth: Mucous membranes are moist       Pharynx: Oropharynx is clear  Eyes:      Pupils: Pupils are equal, round, and reactive to light  Cardiovascular:      Rate and Rhythm: Normal rate and regular rhythm  Heart sounds: Normal heart sounds  Pulmonary:      Effort: Pulmonary effort is normal       Breath sounds: Normal breath sounds  Abdominal:      General: Bowel sounds are normal       Palpations: Abdomen is soft  Tenderness: There is no abdominal tenderness  Musculoskeletal:         General: Normal range of motion  Cervical back: Normal range of motion and neck supple  Skin:     General: Skin is warm and dry  Neurological:      Mental Status: He is alert and oriented to person, place, and time           Vital Signs  ED Triage Vitals [08/27/22 0609]   Temperature Pulse Respirations Blood Pressure SpO2   99 1 °F (37 3 °C) 102 20 (!) 184/111 98 %      Temp Source Heart Rate Source Patient Position - Orthostatic VS BP Location FiO2 (%)   Tympanic Monitor Lying -- --      Pain Score       No Pain           Vitals:    08/27/22 0609   BP: (!) 184/111   Pulse: 102   Patient Position - Orthostatic VS: Lying         Visual Acuity  Visual Acuity    Flowsheet Row Most Recent Value   L Pupil Size (mm) 3   R Pupil Size (mm) 3   L Pupil Shape Round   R Pupil Shape Round          ED Medications  Medications - No data to display    Diagnostic Studies  Results Reviewed     Procedure Component Value Units Date/Time    HS Troponin I 2hr [[id number]]     Lab Status: No result Specimen: Blood     HS Troponin 0hr (reflex protocol) [[id number]]  (Normal) Collected: 08/27/22 0628    Lab Status: Final result Specimen: Blood from Arm, Left Updated: 08/27/22 0701     hs TnI 0hr 16 ng/L     Comprehensive metabolic panel [[id number]]  (Abnormal) Collected: 08/27/22 0628    Lab Status: Final result Specimen: Blood from Arm, Left Updated: 08/27/22 0653     Sodium 139 mmol/L      Potassium 4 6 mmol/L      Chloride 104 mmol/L      CO2 28 mmol/L      ANION GAP 7 mmol/L      BUN 36 mg/dL      Creatinine 1 32 mg/dL      Glucose 194 mg/dL      Calcium 9 1 mg/dL      AST 29 U/L      ALT 21 U/L      Alkaline Phosphatase 56 U/L      Total Protein 7 3 g/dL      Albumin 3 9 g/dL      Total Bilirubin 0 73 mg/dL      eGFR 47 ml/min/1 73sq m     Narrative:      Hemolysis  National Kidney Disease Foundation guidelines for Chronic Kidney Disease (CKD):     Stage 1 with normal or high GFR (GFR > 90 mL/min/1 73 square meters)    Stage 2 Mild CKD (GFR = 60-89 mL/min/1 73 square meters)    Stage 3A Moderate CKD (GFR = 45-59 mL/min/1 73 square meters)    Stage 3B Moderate CKD (GFR = 30-44 mL/min/1 73 square meters)    Stage 4 Severe CKD (GFR = 15-29 mL/min/1 73 square meters)    Stage 5 End Stage CKD (GFR <15 mL/min/1 73 square meters)  Note: GFR calculation is accurate only with a steady state creatinine    Magnesium [[id number]]  (Abnormal) Collected: 08/27/22 0628    Lab Status: Final result Specimen: Blood from Arm, Left Updated: 08/27/22 0651     Magnesium 1 5 mg/dL     Narrative:      Hemolysis    CBC and differential [[id number]]  (Abnormal) Collected: 08/27/22 0628    Lab Status: Final result Specimen: Blood from Arm, Left Updated: 08/27/22 0639     WBC 7 84 Thousand/uL      RBC 3 14 Million/uL      Hemoglobin 10 9 g/dL      Hematocrit 33 3 %       fL      MCH 34 7 pg      MCHC 32 7 g/dL      RDW 12 9 %      MPV 10 2 fL      Platelets 491 Thousands/uL      nRBC 0 /100 WBCs      Neutrophils Relative 59 %      Immat GRANS % 0 %      Lymphocytes Relative 24 %      Monocytes Relative 14 %      Eosinophils Relative 2 %      Basophils Relative 1 %      Neutrophils Absolute 4 68 Thousands/µL      Immature Grans Absolute 0 03 Thousand/uL      Lymphocytes Absolute 1 85 Thousands/µL      Monocytes Absolute 1 08 Thousand/µL      Eosinophils Absolute 0 16 Thousand/µL      Basophils Absolute 0 04 Thousands/µL     COVID19, Influenza A/B, RSV PCR, [facility] [[id number]] Collected: 08/27/22 0628    Lab Status: In process Specimen: Nares from Nose Updated: 08/27/22 0633    UA w Reflex to Microscopic w Reflex to Culture [[id number]]     Lab Status: No result Specimen: Urine, Clean Catch                  XR chest portable    (Results Pending)              Procedures  Procedures         ED Course  ED Course as of 08/27/22 2904   Sat Aug 27, 2022   0016 Procedure Note: EKG  Date/Time: 08/27/22 6:43 AM   Performed by: Rosio Leon  Authorized by: Rosio Leon  ECG interpreted by me, the ED Provider: yes   The EKG demonstrates:  Rate 100  Rhythm sinus arrhythmia  QTc 420  No ST elevations/depressions                                   SBIRT 20yo+    Flowsheet Row Most Recent Value   SBIRT (25 yo +)    In order to provide better care to our patients, we are screening all of our patients for alcohol and drug use  Would it be okay to ask you these screening questions? No Filed at: 08/27/2022 0631                    MDM  Number of Diagnoses or Management Options  Ambulatory dysfunction  Generalized weakness  Hypomagnesemia  Diagnosis management comments: Patient with progressive weakness worsening x1 month  Now having trouble ambulating in house  He is AOx3  Lives alone  Denies any falls  No additional help  Denies any pain during time in ED  Labs unremarkable  TT Dr Beasley who will admit   Patient agreeable with plan      Disposition  Final diagnoses:   Generalized weakness   Ambulatory dysfunction   Hypomagnesemia     Time reflects when diagnosis was documented in both MDM as applicable and the Disposition within this note     Time User Action Codes Description Comment    8/27/2022  7:10 AM Carlito Alexander Add [R53 1] Generalized weakness     8/27/2022  7:10 AM Vinayak Alexander Add [R26 2] Ambulatory dysfunction     8/27/2022  7:10 AM Vinayak Alexander Add [E83 42] Hypomagnesemia       ED Disposition     ED Disposition   Admit    Condition   Stable    Date/Time   Sat Aug 27, 2022  7:10 AM    Comment Case was discussed with BARB and the patient's admission status was agreed to be Admission Status: inpatient status to the service of Dr Mercedez Smith   Follow-up Information    None         Patient's Medications   Discharge Prescriptions    No medications on file       No discharge procedures on file      PDMP Review     None          ED Provider  Electronically Signed by           Nicole Grant PA-C  08/27/22 6516

## 2022-08-27 NOTE — ASSESSMENT & PLAN NOTE
Lab Results   Component Value Date    HGBA1C 8 1 (H) 08/09/2022       No results for input(s): POCGLU in the last 72 hours      Blood Sugar Average: Last 72 hrs:     · Well controlled for patient's age on oral hypoglycemic regimen  · Will utilize sliding scale insulin with Accu-Cheks while inpatient  · Diabetic diet

## 2022-08-27 NOTE — ASSESSMENT & PLAN NOTE
Lab Results   Component Value Date    EGFR 47 08/27/2022    EGFR 39 08/09/2022    EGFR 45 05/11/2022    CREATININE 1 32 08/27/2022    CREATININE 1 54 (H) 08/09/2022    CREATININE 1 37 05/11/2022   · Creatinine appears to be at baseline  · Trend BMP  · Avoid hypotension and nephrotoxic agents

## 2022-08-27 NOTE — H&P
[facility]  H&P- Diane Melendez  1933, 80 y o  male MRN: [id number]  Unit/Bed#: ED 03 Encounter: [id number]  Primary Care Provider: Anitra England DO   Date and time admitted to hospital: 8/27/2022  5:56 AM    * General weakness  Assessment & Plan  · Patient states he lives at home alone and has been having difficulty with ambulation  · No recent traumatic events  · Laboratory analysis grossly unremarkable  · Hemodynamically stable saturating well on room air  · Case management consultation for safe disposition planning  · PT/OT evaluation and treatment    Primary hypertension  Assessment & Plan  · Blood pressures elevated on presentation  · Continue home beta-blocker for atrial fibrillation  · Hydralazine 5 mg IV q6 hours PRN SBP > 180 mmHg  · Monitor blood pressures    Type 2 diabetes mellitus with hyperglycemia (HCC)  Assessment & Plan  Lab Results   Component Value Date    HGBA1C 8 1 (H) 08/09/2022       No results for input(s): POCGLU in the last 72 hours  Blood Sugar Average: Last 72 hrs:     · Well controlled for patient's age on oral hypoglycemic regimen  · Will utilize sliding scale insulin with Accu-Cheks while inpatient  · Diabetic diet    Hyperlipidemia  Assessment & Plan  · Continue home statin therapy    Atrial fibrillation [facility])  Assessment & Plan  · Rates are controlled  · Continue home Eliquis 2 5 mg PO BID    Stage 3a chronic kidney disease [facility])  Assessment & Plan  Lab Results   Component Value Date    EGFR 47 08/27/2022    EGFR 39 08/09/2022    EGFR 45 05/11/2022    CREATININE 1 32 08/27/2022    CREATININE 1 54 (H) 08/09/2022    CREATININE 1 37 05/11/2022   · Creatinine appears to be at baseline  · Trend BMP  · Avoid hypotension and nephrotoxic agents    VTE Prophylaxis:  Eliquis  Code Status:  Level 1 Full Code    Anticipated Length of Stay:  Patient will be admitted on an Inpatient basis with an anticipated length of stay of  2 midnights  Justification for Hospital Stay: Ambulatory dysfunction    Total Time for Visit, including Counseling / Coordination of Care: 60 minutes  Greater than 50% of this total time spent on direct patient counseling and coordination of care  Chief Complaint:  Ambulatory dysfunction      History of Present Illness:    Edison Perales  is a 80 y o  male with a past medical history significant for atrial fibrillation on Eliquis, hypertension, type 2 diabetes mellitus, hyperlipidemia, stage IIIA CKD who presents with difficult living situation  The patient states that he lives alone at home and has been difficulty with ambulating  In the ED, all vital signs were found to be stable  Laboratory analysis grossly unremarkable  Patient was admitted for PT/OT evaluation and treatment and potential placement by case management  Review of Systems:    Review of Systems   Constitutional: Negative for chills and fever  HENT: Negative for ear pain and sore throat  Eyes: Negative for pain and visual disturbance  Respiratory: Negative for cough and shortness of breath  Cardiovascular: Negative for chest pain and palpitations  Gastrointestinal: Negative for abdominal pain and vomiting  Genitourinary: Negative for dysuria and hematuria  Musculoskeletal: Negative for arthralgias and back pain  Skin: Negative for color change and rash  Neurological: Negative for seizures and syncope  All other systems reviewed and are negative  Past Medical and Surgical History:     History reviewed  No pertinent past medical history  Past Surgical History:   Procedure Laterality Date    CATARACT EXTRACTION      HERNIA REPAIR  1985    HERNIA REPAIR  1999       Meds/Allergies:    Prior to Admission medications    Medication Sig Start Date End Date Taking?  Authorizing Provider   apixaban (ELIQUIS) 2 5 mg Take 1 tablet (2 5 mg total) by mouth 2 (two) times a day 8/4/22   Dwayne Hoffman DO   metFORMIN (GLUCOPHAGE) 1000 MG tablet Take 1 tablet (1,000 mg total) by mouth 2 (two) times a day with meals 22   Ernst Ozuna MD   metoprolol succinate (TOPROL-XL) 50 mg 24 hr tablet Take 1 tablet (50 mg total) by mouth 2 (two) times a day 22   Dwayne Martinez DO   Multiple Vitamin (DAILY VALUE MULTIVITAMIN) TABS Take 1 tablet by mouth daily    Historical Provider, MD   pantoprazole (PROTONIX) 20 mg tablet Take 1 tablet (20 mg total) by mouth daily 22   Ernst Ozuna MD   pioglitazone (ACTOS) 30 mg tablet Take 1 tablet (30 mg total) by mouth daily 22   Dwayne Martinez DO   simvastatin (ZOCOR) 40 mg tablet Take 1 tablet (40 mg total) by mouth daily 22   Ernst Ozuna MD       Allergies: Allergies   Allergen Reactions    Penicillin G        Social History:     Marital Status: Single   Substance Use History:   Social History     Substance and Sexual Activity   Alcohol Use Yes    Comment: Social      Social History     Tobacco Use   Smoking Status Former Smoker    Quit date:     Years since quittin 6   Smokeless Tobacco Never Used     Social History     Substance and Sexual Activity   Drug Use Not on file       Family History:    Pertinent family history reviewed    Physical Exam:     Vitals:   Blood Pressure: (!) 184/111 (22)  Pulse: 102 (22)  Temperature: 99 1 °F (37 3 °C) (22)  Temp Source: Tympanic (22)  Respirations: 20 (22)  SpO2: 98 % (22)    Physical Exam  Vitals and nursing note reviewed  Constitutional:       Appearance: He is well-developed  HENT:      Head: Normocephalic and atraumatic  Eyes:      Conjunctiva/sclera: Conjunctivae normal    Cardiovascular:      Rate and Rhythm: Normal rate and regular rhythm  Heart sounds: No murmur heard  Pulmonary:      Effort: Pulmonary effort is normal  No respiratory distress  Breath sounds: Normal breath sounds     Abdominal:      Palpations: Abdomen is soft  Tenderness: There is no abdominal tenderness  Musculoskeletal:      Cervical back: Neck supple  Skin:     General: Skin is warm and dry  Neurological:      Mental Status: He is alert  Additional Data:     Lab Results: I have reviewed pertinent results     Results from last 7 days   Lab Units 08/27/22  0628   WBC Thousand/uL 7 84   HEMOGLOBIN g/dL 10 9*   HEMATOCRIT % 33 3*   PLATELETS Thousands/uL 137*   NEUTROS PCT % 59   LYMPHS PCT % 24   MONOS PCT % 14*   EOS PCT % 2     Results from last 7 days   Lab Units 08/27/22  0628   SODIUM mmol/L 139   POTASSIUM mmol/L 4 6   CHLORIDE mmol/L 104   CO2 mmol/L 28   BUN mg/dL 36*   CREATININE mg/dL 1 32   ANION GAP mmol/L 7   CALCIUM mg/dL 9 1   ALBUMIN g/dL 3 9   TOTAL BILIRUBIN mg/dL 0 73   ALK PHOS U/L 56   ALT U/L 21   AST U/L 29   GLUCOSE RANDOM mg/dL 194*                       Imaging: I have reviewed pertinent imaging     XR chest portable    (Results Pending)       EKG, Pathology, and Other Studies Reviewed on Admission:   · EKG: Reviewed     [facility] Reviewed    ** Please Note: This note has been constructed using a voice recognition system   **

## 2022-08-28 ENCOUNTER — APPOINTMENT (INPATIENT)
Dept: CT IMAGING | Facility: HOSPITAL | Age: 87
DRG: 884 | End: 2022-08-28
Payer: MEDICARE

## 2022-08-28 PROBLEM — F41.8 DEPRESSION WITH ANXIETY: Status: ACTIVE | Noted: 2022-08-28

## 2022-08-28 LAB
ANION GAP SERPL CALCULATED.3IONS-SCNC: 5 MMOL/L (ref 5–14)
BASOPHILS # BLD AUTO: 0.05 THOUSANDS/ΜL (ref 0–0.1)
BASOPHILS NFR BLD AUTO: 1 % (ref 0–1)
BUN SERPL-MCNC: 30 MG/DL (ref 5–25)
CALCIUM SERPL-MCNC: 8.5 MG/DL (ref 8.4–10.2)
CHLORIDE SERPL-SCNC: 103 MMOL/L (ref 96–108)
CO2 SERPL-SCNC: 29 MMOL/L (ref 21–32)
CREAT SERPL-MCNC: 1.18 MG/DL (ref 0.7–1.5)
EOSINOPHIL # BLD AUTO: 0.26 THOUSAND/ΜL (ref 0–0.61)
EOSINOPHIL NFR BLD AUTO: 3 % (ref 0–6)
ERYTHROCYTE [DISTWIDTH] IN BLOOD BY AUTOMATED COUNT: 12.5 % (ref 11.6–15.1)
GFR SERPL CREATININE-BSD FRML MDRD: 54 ML/MIN/1.73SQ M
GLUCOSE SERPL-MCNC: 144 MG/DL (ref 65–140)
GLUCOSE SERPL-MCNC: 171 MG/DL (ref 70–99)
GLUCOSE SERPL-MCNC: 176 MG/DL (ref 65–140)
GLUCOSE SERPL-MCNC: 270 MG/DL (ref 65–140)
GLUCOSE SERPL-MCNC: 270 MG/DL (ref 65–140)
HCT VFR BLD AUTO: 33.7 % (ref 36.5–49.3)
HGB BLD-MCNC: 10.7 G/DL (ref 12–17)
IMM GRANULOCYTES # BLD AUTO: 0.04 THOUSAND/UL (ref 0–0.2)
IMM GRANULOCYTES NFR BLD AUTO: 0 % (ref 0–2)
LYMPHOCYTES # BLD AUTO: 1.89 THOUSANDS/ΜL (ref 0.6–4.47)
LYMPHOCYTES NFR BLD AUTO: 21 % (ref 14–44)
MAGNESIUM SERPL-MCNC: 1.7 MG/DL (ref 1.6–2.3)
MCH RBC QN AUTO: 34 PG (ref 26.8–34.3)
MCHC RBC AUTO-ENTMCNC: 31.8 G/DL (ref 31.4–37.4)
MCV RBC AUTO: 107 FL (ref 82–98)
MONOCYTES # BLD AUTO: 1.37 THOUSAND/ΜL (ref 0.17–1.22)
MONOCYTES NFR BLD AUTO: 15 % (ref 4–12)
NEUTROPHILS # BLD AUTO: 5.43 THOUSANDS/ΜL (ref 1.85–7.62)
NEUTS SEG NFR BLD AUTO: 60 % (ref 43–75)
NRBC BLD AUTO-RTO: 0 /100 WBCS
PHOSPHATE SERPL-MCNC: 3.6 MG/DL (ref 2.5–4.8)
PLATELET # BLD AUTO: 116 THOUSANDS/UL (ref 149–390)
PMV BLD AUTO: 11.1 FL (ref 8.9–12.7)
POTASSIUM SERPL-SCNC: 4.7 MMOL/L (ref 3.5–5.3)
RBC # BLD AUTO: 3.15 MILLION/UL (ref 3.88–5.62)
SODIUM SERPL-SCNC: 137 MMOL/L (ref 135–147)
WBC # BLD AUTO: 9.04 THOUSAND/UL (ref 4.31–10.16)

## 2022-08-28 PROCEDURE — 82948 REAGENT STRIP/BLOOD GLUCOSE: CPT

## 2022-08-28 PROCEDURE — 83735 ASSAY OF MAGNESIUM: CPT | Performed by: INTERNAL MEDICINE

## 2022-08-28 PROCEDURE — 80048 BASIC METABOLIC PNL TOTAL CA: CPT | Performed by: INTERNAL MEDICINE

## 2022-08-28 PROCEDURE — 84100 ASSAY OF PHOSPHORUS: CPT | Performed by: INTERNAL MEDICINE

## 2022-08-28 PROCEDURE — 85025 COMPLETE CBC W/AUTO DIFF WBC: CPT | Performed by: INTERNAL MEDICINE

## 2022-08-28 PROCEDURE — 70450 CT HEAD/BRAIN W/O DYE: CPT

## 2022-08-28 PROCEDURE — G1004 CDSM NDSC: HCPCS

## 2022-08-28 PROCEDURE — 99232 SBSQ HOSP IP/OBS MODERATE 35: CPT | Performed by: INTERNAL MEDICINE

## 2022-08-28 RX ORDER — HALOPERIDOL 5 MG/ML
1 INJECTION INTRAMUSCULAR EVERY 6 HOURS PRN
Status: DISCONTINUED | OUTPATIENT
Start: 2022-08-28 | End: 2022-09-21 | Stop reason: HOSPADM

## 2022-08-28 RX ADMIN — METOPROLOL SUCCINATE 50 MG: 50 TABLET, EXTENDED RELEASE ORAL at 21:31

## 2022-08-28 RX ADMIN — INSULIN LISPRO 1 UNITS: 100 INJECTION, SOLUTION INTRAVENOUS; SUBCUTANEOUS at 06:17

## 2022-08-28 RX ADMIN — ESCITALOPRAM OXALATE 5 MG: 5 TABLET, FILM COATED ORAL at 09:04

## 2022-08-28 RX ADMIN — METOPROLOL SUCCINATE 50 MG: 50 TABLET, EXTENDED RELEASE ORAL at 09:03

## 2022-08-28 RX ADMIN — APIXABAN 2.5 MG: 2.5 TABLET, FILM COATED ORAL at 17:42

## 2022-08-28 RX ADMIN — PANTOPRAZOLE SODIUM 20 MG: 20 TABLET, DELAYED RELEASE ORAL at 09:04

## 2022-08-28 RX ADMIN — HALOPERIDOL LACTATE 1 MG: 5 INJECTION, SOLUTION INTRAMUSCULAR at 12:13

## 2022-08-28 RX ADMIN — APIXABAN 2.5 MG: 2.5 TABLET, FILM COATED ORAL at 09:04

## 2022-08-28 RX ADMIN — INSULIN LISPRO 3 UNITS: 100 INJECTION, SOLUTION INTRAVENOUS; SUBCUTANEOUS at 12:11

## 2022-08-28 RX ADMIN — INSULIN LISPRO 3 UNITS: 100 INJECTION, SOLUTION INTRAVENOUS; SUBCUTANEOUS at 16:36

## 2022-08-28 RX ADMIN — PRAVASTATIN SODIUM 80 MG: 40 TABLET ORAL at 16:37

## 2022-08-28 NOTE — PROGRESS NOTES
[facility]  Progress Note - Saurabh Tovar  1933, 80 y o  male MRN: [id number]  Unit/Bed#: 7T [facility] 704-01 Encounter: [id number]  Primary Care Provider: Citlali ORTIZ DO   Date and time admitted to hospital: 8/27/2022  5:56 AM    * General weakness  Assessment & Plan  · Patient states he lives at home alone and has been having difficulty with ambulation  · No recent traumatic events  · Laboratory analysis grossly unremarkable  · Hemodynamically stable saturating well on room air  · Case management consultation for safe disposition planning  · PT/OT evaluation and treatment  · Patient would prefer home health care    Primary hypertension  Assessment & Plan  · Improving  · Blood pressures elevated on presentation  · Continue home beta-blocker for atrial fibrillation  · Hydralazine 5 mg IV q6 hours PRN SBP > 180 mmHg  · Monitor blood pressures    Depression with anxiety  Assessment & Plan  · Patient has appeared somewhat anxious about his health although he is extremely well-appearing at this time  · Family states there may be an element of depression possibly secondary to dementia  · Start Lexapro 5 mg oral daily  · Ativan 0 5 mg oral daily at bedtime    Type 2 diabetes mellitus with hyperglycemia [facility])  Assessment & Plan  Lab Results   Component Value Date    HGBA1C 8 1 (H) 08/09/2022       Recent Labs     08/27/22  1122 08/27/22  1546 08/27/22  2049 08/28/22  0537   POCGLU 311* 181* 177* 176*       Blood Sugar Average: Last 72 hrs:  (P) 210 6   · Well controlled for patient's age on oral hypoglycemic regimen  · Will utilize sliding scale insulin with Accu-Cheks while inpatient  · Diabetic diet    Hyperlipidemia  Assessment & Plan  · Continue home statin therapy    Atrial fibrillation (HCC)  Assessment & Plan  · Rates are controlled  · Continue home Eliquis 2 5 mg PO BID    Stage 3a chronic kidney disease ([facility] [address] )  Assessment & Plan  Lab Results   Component Value Date    EGFR 54 2022    EGFR 47 2022    EGFR 39 2022    CREATININE 1 18 2022    CREATININE 1 32 2022    CREATININE 1 54 (H) 2022   · Creatinine appears to be at baseline  · Trend BMP  · Avoid hypotension and nephrotoxic agents    VTE Pharmacologic Prophylaxis:  Eliquis    Patient Centered Rounds:  Patient care rounds were performed with nursing    Discussions with Specialists or Other Care Team Provider:  Case Management, nursing, PT/OT    Education and Discussions with Family / Patient:  Spoke with patient's family at bedside    Time Spent for Care: 30  More than 50% of total time spent on counseling and coordination of care as described above  Current Length of Stay: 1 day(s)    Current Patient Status: Inpatient     Certification Statement: The patient will continue to require additional inpatient hospital stay due to inability to care for self at home    Discharge Plan:  Pending PT/OT evaluation and treatment    Code Status: Level 1 - Full Code      Subjective:   Patient seen and examined at bedside  No acute events overnight  Denies chest pain, SOB, diaphoresis, nausea/vomiting/diarrhea, fevers/chills  Objective:     Vitals:   Temp (24hrs), Av 8 °F (23 8 °C), Min:7 2 °F (-13 8 °C), Max:97 5 °F (36 4 °C)    Temp:  [7 2 °F (-13 8 °C)-97 5 °F (36 4 °C)] 7 2 °F (-13 8 °C)  HR:  [82-97] 89  Resp:  [18-20] 18  BP: (106-174)/() 138/84  SpO2:  [97 %-99 %] 97 %  Body mass index is 18 57 kg/m²  Input and Output Summary (last 24 hours): Intake/Output Summary (Last 24 hours) at 2022 0754  Last data filed at 2022 1700  Gross per 24 hour   Intake 660 ml   Output --   Net 660 ml       Physical Exam:     Physical Exam  Vitals and nursing note reviewed  Constitutional:       Appearance: He is well-developed  HENT:      Head: Normocephalic and atraumatic     Eyes:      Conjunctiva/sclera: Conjunctivae normal    Cardiovascular:      Rate and Rhythm: Normal rate and regular rhythm  Heart sounds: No murmur heard  Pulmonary:      Effort: Pulmonary effort is normal  No respiratory distress  Breath sounds: Normal breath sounds  Abdominal:      Palpations: Abdomen is soft  Tenderness: There is no abdominal tenderness  Musculoskeletal:      Cervical back: Neck supple  Skin:     General: Skin is warm and dry  Neurological:      Mental Status: He is alert  Additional Data:     Labs:  I have reviewed pertinent results     Results from last 7 days   Lab Units 08/28/22  0451   WBC Thousand/uL 9 04   HEMOGLOBIN g/dL 10 7*   HEMATOCRIT % 33 7*   PLATELETS Thousands/uL 116*   NEUTROS PCT % 60   LYMPHS PCT % 21   MONOS PCT % 15*   EOS PCT % 3     Results from last 7 days   Lab Units 08/28/22  0451 08/27/22  0628   SODIUM mmol/L 137 139   POTASSIUM mmol/L 4 7 4 6   CHLORIDE mmol/L 103 104   CO2 mmol/L 29 28   BUN mg/dL 30* 36*   CREATININE mg/dL 1 18 1 32   ANION GAP mmol/L 5 7   CALCIUM mg/dL 8 5 9 1   ALBUMIN g/dL  --  3 9   TOTAL BILIRUBIN mg/dL  --  0 73   ALK PHOS U/L  --  56   ALT U/L  --  21   AST U/L  --  29   GLUCOSE RANDOM mg/dL 171* 194*         Results from last 7 days   Lab Units 08/28/22  0537 08/27/22  2049 08/27/22  1546 08/27/22  1122 08/27/22  0832   POC GLUCOSE mg/dl 176* 177* 181* 311* 208*                 Imaging: I have reviewed pertinent imaging       Recent Cultures (last 7 days):           Last 24 Hours Medication List:   Current Facility-Administered Medications   Medication Dose Route Frequency Provider Last Rate    acetaminophen  650 mg Oral Q4H PRN Tara Pata, DO      apixaban  2 5 mg Oral BID Tara Pata, DO      escitalopram  5 mg Oral Daily Tara Pata, DO      hydrALAZINE  5 mg Intravenous Q6H PRN Tara Pata, DO      insulin lispro  1-5 Units Subcutaneous TID AC Harpreet King, DO      insulin lispro  1-5 Units Subcutaneous HS Tara Pata, DO      LORazepam  0 5 mg Oral HS Tara Pata, DO      metoprolol succinate  50 mg Oral BID Araceli Campuzano DO      ondansetron  4 mg Intravenous Q6H PRN Araceli Campuzano,       pantoprazole  20 mg Oral Daily Araceli Campuzano,       pravastatin  80 mg Oral Daily With [address], DO          Today, Patient Was Seen By: Araceli Campuzano DO    ** Please Note: Dictation voice to text software may have been used in the creation of this document   **

## 2022-08-28 NOTE — NURSING NOTE
Patient continues to get up OOB quickly, before alarm can even sound he is up  Patient placed on 1:1 continuous observation for patient safety as other measures are ineffective  Patient is increasing agitated and convinced he needs his keys to check his house to check that the stove is off and his house doesn't burn down  Patient is unable to be reoriented at this time  Family was called to help him, but no answer  Patient given PRN haldol for agitation and yelling

## 2022-08-28 NOTE — ASSESSMENT & PLAN NOTE
· Patient states he lives at home alone and has been having difficulty with ambulation  · No recent traumatic events  · Laboratory analysis grossly unremarkable  · Hemodynamically stable saturating well on room air  · Case management consultation for safe disposition planning  · PT/OT evaluation and treatment  · Patient would prefer home health care

## 2022-08-28 NOTE — PROGRESS NOTES
Patient had an unwitnessed fall  No injuries at this time  Will check CT head without contrast to assess for signs of intracranial hemorrhage  Hemodynamically stable and without complaints at this time

## 2022-08-28 NOTE — ASSESSMENT & PLAN NOTE
Lab Results   Component Value Date    HGBA1C 8 1 (H) 08/09/2022       Recent Labs     08/27/22  1122 08/27/22  1546 08/27/22 2049 08/28/22  0537   POCGLU 311* 181* 177* 176*       Blood Sugar Average: Last 72 hrs:  (P) 210 6   · Well controlled for patient's age on oral hypoglycemic regimen  · Will utilize sliding scale insulin with Accu-Cheks while inpatient  · Diabetic diet

## 2022-08-28 NOTE — ASSESSMENT & PLAN NOTE
Lab Results   Component Value Date    EGFR 54 08/28/2022    EGFR 47 08/27/2022    EGFR 39 08/09/2022    CREATININE 1 18 08/28/2022    CREATININE 1 32 08/27/2022    CREATININE 1 54 (H) 08/09/2022   · Creatinine appears to be at baseline  · Trend BMP  · Avoid hypotension and nephrotoxic agents

## 2022-08-28 NOTE — POST FALL NOTE
Post Fall Note    Date of Fall: 8/28/2022  Observer/Reported time of Fall: 1050  Name of Provider Notified: Dr Per Zheng  Time Provider Notified: [id number]  Assessment of Patient Injury: Interruption in skin integrity; Bleeding; Other (Comment) (a small skin tear to R knuckle and R elbow opened and goose egg to head above R temple)  Post Fall Interventions: Physician notified; Circumstances of fall reviewed and documented; Fall risk precautions implemented/maitained; Comforts rounds continued; Need for additional safety measures intiated if necessary  Family/Next of kin notified?: Yes (notified by Dr Per Zheng)      Brief Description of Events  Patient had used the call bell appropriately all morning  Patient got OOB on his own without using call bell to go to the BR  Bed alarm did go off and staff responded immediately and found pt next to toilet sitting on the floor  Fall was unwitnessed  Patient assessed, VSS, denied pain  Assessment of patient revealed a small skin tear reopened on anterior R third knuckle and R elbow, band-aid and allevyn applied respectively  A goose egg to head above R temple noted, ice applied  Patient is confused, thinks he is in his apartment and asking for his keys and his college ring  Patient taken to CT and returned to bed in stable condition  Call bell in reach, bed alarm active and frequent checks made  Last Recorded Vitals  Blood pressure 162/88, pulse 96, temperature (!) 97 1 °F (36 2 °C), temperature source Temporal, resp  rate 18, height 5' 11" (1 803 m), weight 60 4 kg (133 lb 2 5 oz), SpO2 96 %        Principal Problem:    General weakness  Active Problems:    Primary hypertension    Type 2 diabetes mellitus with hyperglycemia (HCC)    Hyperlipidemia    Atrial fibrillation (HCC)    Stage 3a chronic kidney disease ([facility] [address] )    Depression with anxiety

## 2022-08-28 NOTE — ASSESSMENT & PLAN NOTE
· Improving  · Blood pressures elevated on presentation  · Continue home beta-blocker for atrial fibrillation  · Hydralazine 5 mg IV q6 hours PRN SBP > 180 mmHg  · Monitor blood pressures

## 2022-08-28 NOTE — PLAN OF CARE
Problem: Potential for Falls  Goal: Patient will remain free of falls  Description: INTERVENTIONS:  - Educate patient/family on patient safety including physical limitations  - Instruct patient to call for assistance with activity   - Consult OT/PT to assist with strengthening/mobility   - Keep Call bell within reach  - Keep bed low and locked with side rails adjusted as appropriate  - Keep care items and personal belongings within reach  - Initiate and maintain comfort rounds  - Make Fall Risk Sign visible to staff  - Offer Toileting every  Hours, in advance of need  - Initiate/Maintain bed alarm  - Obtain necessary fall risk management equipment:   - Apply yellow socks and bracelet for high fall risk patients  - Consider moving patient to room near nurses station  Outcome: Progressing     Problem: MOBILITY - ADULT  Goal: Maintain or return to baseline ADL function  Description: INTERVENTIONS:  - Educate patient/family on patient safety including physical limitations  - Instruct patient to call for assistance with activity   - Consult OT/PT to assist with strengthening/mobility   - Keep Call bell within reach  - Keep bed low and locked with side rails adjusted as appropriate  - Keep care items and personal belongings within reach  - Initiate and maintain comfort rounds  - Make Fall Risk Sign visible to staff  - Offer Toileting every  Hours, in advance of need  - Initiate/Maintain bed alarm  - Obtain necessary fall risk management equipment:   - Apply yellow socks and bracelet for high fall risk patients  - Consider moving patient to room near nurses station  Outcome: Progressing     Problem: MOBILITY - ADULT  Goal: Maintains/Returns to pre admission functional level  Description: INTERVENTIONS:  -  Assess patient's ability to carry out ADLs; assess patient's baseline for ADL function and identify physical deficits which impact ability to perform ADLs (bathing, care of mouth/teeth, toileting, grooming, dressing, etc )  - Assess/evaluate cause of self-care deficits   - Assess range of motion  - Assess patient's mobility; develop plan if impaired  - Assess patient's need for assistive devices and provide as appropriate  - Encourage maximum independence but intervene and supervise when necessary  - Involve family in performance of ADLs  - Assess for home care needs following discharge   - Consider OT consult to assist with ADL evaluation and planning for discharge  - Provide patient education as appropriate  Outcome: Progressing

## 2022-08-28 NOTE — ASSESSMENT & PLAN NOTE
· Patient has appeared somewhat anxious about his health although he is extremely well-appearing at this time  · Family states there may be an element of depression possibly secondary to dementia  · Start Lexapro 5 mg oral daily  · Ativan 0 5 mg oral daily at bedtime

## 2022-08-29 PROBLEM — R29.6 UNWITNESSED FALL: Status: ACTIVE | Noted: 2022-08-29

## 2022-08-29 PROBLEM — R41.0 CONFUSION: Status: ACTIVE | Noted: 2022-08-29

## 2022-08-29 LAB
ANION GAP SERPL CALCULATED.3IONS-SCNC: 6 MMOL/L (ref 5–14)
BASOPHILS # BLD AUTO: 0.03 THOUSANDS/ΜL (ref 0–0.1)
BASOPHILS NFR BLD AUTO: 0 % (ref 0–1)
BUN SERPL-MCNC: 27 MG/DL (ref 5–25)
CALCIUM SERPL-MCNC: 8.4 MG/DL (ref 8.4–10.2)
CHLORIDE SERPL-SCNC: 101 MMOL/L (ref 96–108)
CO2 SERPL-SCNC: 30 MMOL/L (ref 21–32)
CREAT SERPL-MCNC: 1.34 MG/DL (ref 0.7–1.5)
EOSINOPHIL # BLD AUTO: 0.22 THOUSAND/ΜL (ref 0–0.61)
EOSINOPHIL NFR BLD AUTO: 2 % (ref 0–6)
ERYTHROCYTE [DISTWIDTH] IN BLOOD BY AUTOMATED COUNT: 12.6 % (ref 11.6–15.1)
GFR SERPL CREATININE-BSD FRML MDRD: 46 ML/MIN/1.73SQ M
GLUCOSE SERPL-MCNC: 196 MG/DL (ref 65–140)
GLUCOSE SERPL-MCNC: 208 MG/DL (ref 65–140)
GLUCOSE SERPL-MCNC: 216 MG/DL (ref 70–99)
GLUCOSE SERPL-MCNC: 283 MG/DL (ref 65–140)
HCT VFR BLD AUTO: 32.7 % (ref 36.5–49.3)
HGB BLD-MCNC: 10.6 G/DL (ref 12–17)
IMM GRANULOCYTES # BLD AUTO: 0.04 THOUSAND/UL (ref 0–0.2)
IMM GRANULOCYTES NFR BLD AUTO: 0 % (ref 0–2)
LYMPHOCYTES # BLD AUTO: 1.71 THOUSANDS/ΜL (ref 0.6–4.47)
LYMPHOCYTES NFR BLD AUTO: 18 % (ref 14–44)
MAGNESIUM SERPL-MCNC: 1.7 MG/DL (ref 1.6–2.3)
MCH RBC QN AUTO: 34.2 PG (ref 26.8–34.3)
MCHC RBC AUTO-ENTMCNC: 32.4 G/DL (ref 31.4–37.4)
MCV RBC AUTO: 106 FL (ref 82–98)
MONOCYTES # BLD AUTO: 1.51 THOUSAND/ΜL (ref 0.17–1.22)
MONOCYTES NFR BLD AUTO: 16 % (ref 4–12)
NEUTROPHILS # BLD AUTO: 5.77 THOUSANDS/ΜL (ref 1.85–7.62)
NEUTS SEG NFR BLD AUTO: 64 % (ref 43–75)
NRBC BLD AUTO-RTO: 0 /100 WBCS
PHOSPHATE SERPL-MCNC: 3.5 MG/DL (ref 2.5–4.8)
PLATELET # BLD AUTO: 145 THOUSANDS/UL (ref 149–390)
PMV BLD AUTO: 10.1 FL (ref 8.9–12.7)
POTASSIUM SERPL-SCNC: 4.2 MMOL/L (ref 3.5–5.3)
RBC # BLD AUTO: 3.1 MILLION/UL (ref 3.88–5.62)
SODIUM SERPL-SCNC: 137 MMOL/L (ref 135–147)
WBC # BLD AUTO: 9.28 THOUSAND/UL (ref 4.31–10.16)

## 2022-08-29 PROCEDURE — 82948 REAGENT STRIP/BLOOD GLUCOSE: CPT

## 2022-08-29 PROCEDURE — 97163 PT EVAL HIGH COMPLEX 45 MIN: CPT

## 2022-08-29 PROCEDURE — 84100 ASSAY OF PHOSPHORUS: CPT | Performed by: INTERNAL MEDICINE

## 2022-08-29 PROCEDURE — 85025 COMPLETE CBC W/AUTO DIFF WBC: CPT | Performed by: INTERNAL MEDICINE

## 2022-08-29 PROCEDURE — 99232 SBSQ HOSP IP/OBS MODERATE 35: CPT | Performed by: INTERNAL MEDICINE

## 2022-08-29 PROCEDURE — 80048 BASIC METABOLIC PNL TOTAL CA: CPT | Performed by: INTERNAL MEDICINE

## 2022-08-29 PROCEDURE — 83735 ASSAY OF MAGNESIUM: CPT | Performed by: INTERNAL MEDICINE

## 2022-08-29 PROCEDURE — 97530 THERAPEUTIC ACTIVITIES: CPT

## 2022-08-29 RX ORDER — INSULIN GLARGINE 100 [IU]/ML
10 INJECTION, SOLUTION SUBCUTANEOUS
Status: DISCONTINUED | OUTPATIENT
Start: 2022-08-29 | End: 2022-09-02

## 2022-08-29 RX ADMIN — METOPROLOL SUCCINATE 50 MG: 50 TABLET, EXTENDED RELEASE ORAL at 21:44

## 2022-08-29 RX ADMIN — APIXABAN 2.5 MG: 2.5 TABLET, FILM COATED ORAL at 09:00

## 2022-08-29 RX ADMIN — INSULIN LISPRO 1 UNITS: 100 INJECTION, SOLUTION INTRAVENOUS; SUBCUTANEOUS at 21:44

## 2022-08-29 RX ADMIN — INSULIN LISPRO 3 UNITS: 100 INJECTION, SOLUTION INTRAVENOUS; SUBCUTANEOUS at 16:46

## 2022-08-29 RX ADMIN — APIXABAN 2.5 MG: 2.5 TABLET, FILM COATED ORAL at 17:30

## 2022-08-29 RX ADMIN — HALOPERIDOL LACTATE 1 MG: 5 INJECTION, SOLUTION INTRAMUSCULAR at 05:50

## 2022-08-29 RX ADMIN — PANTOPRAZOLE SODIUM 20 MG: 20 TABLET, DELAYED RELEASE ORAL at 09:00

## 2022-08-29 RX ADMIN — PRAVASTATIN SODIUM 80 MG: 40 TABLET ORAL at 16:52

## 2022-08-29 RX ADMIN — INSULIN GLARGINE 10 UNITS: 100 INJECTION, SOLUTION SUBCUTANEOUS at 21:44

## 2022-08-29 RX ADMIN — ESCITALOPRAM OXALATE 5 MG: 5 TABLET, FILM COATED ORAL at 09:00

## 2022-08-29 RX ADMIN — INSULIN LISPRO 1 UNITS: 100 INJECTION, SOLUTION INTRAVENOUS; SUBCUTANEOUS at 06:37

## 2022-08-29 RX ADMIN — METOPROLOL SUCCINATE 50 MG: 50 TABLET, EXTENDED RELEASE ORAL at 09:00

## 2022-08-29 NOTE — ASSESSMENT & PLAN NOTE
· Patient seems to have episode of confusion  · Attempted to call the family - Brand Dario to check the history of dementia - there was no answer  Left a voicemail  · Continue 1 on 1 supervision

## 2022-08-29 NOTE — NURSING NOTE
0550 Pt attempted to leave the room, became very agitated combative, pulled out IV line, confused, doesn't follow commands  Started to kick and punch the staffs  PRN Haldol given  Pt initiated on 4 point soft restraints at 0608  1:1 remains at bedside for safety

## 2022-08-29 NOTE — ASSESSMENT & PLAN NOTE
Lab Results   Component Value Date    HGBA1C 8 1 (H) 08/09/2022       Recent Labs     08/28/22  1033 08/28/22  1603 08/28/22 2054 08/29/22  0526   POCGLU 270* 270* 144* 208*       Blood Sugar Average: Last 72 hrs:  (P) 216 [id number]   · Patient was on metformin 1000 b i d   · Start Lantus 10 units q h s    · Continue sliding scale insulin with Accu-Cheks while inpatient  · Diabetic diet

## 2022-08-29 NOTE — PLAN OF CARE
Problem: Potential for Falls  Goal: Patient will remain free of falls  Description: INTERVENTIONS:  - Educate patient/family on patient safety including physical limitations  - Instruct patient to call for assistance with activity   - Consult OT/PT to assist with strengthening/mobility   - Keep Call bell within reach  - Keep bed low and locked with side rails adjusted as appropriate  - Keep care items and personal belongings within reach  - Initiate and maintain comfort rounds  - Make Fall Risk Sign visible to staff  - Offer Toileting every  Hours, in advance of need  - Initiate/Maintain bed/chair alarm  - Obtain necessary fall risk management equipment:   - Apply yellow socks and bracelet for high fall risk patients  - Consider moving patient to room near nurses station  Outcome: Progressing     Problem: MOBILITY - ADULT  Goal: Maintain or return to baseline ADL function  Description: INTERVENTIONS:  -  Assess patient's ability to carry out ADLs; assess patient's baseline for ADL function and identify physical deficits which impact ability to perform ADLs (bathing, care of mouth/teeth, toileting, grooming, dressing, etc )  - Assess/evaluate cause of self-care deficits   - Assess range of motion  - Assess patient's mobility; develop plan if impaired  - Assess patient's need for assistive devices and provide as appropriate  - Encourage maximum independence but intervene and supervise when necessary  - Involve family in performance of ADLs  - Assess for home care needs following discharge   - Consider OT consult to assist with ADL evaluation and planning for discharge  - Provide patient education as appropriate  Outcome: Progressing     Problem: DISCHARGE PLANNING  Goal: Discharge to home or other facility with appropriate resources  Description: INTERVENTIONS:  - Identify barriers to discharge w/patient and caregiver  - Arrange for needed discharge resources and transportation as appropriate  - Identify discharge learning needs (meds, wound care, etc )  - Arrange for interpretive services to assist at discharge as needed  - Refer to Case Management Department for coordinating discharge planning if the patient needs post-hospital services based on physician/advanced practitioner order or complex needs related to functional status, cognitive ability, or social support system  Outcome: Progressing     Problem: Knowledge Deficit  Goal: Patient/family/caregiver demonstrates understanding of disease process, treatment plan, medications, and discharge instructions  Description: Complete learning assessment and assess knowledge base    Interventions:  - Provide teaching at level of understanding  - Provide teaching via preferred learning methods  Outcome: Progressing

## 2022-08-29 NOTE — PROGRESS NOTES
[facility]  Progress Note - Quinten Hamilton  1933, 80 y o  male MRN: [id number]  Unit/Bed#: 7T [facility] 704-01 Encounter: [id number]  Primary Care Provider: Dwayne Styles DO   Date and time admitted to hospital: 8/27/2022  5:56 AM    * General weakness  Assessment & Plan  · Patient states he lives at home alone and has been having difficulty with ambulation  · No recent traumatic events  · Laboratory analysis grossly unremarkable  · Hemodynamically stable saturating well on room air  · Case management consultation for safe disposition planning  · PT/OT evaluation and treatment  · Patient would prefer home health care    Confusion  Assessment & Plan  · Patient seems to have episode of confusion  · Attempted to call the family - Bettyedward Hatch to check the history of dementia - there was no answer  Left a voicemail  · Continue 1 on 1 supervision  Unwitnessed fall  Assessment & Plan  · Patient had unwitnessed fall on 08/28/2022  · CT head was negative for acute intracranial abnormality  Mild chronic microangiopathic changes     · Continue to monitor    Depression with anxiety  Assessment & Plan  · Patient has appeared somewhat anxious about his health although he is extremely well-appearing at this time  · Family states there may be an element of depression possibly secondary to dementia  · Start Lexapro 5 mg oral daily  · Ativan 0 5 mg oral daily at bedtime    Stage 3a chronic kidney disease [facility])  Assessment & Plan  Lab Results   Component Value Date    EGFR 46 08/29/2022    EGFR 54 08/28/2022    EGFR 47 08/27/2022    CREATININE 1 34 08/29/2022    CREATININE 1 18 08/28/2022    CREATININE 1 32 08/27/2022   · Creatinine appears to be at baseline  · Trend BMP  · Avoid hypotension and nephrotoxic agents    Atrial fibrillation (HCC)  Assessment & Plan  · Rates are controlled  · Continue home Eliquis 2 5 mg PO BID    Hyperlipidemia  Assessment & Plan  · Continue home statin therapy    Type 2 diabetes mellitus with hyperglycemia [facility])  Assessment & Plan  Lab Results   Component Value Date    HGBA1C 8 1 (H) 2022       Recent Labs     22  1033 22  1603 224 22  0526   POCGLU 270* 270* 144* 208*       Blood Sugar Average: Last 72 hrs:  (P) 216 [id number]   · Patient was on metformin 1000 b i d   · Start Lantus 10 units q h s  · Continue sliding scale insulin with Accu-Cheks while inpatient  · Diabetic diet    Primary hypertension  Assessment & Plan  · Improving  · Blood pressures elevated on presentation  · Continue home beta-blocker for atrial fibrillation  · Hydralazine 5 mg IV q6 hours PRN SBP > 180 mmHg  · Monitor blood pressures      VTE Pharmacologic Prophylaxis:   Pharmacologic: Apixaban (Eliquis)  Mechanical VTE Prophylaxis in Place: Yes    Patient Centered Rounds: I have performed bedside rounds with nursing staff today  Discussions with Specialists or Other Care Team Provider:  Discussed with Case Management, nurse    Education and Discussions with Family / Patient:  Discussed with patient, attempted to call the relative Manny Phelan, no answer  Left a voicemail  Time Spent for Care: 30 minutes  More than 50% of total time spent on counseling and coordination of care as described above  Current Length of Stay: 2 day(s)    Current Patient Status: Inpatient   Certification Statement: The patient will continue to require additional inpatient hospital stay due to Inability to take care of self at home    Discharge Plan / Estimated Discharge Date:  Pending      Code Status: Level 1 - Full Code      Subjective:   Patient was seen and examined at bedside  The patient is confused  Patient had agitation episode overnight      Objective:     Vitals:   Temp (24hrs), Av 1 °F (36 7 °C), Min:98 °F (36 7 °C), Max:98 3 °F (36 8 °C)    Temp:  [98 °F (36 7 °C)-98 3 °F (36 8 °C)] 98 1 °F (36 7 °C)  HR:  [] 107  Resp:  [18-20] 18  BP: ()/(78-96) 172/90  SpO2:  [95 %-97 %] 97 %  Body mass index is 18 57 kg/m²  Input and Output Summary (last 24 hours): Intake/Output Summary (Last 24 hours) at 8/29/2022 1201  Last data filed at 8/28/2022 1638  Gross per 24 hour   Intake 240 ml   Output --   Net 240 ml       Physical Exam:     Physical Exam  General: breathing well on room air, no acute distress  HEENT: NC/AT, PERRL, EOM - normal  Neck: Supple  Pulm/Chest: Normal chest wall expansion, clear breath sounds on both side, no wheezing/rhonchi or crackles appreciated  CVS: RRR, normal S1&S2, no murmur appreciated, capillary refill <2s  Abd: soft, non tender, non distended, bowel sounds +  MSK: move all 4 extremities spontaneously  Skin: warm  CNS: no acute focal neuro deficit      Additional Data:     Labs:    Results from last 7 days   Lab Units 08/29/22  0435   WBC Thousand/uL 9 28   HEMOGLOBIN g/dL 10 6*   HEMATOCRIT % 32 7*   PLATELETS Thousands/uL 145*   NEUTROS PCT % 64   LYMPHS PCT % 18   MONOS PCT % 16*   EOS PCT % 2     Results from last 7 days   Lab Units 08/29/22  0435 08/28/22  0451 08/27/22  0628   POTASSIUM mmol/L 4 2   < > 4 6   CHLORIDE mmol/L 101   < > 104   CO2 mmol/L 30   < > 28   BUN mg/dL 27*   < > 36*   CREATININE mg/dL 1 34   < > 1 32   CALCIUM mg/dL 8 4   < > 9 1   ALK PHOS U/L  --   --  56   ALT U/L  --   --  21   AST U/L  --   --  29    < > = values in this interval not displayed  * I Have Reviewed All Lab Data Listed Above  * Additional Pertinent Lab Tests Reviewed: [facility] 66 Admission Reviewed    Imaging:      I have reviewed pertinent imaging        Recent Cultures (last 7 days):           Last 24 Hours Medication List:   Current Facility-Administered Medications   Medication Dose Route Frequency Provider Last Rate    acetaminophen  650 mg Oral Q4H PRN Othelia Peaks, DO      apixaban  2 5 mg Oral BID Othelia Peaks, DO      escitalopram  5 mg Oral Daily Othelia Peaks, DO  haloperidol lactate  1 mg Intramuscular Q6H PRN Marvel Hatch, DO      hydrALAZINE  5 mg Intravenous Q6H PRN Marvel Hatch, DO      insulin glargine  10 Units Subcutaneous HS Lachelle Quezada MD      insulin lispro  1-5 Units Subcutaneous TID AC Marvel Hatch, DO      insulin lispro  1-5 Units Subcutaneous HS Marvel Hatch, DO      metoprolol succinate  50 mg Oral BID Marvel Hatch, DO      ondansetron  4 mg Intravenous Q6H PRN Marvel Hatch, DO      pantoprazole  20 mg Oral Daily Marvel Hatch, DO      pravastatin  80 mg Oral Daily With Dinner Marvel Hatch, DO          Today, Patient Was Seen By: Lachelle Quezada MD    ** Please Note: Dragon 360 Dictation voice to text software may have been used in the creation of this document   **

## 2022-08-29 NOTE — ASSESSMENT & PLAN NOTE
· Patient had unwitnessed fall on 08/28/2022  · CT head was negative for acute intracranial abnormality  Mild chronic microangiopathic changes     · Continue to monitor

## 2022-08-29 NOTE — PLAN OF CARE
Problem: MOBILITY - ADULT  Goal: Maintain or return to baseline ADL function  Description: INTERVENTIONS:  -  Assess patient's ability to carry out ADLs; assess patient's baseline for ADL function and identify physical deficits which impact ability to perform ADLs (bathing, care of mouth/teeth, toileting, grooming, dressing, etc )  - Assess/evaluate cause of self-care deficits   - Assess range of motion  - Assess patient's mobility; develop plan if impaired  - Assess patient's need for assistive devices and provide as appropriate  - Encourage maximum independence but intervene and supervise when necessary  - Involve family in performance of ADLs  - Assess for home care needs following discharge   - Consider OT consult to assist with ADL evaluation and planning for discharge  - Provide patient education as appropriate  Outcome: Progressing  Goal: Maintains/Returns to pre admission functional level  Description: INTERVENTIONS:  - Perform BMAT or MOVE assessment daily    - Set and communicate daily mobility goal to care team and patient/family/caregiver     - Collaborate with rehabilitation services on mobility goals if consulted  - Ambulate patient 2 times a day  - Out of bed to chair 3 times a day   - Out of bed for meals 3 times a day  - Out of bed for toileting  - Record patient progress and toleration of activity level   Outcome: Progressing     Problem: PAIN - ADULT  Goal: Verbalizes/displays adequate comfort level or baseline comfort level  Description: Interventions:  - Encourage patient to monitor pain and request assistance  - Assess pain using appropriate pain scale  - Administer analgesics based on type and severity of pain and evaluate response  - Implement non-pharmacological measures as appropriate and evaluate response  - Consider cultural and social influences on pain and pain management  - Notify physician/advanced practitioner if interventions unsuccessful or patient reports new pain  Outcome: Progressing     Problem: INFECTION - ADULT  Goal: Absence or prevention of progression during hospitalization  Description: INTERVENTIONS:  - Assess and monitor for signs and symptoms of infection  - Monitor lab/diagnostic results  - Monitor all insertion sites  - Administer medications as ordered  - Instruct and encourage patient and family to use good hand hygiene technique  - Identify and instruct in appropriate isolation precautions for identified infection/condition  Outcome: Progressing  Goal: Absence of fever/infection during neutropenic period  Description: INTERVENTIONS:  - Monitor WBC    Outcome: Progressing     Problem: SAFETY ADULT  Goal: Maintain or return to baseline ADL function  Description: INTERVENTIONS:  -  Assess patient's ability to carry out ADLs; assess patient's baseline for ADL function and identify physical deficits which impact ability to perform ADLs (bathing, care of mouth/teeth, toileting, grooming, dressing, etc )  - Assess/evaluate cause of self-care deficits   - Assess range of motion  - Assess patient's mobility; develop plan if impaired  - Assess patient's need for assistive devices and provide as appropriate  - Encourage maximum independence but intervene and supervise when necessary  - Involve family in performance of ADLs  - Assess for home care needs following discharge   - Consider OT consult to assist with ADL evaluation and planning for discharge  - Provide patient education as appropriate  Outcome: Progressing  Goal: Maintains/Returns to pre admission functional level  Description: INTERVENTIONS:  - Perform BMAT or MOVE assessment daily    - Set and communicate daily mobility goal to care team and patient/family/caregiver     - Collaborate with rehabilitation services on mobility goals if consulted  - Ambulate patient 2 times a day  - Out of bed to chair 3 times a day   - Out of bed for meals 3 times a day  - Out of bed for toileting  - Record patient progress and toleration of activity level   Outcome: Progressing     Problem: DISCHARGE PLANNING  Goal: Discharge to home or other facility with appropriate resources  Description: INTERVENTIONS:  - Identify barriers to discharge w/patient and caregiver  - Arrange for needed discharge resources and transportation as appropriate  - Identify discharge learning needs (meds, wound care, etc )  - Arrange for interpretive services to assist at discharge as needed  - Refer to Case Management Department for coordinating discharge planning if the patient needs post-hospital services based on physician/advanced practitioner order or complex needs related to functional status, cognitive ability, or social support system  Outcome: Progressing     Problem: Knowledge Deficit  Goal: Patient/family/caregiver demonstrates understanding of disease process, treatment plan, medications, and discharge instructions  Description: Complete learning assessment and assess knowledge base    Interventions:  - Provide teaching at level of understanding  - Provide teaching via preferred learning methods  Outcome: Progressing     Problem: Prexisting or High Potential for Compromised Skin Integrity  Goal: Skin integrity is maintained or improved  Description: INTERVENTIONS:  - Identify patients at risk for skin breakdown  - Assess and monitor skin integrity  - Assess and monitor nutrition and hydration status  - Monitor labs   - Assess for incontinence   - Turn and reposition patient  - Assist with mobility/ambulation  - Relieve pressure over bony prominences  - Avoid friction and shearing  - Provide appropriate hygiene as needed including keeping skin clean and dry  - Evaluate need for skin moisturizer/barrier cream  - Collaborate with interdisciplinary team   - Patient/family teaching  - Consider wound care consult   Outcome: Progressing     Problem: SAFETY,RESTRAINT: NV/NON-SELF DESTRUCTIVE BEHAVIOR  Goal: Remains free of harm/injury (restraint for non violent/non self-detsructive behavior)  Description: INTERVENTIONS:  - Instruct patient/family regarding restraint use   - Assess and monitor physiologic and psychological status   - Provide interventions and comfort measures to meet assessed patient needs   - Identify and implement measures to help patient regain control  - Assess readiness for release of restraint   Outcome: Progressing  Goal: Returns to optimal restraint-free functioning  Description: INTERVENTIONS:  - Assess the patient's behavior and symptoms that indicate continued need for restraint  - Identify and implement measures to help patient regain control  - Assess readiness for release of restraint   Outcome: Progressing     Problem: Nutrition/Hydration-ADULT  Goal: Nutrient/Hydration intake appropriate for improving, restoring or maintaining nutritional needs  Description: Monitor and assess patient's nutrition/hydration status for malnutrition  Collaborate with interdisciplinary team and initiate plan and interventions as ordered  Monitor patient's weight and dietary intake as ordered or per policy  Utilize nutrition screening tool and intervene as necessary  Determine patient's food preferences and provide high-protein, high-caloric foods as appropriate       INTERVENTIONS:  - Monitor oral intake, urinary output, labs, and treatment plans  - Assess nutrition and hydration status and recommend course of action  - Evaluate amount of meals eaten  - Assist patient with eating if necessary   - Allow adequate time for meals  - Recommend/ encourage appropriate diets, oral nutritional supplements, and vitamin/mineral supplements  - Order, calculate, and assess calorie counts as needed  - Recommend, monitor, and adjust tube feedings and TPN/PPN based on assessed needs  - Assess need for intravenous fluids  - Provide specific nutrition/hydration education as appropriate  - Include patient/family/caregiver in decisions related to nutrition  Outcome: Progressing Problem: CONFUSION/THOUGHT DISTURBANCE  Goal: Thought disturbances (confusion, delirium, depression, dementia or psychosis) are managed to maintain or return to baseline mental status and functional level  Description: INTERVENTIONS:  - Assess for possible contributors to  thought disturbance, including but not limited to medications, infection, impaired vision or hearing, underlying metabolic abnormalities, dehydration, respiratory compromise,  psychiatric diagnoses and notify attending PHYSICAN/AP  - Monitor and intervene to maintain adequate nutrition, hydration, elimination, sleep and activity  - Decrease environmental stimuli, including noise as appropriate  - Provide frequent contacts to provide refocusing, direction and reassurance as needed  Approach patient calmly with eye contact and at their level  - [place] high risk fall precautions, aspiration precautions and other safety measures, as indicated  - If delirium suspected, notify physician/AP of change in condition and request immediate in-person evaluation  - Pursue consults as appropriate including Geriatric (campus dependent), OT for cognitive evaluation/activity planning, psychiatric, pastoral care, etc   Outcome: Progressing     Problem: BEHAVIOR  Goal: Pt/Family maintain appropriate behavior and adhere to behavioral management agreement, if implemented  Description: INTERVENTIONS:  - Assess the family dynamic   - Encourage verbalization of thoughts and concerns in a socially appropriate manner  - Assess patient/family's coping skills and non-compliant behavior (including use of illegal substances)  - Utilize positive, consistent limit setting strategies supporting safety of patient, staff and others  - Initiate consult with Case Management, Spiritual Care or other ancillary services as appropriate  - If a patient's/visitor's behavior jeopardizes the safety of the patient, staff, or others, refer to organization procedure     - Notify Security of behavior or suspected illegal substances which indicate the need for search of the patient and/or belongings  - Encourage participation in the decision making process about a behavioral management agreement; implement if patient meets criteria  Outcome: Progressing

## 2022-08-29 NOTE — CONSULTS
Consultation - Neuropsychology/Psychology Department  Maryann Avila  80 y o  male MRN: [id number]  Unit/Bed#: 7T [facility] 704-01 Encounter: [id number]        Reason for Consultation:  Maryann Avila  is a 80y o  year old male who was referred for a Neuropsychological Exam to assess cognitive functioning and comment on capacity to make informed medical decisions  History of Present Illness  Combative, pulled out lines, confusion    Physician Requesting Consult: Dwaine Henriquez MD    PROBLEM LIST:  Patient Active Problem List   Diagnosis    Primary hypertension    Type 2 diabetes mellitus with hyperglycemia ([facility] [address] )    Hyperlipidemia    Atrial fibrillation (HCC)    Inguinal hernia    Anemia    Onychomycosis    Hematemesis    Gross hematuria    Thrombocytopenia (HCC)    Stage 3a chronic kidney disease ([facility] [address] )    General weakness    Depression with anxiety    Unwitnessed fall    Confusion         Historical Information   History reviewed  No pertinent past medical history    Past Surgical History:   Procedure Laterality Date    CATARACT EXTRACTION      HERNIA REPAIR  1985    HERNIA REPAIR       Social History   Social History     Substance and Sexual Activity   Alcohol Use Yes    Comment: Social      Social History     Substance and Sexual Activity   Drug Use Never     Social History     Tobacco Use   Smoking Status Former Smoker    Quit date: 5    Years since quittin 6   Smokeless Tobacco Never Used     Family History:   Family History   Problem Relation Age of Onset    Stroke Mother     Stroke Father     Atrial fibrillation Sister        Meds/Allergies   current meds:   Current Facility-Administered Medications   Medication Dose Route Frequency    acetaminophen (TYLENOL) tablet 650 mg  650 mg Oral Q4H PRN    apixaban (ELIQUIS) tablet 2 5 mg  2 5 mg Oral BID    escitalopram (LEXAPRO) tablet 5 mg  5 mg Oral Daily    haloperidol lactate (HALDOL) injection 1 mg  1 mg Intramuscular Q6H PRN    hydrALAZINE (APRESOLINE) injection 5 mg  5 mg Intravenous Q6H PRN    insulin glargine (LANTUS) subcutaneous injection 10 Units 0 1 mL  10 Units Subcutaneous HS    insulin lispro (HumaLOG) 100 units/mL subcutaneous injection 1-5 Units  1-5 Units Subcutaneous TID AC    insulin lispro (HumaLOG) 100 units/mL subcutaneous injection 1-5 Units  1-5 Units Subcutaneous HS    metoprolol succinate (TOPROL-XL) 24 hr tablet 50 mg  50 mg Oral BID    ondansetron (ZOFRAN) injection 4 mg  4 mg Intravenous Q6H PRN    pantoprazole (PROTONIX) EC tablet 20 mg  20 mg Oral Daily    pravastatin (PRAVACHOL) tablet 80 mg  80 mg Oral Daily With Dinner       Allergies   Allergen Reactions    Penicillin G          Family and Social Support:   No data recorded    Behavioral Observations: Alert, oriented x 3, cooperative; affect appeared appropriate to content and patient denied depressed mood and anxiety; patient was unable to state reason for hospitalization, medical history and does not know what he is being treated for in hospital' patient reported he is able to return home today and care for self; states he requires no assistance  Cognitive Examination    General Cognitive Functioning MMSE = Deficits in recall and working memory; Attention/Concentration Auditory Selective Attention = Borderline; Auditory Vigilance = Within Normal Limits; Information Processing Speed = Within Normal Limits    Frontal Systems/Executive Functioning Mental Flexibility/Cognitive Control = Within Normal Limits; Working Memory = Impaired Abstract Reasoning = Severely Impaired; Generative Ability = Average, Commonsense Reasoning and Judgement = Low Average    Language Functioning Confrontation naming = Within Normal Limits, Phonemic Fluency = Average; Semantic Retrieval = Average; Comprehension of Complex Ideational Material = Impaired;    Memory Functioning Narrative Recall - Short Delay = Borderline;  Long Delay Narrative Recall = Impaired; Three word recall = Impaired    Visuo-Spatial Abilities Not Assessed    Functional Knowledge  Health & Safety Knowledge = Impaired;     Summary/Impression:  Results of Neuropsychological Exam revealed diffuse cognitive dysfunction and on a measure assessing awareness of personal health status and ability to evaluate health problems, handle medical emergencies and take safety precautions, patient performed in the IMPAIRED range of functioning  At this time, patient does not appear to have capacity to make fully informed medical decisions  Unspecified Neurocognitive Disorder

## 2022-08-29 NOTE — PLAN OF CARE

## 2022-08-29 NOTE — MALNUTRITION/BMI
This medical record reflects one or more clinical indicators suggestive of malnutrition and/or morbid obesity  Malnutrition Findings:   Adult Malnutrition type: Chronic illness  Adult Degree of Malnutrition: Other severe protein calorie malnutrition  Malnutrition Characteristics: Inadequate energy, Weight loss                  360 Statement: Severe pro/singh malnutrition r/t depression as evidenced by 12% unplanned weight loss since 2/24/22, consuming < 75% energy intake compared to estimated energy needs > 1 month  Treated with Enlive bid    BMI Findings: Body mass index is 18 57 kg/m²  See Nutrition note dated 8/29/22 for additional details  Completed nutrition assessment is viewable in the nutrition documentation

## 2022-08-29 NOTE — PLAN OF CARE
Problem: CONFUSION/THOUGHT DISTURBANCE  Goal: Thought disturbances (confusion, delirium, depression, dementia or psychosis) are managed to maintain or return to baseline mental status and functional level  Description: INTERVENTIONS:  - Assess for possible contributors to  thought disturbance, including but not limited to medications, infection, impaired vision or hearing, underlying metabolic abnormalities, dehydration, respiratory compromise,  psychiatric diagnoses and notify attending PHYSICAN/AP  - Monitor and intervene to maintain adequate nutrition, hydration, elimination, sleep and activity  - Decrease environmental stimuli, including noise as appropriate  - Provide frequent contacts to provide refocusing, direction and reassurance as needed  Approach patient calmly with eye contact and at their level  - [place] high risk fall precautions, aspiration precautions and other safety measures, as indicated  - If delirium suspected, notify physician/AP of change in condition and request immediate in-person evaluation  - Pursue consults as appropriate including Geriatric (campus dependent), OT for cognitive evaluation/activity planning, psychiatric, pastoral care, etc   Outcome: Progressing     Problem: BEHAVIOR  Goal: Pt/Family maintain appropriate behavior and adhere to behavioral management agreement, if implemented  Description: INTERVENTIONS:  - Assess the family dynamic   - Encourage verbalization of thoughts and concerns in a socially appropriate manner  - Assess patient/family's coping skills and non-compliant behavior (including use of illegal substances)  - Utilize positive, consistent limit setting strategies supporting safety of patient, staff and others  - Initiate consult with Case Management, Spiritual Care or other ancillary services as appropriate  - If a patient's/visitor's behavior jeopardizes the safety of the patient, staff, or others, refer to organization procedure     - Notify Security of behavior or suspected illegal substances which indicate the need for search of the patient and/or belongings  - Encourage participation in the decision making process about a behavioral management agreement; implement if patient meets criteria  Outcome: Progressing

## 2022-08-29 NOTE — ASSESSMENT & PLAN NOTE
Lab Results   Component Value Date    EGFR 46 08/29/2022    EGFR 54 08/28/2022    EGFR 47 08/27/2022    CREATININE 1 34 08/29/2022    CREATININE 1 18 08/28/2022    CREATININE 1 32 08/27/2022   · Creatinine appears to be at baseline  · Trend BMP  · Avoid hypotension and nephrotoxic agents

## 2022-08-30 ENCOUNTER — TELEPHONE (OUTPATIENT)
Dept: INTERNAL MEDICINE CLINIC | Facility: CLINIC | Age: 87
End: 2022-08-30

## 2022-08-30 PROBLEM — E43 SEVERE PROTEIN-CALORIE MALNUTRITION (HCC): Status: ACTIVE | Noted: 2022-08-30

## 2022-08-30 LAB
ANION GAP SERPL CALCULATED.3IONS-SCNC: 8 MMOL/L (ref 5–14)
BASOPHILS # BLD AUTO: 0.05 THOUSANDS/ΜL (ref 0–0.1)
BASOPHILS NFR BLD AUTO: 1 % (ref 0–1)
BUN SERPL-MCNC: 28 MG/DL (ref 5–25)
CALCIUM SERPL-MCNC: 8.5 MG/DL (ref 8.4–10.2)
CHLORIDE SERPL-SCNC: 103 MMOL/L (ref 96–108)
CO2 SERPL-SCNC: 28 MMOL/L (ref 21–32)
CREAT SERPL-MCNC: 1.24 MG/DL (ref 0.7–1.5)
EOSINOPHIL # BLD AUTO: 0.22 THOUSAND/ΜL (ref 0–0.61)
EOSINOPHIL NFR BLD AUTO: 2 % (ref 0–6)
ERYTHROCYTE [DISTWIDTH] IN BLOOD BY AUTOMATED COUNT: 12.5 % (ref 11.6–15.1)
GFR SERPL CREATININE-BSD FRML MDRD: 51 ML/MIN/1.73SQ M
GLUCOSE SERPL-MCNC: 120 MG/DL (ref 70–99)
GLUCOSE SERPL-MCNC: 164 MG/DL (ref 65–140)
GLUCOSE SERPL-MCNC: 295 MG/DL (ref 65–140)
GLUCOSE SERPL-MCNC: 299 MG/DL (ref 65–140)
GLUCOSE SERPL-MCNC: 323 MG/DL (ref 65–140)
HCT VFR BLD AUTO: 35.7 % (ref 36.5–49.3)
HGB BLD-MCNC: 11.9 G/DL (ref 12–17)
IMM GRANULOCYTES # BLD AUTO: 0.05 THOUSAND/UL (ref 0–0.2)
IMM GRANULOCYTES NFR BLD AUTO: 1 % (ref 0–2)
LYMPHOCYTES # BLD AUTO: 1.64 THOUSANDS/ΜL (ref 0.6–4.47)
LYMPHOCYTES NFR BLD AUTO: 15 % (ref 14–44)
MCH RBC QN AUTO: 34.2 PG (ref 26.8–34.3)
MCHC RBC AUTO-ENTMCNC: 33.3 G/DL (ref 31.4–37.4)
MCV RBC AUTO: 103 FL (ref 82–98)
MONOCYTES # BLD AUTO: 1.82 THOUSAND/ΜL (ref 0.17–1.22)
MONOCYTES NFR BLD AUTO: 17 % (ref 4–12)
NEUTROPHILS # BLD AUTO: 7.11 THOUSANDS/ΜL (ref 1.85–7.62)
NEUTS SEG NFR BLD AUTO: 64 % (ref 43–75)
NRBC BLD AUTO-RTO: 0 /100 WBCS
PLATELET # BLD AUTO: 154 THOUSANDS/UL (ref 149–390)
PMV BLD AUTO: 11.3 FL (ref 8.9–12.7)
POTASSIUM SERPL-SCNC: 3.8 MMOL/L (ref 3.5–5.3)
RBC # BLD AUTO: 3.48 MILLION/UL (ref 3.88–5.62)
SODIUM SERPL-SCNC: 139 MMOL/L (ref 135–147)
WBC # BLD AUTO: 10.89 THOUSAND/UL (ref 4.31–10.16)

## 2022-08-30 PROCEDURE — 82948 REAGENT STRIP/BLOOD GLUCOSE: CPT

## 2022-08-30 PROCEDURE — 80048 BASIC METABOLIC PNL TOTAL CA: CPT | Performed by: INTERNAL MEDICINE

## 2022-08-30 PROCEDURE — 99232 SBSQ HOSP IP/OBS MODERATE 35: CPT | Performed by: INTERNAL MEDICINE

## 2022-08-30 PROCEDURE — 85025 COMPLETE CBC W/AUTO DIFF WBC: CPT | Performed by: INTERNAL MEDICINE

## 2022-08-30 RX ADMIN — METOPROLOL SUCCINATE 50 MG: 50 TABLET, EXTENDED RELEASE ORAL at 09:25

## 2022-08-30 RX ADMIN — PRAVASTATIN SODIUM 80 MG: 40 TABLET ORAL at 16:34

## 2022-08-30 RX ADMIN — ESCITALOPRAM OXALATE 5 MG: 5 TABLET, FILM COATED ORAL at 09:26

## 2022-08-30 RX ADMIN — APIXABAN 2.5 MG: 2.5 TABLET, FILM COATED ORAL at 09:25

## 2022-08-30 RX ADMIN — INSULIN LISPRO 1 UNITS: 100 INJECTION, SOLUTION INTRAVENOUS; SUBCUTANEOUS at 06:07

## 2022-08-30 RX ADMIN — INSULIN LISPRO 3 UNITS: 100 INJECTION, SOLUTION INTRAVENOUS; SUBCUTANEOUS at 21:11

## 2022-08-30 RX ADMIN — METOPROLOL SUCCINATE 50 MG: 50 TABLET, EXTENDED RELEASE ORAL at 21:10

## 2022-08-30 RX ADMIN — INSULIN LISPRO 3 UNITS: 100 INJECTION, SOLUTION INTRAVENOUS; SUBCUTANEOUS at 11:39

## 2022-08-30 RX ADMIN — PANTOPRAZOLE SODIUM 20 MG: 20 TABLET, DELAYED RELEASE ORAL at 09:25

## 2022-08-30 RX ADMIN — INSULIN LISPRO 3 UNITS: 100 INJECTION, SOLUTION INTRAVENOUS; SUBCUTANEOUS at 16:34

## 2022-08-30 RX ADMIN — APIXABAN 2.5 MG: 2.5 TABLET, FILM COATED ORAL at 18:18

## 2022-08-30 RX ADMIN — INSULIN GLARGINE 10 UNITS: 100 INJECTION, SOLUTION SUBCUTANEOUS at 21:11

## 2022-08-30 NOTE — ASSESSMENT & PLAN NOTE
· BP acceptable  · Blood pressures elevated on presentation  · Continue home beta-blocker for atrial fibrillation  · Hydralazine 5 mg IV q6 hours PRN SBP > 180 mmHg  · Monitor blood pressures

## 2022-08-30 NOTE — NURSING NOTE
This writer assume patient care for 4 hours  Patient continue to be 2 point soft restrains  1:1 remains at bedside for safety

## 2022-08-30 NOTE — PLAN OF CARE
Problem: SAFETY,RESTRAINT: NV/NON-SELF DESTRUCTIVE BEHAVIOR  Goal: Remains free of harm/injury (restraint for non violent/non self-detsructive behavior)  Description: INTERVENTIONS:  - Instruct patient/family regarding restraint use   - Assess and monitor physiologic and psychological status   - Provide interventions and comfort measures to meet assessed patient needs   - Identify and implement measures to help patient regain control  - Assess readiness for release of restraint   Outcome: Progressing     Problem: SAFETY,RESTRAINT: NV/NON-SELF DESTRUCTIVE BEHAVIOR  Goal: Returns to optimal restraint-free functioning  Description: INTERVENTIONS:  - Assess the patient's behavior and symptoms that indicate continued need for restraint  - Identify and implement measures to help patient regain control  - Assess readiness for release of restraint   Outcome: Progressing     Problem: Nutrition/Hydration-ADULT  Goal: Nutrient/Hydration intake appropriate for improving, restoring or maintaining nutritional needs  Description: Monitor and assess patient's nutrition/hydration status for malnutrition  Collaborate with interdisciplinary team and initiate plan and interventions as ordered  Monitor patient's weight and dietary intake as ordered or per policy  Utilize nutrition screening tool and intervene as necessary  Determine patient's food preferences and provide high-protein, high-caloric foods as appropriate       INTERVENTIONS:  - Monitor oral intake, urinary output, labs, and treatment plans  - Assess nutrition and hydration status and recommend course of action  - Evaluate amount of meals eaten  - Assist patient with eating if necessary   - Allow adequate time for meals  - Recommend/ encourage appropriate diets, oral nutritional supplements, and vitamin/mineral supplements  - Order, calculate, and assess calorie counts as needed  - Recommend, monitor, and adjust tube feedings and TPN/PPN based on assessed needs  - Assess need for intravenous fluids  - Provide specific nutrition/hydration education as appropriate  - Include patient/family/caregiver in decisions related to nutrition  Outcome: Progressing     Problem: CONFUSION/THOUGHT DISTURBANCE  Goal: Thought disturbances (confusion, delirium, depression, dementia or psychosis) are managed to maintain or return to baseline mental status and functional level  Description: INTERVENTIONS:  - Assess for possible contributors to  thought disturbance, including but not limited to medications, infection, impaired vision or hearing, underlying metabolic abnormalities, dehydration, respiratory compromise,  psychiatric diagnoses and notify attending PHYSICAN/AP  - Monitor and intervene to maintain adequate nutrition, hydration, elimination, sleep and activity  - Decrease environmental stimuli, including noise as appropriate  - Provide frequent contacts to provide refocusing, direction and reassurance as needed  Approach patient calmly with eye contact and at their level    - [place] high risk fall precautions, aspiration precautions and other safety measures, as indicated  - If delirium suspected, notify physician/AP of change in condition and request immediate in-person evaluation  - Pursue consults as appropriate including Geriatric (campus dependent), OT for cognitive evaluation/activity planning, psychiatric, pastoral care, etc   Outcome: Progressing

## 2022-08-30 NOTE — ASSESSMENT & PLAN NOTE
· Patient states he lives at home alone and has been having difficulty with ambulation  · No recent traumatic events  · Laboratory analysis grossly unremarkable  · Hemodynamically stable saturating well on room air  · Case management consultation for safe disposition planning  · Patient is medically clear  · PT/OT recommended post acute rehab services  · Patient and the family would prefer home health care with 24/7 care

## 2022-08-30 NOTE — ASSESSMENT & PLAN NOTE
· Patient seems to have episode of confusion  · Discussed with the Roger Brittle and Kristin Butler- patient does not have official diagnosis of dementia before  However it seems to be an element of advanced dementia  · Neuro psychiatrist was consulted - At this time, patient does not appear to have capacity to make fully informed medical decisions  Unspecified Neurocognitive Disorder  ·  Continue 1 on 1 supervision

## 2022-08-30 NOTE — CASE MANAGEMENT
Case Management Progress Note    Patient name Saurabh Tovar  Location 7[facility] 704/7[facility] 704-01 MRN [id number]  : 1933 Date 2022       LOS (days): 3  Geometric Mean LOS (GMLOS) (days): 3 50  Days to GMLOS:0 1        OBJECTIVE:        Current admission status: Inpatient  Preferred Pharmacy:   [facility] - [facility] Masson, [address] [place] [id number]  Phone: [phone] Fax: [phone]    [facility] [address] [facility] - [address]  [address] [address]  Phone: [phone] Fax: [phone]    Primary Care Provider: Dwayne Garcia DO    Primary Insurance: MEDICARE  Secondary Insurance: [address]    PROGRESS NOTE: CM met with Pt, Pt's Niece Mary Jo Meredith, her  Halie Ray and  at [facility] ph: [phone]  CM was notified that pt has assigned POA who are Tari Moreno ( Niece),Magan BETANCOURT (Nephew) and Rosalie Anthony [facility]) but they were not able to find the paperwork for the POA  Garland Kramer stated that he will try to arrange pt's Niece and nephew as the Emergency guardianship  Pt's discharge plan is to return back to his previous environment with 24hrs caregiver support  During the meeting family stated that pt is able to afford private care givers  They will call agencies to arrange private caregivers for the patient  CM gave family resources of private caregivers phone number to call     CM department will continue to follow through pt's D/C

## 2022-08-30 NOTE — PROGRESS NOTES
[facility]  Progress Note - Hao Dials  1933, 80 y o  male MRN: [id number]  Unit/Bed#: [facility] Encounter: [id number]  Primary Care Provider: Quinn TIM,    Date and time admitted to hospital: 8/27/2022  5:56 AM    * General weakness  Assessment & Plan  · Patient states he lives at home alone and has been having difficulty with ambulation  · No recent traumatic events  · Laboratory analysis grossly unremarkable  · Hemodynamically stable saturating well on room air  · Case management consultation for safe disposition planning  · Patient is medically clear  · PT/OT recommended post acute rehab services  · Patient and the family would prefer home health care with 24/7 care    Severe protein-calorie malnutrition ([address] )  Assessment & Plan  Malnutrition Findings:   Adult Malnutrition type: Chronic illness  Adult Degree of Malnutrition: Other severe protein calorie malnutrition  Malnutrition Characteristics: Inadequate energy, Weight loss          continue nutritional supplement         360 Statement: Severe pro/singh malnutrition r/t depression as evidenced by 12% unplanned weight loss since 2/24/22, consuming < 75% energy intake compared to estimated energy needs > 1 month  Treated with Enlive bid    BMI Findings: Body mass index is 18 57 kg/m²  Confusion  Assessment & Plan  · Patient seems to have episode of confusion  · Discussed with the Baldo Holter and Antionette Bridegroom- patient does not have official diagnosis of dementia before  However it seems to be an element of advanced dementia  · Neuro psychiatrist was consulted - At this time, patient does not appear to have capacity to make fully informed medical decisions  Unspecified Neurocognitive Disorder  ·  Continue 1 on 1 supervision  Unwitnessed fall  Assessment & Plan  · Patient had unwitnessed fall on 08/28/2022  · CT head was negative for acute intracranial abnormality   Mild chronic microangiopathic changes  · Continue to monitor    Depression with anxiety  Assessment & Plan  · Patient has appeared somewhat anxious about his health  · Family states there may be an element of depression possibly secondary to dementia  · Continue Lexapro 5 mg oral daily  · Ativan 0 5 mg oral daily at bedtime    Stage 3a chronic kidney disease [facility])  Assessment & Plan  Lab Results   Component Value Date    EGFR 51 08/30/2022    EGFR 46 08/29/2022    EGFR 54 08/28/2022    CREATININE 1 24 08/30/2022    CREATININE 1 34 08/29/2022    CREATININE 1 18 08/28/2022   · Creatinine appears to be at baseline  · Trend BMP  · Avoid hypotension and nephrotoxic agents    Atrial fibrillation (HCC)  Assessment & Plan  · Rates are controlled  · Continue home Eliquis 2 5 mg PO BID    Hyperlipidemia  Assessment & Plan  · Continue home statin therapy    Type 2 diabetes mellitus with hyperglycemia [facility])  Assessment & Plan  Lab Results   Component Value Date    HGBA1C 8 1 (H) 08/09/2022       Recent Labs     08/29/22  0526 08/29/22  1538 08/29/22 2032 08/30/22  0603   POCGLU 208* 283* 196* 164*       Blood Sugar Average: Last 72 hrs:  (P) 264 [id number]   · Patient was on metformin 1000 b i d   · Start Lantus 10 units q h s  · Continue sliding scale insulin with Accu-Cheks while inpatient  · Diabetic diet    Primary hypertension  Assessment & Plan  · BP acceptable  · Blood pressures elevated on presentation  · Continue home beta-blocker for atrial fibrillation  · Hydralazine 5 mg IV q6 hours PRN SBP > 180 mmHg  · Monitor blood pressures      VTE Pharmacologic Prophylaxis:   Pharmacologic: Apixaban (Eliquis)  Mechanical VTE Prophylaxis in Place: Yes    Patient Centered Rounds: I have performed bedside rounds with nursing staff today      Discussions with Specialists or Other Care Team Provider:  Discussed with , nurse    Education and Discussions with Family / Patient:  Discussed with Mahsa Murray and Mary Knowles Spent for Care: 45 minutes  More than 50% of total time spent on counseling and coordination of care as described above  Current Length of Stay: 3 day(s)    Current Patient Status: Inpatient   Certification Statement: The patient will continue to require additional inpatient hospital stay due to Pending disposition plan, patient is medically clear    Discharge Plan / Estimated Discharge Date:  Pending      Code Status: Level 1 - Full Code      Subjective:   Patient was seen and examined at bedside  The patient stated he feels weak  Patient denies fever, chills, cough, abdominal pain, nausea, vomiting  Patient stated he has been trying to eat and drink  He is orientated to his name, year and the president  Objective:     Vitals:   Temp (24hrs), Av 3 °F (36 8 °C), Min:97 8 °F (36 6 °C), Max:99 1 °F (37 3 °C)    Temp:  [97 8 °F (36 6 °C)-99 1 °F (37 3 °C)] 99 1 °F (37 3 °C)  HR:  [] 91  Resp:  [18-20] 18  BP: (127-151)/(81-99) 127/81  SpO2:  [92 %-96 %] 96 %  Body mass index is 18 57 kg/m²  Input and Output Summary (last 24 hours):        Intake/Output Summary (Last 24 hours) at 2022 1049  Last data filed at 2022 0218  Gross per 24 hour   Intake 520 ml   Output 600 ml   Net -80 ml       Physical Exam:     Physical Exam  General: breathing well on room air, no acute distress  HEENT: NC/AT, PERRL, EOM - normal  Neck: Supple  Pulm/Chest: Normal chest wall expansion, clear breath sounds on both side, no wheezing/rhonchi or crackles appreciated  CVS: RRR, normal S1&S2, no murmur appreciated, capillary refill <2s  Abd: soft, non tender, non distended, bowel sounds +  MSK: move all 4 extremities spontaneously  Skin: warm  CNS: no acute focal neuro deficit, he is oriented to his name, year and the president      Additional Data:     Labs:    Results from last 7 days   Lab Units 22  0524   WBC Thousand/uL 10 89*   HEMOGLOBIN g/dL 11 9*   HEMATOCRIT % 35 7*   PLATELETS Thousands/uL 154 NEUTROS PCT % 64   LYMPHS PCT % 15   MONOS PCT % 17*   EOS PCT % 2     Results from last 7 days   Lab Units 08/30/22  0524 08/28/22  0451 08/27/22  0628   POTASSIUM mmol/L 3 8   < > 4 6   CHLORIDE mmol/L 103   < > 104   CO2 mmol/L 28   < > 28   BUN mg/dL 28*   < > 36*   CREATININE mg/dL 1 24   < > 1 32   CALCIUM mg/dL 8 5   < > 9 1   ALK PHOS U/L  --   --  56   ALT U/L  --   --  21   AST U/L  --   --  29    < > = values in this interval not displayed  * I Have Reviewed All Lab Data Listed Above  * Additional Pertinent Lab Tests Reviewed: [address] Admission Reviewed    Imaging:      I have reviewed pertinent imaging  Recent Cultures (last 7 days):           Last 24 Hours Medication List:   Current Facility-Administered Medications   Medication Dose Route Frequency Provider Last Rate    acetaminophen  650 mg Oral Q4H PRN Rexie Hugh, DO      apixaban  2 5 mg Oral BID Rexie Hugh, DO      escitalopram  5 mg Oral Daily Rexie Hugh, DO      haloperidol lactate  1 mg Intramuscular Q6H PRN Rexie Hugh, DO      hydrALAZINE  5 mg Intravenous Q6H PRN Rexie Hugh, DO      insulin glargine  10 Units Subcutaneous HS Dwaine Henriquez MD      insulin lispro  1-5 Units Subcutaneous TID AC Rexie Hugh, DO      insulin lispro  1-5 Units Subcutaneous HS Rexie Hugh, DO      metoprolol succinate  50 mg Oral BID Rexie Hugh, DO      ondansetron  4 mg Intravenous Q6H PRN Rexie Hugh, DO      pantoprazole  20 mg Oral Daily Rexie Hugh, DO      pravastatin  80 mg Oral Daily With Dinner Rexie Hugh, DO          Today, Patient Was Seen By: Dwaine Henriquez MD    ** Please Note: Dragon 360 Dictation voice to text software may have been used in the creation of this document   **

## 2022-08-30 NOTE — ASSESSMENT & PLAN NOTE
Malnutrition Findings:   Adult Malnutrition type: Chronic illness  Adult Degree of Malnutrition: Other severe protein calorie malnutrition  Malnutrition Characteristics: Inadequate energy, Weight loss          continue nutritional supplement         360 Statement: Severe pro/singh malnutrition r/t depression as evidenced by 12% unplanned weight loss since 2/24/22, consuming < 75% energy intake compared to estimated energy needs > 1 month  Treated with Enlive bid    BMI Findings: Body mass index is 18 57 kg/m²

## 2022-08-30 NOTE — TELEPHONE ENCOUNTER
Patient's son, Krista Sinha [phone] called into the office wishing to speak with me to discuss his father's case  Patient is currently admitted at [address] [place] [address]  Since our appointment, he is apparently been displaying paranoid behavior at home, having premonition is of dying  He had been calling his son Diony Negron to come to the house quick and that there was an emergency and that he was dying, but not to call an ambulance  When Diony Negron arrived, the patient did not appear to be in any distress  This behavior is out of the ordinary for [place]  He is not aware of any history of dementia in his father    In briefly looking at hospital course, does not appear that there is any underlying metabolic issue or infection that would explain Dennis's behavior  It is possible this is a manifestation of underlying dementia  He has been evaluated by neuropsych and does not have capacity to make medical decisions  Diony Jamil and family will be meeting with  this afternoon    Will continue to follow and will provide any assistance as needed on outpatient basis    Diony Negron was thankful for the phone call, all questions answered

## 2022-08-30 NOTE — ASSESSMENT & PLAN NOTE
· Patient has appeared somewhat anxious about his health  · Family states there may be an element of depression possibly secondary to dementia  · Continue Lexapro 5 mg oral daily  · Ativan 0 5 mg oral daily at bedtime

## 2022-08-30 NOTE — ASSESSMENT & PLAN NOTE
Lab Results   Component Value Date    EGFR 51 08/30/2022    EGFR 46 08/29/2022    EGFR 54 08/28/2022    CREATININE 1 24 08/30/2022    CREATININE 1 34 08/29/2022    CREATININE 1 18 08/28/2022   · Creatinine appears to be at baseline  · Trend BMP  · Avoid hypotension and nephrotoxic agents

## 2022-08-30 NOTE — PLAN OF CARE
Problem: Potential for Falls  Goal: Patient will remain free of falls  Description: INTERVENTIONS:  - Educate patient/family on patient safety including physical limitations  - Instruct patient to call for assistance with activity   - Consult OT/PT to assist with strengthening/mobility   - Keep Call bell within reach  - Keep bed low and locked with side rails adjusted as appropriate  - Keep care items and personal belongings within reach  - Initiate and maintain comfort rounds  - Offer Toileting every 2 Hours, in advance of need  - Initiate/Maintain bed alarm  - Obtain necessary fall risk management equipment: walker  - Apply yellow socks and bracelet for high fall risk patients  - Consider moving patient to room near nurses station  Outcome: Progressing     Problem: MOBILITY - ADULT  Goal: Maintain or return to baseline ADL function  Description: INTERVENTIONS:  -  Assess patient's ability to carry out ADLs; assess patient's baseline for ADL function and identify physical deficits which impact ability to perform ADLs (bathing, care of mouth/teeth, toileting, grooming, dressing, etc )  - Assess/evaluate cause of self-care deficits   - Assess range of motion  - Assess patient's mobility; develop plan if impaired  - Assess patient's need for assistive devices and provide as appropriate  - Encourage maximum independence but intervene and supervise when necessary  - Involve family in performance of ADLs  - Assess for home care needs following discharge   - Consider OT consult to assist with ADL evaluation and planning for discharge  - Provide patient education as appropriate  Outcome: Progressing  Goal: Maintains/Returns to pre admission functional level  Description: INTERVENTIONS:  - Perform BMAT or MOVE assessment daily    - Set and communicate daily mobility goal to care team and patient/family/caregiver     - Collaborate with rehabilitation services on mobility goals if consulted  - Ambulate patient 2 times a day  - Out of bed to chair 3 times a day   - Out of bed for meals 3 times a day  - Out of bed for toileting  - Record patient progress and toleration of activity level   Outcome: Progressing     Problem: PAIN - ADULT  Goal: Verbalizes/displays adequate comfort level or baseline comfort level  Description: Interventions:  - Encourage patient to monitor pain and request assistance  - Assess pain using appropriate pain scale  - Administer analgesics based on type and severity of pain and evaluate response  - Implement non-pharmacological measures as appropriate and evaluate response  - Consider cultural and social influences on pain and pain management  - Notify physician/advanced practitioner if interventions unsuccessful or patient reports new pain  Outcome: Progressing     Problem: INFECTION - ADULT  Goal: Absence or prevention of progression during hospitalization  Description: INTERVENTIONS:  - Assess and monitor for signs and symptoms of infection  - Monitor lab/diagnostic results  - Monitor all insertion sites  - Administer medications as ordered  - Instruct and encourage patient and family to use good hand hygiene technique  - Identify and instruct in appropriate isolation precautions for identified infection/condition  Outcome: Progressing  Goal: Absence of fever/infection during neutropenic period  Description: INTERVENTIONS:  - Monitor WBC    Outcome: Progressing     Problem: SAFETY ADULT  Goal: Patient will remain free of falls  Description: INTERVENTIONS:  - Educate patient/family on patient safety including physical limitations  - Instruct patient to call for assistance with activity   - Consult OT/PT to assist with strengthening/mobility   - Keep Call bell within reach  - Keep bed low and locked with side rails adjusted as appropriate  - Keep care items and personal belongings within reach  - Initiate and maintain comfort rounds  - Offer Toileting every 2 Hours, in advance of need  - Initiate/Maintain bed alarm  - Obtain necessary fall risk management equipment: walker  - Apply yellow socks and bracelet for high fall risk patients  - Consider moving patient to room near nurses station  Outcome: Progressing  Goal: Maintain or return to baseline ADL function  Description: INTERVENTIONS:  -  Assess patient's ability to carry out ADLs; assess patient's baseline for ADL function and identify physical deficits which impact ability to perform ADLs (bathing, care of mouth/teeth, toileting, grooming, dressing, etc )  - Assess/evaluate cause of self-care deficits   - Assess range of motion  - Assess patient's mobility; develop plan if impaired  - Assess patient's need for assistive devices and provide as appropriate  - Encourage maximum independence but intervene and supervise when necessary  - Involve family in performance of ADLs  - Assess for home care needs following discharge   - Consider OT consult to assist with ADL evaluation and planning for discharge  - Provide patient education as appropriate  Outcome: Progressing  Goal: Maintains/Returns to pre admission functional level  Description: INTERVENTIONS:  - Perform BMAT or MOVE assessment daily    - Set and communicate daily mobility goal to care team and patient/family/caregiver  - Collaborate with rehabilitation services on mobility goals if consulted  - Ambulate patient 2 times a day  - Out of bed to chair 3 times a day   - Out of bed for meals 3 times a day  - Out of bed for toileting  - Record patient progress and toleration of activity level   Outcome: Progressing     Problem: Knowledge Deficit  Goal: Patient/family/caregiver demonstrates understanding of disease process, treatment plan, medications, and discharge instructions  Description: Complete learning assessment and assess knowledge base    Interventions:  - Provide teaching at level of understanding  - Provide teaching via preferred learning methods  Outcome: Progressing     Problem: Prexisting or High Potential for Compromised Skin Integrity  Goal: Skin integrity is maintained or improved  Description: INTERVENTIONS:  - Identify patients at risk for skin breakdown  - Assess and monitor skin integrity  - Assess and monitor nutrition and hydration status  - Monitor labs   - Assess for incontinence   - Turn and reposition patient  - Assist with mobility/ambulation  - Relieve pressure over bony prominences  - Avoid friction and shearing  - Provide appropriate hygiene as needed including keeping skin clean and dry  - Evaluate need for skin moisturizer/barrier cream  - Collaborate with interdisciplinary team   - Patient/family teaching  - Consider wound care consult   Outcome: Progressing     Problem: SAFETY,RESTRAINT: NV/NON-SELF DESTRUCTIVE BEHAVIOR  Goal: Remains free of harm/injury (restraint for non violent/non self-detsructive behavior)  Description: INTERVENTIONS:  - Instruct patient/family regarding restraint use   - Assess and monitor physiologic and psychological status   - Provide interventions and comfort measures to meet assessed patient needs   - Identify and implement measures to help patient regain control  - Assess readiness for release of restraint   Outcome: Progressing  Goal: Returns to optimal restraint-free functioning  Description: INTERVENTIONS:  - Assess the patient's behavior and symptoms that indicate continued need for restraint  - Identify and implement measures to help patient regain control  - Assess readiness for release of restraint   Outcome: Progressing     Problem: Nutrition/Hydration-ADULT  Goal: Nutrient/Hydration intake appropriate for improving, restoring or maintaining nutritional needs  Description: Monitor and assess patient's nutrition/hydration status for malnutrition  Collaborate with interdisciplinary team and initiate plan and interventions as ordered  Monitor patient's weight and dietary intake as ordered or per policy   Utilize nutrition screening tool and intervene as necessary  Determine patient's food preferences and provide high-protein, high-caloric foods as appropriate  INTERVENTIONS:  - Monitor oral intake, urinary output, labs, and treatment plans  - Assess nutrition and hydration status and recommend course of action  - Evaluate amount of meals eaten  - Assist patient with eating if necessary   - Allow adequate time for meals  - Recommend/ encourage appropriate diets, oral nutritional supplements, and vitamin/mineral supplements  - Order, calculate, and assess calorie counts as needed  - Recommend, monitor, and adjust tube feedings and TPN/PPN based on assessed needs  - Assess need for intravenous fluids  - Provide specific nutrition/hydration education as appropriate  - Include patient/family/caregiver in decisions related to nutrition  Outcome: Progressing     Problem: CONFUSION/THOUGHT DISTURBANCE  Goal: Thought disturbances (confusion, delirium, depression, dementia or psychosis) are managed to maintain or return to baseline mental status and functional level  Description: INTERVENTIONS:  - Assess for possible contributors to  thought disturbance, including but not limited to medications, infection, impaired vision or hearing, underlying metabolic abnormalities, dehydration, respiratory compromise,  psychiatric diagnoses and notify attending PHYSICAN/AP  - Monitor and intervene to maintain adequate nutrition, hydration, elimination, sleep and activity  - Decrease environmental stimuli, including noise as appropriate  - Provide frequent contacts to provide refocusing, direction and reassurance as needed  Approach patient calmly with eye contact and at their level    - [place] high risk fall precautions, aspiration precautions and other safety measures, as indicated  - If delirium suspected, notify physician/AP of change in condition and request immediate in-person evaluation  - Pursue consults as appropriate including Geriatric (campus dependent), OT for cognitive evaluation/activity planning, psychiatric, pastoral care, etc   Outcome: Progressing     Problem: BEHAVIOR  Goal: Pt/Family maintain appropriate behavior and adhere to behavioral management agreement, if implemented  Description: INTERVENTIONS:  - Assess the family dynamic   - Encourage verbalization of thoughts and concerns in a socially appropriate manner  - Assess patient/family's coping skills and non-compliant behavior (including use of illegal substances)  - Utilize positive, consistent limit setting strategies supporting safety of patient, staff and others  - Initiate consult with Case Management, Spiritual Care or other ancillary services as appropriate  - If a patient's/visitor's behavior jeopardizes the safety of the patient, staff, or others, refer to organization procedure     - Notify Security of behavior or suspected illegal substances which indicate the need for search of the patient and/or belongings  - Encourage participation in the decision making process about a behavioral management agreement; implement if patient meets criteria  Outcome: Progressing

## 2022-08-30 NOTE — PHYSICAL THERAPY NOTE
Physical Therapy Evaluation    Patient's Name: Godwin De Guzman  Admitting Diagnosis  Hypomagnesemia [E83 42]  General weakness [R53 1]  Generalized weakness [R53 1]  Ambulatory dysfunction [R26 2]    Problem List  Patient Active Problem List   Diagnosis    Primary hypertension    Type 2 diabetes mellitus with hyperglycemia ([facility] [address] )    Hyperlipidemia    Atrial fibrillation ([facility] [address] )    Inguinal hernia    Anemia    Onychomycosis    Hematemesis    Gross hematuria    Thrombocytopenia (HCC)    Stage 3a chronic kidney disease (HCC)    General weakness    Depression with anxiety    Unwitnessed fall    Confusion       Past Medical History  History reviewed  No pertinent past medical history  Past Surgical History  Past Surgical History:   Procedure Laterality Date    CATARACT EXTRACTION      [address]       Recent Imaging  CT head wo contrast   Final Result by Cerdick Leach DO (08/28 1133)      No acute intracranial abnormality  Mild chronic microangiopathic changes  Workstation performed: [id number]         XR chest portable   Final Result by Juan Jose Pulido MD (08/27 0744)      Bilateral lower lobe opacities suggesting combination of scarring and or chronic interstitial lung disease, progressed at the right lung base              Workstation performed: [id number]             Recent Vital Signs  Vitals:    08/29/22 1446 08/29/22 2142 08/30/22 0025 08/30/22 0702   BP: 147/95 151/93 150/99 137/92   BP Location: Right arm Left arm Right arm Left arm   Pulse: 100 92 85 94   Resp: 20  18 18   Temp: 97 8 °F (36 6 °C)  98 1 °F (36 7 °C) 99 1 °F (37 3 °C)   TempSrc: Skin  Temporal Temporal   SpO2: 92%  96% 96%   Weight:       Height:            08/29/22 1310   PT Last Visit   PT Visit Date 08/29/22   Note Type   Note type Evaluation   Pain Assessment   Pain Assessment Tool 0-10   Pain Score No Pain   Restrictions/Precautions   Weight Bearing Precautions Per Order No   Other Precautions Fall Risk;Cognitive; Chair Alarm; Bed Alarm;Agitated; Impulsive; Restraints   Home Living   Type of Home House   Additional Comments pt is not cooperative with questioning at this time   Prior Function   Lives With [address] [place] Help From Family   Comments pt is not cooperative with questioning at this time   General   Family/Caregiver Present No   Cognition   Overall Cognitive Status Impaired   Arousal/Participation Responsive   Orientation Level Oriented to person;Oriented to time;Disoriented to place; Disoriented to situation   Memory Decreased short term memory;Decreased recall of recent events;Decreased recall of precautions   Following Commands Follows one step commands with increased time or repetition   RLE Assessment   RLE Assessment   (3+/5)   LLE Assessment   LLE Assessment   (3+/5)   Coordination   Movements are Fluid and Coordinated 0   Coordination and Movement Description retropulsion with standing, decreased general coordination with movement   Sensation X   Light Touch   RLE Light Touch Impaired   LLE Light Touch Impaired   Bed Mobility   Supine to Sit 3  Moderate assistance   Additional items Assist x 1;Bedrails; Increased time required;Verbal cues;LE management   Sit to Supine 3  Moderate assistance   Additional items Assist x 1;Bedrails; Increased time required;Verbal cues;LE management   Transfers   Sit to Stand 3  Moderate assistance   Additional items Assist x 1; Armrests; Increased time required;Verbal cues   Stand to Sit 3  Moderate assistance   Additional items Assist x 1; Armrests; Increased time required;Verbal cues   Additional Comments with RW   Ambulation/Elevation   Gait pattern Step through pattern;Decreased toe off;Decreased heel strike; Step to;Short stride; Foward flexed;Decreased foot clearance; Wide ROHAN; Improper Weight shift   Gait Assistance 3  Moderate assist   Additional items Assist x 1;Verbal cues; Tactile cues   Assistive Device Rolling walker   Distance 10ft   Balance   Static Sitting Fair -   Dynamic Sitting Fair -   Static Standing Poor +   Dynamic Standing Poor   Ambulatory Poor   Endurance Deficit   Endurance Deficit Yes   Endurance Deficit Description fatigue   Activity Tolerance   Activity Tolerance Patient limited by fatigue   Medical Staff Made Aware spoke to CM   Nurse Made Aware spoke to RN   Assessment   Prognosis Good   Problem List Decreased strength;Decreased endurance; Impaired balance;Decreased mobility; Decreased coordination; Impaired sensation;Decreased cognition; Impaired judgement;Decreased safety awareness   Barriers to Discharge Inaccessible home environment;Decreased caregiver support   Goals   Patient Goals to get back in bed   STG Expiration Date 09/09/22   Short Term Goal #1 see eval note   PT Treatment Day 1   Plan   Treatment/Interventions ADL retraining;Functional transfer training;LE strengthening/ROM; Elevations; Therapeutic exercise; Endurance training;Patient/family training;Equipment eval/education; Bed mobility;Cognitive reorientation;Gait training;Spoke to nursing;Spoke to case management;OT   PT Frequency 2-3x/wk   Recommendation   PT Discharge Recommendation Post acute rehabilitation services   Equipment Recommended [facility] Recommended Wheeled walker   AM-PAC Basic Mobility Inpatient   Turning in Bed Without Bedrails 2   Lying on Back to Sitting on Edge of Flat Bed 2   Moving Bed to Chair 2   Standing Up From Chair 2   Walk in Room 2   Climb 3-5 Stairs 1   Basic Mobility Inpatient Raw Score 11   Basic Mobility Standardized Score 30 25   Highest Level Of Mobility   -HL Goal 4: Move to chair/commode   -HL Achieved 5: Stand (1 or more minutes)   Additional Treatment Session   Start Time 1325   End Time 1335   Treatment Assessment Pt participated in additional transfers training with RW including sit to stand and stand pivot transfers, continues to require mod A due to reduced LE strength and poor balance in standing  Equipment Use RW   Additional Treatment Day 1   End of Consult   Patient Position at End of Consult Supine;Bed/Chair alarm activated; All needs within reach         Jayant Maxwell  is a 80 y o  male admitted [facility]/27/2022 for General weakness  Pt  has no past medical history on file    PT was consulted and pt was seen on 8/30/2022 for mobility assessment and d/c planning  Pt presents supine in bed alert and agreeable to therapy  Impairments limiting pt at this time include impaired balance, decreased endurance, decreased coordination, increased fall risk, new onset of impairment of functional mobility, decreased ADLS, decreased IADLS, pain, decreased activity tolerance, decreased safety awareness, impaired judgement, decreased cognition, decreased sensation, and decreased strength  Pt is currently functioning at a moderate assistance x1 level for bed mobility, moderate assistance x1 level for transfers, moderate assistance x1 level for ambulation with Rolling Walker  The patient's AM-PAC Basic Mobility Inpatient Short Form Raw Score is 11 A Raw score of less than or equal to 16 suggests the patient may benefit from discharge to post-acute rehabilitation services  Please also refer to the recommendation of the Physical Therapist for safe discharge planning  Goals                                                                                                                                    1) Bed mobility skills with supervision assistance x1 to facilitate safe return to previous living environment 2) Functional transfers with supervision assistance x1 to facilitate safe return to previous living environment  3) Ambulation with least restrictive AD supervision assistance x1 without LOB and stable vitals for safe ambulation home/ community distances   4) Improve balance grades to fair to reduce risk of falls  6)Improve LE strength grades by 1 to increase independence w/ transfers and gait  7) PT for ongoing pt and family education; DME needs and D/C planning to promote highest level of function in least restrictive environment  Recommendations                                                                                                              Pt will benefit from continued skilled IP PT to address the above mentioned impairments in order to maximize recovery and increase functional independence when completing mobility and ADLs  See flow sheet for goals and POC       DME: Mike Hill    Discharge Disposition:  Post Acute Rehab Services      Bob Perry PT, DPT

## 2022-08-30 NOTE — NURSING NOTE
Blood sugar checked  Found it to be 299 at 1110  Glucometer unsuccessfully uploading at this time  1:1 remains in place

## 2022-08-30 NOTE — PLAN OF CARE
Problem: PHYSICAL THERAPY ADULT  Goal: Performs mobility at highest level of function for planned discharge setting  See evaluation for individualized goals  Description: Treatment/Interventions: ADL retraining, Functional transfer training, LE strengthening/ROM, Elevations, Therapeutic exercise, Endurance training, Patient/family training, Equipment eval/education, Bed mobility, Cognitive reorientation, Gait training, Spoke to nursing, Spoke to case management, OT  Equipment Recommended: Nick Celmons       See flowsheet documentation for full assessment, interventions and recommendations  Outcome: Progressing  Note: Prognosis: Good  Problem List: Decreased strength, Decreased endurance, Impaired balance, Decreased mobility, Decreased coordination, Impaired sensation, Decreased cognition, Impaired judgement, Decreased safety awareness     Barriers to Discharge: Inaccessible home environment, Decreased caregiver support     PT Discharge Recommendation: Post acute rehabilitation services    See flowsheet documentation for full assessment

## 2022-08-30 NOTE — ASSESSMENT & PLAN NOTE
Lab Results   Component Value Date    HGBA1C 8 1 (H) 08/09/2022       Recent Labs     08/29/22  0526 08/29/22  1538 08/29/22 2032 08/30/22  0603   POCGLU 208* 283* 196* 164*       Blood Sugar Average: Last 72 hrs:  (P) 005 [id number]   · Patient was on metformin 1000 b i d   · Start Lantus 10 units q h s    · Continue sliding scale insulin with Accu-Cheks while inpatient  · Diabetic diet

## 2022-08-31 LAB
GLUCOSE SERPL-MCNC: 161 MG/DL (ref 65–140)
GLUCOSE SERPL-MCNC: 171 MG/DL (ref 65–140)
GLUCOSE SERPL-MCNC: 405 MG/DL (ref 65–140)
GLUCOSE SERPL-MCNC: 418 MG/DL (ref 65–140)

## 2022-08-31 PROCEDURE — 99232 SBSQ HOSP IP/OBS MODERATE 35: CPT | Performed by: INTERNAL MEDICINE

## 2022-08-31 PROCEDURE — 82948 REAGENT STRIP/BLOOD GLUCOSE: CPT

## 2022-08-31 RX ADMIN — ESCITALOPRAM OXALATE 5 MG: 5 TABLET, FILM COATED ORAL at 08:55

## 2022-08-31 RX ADMIN — PANTOPRAZOLE SODIUM 20 MG: 20 TABLET, DELAYED RELEASE ORAL at 08:55

## 2022-08-31 RX ADMIN — METOPROLOL SUCCINATE 50 MG: 50 TABLET, EXTENDED RELEASE ORAL at 21:10

## 2022-08-31 RX ADMIN — METOPROLOL SUCCINATE 50 MG: 50 TABLET, EXTENDED RELEASE ORAL at 08:55

## 2022-08-31 RX ADMIN — INSULIN LISPRO 5 UNITS: 100 INJECTION, SOLUTION INTRAVENOUS; SUBCUTANEOUS at 11:28

## 2022-08-31 RX ADMIN — INSULIN GLARGINE 10 UNITS: 100 INJECTION, SOLUTION SUBCUTANEOUS at 21:10

## 2022-08-31 RX ADMIN — APIXABAN 2.5 MG: 2.5 TABLET, FILM COATED ORAL at 08:55

## 2022-08-31 RX ADMIN — PRAVASTATIN SODIUM 80 MG: 40 TABLET ORAL at 16:43

## 2022-08-31 RX ADMIN — INSULIN HUMAN 5 UNITS: 100 INJECTION, SOLUTION PARENTERAL at 11:53

## 2022-08-31 RX ADMIN — INSULIN LISPRO 5 UNITS: 100 INJECTION, SOLUTION INTRAVENOUS; SUBCUTANEOUS at 21:10

## 2022-08-31 RX ADMIN — APIXABAN 2.5 MG: 2.5 TABLET, FILM COATED ORAL at 17:22

## 2022-08-31 RX ADMIN — INSULIN LISPRO 1 UNITS: 100 INJECTION, SOLUTION INTRAVENOUS; SUBCUTANEOUS at 06:04

## 2022-08-31 RX ADMIN — INSULIN LISPRO 1 UNITS: 100 INJECTION, SOLUTION INTRAVENOUS; SUBCUTANEOUS at 16:41

## 2022-08-31 NOTE — PROGRESS NOTES
[facility]  Progress Note - Shaw Camacho  1933, 80 y o  male MRN: [id number]  Unit/Bed#: 7T [facility] 716-01 Encounter: [id number]  Primary Care Provider: Dwayne Fernandez DO   Date and time admitted to hospital: 8/27/2022  5:56 AM    * General weakness  Assessment & Plan  · Patient states he lives at home alone and has been having difficulty with ambulation  · No recent traumatic events  · Laboratory analysis grossly unremarkable  · Hemodynamically stable saturating well on room air  · Case management consultation for safe disposition planning  · Patient is medically clear  · PT/OT recommended post acute rehab services  · Patient and the family would prefer home health care with 24/7 care    Severe protein-calorie malnutrition ([address] )  Assessment & Plan  Malnutrition Findings:   Adult Malnutrition type: Chronic illness  Adult Degree of Malnutrition: Other severe protein calorie malnutrition  Malnutrition Characteristics: Inadequate energy, Weight loss          continue nutritional supplement         360 Statement: Severe pro/singh malnutrition r/t depression as evidenced by 12% unplanned weight loss since 2/24/22, consuming < 75% energy intake compared to estimated energy needs > 1 month  Treated with Enlive bid    BMI Findings: Body mass index is 18 57 kg/m²  Confusion  Assessment & Plan  · Patient seems to have episode of confusion  · Discussed with the Gato Lozada and Kenyetta Subramanian- patient does not have official diagnosis of dementia before  However it seems to be an element of advanced dementia  · Neuro psychiatrist was consulted - At this time, patient does not appear to have capacity to make fully informed medical decisions  Unspecified Neurocognitive Disorder  ·  Continue 1 on 1 supervision  Unwitnessed fall  Assessment & Plan  · Patient had unwitnessed fall on 08/28/2022  · CT head was negative for acute intracranial abnormality   Mild chronic microangiopathic changes  · Continue to monitor    Depression with anxiety  Assessment & Plan  · Patient has appeared somewhat anxious about his health  · Family states there may be an element of depression possibly secondary to dementia  · Continue Lexapro 5 mg oral daily  · Ativan 0 5 mg oral daily at bedtime    Stage 3a chronic kidney disease [facility])  Assessment & Plan  Lab Results   Component Value Date    EGFR 51 08/30/2022    EGFR 46 08/29/2022    EGFR 54 08/28/2022    CREATININE 1 24 08/30/2022    CREATININE 1 34 08/29/2022    CREATININE 1 18 08/28/2022   · Creatinine appears to be at baseline  · Trend BMP  · Avoid hypotension and nephrotoxic agents    Atrial fibrillation (HCC)  Assessment & Plan  · Rates are controlled  · Continue home Eliquis 2 5 mg PO BID    Hyperlipidemia  Assessment & Plan  · Continue home statin therapy    Type 2 diabetes mellitus with hyperglycemia [facility])  Assessment & Plan  Lab Results   Component Value Date    HGBA1C 8 1 (H) 08/09/2022       Recent Labs     08/30/22  2013 08/31/22  0530 08/31/22  1118 08/31/22  1616   POCGLU 295* 161* 405* 171*       Blood Sugar Average: Last 72 hrs:  (P) 240 [id number]   · Patient was on metformin 1000 b i d   · Continue Lantus 10 units q h s  · Continue sliding scale insulin with Accu-Cheks while inpatient  · Diabetic diet    Primary hypertension  Assessment & Plan  · BP acceptable  · Blood pressures elevated on presentation  · Continue home beta-blocker for atrial fibrillation  · Hydralazine 5 mg IV q6 hours PRN SBP > 180 mmHg  · Monitor blood pressures      VTE Pharmacologic Prophylaxis:   Pharmacologic: Apixaban (Eliquis)  Mechanical VTE Prophylaxis in Place: Yes    Patient Centered Rounds: I have performed bedside rounds with nursing staff today      Discussions with Specialists or Other Care Team Provider:  Discussed with Case Management, nurse    Education and Discussions with Family / Patient:  Discussed with patient,     Time Spent for Care: 45 minutes  More than 50% of total time spent on counseling and coordination of care as described above  Current Length of Stay: 4 day(s)    Current Patient Status: Inpatient   Certification Statement: The patient will continue to require additional inpatient hospital stay due to Patient is medically clear, pending placement    Discharge Plan / Estimated Discharge Date:  Pending      Code Status: Level 1 - Full Code      Subjective:   Patient was seen and examined at bedside  Patient stated he has right shoulder pain however he is adamantly refusing Tylenol  No acute overnight changes  Objective:     Vitals:   Temp (24hrs), Av 4 °F (36 9 °C), Min:97 5 °F (36 4 °C), Max:99 1 °F (37 3 °C)    Temp:  [97 5 °F (36 4 °C)-99 1 °F (37 3 °C)] 99 1 °F (37 3 °C)  HR:  [] 88  Resp:  [18] 18  BP: (124-148)/(81-92) 148/83  SpO2:  [96 %-97 %] 97 %  Body mass index is 18 57 kg/m²  Input and Output Summary (last 24 hours):        Intake/Output Summary (Last 24 hours) at 2022 1644  Last data filed at 2022 1257  Gross per 24 hour   Intake 960 ml   Output --   Net 960 ml       Physical Exam:     Physical Exam  General: breathing well on room air, no acute distress  HEENT: NC/AT, PERRL, EOM - normal  Neck: Supple  Pulm/Chest: Normal chest wall expansion, clear breath sounds on both side, no wheezing/rhonchi or crackles appreciated  CVS: RRR, normal S1&S2, no murmur appreciated, capillary refill <2s  Abd: soft, non tender, non distended, bowel sounds +  MSK: move all 4 extremities spontaneously  Skin: warm  CNS: no acute focal neuro deficit      Additional Data:     Labs:    Results from last 7 days   Lab Units 22  0524   WBC Thousand/uL 10 89*   HEMOGLOBIN g/dL 11 9*   HEMATOCRIT % 35 7*   PLATELETS Thousands/uL 154   NEUTROS PCT % 64   LYMPHS PCT % 15   MONOS PCT % 17*   EOS PCT % 2     Results from last 7 days   Lab Units 22  0524 22  0451 22  0628   POTASSIUM mmol/L 3 8   < > 4 6   CHLORIDE mmol/L 103   < > 104   CO2 mmol/L 28   < > 28   BUN mg/dL 28*   < > 36*   CREATININE mg/dL 1 24   < > 1 32   CALCIUM mg/dL 8 5   < > 9 1   ALK PHOS U/L  --   --  56   ALT U/L  --   --  21   AST U/L  --   --  29    < > = values in this interval not displayed  * I Have Reviewed All Lab Data Listed Above  * Additional Pertinent Lab Tests Reviewed: [address] Admission Reviewed    Imaging:      I have reviewed pertinent imaging  Recent Cultures (last 7 days):           Last 24 Hours Medication List:   Current Facility-Administered Medications   Medication Dose Route Frequency Provider Last Rate    acetaminophen  650 mg Oral Q4H PRN Araceli Tatianna, DO      apixaban  2 5 mg Oral BID Araceli Tatianna, DO      escitalopram  5 mg Oral Daily Araceli Tatianna, DO      haloperidol lactate  1 mg Intramuscular Q6H PRN Araceli Tatianna, DO      hydrALAZINE  5 mg Intravenous Q6H PRN Araceli Tatianna, DO      insulin glargine  10 Units Subcutaneous HS Holli Rodriguez MD      insulin lispro  1-5 Units Subcutaneous TID AC Araceli Tatianna, DO      insulin lispro  1-5 Units Subcutaneous HS Araceli Tatianna, DO      metoprolol succinate  50 mg Oral BID Araceli Tatianna, DO      ondansetron  4 mg Intravenous Q6H PRN Araceli Tatianna, DO      pantoprazole  20 mg Oral Daily Araceli Tatianna, DO      pravastatin  80 mg Oral Daily With Dinner Araceli Tatianna, DO          Today, Patient Was Seen By: Holli Rodriguez MD    ** Please Note: Dragon 360 Dictation voice to text software may have been used in the creation of this document   **

## 2022-08-31 NOTE — ASSESSMENT & PLAN NOTE
Lab Results   Component Value Date    HGBA1C 8 1 (H) 08/09/2022       Recent Labs     08/30/22 2013 08/31/22  0530 08/31/22  1118 08/31/22  1616   POCGLU 295* 161* 405* 171*       Blood Sugar Average: Last 72 hrs:  (P) 240 [id number]   · Patient was on metformin 1000 b i d   · Continue Lantus 10 units q h s    · Continue sliding scale insulin with Accu-Cheks while inpatient  · Diabetic diet

## 2022-08-31 NOTE — PLAN OF CARE
Problem: Potential for Falls  Goal: Patient will remain free of falls  Description: INTERVENTIONS:  - Educate patient/family on patient safety including physical limitations  - Instruct patient to call for assistance with activity   - Consult OT/PT to assist with strengthening/mobility   - Keep Call bell within reach  - Keep bed low and locked with side rails adjusted as appropriate  - Keep care items and personal belongings within reach  - Initiate and maintain comfort rounds  - Offer Toileting every 2 Hours, in advance of need  - Initiate/Maintain bed alarm  - Obtain necessary fall risk management equipment: walker  - Apply yellow socks and bracelet for high fall risk patients  - Consider moving patient to room near nurses station  8/31/2022 0751 by Yoly Duff RN  Outcome: Progressing  8/30/2022 1828 by Yoly Duff RN  Outcome: Progressing     Problem: MOBILITY - ADULT  Goal: Maintain or return to baseline ADL function  Description: INTERVENTIONS:  -  Assess patient's ability to carry out ADLs; assess patient's baseline for ADL function and identify physical deficits which impact ability to perform ADLs (bathing, care of mouth/teeth, toileting, grooming, dressing, etc )  - Assess/evaluate cause of self-care deficits   - Assess range of motion  - Assess patient's mobility; develop plan if impaired  - Assess patient's need for assistive devices and provide as appropriate  - Encourage maximum independence but intervene and supervise when necessary  - Involve family in performance of ADLs  - Assess for home care needs following discharge   - Consider OT consult to assist with ADL evaluation and planning for discharge  - Provide patient education as appropriate  8/31/2022 0751 by Yoly Duff RN  Outcome: Progressing  8/30/2022 1828 by Yoly Duff RN  Outcome: Progressing  Goal: Maintains/Returns to pre admission functional level  Description: INTERVENTIONS:  - Perform BMAT or MOVE assessment daily    - Set and communicate daily mobility goal to care team and patient/family/caregiver     - Collaborate with rehabilitation services on mobility goals if consulted  - Ambulate patient 2 times a day  - Out of bed to chair 3 times a day   - Out of bed for meals 3 times a day  - Out of bed for toileting  - Record patient progress and toleration of activity level   8/31/2022 0751 by Angelito Collier RN  Outcome: Progressing  8/30/2022 1828 by Angelito Collier RN  Outcome: Progressing     Problem: PAIN - ADULT  Goal: Verbalizes/displays adequate comfort level or baseline comfort level  Description: Interventions:  - Encourage patient to monitor pain and request assistance  - Assess pain using appropriate pain scale  - Administer analgesics based on type and severity of pain and evaluate response  - Implement non-pharmacological measures as appropriate and evaluate response  - Consider cultural and social influences on pain and pain management  - Notify physician/advanced practitioner if interventions unsuccessful or patient reports new pain  8/31/2022 0751 by Angelito Collier RN  Outcome: Progressing  8/30/2022 1828 by Angelito Collier RN  Outcome: Progressing     Problem: INFECTION - ADULT  Goal: Absence or prevention of progression during hospitalization  Description: INTERVENTIONS:  - Assess and monitor for signs and symptoms of infection  - Monitor lab/diagnostic results  - Monitor all insertion sites  - Administer medications as ordered  - Instruct and encourage patient and family to use good hand hygiene technique  - Identify and instruct in appropriate isolation precautions for identified infection/condition  8/31/2022 0751 by Angelito Collier RN  Outcome: Progressing  8/30/2022 1828 by Angelito Collier RN  Outcome: Progressing  Goal: Absence of fever/infection during neutropenic period  Description: INTERVENTIONS:  - Monitor WBC    8/31/2022 0751 by Stephanie Roberson RN  Outcome: Progressing  8/30/2022 1828 by Stephanie Roberson RN  Outcome: Progressing     Problem: SAFETY ADULT  Goal: Patient will remain free of falls  Description: INTERVENTIONS:  - Educate patient/family on patient safety including physical limitations  - Instruct patient to call for assistance with activity   - Consult OT/PT to assist with strengthening/mobility   - Keep Call bell within reach  - Keep bed low and locked with side rails adjusted as appropriate  - Keep care items and personal belongings within reach  - Initiate and maintain comfort rounds  - Offer Toileting every 2 Hours, in advance of need  - Initiate/Maintain bed alarm  - Obtain necessary fall risk management equipment: walker  - Apply yellow socks and bracelet for high fall risk patients  - Consider moving patient to room near nurses station  8/31/2022 0751 by Stephanie Roberson RN  Outcome: Progressing  8/30/2022 1828 by Stephanie Roberson RN  Outcome: Progressing  Goal: Maintain or return to baseline ADL function  Description: INTERVENTIONS:  -  Assess patient's ability to carry out ADLs; assess patient's baseline for ADL function and identify physical deficits which impact ability to perform ADLs (bathing, care of mouth/teeth, toileting, grooming, dressing, etc )  - Assess/evaluate cause of self-care deficits   - Assess range of motion  - Assess patient's mobility; develop plan if impaired  - Assess patient's need for assistive devices and provide as appropriate  - Encourage maximum independence but intervene and supervise when necessary  - Involve family in performance of ADLs  - Assess for home care needs following discharge   - Consider OT consult to assist with ADL evaluation and planning for discharge  - Provide patient education as appropriate  8/31/2022 0751 by Stephanie Roberson RN  Outcome: Progressing  8/30/2022 1828 by Stephanie Roberson RN  Outcome: Progressing  Goal: Maintains/Returns to pre admission functional level  Description: INTERVENTIONS:  - Perform BMAT or MOVE assessment daily    - Set and communicate daily mobility goal to care team and patient/family/caregiver  - Collaborate with rehabilitation services on mobility goals if consulted  - Ambulate patient 2 times a day  - Out of bed to chair 3 times a day   - Out of bed for meals 3 times a day  - Out of bed for toileting  - Record patient progress and toleration of activity level   8/31/2022 0751 by Angelito Collier RN  Outcome: Progressing  8/30/2022 1828 by Angelito Collier RN  Outcome: Progressing     Problem: DISCHARGE PLANNING  Goal: Discharge to home or other facility with appropriate resources  Description: INTERVENTIONS:  - Identify barriers to discharge w/patient and caregiver  - Arrange for needed discharge resources and transportation as appropriate  - Identify discharge learning needs (meds, wound care, etc )  - Arrange for interpretive services to assist at discharge as needed  - Refer to Case Management Department for coordinating discharge planning if the patient needs post-hospital services based on physician/advanced practitioner order or complex needs related to functional status, cognitive ability, or social support system  8/31/2022 0751 by Angelito Collier RN  Outcome: Progressing  8/30/2022 1828 by Angelito Collier RN  Outcome: Progressing     Problem: Knowledge Deficit  Goal: Patient/family/caregiver demonstrates understanding of disease process, treatment plan, medications, and discharge instructions  Description: Complete learning assessment and assess knowledge base    Interventions:  - Provide teaching at level of understanding  - Provide teaching via preferred learning methods  8/31/2022 0751 by Angelito Collier RN  Outcome: Progressing  8/30/2022 1828 by Angelito Collier RN  Outcome: Progressing     Problem: Prexisting or High Potential for Compromised Skin Integrity  Goal: Skin integrity is maintained or improved  Description: INTERVENTIONS:  - Identify patients at risk for skin breakdown  - Assess and monitor skin integrity  - Assess and monitor nutrition and hydration status  - Monitor labs   - Assess for incontinence   - Turn and reposition patient  - Assist with mobility/ambulation  - Relieve pressure over bony prominences  - Avoid friction and shearing  - Provide appropriate hygiene as needed including keeping skin clean and dry  - Evaluate need for skin moisturizer/barrier cream  - Collaborate with interdisciplinary team   - Patient/family teaching  - Consider wound care consult   8/31/2022 0751 by Riki Rowan RN  Outcome: Progressing  8/30/2022 1828 by Riki Rowan RN  Outcome: Progressing     Problem: SAFETY,RESTRAINT: NV/NON-SELF DESTRUCTIVE BEHAVIOR  Goal: Remains free of harm/injury (restraint for non violent/non self-detsructive behavior)  Description: INTERVENTIONS:  - Instruct patient/family regarding restraint use   - Assess and monitor physiologic and psychological status   - Provide interventions and comfort measures to meet assessed patient needs   - Identify and implement measures to help patient regain control  - Assess readiness for release of restraint   8/31/2022 0751 by Riki Rowan RN  Outcome: Progressing  8/30/2022 1828 by Riki Rowan RN  Outcome: Progressing  Goal: Returns to optimal restraint-free functioning  Description: INTERVENTIONS:  - Assess the patient's behavior and symptoms that indicate continued need for restraint  - Identify and implement measures to help patient regain control  - Assess readiness for release of restraint   8/31/2022 0751 by Riki Rowan RN  Outcome: Progressing  8/30/2022 1828 by Riki Rowan RN  Outcome: Progressing     Problem: Nutrition/Hydration-ADULT  Goal: Nutrient/Hydration intake appropriate for improving, restoring or maintaining nutritional needs  Description: Monitor and assess patient's nutrition/hydration status for malnutrition  Collaborate with interdisciplinary team and initiate plan and interventions as ordered  Monitor patient's weight and dietary intake as ordered or per policy  Utilize nutrition screening tool and intervene as necessary  Determine patient's food preferences and provide high-protein, high-caloric foods as appropriate       INTERVENTIONS:  - Monitor oral intake, urinary output, labs, and treatment plans  - Assess nutrition and hydration status and recommend course of action  - Evaluate amount of meals eaten  - Assist patient with eating if necessary   - Allow adequate time for meals  - Recommend/ encourage appropriate diets, oral nutritional supplements, and vitamin/mineral supplements  - Order, calculate, and assess calorie counts as needed  - Recommend, monitor, and adjust tube feedings and TPN/PPN based on assessed needs  - Assess need for intravenous fluids  - Provide specific nutrition/hydration education as appropriate  - Include patient/family/caregiver in decisions related to nutrition  8/31/2022 0751 by Angelito Collier RN  Outcome: Progressing  8/30/2022 1828 by Angelito Collier RN  Outcome: Progressing     Problem: CONFUSION/THOUGHT DISTURBANCE  Goal: Thought disturbances (confusion, delirium, depression, dementia or psychosis) are managed to maintain or return to baseline mental status and functional level  Description: INTERVENTIONS:  - Assess for possible contributors to  thought disturbance, including but not limited to medications, infection, impaired vision or hearing, underlying metabolic abnormalities, dehydration, respiratory compromise,  psychiatric diagnoses and notify attending PHYSICAN/AP  - Monitor and intervene to maintain adequate nutrition, hydration, elimination, sleep and activity  - Decrease environmental stimuli, including noise as appropriate  - Provide frequent contacts to provide refocusing, direction and reassurance as needed  Approach patient calmly with eye contact and at their level  - [place] high risk fall precautions, aspiration precautions and other safety measures, as indicated  - If delirium suspected, notify physician/AP of change in condition and request immediate in-person evaluation  - Pursue consults as appropriate including Geriatric (campus dependent), OT for cognitive evaluation/activity planning, psychiatric, pastoral care, etc   8/31/2022 0751 by Lev Dillon RN  Outcome: Progressing  8/30/2022 1828 by Lev Dillon RN  Outcome: Progressing     Problem: BEHAVIOR  Goal: Pt/Family maintain appropriate behavior and adhere to behavioral management agreement, if implemented  Description: INTERVENTIONS:  - Assess the family dynamic   - Encourage verbalization of thoughts and concerns in a socially appropriate manner  - Assess patient/family's coping skills and non-compliant behavior (including use of illegal substances)  - Utilize positive, consistent limit setting strategies supporting safety of patient, staff and others  - Initiate consult with Case Management, Spiritual Care or other ancillary services as appropriate  - If a patient's/visitor's behavior jeopardizes the safety of the patient, staff, or others, refer to organization procedure     - Notify Security of behavior or suspected illegal substances which indicate the need for search of the patient and/or belongings  - Encourage participation in the decision making process about a behavioral management agreement; implement if patient meets criteria  8/31/2022 0751 by Lev Dillon RN  Outcome: Progressing  8/30/2022 1828 by Lev Dillon RN  Outcome: Progressing

## 2022-08-31 NOTE — ASSESSMENT & PLAN NOTE
· Patient seems to have episode of confusion  · Discussed with the James Spear and [place]- patient does not have official diagnosis of dementia before  However it seems to be an element of advanced dementia  · Neuro psychiatrist was consulted - At this time, patient does not appear to have capacity to make fully informed medical decisions  Unspecified Neurocognitive Disorder  ·  Continue 1 on 1 supervision

## 2022-08-31 NOTE — CASE MANAGEMENT
Case Management Assessment & Discharge Planning Note    Patient name Hao Santa  Location 7T [facility] 716/7T [facility] 716-01 MRN [id number]  : 1933 Date 2022       Current Admission Date: 2022  Current Admission Diagnosis:General weakness   Patient Active Problem List    Diagnosis Date Noted    Severe protein-calorie malnutrition ([facility] ) 2022    Unwitnessed fall 2022    Confusion 2022    Depression with anxiety 2022    General weakness 2022    Stage 3a chronic kidney disease ([facility] ) 2022    [facility] ) 2021    Gross hematuria 10/28/2021    Hematemesis 10/26/2021    Onychomycosis 2020    Anemia 2018    Atrial fibrillation ([facility] ) 2013    Primary hypertension 2012    Inguinal hernia 2012    Type 2 diabetes mellitus with hyperglycemia ([facility] ) 2012    Hyperlipidemia 2012      LOS (days): 4  Geometric Mean LOS (GMLOS) (days): 3 50  Days to GMLOS:-0 6     OBJECTIVE:    Risk of Unplanned Readmission Score: 18 72         Current admission status: Inpatient       Preferred Pharmacy:   [facility]  [address] [address]  Phone: [phone] Fax: [phone]    [facility] [address] #[id number] EUGENE Harmon - [address] [address]  Phone: [phone] Fax: [phone]    Primary Care Provider: Dwayne Gordillo DO    Primary Insurance: MEDICARE  Secondary Insurance: Marbin Hayden 20:  Active Health Care Proxies    There are no active Health Care Proxies on file  Patient Information  Admitted from[de-identified] Home  Mental Status: Confused, Alert  During Assessment patient was accompanied by: Not accompanied during assessment  Assessment information provided by[de-identified] Patient (Martin Hall; Tinakimberly Mike, Niece Baldo Holter and her  Antionette Brideglizbet)  Primary Caregiver:  Other (Comment)  Caregiver's Name[de-identified] Agnesece Ariela Lerner and her  iMmi Go Relationship to Patient[de-identified] Family Member  Caregiver's Telephone Number[de-identified] [phone] (M)  Support Systems: Family members  [place] of Residence: [address] do you live in?: [address] [facility] entry access options   Select all that apply : Stairs  Number of steps to enter home : 3  Do the steps have railings?: Yes  Type of Current Residence: 2 story home  Upon entering residence, is there a bedroom on the main floor (no further steps)?: Yes  Upon entering residence, is there a bathroom on the main floor (no further steps)?: Yes  In the last 12 months, was there a time when you were not able to pay the mortgage or rent on time?: No  In the last 12 months, how many places have you lived?: 1  In the last 12 months, was there a time when you did not have a steady place to sleep or slept in a shelter (including now)?: Yes  Homeless/housing insecurity resource given?: No  Living Arrangements: Lives Alone  Is patient a ?: No    Activities of Daily Living Prior to Admission  Functional Status: Assistance  Completes ADLs independently?: No  Level of ADL dependence: Assistance  Ambulates independently?: No  Level of ambulatory dependence: Assistance  Does patient use assisted devices?: Yes  Assisted Devices (DME) used: Patito Perez  Does patient currently own DME?: Yes  What DME does the patient currently own?: [facility]  Does the patient have a history of Short-Term Rehab?: No  Does patient have a history of HHC?: No  Does patient currently have [facility]?: No      Patient Information Continued  Income Source: Pension/[facility]  Does patient have prescription coverage?: Yes  Within the past 12 months, you worried that your food would run out before you got the money to buy more : Never true  Within the past 12 months, the food you bought just didn't last and you didn't have money to get more : Never true  Food insecurity resource given?: No  Does patient receive dialysis treatments?: No  Does patient have a history of substance abuse?: No  Does patient have a history of Mental Health Diagnosis?: Yes  Is patient receiving treatment for mental health?: Yes  Has patient received inpatient treatment related to mental health in the last 2 years?: No    PHQ 2/9 Screening   Reviewed PHQ 2/9 Depression Screening Score?: No    Means of Transportation  Means of Transport to Appts[de-identified] Friends  In the past 12 months, has lack of transportation kept you from medical appointments or from getting medications?: No  In the past 12 months, has lack of transportation kept you from meetings, work, or from getting things needed for daily living?: No  Was application for public transport provided?: Refused        DISCHARGE DETAILS:    Discharge planning discussed with[de-identified] Pt's Niece : Morena Hoskins, Her : Juan Lyles and (friend) Renee Aquino  Freedom of Choice: Yes  Comments - Freedom of Choice: Pt will need 24hrs caregiver support prior to discharge  CM contacted family/caregiver?: Yes  Were Treatment Team discharge recommendations reviewed with patient/caregiver?: Yes  Did patient/caregiver verbalize understanding of patient care needs?: Yes  Were patient/caregiver advised of the risks associated with not following Treatment Team discharge recommendations?: Yes    Contacts  Patient Contacts: Morena Hoskins- niece  Relationship to Patient[de-identified] Family  Contact Method: Phone  Phone Number: [phone]  Reason/Outcome: Discharge Planning, Continuity of [facility]         Is the patient interested in [address] at discharge?: Yes  [address] requested[de-identified] Nursing, Occupational Therapy, Physical Therapy, Other (comment) ()  [facility] Name[de-identified] Other  [address] [facility] Provider[de-identified] PCP  [address] Needed[de-identified] Evaluate Functional Status and Safety, Gait/ADL Training, Strengthening/Theraputic Exercises to Improve Function, Diabetes Management  Homebound Criteria Met[de-identified] Requires the Assistance of Another Person for Safe Ambulation or to Leave the Home  Supporting Clincal Findings[de-identified] Fatigues Easliy in [employer], Limited Endurance    DME Referral Provided  Referral made for DME?: No    Other Referral/Resources/Interventions Provided:  Referral Comments: CM met with pt, pt's  who is also pt's friend Hector Mckeon, Niece Baldo Holter and her  Antionette Aguilera to discuss discharge plan  They stated that pt will refuse [facility]/SNF rehab and will go home with private caregivers  CM gave them list of private caregivers agency to contact and arrange as soon as possible  Family stated that they will be arranging private dute care soon      Would you like to participate in our [address] service program?  : No - Declined    Treatment Team Recommendation: Home with [facility]  Discharge Destination Plan[de-identified] Home with Prabhakar at Discharge : Family     IMM Given (Date):: 08/31/22  IMM Given to[de-identified] Family

## 2022-09-01 LAB
GLUCOSE SERPL-MCNC: 192 MG/DL (ref 65–140)
GLUCOSE SERPL-MCNC: 278 MG/DL (ref 65–140)
GLUCOSE SERPL-MCNC: 325 MG/DL (ref 65–140)
GLUCOSE SERPL-MCNC: 424 MG/DL (ref 65–140)

## 2022-09-01 PROCEDURE — 99232 SBSQ HOSP IP/OBS MODERATE 35: CPT | Performed by: INTERNAL MEDICINE

## 2022-09-01 PROCEDURE — 82948 REAGENT STRIP/BLOOD GLUCOSE: CPT

## 2022-09-01 RX ORDER — INSULIN LISPRO 100 [IU]/ML
3 INJECTION, SOLUTION INTRAVENOUS; SUBCUTANEOUS
Status: DISCONTINUED | OUTPATIENT
Start: 2022-09-01 | End: 2022-09-02

## 2022-09-01 RX ADMIN — PRAVASTATIN SODIUM 80 MG: 40 TABLET ORAL at 17:30

## 2022-09-01 RX ADMIN — INSULIN LISPRO 1 UNITS: 100 INJECTION, SOLUTION INTRAVENOUS; SUBCUTANEOUS at 06:06

## 2022-09-01 RX ADMIN — APIXABAN 2.5 MG: 2.5 TABLET, FILM COATED ORAL at 17:31

## 2022-09-01 RX ADMIN — APIXABAN 2.5 MG: 2.5 TABLET, FILM COATED ORAL at 08:48

## 2022-09-01 RX ADMIN — INSULIN LISPRO 3 UNITS: 100 INJECTION, SOLUTION INTRAVENOUS; SUBCUTANEOUS at 12:04

## 2022-09-01 RX ADMIN — ESCITALOPRAM OXALATE 5 MG: 5 TABLET, FILM COATED ORAL at 08:48

## 2022-09-01 RX ADMIN — INSULIN LISPRO 3 UNITS: 100 INJECTION, SOLUTION INTRAVENOUS; SUBCUTANEOUS at 17:31

## 2022-09-01 RX ADMIN — INSULIN GLARGINE 10 UNITS: 100 INJECTION, SOLUTION SUBCUTANEOUS at 22:42

## 2022-09-01 RX ADMIN — PANTOPRAZOLE SODIUM 20 MG: 20 TABLET, DELAYED RELEASE ORAL at 08:48

## 2022-09-01 RX ADMIN — INSULIN LISPRO 5 UNITS: 100 INJECTION, SOLUTION INTRAVENOUS; SUBCUTANEOUS at 11:37

## 2022-09-01 RX ADMIN — INSULIN LISPRO 3 UNITS: 100 INJECTION, SOLUTION INTRAVENOUS; SUBCUTANEOUS at 23:03

## 2022-09-01 RX ADMIN — METOPROLOL SUCCINATE 50 MG: 50 TABLET, EXTENDED RELEASE ORAL at 22:38

## 2022-09-01 RX ADMIN — METOPROLOL SUCCINATE 50 MG: 50 TABLET, EXTENDED RELEASE ORAL at 08:48

## 2022-09-01 RX ADMIN — INSULIN LISPRO 3 UNITS: 100 INJECTION, SOLUTION INTRAVENOUS; SUBCUTANEOUS at 17:32

## 2022-09-01 NOTE — CASE MANAGEMENT
Case Management Discharge Planning Note    Patient name Edison Perales  Location 7T [facility] 716/7T [facility] 716-01 MRN [id number]  : 1933 Date 2022       Current Admission Date: 2022  Current Admission Diagnosis:General weakness   Patient Active Problem List    Diagnosis Date Noted    [facility] ) 2022    Unwitnessed fall 2022    Confusion 2022    Depression with anxiety 2022    General weakness 2022    Stage 3a chronic kidney disease ([facility] ) 2022    Thrombocytopenia ([facility] ) 2021    Gross hematuria 10/28/2021    Hematemesis 10/26/2021    Onychomycosis 2020    Anemia 2018    [facility] fibrillation ([facility] ) 2013    Primary hypertension 2012    Inguinal hernia 2012    Type 2 diabetes mellitus with hyperglycemia ([place] [address] ) 2012    Hyperlipidemia 2012      LOS (days): 5  Geometric Mean LOS (GMLOS) (days): 3 50  Days to GMLOS:-1 7     OBJECTIVE:  Risk of Unplanned Readmission Score: 17 6         Current admission status: Inpatient   Preferred Pharmacy:   [facility]  for [address] [place] [address], [address] [place] [address]  [facility] [address]  Phone: [phone] Fax: [phone]    [facility] [address] #[id number] EUGENE Hernandez - [address]  [address] [address]  Phone: [phone] Fax: [phone]    Primary Care Provider: Dwayne Hoffman DO    Primary Insurance: MEDICARE  Secondary Insurance: Berle Augusta SHIELD    DISCHARGE DETAILS: CM was notified at morning rounds that pt is medically cleared to be discharged today  CM spoke with pt's Jonathan Lopez he stated that he is trying to arrange Emergency guardianship for the pt  CM notified pt's Mariza Geiger and Nigris Pinto that pt is medically cleared to be discharged today and they should expedite the process for arranging a Private caregiver for the pt     CM contact [facility] to open the case with Adult protective services  [facility] will work with CM to arrange a safe discharge plan for the pt and the family  Discharge plan  Pt is recommended by PT for Short term rehab transitioning to Long term care: Pt and pt's family refused as patient  stated he wants to discharge home and he refused SNF  Discharge plan 2 : Home with 24hrs care giver  Pt's Niece Baldo Holter and her  Antionette Aguilera are trying to arrange 24 hrs caregivers  Resources were given by CM  Barriers that they are facing is to have access pt's financial information and bank accounts as pt lacks capacity and they do not have the official POA paper work of the patient     CM department will continue to follow through pt's D/C

## 2022-09-01 NOTE — PLAN OF CARE
Problem: Potential for Falls  Goal: Patient will remain free of falls  Description: INTERVENTIONS:  - Educate patient/family on patient safety including physical limitations  - Instruct patient to call for assistance with activity   - Consult OT/PT to assist with strengthening/mobility   - Keep Call bell within reach  - Keep bed low and locked with side rails adjusted as appropriate  - Keep care items and personal belongings within reach  - Initiate and maintain comfort rounds  - Offer Toileting every 2 Hours, in advance of need  - Initiate/Maintain bed alarm  - Obtain necessary fall risk management equipment: walker  - Apply yellow socks and bracelet for high fall risk patients  - Consider moving patient to room near nurses station  Outcome: Progressing     Problem: MOBILITY - ADULT  Goal: Maintain or return to baseline ADL function  Description: INTERVENTIONS:  -  Assess patient's ability to carry out ADLs; assess patient's baseline for ADL function and identify physical deficits which impact ability to perform ADLs (bathing, care of mouth/teeth, toileting, grooming, dressing, etc )  - Assess/evaluate cause of self-care deficits   - Assess range of motion  - Assess patient's mobility; develop plan if impaired  - Assess patient's need for assistive devices and provide as appropriate  - Encourage maximum independence but intervene and supervise when necessary  - Involve family in performance of ADLs  - Assess for home care needs following discharge   - Consider OT consult to assist with ADL evaluation and planning for discharge  - Provide patient education as appropriate  Outcome: Progressing  Goal: Maintains/Returns to pre admission functional level  Description: INTERVENTIONS:  - Perform BMAT or MOVE assessment daily    - Set and communicate daily mobility goal to care team and patient/family/caregiver     - Collaborate with rehabilitation services on mobility goals if consulted  - Ambulate patient 2 times a day  - Out of bed to chair 3 times a day   - Out of bed for meals 3 times a day  - Out of bed for toileting  - Record patient progress and toleration of activity level   Outcome: Progressing     Problem: PAIN - ADULT  Goal: Verbalizes/displays adequate comfort level or baseline comfort level  Description: Interventions:  - Encourage patient to monitor pain and request assistance  - Assess pain using appropriate pain scale  - Administer analgesics based on type and severity of pain and evaluate response  - Implement non-pharmacological measures as appropriate and evaluate response  - Consider cultural and social influences on pain and pain management  - Notify physician/advanced practitioner if interventions unsuccessful or patient reports new pain  Outcome: Progressing     Problem: INFECTION - ADULT  Goal: Absence or prevention of progression during hospitalization  Description: INTERVENTIONS:  - Assess and monitor for signs and symptoms of infection  - Monitor lab/diagnostic results  - Monitor all insertion sites  - Administer medications as ordered  - Instruct and encourage patient and family to use good hand hygiene technique  - Identify and instruct in appropriate isolation precautions for identified infection/condition  Outcome: Progressing  Goal: Absence of fever/infection during neutropenic period  Description: INTERVENTIONS:  - Monitor WBC    Outcome: Progressing     Problem: SAFETY ADULT  Goal: Patient will remain free of falls  Description: INTERVENTIONS:  - Educate patient/family on patient safety including physical limitations  - Instruct patient to call for assistance with activity   - Consult OT/PT to assist with strengthening/mobility   - Keep Call bell within reach  - Keep bed low and locked with side rails adjusted as appropriate  - Keep care items and personal belongings within reach  - Initiate and maintain comfort rounds  - Offer Toileting every 2 Hours, in advance of need  - Initiate/Maintain bed alarm  - Obtain necessary fall risk management equipment: walker  - Apply yellow socks and bracelet for high fall risk patients  - Consider moving patient to room near nurses station  Outcome: Progressing  Goal: Maintain or return to baseline ADL function  Description: INTERVENTIONS:  -  Assess patient's ability to carry out ADLs; assess patient's baseline for ADL function and identify physical deficits which impact ability to perform ADLs (bathing, care of mouth/teeth, toileting, grooming, dressing, etc )  - Assess/evaluate cause of self-care deficits   - Assess range of motion  - Assess patient's mobility; develop plan if impaired  - Assess patient's need for assistive devices and provide as appropriate  - Encourage maximum independence but intervene and supervise when necessary  - Involve family in performance of ADLs  - Assess for home care needs following discharge   - Consider OT consult to assist with ADL evaluation and planning for discharge  - Provide patient education as appropriate  Outcome: Progressing  Goal: Maintains/Returns to pre admission functional level  Description: INTERVENTIONS:  - Perform BMAT or MOVE assessment daily    - Set and communicate daily mobility goal to care team and patient/family/caregiver     - Collaborate with rehabilitation services on mobility goals if consulted  - Ambulate patient 2 times a day  - Out of bed to chair 3 times a day   - Out of bed for meals 3 times a day  - Out of bed for toileting  - Record patient progress and toleration of activity level   Outcome: Progressing     Problem: DISCHARGE PLANNING  Goal: Discharge to home or other facility with appropriate resources  Description: INTERVENTIONS:  - Identify barriers to discharge w/patient and caregiver  - Arrange for needed discharge resources and transportation as appropriate  - Identify discharge learning needs (meds, wound care, etc )  - Arrange for interpretive services to assist at discharge as needed  - Refer to Case Management Department for coordinating discharge planning if the patient needs post-hospital services based on physician/advanced practitioner order or complex needs related to functional status, cognitive ability, or social support system  Outcome: Progressing     Problem: Knowledge Deficit  Goal: Patient/family/caregiver demonstrates understanding of disease process, treatment plan, medications, and discharge instructions  Description: Complete learning assessment and assess knowledge base  Interventions:  - Provide teaching at level of understanding  - Provide teaching via preferred learning methods  Outcome: Progressing     Problem: Prexisting or High Potential for Compromised Skin Integrity  Goal: Skin integrity is maintained or improved  Description: INTERVENTIONS:  - Identify patients at risk for skin breakdown  - Assess and monitor skin integrity  - Assess and monitor nutrition and hydration status  - Monitor labs   - Assess for incontinence   - Turn and reposition patient  - Assist with mobility/ambulation  - Relieve pressure over bony prominences  - Avoid friction and shearing  - Provide appropriate hygiene as needed including keeping skin clean and dry  - Evaluate need for skin moisturizer/barrier cream  - Collaborate with interdisciplinary team   - Patient/family teaching  - Consider wound care consult   Outcome: Progressing     Problem: Nutrition/Hydration-ADULT  Goal: Nutrient/Hydration intake appropriate for improving, restoring or maintaining nutritional needs  Description: Monitor and assess patient's nutrition/hydration status for malnutrition  Collaborate with interdisciplinary team and initiate plan and interventions as ordered  Monitor patient's weight and dietary intake as ordered or per policy  Utilize nutrition screening tool and intervene as necessary  Determine patient's food preferences and provide high-protein, high-caloric foods as appropriate       INTERVENTIONS:  - Monitor oral intake, urinary output, labs, and treatment plans  - Assess nutrition and hydration status and recommend course of action  - Evaluate amount of meals eaten  - Assist patient with eating if necessary   - Allow adequate time for meals  - Recommend/ encourage appropriate diets, oral nutritional supplements, and vitamin/mineral supplements  - Order, calculate, and assess calorie counts as needed  - Recommend, monitor, and adjust tube feedings and TPN/PPN based on assessed needs  - Assess need for intravenous fluids  - Provide specific nutrition/hydration education as appropriate  - Include patient/family/caregiver in decisions related to nutrition  Outcome: Progressing     Problem: CONFUSION/THOUGHT DISTURBANCE  Goal: Thought disturbances (confusion, delirium, depression, dementia or psychosis) are managed to maintain or return to baseline mental status and functional level  Description: INTERVENTIONS:  - Assess for possible contributors to  thought disturbance, including but not limited to medications, infection, impaired vision or hearing, underlying metabolic abnormalities, dehydration, respiratory compromise,  psychiatric diagnoses and notify attending PHYSICAN/AP  - Monitor and intervene to maintain adequate nutrition, hydration, elimination, sleep and activity  - Decrease environmental stimuli, including noise as appropriate  - Provide frequent contacts to provide refocusing, direction and reassurance as needed  Approach patient calmly with eye contact and at their level    - [place] high risk fall precautions, aspiration precautions and other safety measures, as indicated  - If delirium suspected, notify physician/AP of change in condition and request immediate in-person evaluation  - Pursue consults as appropriate including Geriatric (campus dependent), OT for cognitive evaluation/activity planning, psychiatric, pastoral care, etc   Outcome: Progressing     Problem: BEHAVIOR  Goal: Pt/Family maintain appropriate behavior and adhere to behavioral management agreement, if implemented  Description: INTERVENTIONS:  - Assess the family dynamic   - Encourage verbalization of thoughts and concerns in a socially appropriate manner  - Assess patient/family's coping skills and non-compliant behavior (including use of illegal substances)  - Utilize positive, consistent limit setting strategies supporting safety of patient, staff and others  - Initiate consult with Case Management, Spiritual Care or other ancillary services as appropriate  - If a patient's/visitor's behavior jeopardizes the safety of the patient, staff, or others, refer to organization procedure     - Notify Security of behavior or suspected illegal substances which indicate the need for search of the patient and/or belongings  - Encourage participation in the decision making process about a behavioral management agreement; implement if patient meets criteria  Outcome: Progressing     Problem: METABOLIC, FLUID AND ELECTROLYTES - ADULT  Goal: Glucose maintained within target range  Description: INTERVENTIONS:  - Monitor Blood Glucose as ordered  - Assess for signs and symptoms of hyperglycemia and hypoglycemia  - Administer ordered medications to maintain glucose within target range  - Assess nutritional intake and initiate nutrition service referral as needed  Outcome: Progressing

## 2022-09-01 NOTE — ASSESSMENT & PLAN NOTE
· Patient seems to have episode of confusion  · Discussed with the Caroline Ray- patient does not have official diagnosis of dementia before  However it seems to be an element of advanced dementia  · Neuro psychiatrist was consulted - At this time, patient does not appear to have capacity to make fully informed medical decisions  Unspecified Neurocognitive Disorder  ·  Continue 1 on 1 supervision

## 2022-09-01 NOTE — ASSESSMENT & PLAN NOTE
Lab Results   Component Value Date    HGBA1C 8 1 (H) 08/09/2022       Recent Labs     08/31/22  1616 08/31/22  2055 09/01/22  0525 09/01/22  1057   POCGLU 171* 418* 192* 424*       Blood Sugar Average: Last 72 hrs:  (P) 272 [id number]   · Patient was on metformin 1000 b i d   · Continue Lantus 10 units q h s , added lispro 3 units t i d  A c   · Continue sliding scale insulin with Accu-Cheks while inpatient  · Diabetic diet

## 2022-09-01 NOTE — PROGRESS NOTES
[facility]  Progress Note - Fariha Varela  1933, 80 y o  male MRN: [id number]  Unit/Bed#: 7T [facility] 716-01 Encounter: [id number]  Primary Care Provider: Dwayne Levin DO   Date and time admitted to hospital: 8/27/2022  5:56 AM    * General weakness  Assessment & Plan  · Patient states he lives at home alone and has been having difficulty with ambulation  · No recent traumatic events  · Laboratory analysis grossly unremarkable  · Hemodynamically stable saturating well on room air  · Case management consultation for safe disposition planning  · Patient is medically clear  · PT/OT recommended post acute rehab services  · Patient and the family would prefer home health care with 24/7 care    Severe protein-calorie malnutrition ([address] )  Assessment & Plan  Malnutrition Findings:   Adult Malnutrition type: Chronic illness  Adult Degree of Malnutrition: Other severe protein calorie malnutrition  Malnutrition Characteristics: Inadequate energy, Weight loss          continue nutritional supplement         360 Statement: Severe pro/singh malnutrition r/t depression as evidenced by 12% unplanned weight loss since 2/24/22, consuming < 75% energy intake compared to estimated energy needs > 1 month  Treated with Enlive bid    BMI Findings: Body mass index is 18 57 kg/m²  Confusion  Assessment & Plan  · Patient seems to have episode of confusion  · Discussed with the Reina Patterson- patient does not have official diagnosis of dementia before  However it seems to be an element of advanced dementia  · Neuro psychiatrist was consulted - At this time, patient does not appear to have capacity to make fully informed medical decisions  Unspecified Neurocognitive Disorder  ·  Continue 1 on 1 supervision  Unwitnessed fall  Assessment & Plan  · Patient had unwitnessed fall on 08/28/2022  · CT head was negative for acute intracranial abnormality   Mild chronic microangiopathic changes  · Continue to monitor    Depression with anxiety  Assessment & Plan  · Patient has appeared somewhat anxious about his health  · Family states there may be an element of depression possibly secondary to dementia  · Continue Lexapro 5 mg oral daily  · Ativan 0 5 mg oral daily at bedtime    Stage 3a chronic kidney disease [facility])  Assessment & Plan  Lab Results   Component Value Date    EGFR 51 08/30/2022    EGFR 46 08/29/2022    EGFR 54 08/28/2022    CREATININE 1 24 08/30/2022    CREATININE 1 34 08/29/2022    CREATININE 1 18 08/28/2022   · Creatinine appears to be at baseline  · Trend BMP  · Avoid hypotension and nephrotoxic agents    Atrial fibrillation (HCC)  Assessment & Plan  · Rates are controlled  · Continue home Eliquis 2 5 mg PO BID    Hyperlipidemia  Assessment & Plan  · Continue home statin therapy    Type 2 diabetes mellitus with hyperglycemia [facility])  Assessment & Plan  Lab Results   Component Value Date    HGBA1C 8 1 (H) 08/09/2022       Recent Labs     08/31/22  1616 08/31/22  2055 09/01/22  0525 09/01/22  1057   POCGLU 171* 418* 192* 424*       Blood Sugar Average: Last 72 hrs:  (P) 272 [id number]   · Patient was on metformin 1000 b i d   · Continue Lantus 10 units q h s , added lispro 3 units t i d  A c   · Continue sliding scale insulin with Accu-Cheks while inpatient  · Diabetic diet    Primary hypertension  Assessment & Plan  · BP acceptable  · Blood pressures elevated on presentation  · Continue home beta-blocker for atrial fibrillation  · Hydralazine 5 mg IV q6 hours PRN SBP > 180 mmHg  · Monitor blood pressures      VTE Pharmacologic Prophylaxis:   Pharmacologic: Apixaban (Eliquis)  Mechanical VTE Prophylaxis in Place: Yes    Patient Centered Rounds: I have performed bedside rounds with nursing staff today      Discussions with Specialists or Other Care Team Provider:  Discussed with Case Management, nurse    Education and Discussions with Family / Patient: Discussed with patient    Time Spent for Care: 45 minutes  More than 50% of total time spent on counseling and coordination of care as described above  Current Length of Stay: 5 day(s)    Current Patient Status: Inpatient   Certification Statement: The patient will continue to require additional inpatient hospital stay due to Patient is medically clear, pending placement    Discharge Plan / Estimated Discharge Date:  Pending      Code Status: Level 1 - Full Code      Subjective:   Patient was seen and examined at bedside  The patient stated he is doing well  No acute overnight changes  Objective:     Vitals:   Temp (24hrs), Av 8 °F (37 1 °C), Min:97 6 °F (36 4 °C), Max:99 8 °F (37 7 °C)    Temp:  [97 6 °F (36 4 °C)-99 8 °F (37 7 °C)] 97 6 °F (36 4 °C)  HR:  [88-99] 93  Resp:  [17-18] 17  BP: (129-148)/(67-85) 142/85  SpO2:  [94 %-97 %] 96 %  Body mass index is 18 57 kg/m²  Input and Output Summary (last 24 hours):        Intake/Output Summary (Last 24 hours) at 2022 1200  Last data filed at 2022 0900  Gross per 24 hour   Intake 600 ml   Output 550 ml   Net 50 ml       Physical Exam:     Physical Exam  General: breathing well on room air, no acute distress  HEENT: NC/AT, PERRL, EOM - normal  Neck: Supple  Pulm/Chest: Normal chest wall expansion, clear breath sounds on both side, no wheezing/rhonchi or crackles appreciated  CVS: RRR, normal S1&S2, no murmur appreciated, capillary refill <2s  Abd: soft, non tender, non distended, bowel sounds +  MSK: move all 4 extremities spontaneously  Skin: warm  CNS: no acute focal neuro deficit      Additional Data:     Labs:    Results from last 7 days   Lab Units 22  0524   WBC Thousand/uL 10 89*   HEMOGLOBIN g/dL 11 9*   HEMATOCRIT % 35 7*   PLATELETS Thousands/uL 154   NEUTROS PCT % 64   LYMPHS PCT % 15   MONOS PCT % 17*   EOS PCT % 2     Results from last 7 days   Lab Units 22  0524 22  0451 22  0628   POTASSIUM mmol/L 3 8   < > 4  6   CHLORIDE mmol/L 103   < > 104   CO2 mmol/L 28   < > 28   BUN mg/dL 28*   < > 36*   CREATININE mg/dL 1 24   < > 1 32   CALCIUM mg/dL 8 5   < > 9 1   ALK PHOS U/L  --   --  56   ALT U/L  --   --  21   AST U/L  --   --  29    < > = values in this interval not displayed  * I Have Reviewed All Lab Data Listed Above  * Additional Pertinent Lab Tests Reviewed: [address] Admission Reviewed    Imaging:      I have reviewed pertinent imaging  Recent Cultures (last 7 days):           Last 24 Hours Medication List:   Current Facility-Administered Medications   Medication Dose Route Frequency Provider Last Rate    acetaminophen  650 mg Oral Q4H PRN Brie Kayla, DO      apixaban  2 5 mg Oral BID Brie Garza, DO      escitalopram  5 mg Oral Daily Brie Garza, DO      haloperidol lactate  1 mg Intramuscular Q6H PRN Brie Kayla, DO      hydrALAZINE  5 mg Intravenous Q6H PRN Brie Garza, DO      insulin glargine  10 Units Subcutaneous HS Lui Colon MD      insulin lispro  1-5 Units Subcutaneous TID AC Brie Garza, DO      insulin lispro  1-5 Units Subcutaneous HS Brie Garza, DO      insulin lispro  3 Units Subcutaneous TID With Meals Lui Colon MD      metoprolol succinate  50 mg Oral BID Brie Garza, DO      ondansetron  4 mg Intravenous Q6H PRN Brie Garza, DO      pantoprazole  20 mg Oral Daily Brie Kayla, DO      pravastatin  80 mg Oral Daily With [facility],           Today, Patient Was Seen By: Lui Colon MD    ** Please Note: Dragon 360 Dictation voice to text software may have been used in the creation of this document   **

## 2022-09-01 NOTE — PLAN OF CARE
Problem: METABOLIC, FLUID AND ELECTROLYTES - ADULT  Goal: Glucose maintained within target range  Description: INTERVENTIONS:  - Monitor Blood Glucose as ordered  - Assess for signs and symptoms of hyperglycemia and hypoglycemia  - Administer ordered medications to maintain glucose within target range  - Assess nutritional intake and initiate nutrition service referral as needed  Outcome: Progressing     Problem: Prexisting or High Potential for Compromised Skin Integrity  Goal: Skin integrity is maintained or improved  Description: INTERVENTIONS:  - Identify patients at risk for skin breakdown  - Assess and monitor skin integrity  - Assess and monitor nutrition and hydration status  - Monitor labs   - Assess for incontinence   - Turn and reposition patient  - Assist with mobility/ambulation  - Relieve pressure over bony prominences  - Avoid friction and shearing  - Provide appropriate hygiene as needed including keeping skin clean and dry  - Evaluate need for skin moisturizer/barrier cream  - Collaborate with interdisciplinary team   - Patient/family teaching  - Consider wound care consult   Outcome: Progressing     Problem: Potential for Falls  Goal: Patient will remain free of falls  Description: INTERVENTIONS:  - Educate patient/family on patient safety including physical limitations  - Instruct patient to call for assistance with activity   - Consult OT/PT to assist with strengthening/mobility   - Keep Call bell within reach  - Keep bed low and locked with side rails adjusted as appropriate  - Keep care items and personal belongings within reach  - Initiate and maintain comfort rounds  - Offer Toileting every 2 Hours, in advance of need  - Initiate/Maintain bed alarm  - Obtain necessary fall risk management equipment: walker  - Apply yellow socks and bracelet for high fall risk patients  - Consider moving patient to room near nurses station  Outcome: Progressing     Problem: Knowledge Deficit  Goal: Patient/family/caregiver demonstrates understanding of disease process, treatment plan, medications, and discharge instructions  Description: Complete learning assessment and assess knowledge base    Interventions:  - Provide teaching at level of understanding  - Provide teaching via preferred learning methods  Outcome: Progressing     Problem: DISCHARGE PLANNING  Goal: Discharge to home or other facility with appropriate resources  Description: INTERVENTIONS:  - Identify barriers to discharge w/patient and caregiver  - Arrange for needed discharge resources and transportation as appropriate  - Identify discharge learning needs (meds, wound care, etc )  - Arrange for interpretive services to assist at discharge as needed  - Refer to Case Management Department for coordinating discharge planning if the patient needs post-hospital services based on physician/advanced practitioner order or complex needs related to functional status, cognitive ability, or social support system  Outcome: Progressing

## 2022-09-02 LAB
GLUCOSE SERPL-MCNC: 205 MG/DL (ref 65–140)
GLUCOSE SERPL-MCNC: 306 MG/DL (ref 65–140)
GLUCOSE SERPL-MCNC: 382 MG/DL (ref 65–140)
GLUCOSE SERPL-MCNC: 444 MG/DL (ref 65–140)
GLUCOSE SERPL-MCNC: 445 MG/DL (ref 65–140)

## 2022-09-02 PROCEDURE — 99232 SBSQ HOSP IP/OBS MODERATE 35: CPT | Performed by: INTERNAL MEDICINE

## 2022-09-02 PROCEDURE — 82948 REAGENT STRIP/BLOOD GLUCOSE: CPT

## 2022-09-02 PROCEDURE — 97167 OT EVAL HIGH COMPLEX 60 MIN: CPT

## 2022-09-02 RX ORDER — INSULIN GLARGINE 100 [IU]/ML
15 INJECTION, SOLUTION SUBCUTANEOUS
Status: DISCONTINUED | OUTPATIENT
Start: 2022-09-02 | End: 2022-09-08

## 2022-09-02 RX ORDER — INSULIN LISPRO 100 [IU]/ML
5 INJECTION, SOLUTION INTRAVENOUS; SUBCUTANEOUS
Status: DISCONTINUED | OUTPATIENT
Start: 2022-09-02 | End: 2022-09-08

## 2022-09-02 RX ADMIN — METOPROLOL SUCCINATE 50 MG: 50 TABLET, EXTENDED RELEASE ORAL at 23:10

## 2022-09-02 RX ADMIN — INSULIN LISPRO 1 UNITS: 100 INJECTION, SOLUTION INTRAVENOUS; SUBCUTANEOUS at 06:30

## 2022-09-02 RX ADMIN — ACETAMINOPHEN 650 MG: 325 TABLET ORAL at 08:30

## 2022-09-02 RX ADMIN — PRAVASTATIN SODIUM 80 MG: 40 TABLET ORAL at 15:40

## 2022-09-02 RX ADMIN — PANTOPRAZOLE SODIUM 20 MG: 20 TABLET, DELAYED RELEASE ORAL at 08:30

## 2022-09-02 RX ADMIN — APIXABAN 2.5 MG: 2.5 TABLET, FILM COATED ORAL at 08:30

## 2022-09-02 RX ADMIN — APIXABAN 2.5 MG: 2.5 TABLET, FILM COATED ORAL at 17:12

## 2022-09-02 RX ADMIN — INSULIN LISPRO 4 UNITS: 100 INJECTION, SOLUTION INTRAVENOUS; SUBCUTANEOUS at 23:10

## 2022-09-02 RX ADMIN — INSULIN LISPRO 3 UNITS: 100 INJECTION, SOLUTION INTRAVENOUS; SUBCUTANEOUS at 08:34

## 2022-09-02 RX ADMIN — INSULIN GLARGINE 15 UNITS: 100 INJECTION, SOLUTION SUBCUTANEOUS at 23:10

## 2022-09-02 RX ADMIN — ESCITALOPRAM OXALATE 5 MG: 5 TABLET, FILM COATED ORAL at 08:30

## 2022-09-02 RX ADMIN — INSULIN LISPRO 5 UNITS: 100 INJECTION, SOLUTION INTRAVENOUS; SUBCUTANEOUS at 11:16

## 2022-09-02 RX ADMIN — METOPROLOL SUCCINATE 50 MG: 50 TABLET, EXTENDED RELEASE ORAL at 08:30

## 2022-09-02 RX ADMIN — INSULIN LISPRO 3 UNITS: 100 INJECTION, SOLUTION INTRAVENOUS; SUBCUTANEOUS at 16:10

## 2022-09-02 RX ADMIN — INSULIN LISPRO 5 UNITS: 100 INJECTION, SOLUTION INTRAVENOUS; SUBCUTANEOUS at 11:17

## 2022-09-02 RX ADMIN — INSULIN LISPRO 5 UNITS: 100 INJECTION, SOLUTION INTRAVENOUS; SUBCUTANEOUS at 16:10

## 2022-09-02 NOTE — OCCUPATIONAL THERAPY NOTE
Occupational Therapy Evaluation     Patient Name: Silvia Zhao  Today's Date: 9/2/2022  Problem List  Principal Problem:    General weakness  Active Problems:    Primary hypertension    Type 2 diabetes mellitus with hyperglycemia (HCC)    Hyperlipidemia    Atrial fibrillation (HCC)    Stage 3a chronic kidney disease ([facility] [address] )    Depression with anxiety    Unwitnessed fall    Confusion    Severe protein-calorie malnutrition ([facility] [address] )    Past Medical History  History reviewed  No pertinent past medical history  Past Surgical History  Past Surgical History:   Procedure Laterality Date    CATARACT EXTRACTION      [address]             09/02/22 0753   OT Last Visit   OT Visit Date 09/02/22   Note Type   Note type Evaluation   Restrictions/Precautions   Weight Bearing Precautions Per Order No   Other Precautions Fall Risk; Chair Alarm;Cognitive; Bed Alarm   Pain Assessment   Pain Assessment Tool 0-10   Pain Score No Pain   Home Living   Type of Home House   Additional Comments Pt an unreliable historian  Prior Function   Comments Per chart review, Pt lives alone  Pt was having difficulty caring for self at home  ADL   Grooming Assistance 4  Minimal Assistance   UB Bathing Assistance 3  Moderate Assistance   LB Bathing Assistance 2  Maximal Assistance   UB Dressing Assistance 3  Moderate Assistance   LB Dressing Assistance 2  Maximal [address]  2  Maximal Assistance   Bed Mobility   Supine to Sit 3  Moderate assistance   Additional items Assist x 1; Increased time required   Sit to Supine 3  Moderate assistance   Additional items Assist x 1; Increased time required   Transfers   Sit to Stand 3  Moderate assistance   Additional items Assist x 1   Stand to Sit 3  Moderate assistance   Additional items Assist x 1   Toilet transfer 3  Moderate assistance   Additional items Assist x 1; Increased time required   Functional Mobility   Functional Mobility 3  Moderate assistance   Additional Comments 3-4 small steps   Additional items Rolling walker   Balance   Static Sitting Fair   Dynamic Sitting Fair -   Static Standing Poor +   Dynamic Standing Poor +   Ambulatory Poor +   Activity Tolerance   Activity Tolerance Patient tolerated treatment well   RUE Assessment   RUE Assessment WFL   LUE Assessment   LUE Assessment WFL   Cognition   Arousal/Participation Alert; Cooperative   Attention Difficulty attending to directions   Orientation Level Oriented to person;Disoriented to situation;Disoriented to time;Disoriented to place   Memory Within functional limits   Following Commands Follows one step commands with increased time or repetition   Assessment   Limitation Decreased ADL status; Decreased UE ROM; Decreased UE strength;Decreased Safe judgement during ADL;Decreased cognition;Decreased endurance;Decreased high-level ADLs   Prognosis Good   Assessment   Pt is a 80 y o  male seen for OT evaluation s/p admit [facility] on 8/27/2022 w/ General weakness  See above for extensive list of comorbidities affecting Pt's functional performance at time of assessment  Personal factors affecting Pt at time of IE include:behavioral pattern, difficulty performing ADLS, difficulty performing IADLS , health management , and environment  Upon evaluation: Pt requires modA for sit-->stand transfers and pivot to commode, modA to take 3-4 small steps at EOB  The following deficits impact occupational performance: weakness, decreased strength, decreased balance, decreased tolerance, impaired memory, impaired sequencing, impaired problem solving, and decreased safety awareness  Pt to benefit from continued skilled OT services while in the hospital to address deficits as defined above and maximize level of functional independence w ADL's and functional mobility   Occupational performance areas to address include: bathing/shower, toilet hygiene, dressing, health maintenance, functional mobility, and clothing management  From OT standpoint, recommendation at time of d/c would be post acute rehab  Goals   STG Time Frame   (2 weeks)   Short Term Goals Pt will complete ADL transfers with supervision  Pt will complete UB ADL with supervision  Pt will complete LB dressing with Sherley  Plan   Treatment Interventions ADL retraining;Functional transfer training;UE strengthening/ROM; Endurance training;Continued evaluation; Activityengagement   Goal Expiration Date 09/16/22   OT Frequency 2-3x/wk   Recommendation   OT Discharge Recommendation Post acute rehabilitation services

## 2022-09-02 NOTE — PROGRESS NOTES
51 [facility]  Progress Note - Godwin De Guzman  1933, 80 y o  male MRN: [id number]  Unit/Bed#: 7T [facility] 716-01 Encounter: [id number]  Primary Care Provider: Jena PATEL DO   Date and time admitted to hospital: 8/27/2022  5:56 AM    * General weakness  Assessment & Plan  · Patient states he lives at home alone and has been having difficulty with ambulation  · No recent traumatic events  · Laboratory analysis grossly unremarkable  · Hemodynamically stable saturating well on room air  · Case management consultation for safe disposition planning  · Patient is medically clear, awaiting safe disposition plan  · PT/OT recommended post acute rehab services  · Patient and the family would prefer home health care with 24/7 care    Severe protein-calorie malnutrition ([address] )  Assessment & Plan  Malnutrition Findings:   Adult Malnutrition type: Chronic illness  Adult Degree of Malnutrition: Other severe protein calorie malnutrition  Malnutrition Characteristics: Inadequate energy, Weight loss          continue nutritional supplement         360 Statement: Severe pro/singh malnutrition r/t depression as evidenced by 12% unplanned weight loss since 2/24/22, consuming < 75% energy intake compared to estimated energy needs > 1 month  Treated with Enlive bid    BMI Findings: Body mass index is 18 57 kg/m²  Confusion  Assessment & Plan  · Patient seems to have episode of confusion  · Discussed with the Ekaterina Coombs- patient does not have official diagnosis of dementia before  However it seems to be an element of advanced dementia  · Neuro psychiatrist was consulted - At this time, patient does not appear to have capacity to make fully informed medical decisions  Unspecified Neurocognitive Disorder  Unwitnessed fall  Assessment & Plan  · Patient had unwitnessed fall on 08/28/2022  · CT head was negative for acute intracranial abnormality   Mild chronic microangiopathic changes  · Continue to monitor    Depression with anxiety  Assessment & Plan  · Patient has appeared somewhat anxious about his health  · Family states there may be an element of depression possibly secondary to dementia  · Continue Lexapro 5 mg oral daily  · Ativan 0 5 mg oral daily at bedtime    Stage 3a chronic kidney disease [facility])  Assessment & Plan  Lab Results   Component Value Date    EGFR 51 08/30/2022    EGFR 46 08/29/2022    EGFR 54 08/28/2022    CREATININE 1 24 08/30/2022    CREATININE 1 34 08/29/2022    CREATININE 1 18 08/28/2022   · Creatinine appears to be at baseline  · Trend BMP  · Avoid hypotension and nephrotoxic agents    Atrial fibrillation (HCC)  Assessment & Plan  · Rates are controlled  · Continue home Eliquis 2 5 mg PO BID    Hyperlipidemia  Assessment & Plan  · Continue home statin therapy    Type 2 diabetes mellitus with hyperglycemia [facility])  Assessment & Plan  Lab Results   Component Value Date    HGBA1C 8 1 (H) 08/09/2022       Recent Labs     09/01/22  1057 09/01/22  1543 09/01/22 2024 09/02/22  0613   POCGLU 424* 278* 325* 205*       Blood Sugar Average: Last 72 hrs:  (P) 445 [id number]   · Patient was on metformin 1000 b i d  · Will increase Lantus to 15 units q h s , and lispro to 5 units t i d  A c   · Continue sliding scale insulin with Accu-Cheks while inpatient  · Diabetic diet    Primary hypertension  Assessment & Plan  · BP acceptable  · Blood pressures elevated on presentation  · Continue home beta-blocker for atrial fibrillation  · Hydralazine 5 mg IV q6 hours PRN SBP > 180 mmHg  · Monitor blood pressures      VTE Pharmacologic Prophylaxis:   Pharmacologic: Apixaban (Eliquis)  Mechanical VTE Prophylaxis in Place: Yes    Patient Centered Rounds: I have performed bedside rounds with nursing staff today      Discussions with Specialists or Other Care Team Provider:  Discussed with Case Management, nurse    Education and Discussions with Family / Patient: Discussed with patientEdmund at bedside    Time Spent for Care: 45 minutes  More than 50% of total time spent on counseling and coordination of care as described above  Current Length of Stay: 6 day(s)    Current Patient Status: Inpatient   Certification Statement: The patient will continue to require additional inpatient hospital stay due to Patient is medically clear, pending placement    Discharge Plan / Estimated Discharge Date:  Pending placement      Code Status: Level 1 - Full Code      Subjective:   Patient was seen and examined at bedside  The patient has generalized deconditioning  No acute overnight changes  Objective:     Vitals:   Temp (24hrs), Av 7 °F (36 5 °C), Min:97 2 °F (36 2 °C), Max:98 7 °F (37 1 °C)    Temp:  [97 2 °F (36 2 °C)-98 7 °F (37 1 °C)] 98 7 °F (37 1 °C)  HR:  [93-98] 98  Resp:  [17-20] 17  BP: (142-159)/(79-90) 142/90  SpO2:  [97 %-100 %] 97 %  Body mass index is 18 57 kg/m²  Input and Output Summary (last 24 hours):        Intake/Output Summary (Last 24 hours) at 2022 1111  Last data filed at 2022 0830  Gross per 24 hour   Intake 480 ml   Output 450 ml   Net 30 ml       Physical Exam:     Physical Exam  General: breathing well on room air, no acute distress  HEENT: NC/AT, PERRL, EOM - normal  Neck: Supple  Pulm/Chest: Normal chest wall expansion, clear breath sounds on both side, no wheezing/rhonchi or crackles appreciated  CVS: RRR, normal S1&S2, no murmur appreciated, capillary refill <2s  Abd: soft, non tender, non distended, bowel sounds +  MSK: move all 4 extremities spontaneously  Skin: warm  CNS: no acute focal neuro deficit      Additional Data:     Labs:    Results from last 7 days   Lab Units 22  0524   WBC Thousand/uL 10 89*   HEMOGLOBIN g/dL 11 9*   HEMATOCRIT % 35 7*   PLATELETS Thousands/uL 154   NEUTROS PCT % 64   LYMPHS PCT % 15   MONOS PCT % 17*   EOS PCT % 2     Results from last 7 days   Lab Units 22  0524 08/28/22  0451 08/27/22  0628   POTASSIUM mmol/L 3 8   < > 4 6   CHLORIDE mmol/L 103   < > 104   CO2 mmol/L 28   < > 28   BUN mg/dL 28*   < > 36*   CREATININE mg/dL 1 24   < > 1 32   CALCIUM mg/dL 8 5   < > 9 1   ALK PHOS U/L  --   --  56   ALT U/L  --   --  21   AST U/L  --   --  29    < > = values in this interval not displayed  * I Have Reviewed All Lab Data Listed Above  * Additional Pertinent Lab Tests Reviewed: [address] Admission Reviewed    Imaging:      I have reviewed pertinent imaging  Recent Cultures (last 7 days):           Last 24 Hours Medication List:   Current Facility-Administered Medications   Medication Dose Route Frequency Provider Last Rate    acetaminophen  650 mg Oral Q4H PRN [facility], DO      apixaban  2 5 mg Oral BID [facility], DO      escitalopram  5 mg Oral Daily [facility], DO      haloperidol lactate  1 mg Intramuscular Q6H PRN [facility], DO      hydrALAZINE  5 mg Intravenous Q6H PRN [facility], DO      insulin glargine  15 Units Subcutaneous HS Victorino Walker MD      insulin lispro  1-5 Units Subcutaneous TID AC [facility], DO      insulin lispro  1-5 Units Subcutaneous HS [facility], DO      insulin lispro  5 Units Subcutaneous TID With Meals Victorino Walker MD      metoprolol succinate  50 mg Oral BID [facility], DO      ondansetron  4 mg Intravenous Q6H PRN [facility], DO      pantoprazole  20 mg Oral Daily [facility], DO      pravastatin  80 mg Oral Daily With [address],           Today, Patient Was Seen By: Victorino Walker MD    ** Please Note: Dragon 360 Dictation voice to text software may have been used in the creation of this document   **

## 2022-09-02 NOTE — ASSESSMENT & PLAN NOTE
· Patient states he lives at home alone and has been having difficulty with ambulation  · No recent traumatic events  · Laboratory analysis grossly unremarkable  · Hemodynamically stable saturating well on room air  · Case management consultation for safe disposition planning  · Patient is medically clear, awaiting safe disposition plan  · PT/OT recommended post acute rehab services  · Patient and the family would prefer home health care with 24/7 care

## 2022-09-02 NOTE — ASSESSMENT & PLAN NOTE
· Patient seems to have episode of confusion  · Discussed with the Princess Sears- patient does not have official diagnosis of dementia before  However it seems to be an element of advanced dementia  · Neuro psychiatrist was consulted - At this time, patient does not appear to have capacity to make fully informed medical decisions  Unspecified Neurocognitive Disorder

## 2022-09-02 NOTE — ASSESSMENT & PLAN NOTE
Lab Results   Component Value Date    HGBA1C 8 1 (H) 08/09/2022       Recent Labs     09/01/22  1057 09/01/22  1543 09/01/22 2024 09/02/22  0613   POCGLU 424* 278* 325* 205*       Blood Sugar Average: Last 72 hrs:  (P) 699 [id number]   · Patient was on metformin 1000 b i d    · Will increase Lantus to 15 units q h s , and lispro to 5 units t i d  A c   · Continue sliding scale insulin with Accu-Cheks while inpatient  · Diabetic diet

## 2022-09-02 NOTE — PLAN OF CARE
Problem: OCCUPATIONAL THERAPY ADULT  Goal: Performs self-care activities at highest level of function for planned discharge setting  See evaluation for individualized goals  Description: Treatment Interventions: ADL retraining, Functional transfer training, UE strengthening/ROM, Endurance training, Continued evaluation, Activityengagement          See flowsheet documentation for full assessment, interventions and recommendations  Note: Limitation: Decreased ADL status, Decreased UE ROM, Decreased UE strength, Decreased Safe judgement during ADL, Decreased cognition, Decreased endurance, Decreased high-level ADLs  Prognosis: Good  Assessment: Pt is a 80 y o  male seen for OT evaluation s/p admit to [address] [place] [address] on 8/27/2022 w/ General weakness  See above for extensive list of comorbidities affecting Pt's functional performance at time of assessment  Personal factors affecting Pt at time of IE include:behavioral pattern, difficulty performing ADLS, difficulty performing IADLS , health management , and environment  Upon evaluation: Pt requires modA for sit-->stand transfers and pivot to commode, modA to take 3-4 small steps at EOB  The following deficits impact occupational performance: weakness, decreased strength, decreased balance, decreased tolerance, impaired memory, impaired sequencing, impaired problem solving, and decreased safety awareness  Pt to benefit from continued skilled OT services while in the hospital to address deficits as defined above and maximize level of functional independence w ADL's and functional mobility  Occupational performance areas to address include: bathing/shower, toilet hygiene, dressing, health maintenance, functional mobility, and clothing management  From OT standpoint, recommendation at time of d/c would be post acute rehab         OT Discharge Recommendation: Post acute rehabilitation services

## 2022-09-03 LAB
GLUCOSE SERPL-MCNC: 113 MG/DL (ref 65–140)
GLUCOSE SERPL-MCNC: 147 MG/DL (ref 65–140)
GLUCOSE SERPL-MCNC: 282 MG/DL (ref 65–140)
GLUCOSE SERPL-MCNC: 77 MG/DL (ref 65–140)

## 2022-09-03 PROCEDURE — 82948 REAGENT STRIP/BLOOD GLUCOSE: CPT

## 2022-09-03 PROCEDURE — 99232 SBSQ HOSP IP/OBS MODERATE 35: CPT | Performed by: INTERNAL MEDICINE

## 2022-09-03 RX ADMIN — PRAVASTATIN SODIUM 80 MG: 40 TABLET ORAL at 15:53

## 2022-09-03 RX ADMIN — INSULIN LISPRO 5 UNITS: 100 INJECTION, SOLUTION INTRAVENOUS; SUBCUTANEOUS at 08:20

## 2022-09-03 RX ADMIN — INSULIN GLARGINE 15 UNITS: 100 INJECTION, SOLUTION SUBCUTANEOUS at 22:32

## 2022-09-03 RX ADMIN — METOPROLOL SUCCINATE 50 MG: 50 TABLET, EXTENDED RELEASE ORAL at 22:26

## 2022-09-03 RX ADMIN — METOPROLOL SUCCINATE 50 MG: 50 TABLET, EXTENDED RELEASE ORAL at 08:19

## 2022-09-03 RX ADMIN — PANTOPRAZOLE SODIUM 20 MG: 20 TABLET, DELAYED RELEASE ORAL at 08:19

## 2022-09-03 RX ADMIN — INSULIN LISPRO 5 UNITS: 100 INJECTION, SOLUTION INTRAVENOUS; SUBCUTANEOUS at 15:50

## 2022-09-03 RX ADMIN — INSULIN LISPRO 3 UNITS: 100 INJECTION, SOLUTION INTRAVENOUS; SUBCUTANEOUS at 22:32

## 2022-09-03 RX ADMIN — APIXABAN 2.5 MG: 2.5 TABLET, FILM COATED ORAL at 17:04

## 2022-09-03 RX ADMIN — APIXABAN 2.5 MG: 2.5 TABLET, FILM COATED ORAL at 08:19

## 2022-09-03 RX ADMIN — ACETAMINOPHEN 650 MG: 325 TABLET ORAL at 22:27

## 2022-09-03 RX ADMIN — INSULIN LISPRO 5 UNITS: 100 INJECTION, SOLUTION INTRAVENOUS; SUBCUTANEOUS at 11:45

## 2022-09-03 RX ADMIN — ESCITALOPRAM OXALATE 5 MG: 5 TABLET, FILM COATED ORAL at 08:19

## 2022-09-03 NOTE — ASSESSMENT & PLAN NOTE
· Patient seems to have episode of confusion  · Discussed with the Mercedez Isidro and Cyrus Weaver- patient does not have official diagnosis of dementia before  However it seems to be an element of advanced dementia  · Neuro psychiatrist was consulted - At this time, patient does not appear to have capacity to make fully informed medical decisions  Unspecified Neurocognitive Disorder

## 2022-09-03 NOTE — PROGRESS NOTES
[facility]  Progress Note - Flory Braga  1933, 80 y o  male MRN: [id number]  Unit/Bed#: 7T [facility] 716-01 Encounter: [id number]  Primary Care Provider: Aries FERRARO,    Date and time admitted to hospital: 8/27/2022  5:56 AM    * General weakness  Assessment & Plan  · Patient states he lives at home alone and has been having difficulty with ambulation  · No recent traumatic events  · Laboratory analysis grossly unremarkable  · Hemodynamically stable saturating well on room air  · Case management consultation for safe disposition planning  · Patient is medically clear, awaiting safe disposition plan  · PT/OT recommended post acute rehab services  · Patient and the family would prefer home health care with 24/7 care    Severe protein-calorie malnutrition ([address] )  Assessment & Plan  Malnutrition Findings:   Adult Malnutrition type: Chronic illness  Adult Degree of Malnutrition: Other severe protein calorie malnutrition  Malnutrition Characteristics: Inadequate energy, Weight loss          continue nutritional supplement         360 Statement: Severe pro/singh malnutrition r/t depression as evidenced by 12% unplanned weight loss since 2/24/22, consuming < 75% energy intake compared to estimated energy needs > 1 month  Treated with Enlive bid    BMI Findings: Body mass index is 18 57 kg/m²  Confusion  Assessment & Plan  · Patient seems to have episode of confusion  · Discussed with the Lul Simpson and Angelika Bernardo- patient does not have official diagnosis of dementia before  However it seems to be an element of advanced dementia  · Neuro psychiatrist was consulted - At this time, patient does not appear to have capacity to make fully informed medical decisions  Unspecified Neurocognitive Disorder  Unwitnessed fall  Assessment & Plan  · Patient had unwitnessed fall on 08/28/2022  · CT head was negative for acute intracranial abnormality   Mild chronic microangiopathic changes  · Continue to monitor    Depression with anxiety  Assessment & Plan  · Patient has appeared somewhat anxious about his health  · Family states there may be an element of depression possibly secondary to dementia  · Continue Lexapro 5 mg oral daily  · Ativan 0 5 mg oral daily at bedtime    Stage 3a chronic kidney disease [facility])  Assessment & Plan  Lab Results   Component Value Date    EGFR 51 08/30/2022    EGFR 46 08/29/2022    EGFR 54 08/28/2022    CREATININE 1 24 08/30/2022    CREATININE 1 34 08/29/2022    CREATININE 1 18 08/28/2022   · Creatinine appears to be at baseline  · Trend BMP  · Avoid hypotension and nephrotoxic agents    Atrial fibrillation (HCC)  Assessment & Plan  · Rates are controlled  · Continue home Eliquis 2 5 mg PO BID    Hyperlipidemia  Assessment & Plan  · Continue home statin therapy    Type 2 diabetes mellitus with hyperglycemia [facility])  Assessment & Plan  Lab Results   Component Value Date    HGBA1C 8 1 (H) 08/09/2022       Recent Labs     09/02/22  1609 09/02/22  2228 09/03/22  0601 09/03/22  1053   POCGLU 306* 382* 77 113       Blood Sugar Average: Last 72 hrs:  (P) 453 [id number]   · Patient was on metformin 1000 b i d   · Continue Lantus to 15 units q h s , and lispro 5 units t i d  A c   · Continue sliding scale insulin with Accu-Cheks while inpatient  · Diabetic diet    Primary hypertension  Assessment & Plan  · BP acceptable  · Blood pressures elevated on presentation  · Continue home beta-blocker for atrial fibrillation  · Hydralazine 5 mg IV q6 hours PRN SBP > 180 mmHg  · Monitor blood pressures      VTE Pharmacologic Prophylaxis:   Pharmacologic: Apixaban (Eliquis)  Mechanical VTE Prophylaxis in Place: Yes    Patient Centered Rounds: I have performed bedside rounds with nursing staff today      Discussions with Specialists or Other Care Team Provider:  Discussed with nurse    Education and Discussions with Family / Patient:  Discussed with patient    Time Spent for Care: 45 minutes  More than 50% of total time spent on counseling and coordination of care as described above  Current Length of Stay: 7 day(s)    Current Patient Status: Inpatient   Certification Statement: The patient will continue to require additional inpatient hospital stay due to Pending placement, patient is medically clear    Discharge Plan / Estimated Discharge Date:  Pending placement      Code Status: Level 1 - Full Code      Subjective:   Patient was seen and examined at bedside  The patient has no major complaint  No acute overnight changes  Objective:     Vitals:   Temp (24hrs), Av 8 °F (37 1 °C), Min:98 1 °F (36 7 °C), Max:99 7 °F (37 6 °C)    Temp:  [98 1 °F (36 7 °C)-99 7 °F (37 6 °C)] 98 1 °F (36 7 °C)  HR:  [94-96] 95  Resp:  [18] 18  BP: (115-128)/(74-84) 124/74  SpO2:  [96 %-97 %] 96 %  Body mass index is 18 57 kg/m²  Input and Output Summary (last 24 hours):        Intake/Output Summary (Last 24 hours) at 9/3/2022 1223  Last data filed at 9/3/2022 1100  Gross per 24 hour   Intake 540 ml   Output --   Net 540 ml       Physical Exam:     Physical Exam  General: breathing well on room air, no acute distress  HEENT: NC/AT, PERRL, EOM - normal  Neck: Supple  Pulm/Chest: Normal chest wall expansion, clear breath sounds on both side, no wheezing/rhonchi or crackles appreciated  CVS: RRR, normal S1&S2, no murmur appreciated, capillary refill <2s  Abd: soft, non tender, non distended, bowel sounds +  MSK: move all 4 extremities spontaneously  Skin: warm  CNS: no acute focal neuro deficit      Additional Data:     Labs:    Results from last 7 days   Lab Units 22  0524   WBC Thousand/uL 10 89*   HEMOGLOBIN g/dL 11 9*   HEMATOCRIT % 35 7*   PLATELETS Thousands/uL 154   NEUTROS PCT % 64   LYMPHS PCT % 15   MONOS PCT % 17*   EOS PCT % 2     Results from last 7 days   Lab Units 22  0524   POTASSIUM mmol/L 3 8   CHLORIDE mmol/L 103   CO2 mmol/L 28   BUN mg/dL 28*   CREATININE mg/dL 1 24   CALCIUM mg/dL 8 5           * I Have Reviewed All Lab Data Listed Above  * Additional Pertinent Lab Tests Reviewed: [address] Admission Reviewed    Imaging:      I have reviewed pertinent imaging  Recent Cultures (last 7 days):           Last 24 Hours Medication List:   Current Facility-Administered Medications   Medication Dose Route Frequency Provider Last Rate    acetaminophen  650 mg Oral Q4H PRN Lucero Crawley, DO      apixaban  2 5 mg Oral BID Clare Crawley, DO      escitalopram  5 mg Oral Daily Clare Jonathon, DO      haloperidol lactate  1 mg Intramuscular Q6H PRN Lucero Jonathon, DO      hydrALAZINE  5 mg Intravenous Q6H PRN Lucero Jonathon, DO      insulin glargine  15 Units Subcutaneous HS Lawyer Rufino MD      insulin lispro  1-5 Units Subcutaneous TID AC Clare Crawley, DO      insulin lispro  1-5 Units Subcutaneous HS Clare Crawley, DO      insulin lispro  5 Units Subcutaneous TID With Meals Lawyer Rufino MD      metoprolol succinate  50 mg Oral BID Clare Jonathon, DO      ondansetron  4 mg Intravenous Q6H PRN Clare Crawley, DO      pantoprazole  20 mg Oral Daily Clare Jonathon, DO      pravastatin  80 mg Oral Daily With [facility],           Today, Patient Was Seen By: Lawyer Rufino MD    ** Please Note: Dragon 360 Dictation voice to text software may have been used in the creation of this document   **

## 2022-09-03 NOTE — ASSESSMENT & PLAN NOTE
Lab Results   Component Value Date    HGBA1C 8 1 (H) 08/09/2022       Recent Labs     09/02/22  1609 09/02/22  2228 09/03/22  0601 09/03/22  1053   POCGLU 306* 382* 77 113       Blood Sugar Average: Last 72 hrs:  (P) 120 [id number]   · Patient was on metformin 1000 b i d   · Continue Lantus to 15 units q h s , and lispro 5 units t i d  A c   · Continue sliding scale insulin with Accu-Cheks while inpatient  · Diabetic diet

## 2022-09-03 NOTE — PLAN OF CARE
Problem: Potential for Falls  Goal: Patient will remain free of falls  Description: INTERVENTIONS:  - Educate patient/family on patient safety including physical limitations  - Instruct patient to call for assistance with activity   - Consult OT/PT to assist with strengthening/mobility   - Keep Call bell within reach  - Keep bed low and locked with side rails adjusted as appropriate  - Keep care items and personal belongings within reach  - Initiate and maintain comfort rounds  - Offer Toileting every 2 Hours, in advance of need  - Initiate/Maintain bed alarm  - Obtain necessary fall risk management equipment: walker  - Apply yellow socks and bracelet for high fall risk patients  - Consider moving patient to room near nurses station  Outcome: Progressing     Problem: MOBILITY - ADULT  Goal: Maintain or return to baseline ADL function  Description: INTERVENTIONS:  -  Assess patient's ability to carry out ADLs; assess patient's baseline for ADL function and identify physical deficits which impact ability to perform ADLs (bathing, care of mouth/teeth, toileting, grooming, dressing, etc )  - Assess/evaluate cause of self-care deficits   - Assess range of motion  - Assess patient's mobility; develop plan if impaired  - Assess patient's need for assistive devices and provide as appropriate  - Encourage maximum independence but intervene and supervise when necessary  - Involve family in performance of ADLs  - Assess for home care needs following discharge   - Consider OT consult to assist with ADL evaluation and planning for discharge  - Provide patient education as appropriate  Outcome: Progressing  Goal: Maintains/Returns to pre admission functional level  Description: INTERVENTIONS:  - Perform BMAT or MOVE assessment daily    - Set and communicate daily mobility goal to care team and patient/family/caregiver     - Collaborate with rehabilitation services on mobility goals if consulted  - Ambulate patient 2 times a day  - Out of bed to chair 3 times a day   - Out of bed for meals 3 times a day  - Out of bed for toileting  - Record patient progress and toleration of activity level   Outcome: Progressing     Problem: PAIN - ADULT  Goal: Verbalizes/displays adequate comfort level or baseline comfort level  Description: Interventions:  - Encourage patient to monitor pain and request assistance  - Assess pain using appropriate pain scale  - Administer analgesics based on type and severity of pain and evaluate response  - Implement non-pharmacological measures as appropriate and evaluate response  - Consider cultural and social influences on pain and pain management  - Notify physician/advanced practitioner if interventions unsuccessful or patient reports new pain  Outcome: Progressing     Problem: INFECTION - ADULT  Goal: Absence or prevention of progression during hospitalization  Description: INTERVENTIONS:  - Assess and monitor for signs and symptoms of infection  - Monitor lab/diagnostic results  - Monitor all insertion sites  - Administer medications as ordered  - Instruct and encourage patient and family to use good hand hygiene technique  - Identify and instruct in appropriate isolation precautions for identified infection/condition  Outcome: Progressing  Goal: Absence of fever/infection during neutropenic period  Description: INTERVENTIONS:  - Monitor WBC    Outcome: Progressing     Problem: SAFETY ADULT  Goal: Patient will remain free of falls  Description: INTERVENTIONS:  - Educate patient/family on patient safety including physical limitations  - Instruct patient to call for assistance with activity   - Consult OT/PT to assist with strengthening/mobility   - Keep Call bell within reach  - Keep bed low and locked with side rails adjusted as appropriate  - Keep care items and personal belongings within reach  - Initiate and maintain comfort rounds  - Offer Toileting every 2 Hours, in advance of need  - Initiate/Maintain bed alarm  - Obtain necessary fall risk management equipment: walker  - Apply yellow socks and bracelet for high fall risk patients  - Consider moving patient to room near nurses station  Outcome: Progressing  Goal: Maintain or return to baseline ADL function  Description: INTERVENTIONS:  -  Assess patient's ability to carry out ADLs; assess patient's baseline for ADL function and identify physical deficits which impact ability to perform ADLs (bathing, care of mouth/teeth, toileting, grooming, dressing, etc )  - Assess/evaluate cause of self-care deficits   - Assess range of motion  - Assess patient's mobility; develop plan if impaired  - Assess patient's need for assistive devices and provide as appropriate  - Encourage maximum independence but intervene and supervise when necessary  - Involve family in performance of ADLs  - Assess for home care needs following discharge   - Consider OT consult to assist with ADL evaluation and planning for discharge  - Provide patient education as appropriate  Outcome: Progressing  Goal: Maintains/Returns to pre admission functional level  Description: INTERVENTIONS:  - Perform BMAT or MOVE assessment daily    - Set and communicate daily mobility goal to care team and patient/family/caregiver     - Collaborate with rehabilitation services on mobility goals if consulted  - Ambulate patient 2 times a day  - Out of bed to chair 3 times a day   - Out of bed for meals 3 times a day  - Out of bed for toileting  - Record patient progress and toleration of activity level   Outcome: Progressing     Problem: DISCHARGE PLANNING  Goal: Discharge to home or other facility with appropriate resources  Description: INTERVENTIONS:  - Identify barriers to discharge w/patient and caregiver  - Arrange for needed discharge resources and transportation as appropriate  - Identify discharge learning needs (meds, wound care, etc )  - Arrange for interpretive services to assist at discharge as needed  - Refer to Case Management Department for coordinating discharge planning if the patient needs post-hospital services based on physician/advanced practitioner order or complex needs related to functional status, cognitive ability, or social support system  Outcome: Progressing     Problem: Knowledge Deficit  Goal: Patient/family/caregiver demonstrates understanding of disease process, treatment plan, medications, and discharge instructions  Description: Complete learning assessment and assess knowledge base  Interventions:  - Provide teaching at level of understanding  - Provide teaching via preferred learning methods  Outcome: Progressing     Problem: Prexisting or High Potential for Compromised Skin Integrity  Goal: Skin integrity is maintained or improved  Description: INTERVENTIONS:  - Identify patients at risk for skin breakdown  - Assess and monitor skin integrity  - Assess and monitor nutrition and hydration status  - Monitor labs   - Assess for incontinence   - Turn and reposition patient  - Assist with mobility/ambulation  - Relieve pressure over bony prominences  - Avoid friction and shearing  - Provide appropriate hygiene as needed including keeping skin clean and dry  - Evaluate need for skin moisturizer/barrier cream  - Collaborate with interdisciplinary team   - Patient/family teaching  - Consider wound care consult   Outcome: Progressing     Problem: Nutrition/Hydration-ADULT  Goal: Nutrient/Hydration intake appropriate for improving, restoring or maintaining nutritional needs  Description: Monitor and assess patient's nutrition/hydration status for malnutrition  Collaborate with interdisciplinary team and initiate plan and interventions as ordered  Monitor patient's weight and dietary intake as ordered or per policy  Utilize nutrition screening tool and intervene as necessary  Determine patient's food preferences and provide high-protein, high-caloric foods as appropriate       INTERVENTIONS:  - Monitor oral intake, urinary output, labs, and treatment plans  - Assess nutrition and hydration status and recommend course of action  - Evaluate amount of meals eaten  - Assist patient with eating if necessary   - Allow adequate time for meals  - Recommend/ encourage appropriate diets, oral nutritional supplements, and vitamin/mineral supplements  - Order, calculate, and assess calorie counts as needed  - Recommend, monitor, and adjust tube feedings and TPN/PPN based on assessed needs  - Assess need for intravenous fluids  - Provide specific nutrition/hydration education as appropriate  - Include patient/family/caregiver in decisions related to nutrition  Outcome: Progressing     Problem: CONFUSION/THOUGHT DISTURBANCE  Goal: Thought disturbances (confusion, delirium, depression, dementia or psychosis) are managed to maintain or return to baseline mental status and functional level  Description: INTERVENTIONS:  - Assess for possible contributors to  thought disturbance, including but not limited to medications, infection, impaired vision or hearing, underlying metabolic abnormalities, dehydration, respiratory compromise,  psychiatric diagnoses and notify attending PHYSICAN/AP  - Monitor and intervene to maintain adequate nutrition, hydration, elimination, sleep and activity  - Decrease environmental stimuli, including noise as appropriate  - Provide frequent contacts to provide refocusing, direction and reassurance as needed  Approach patient calmly with eye contact and at their level    - Walsh high risk fall precautions, aspiration precautions and other safety measures, as indicated  - If delirium suspected, notify physician/AP of change in condition and request immediate in-person evaluation  - Pursue consults as appropriate including Geriatric (campus dependent), OT for cognitive evaluation/activity planning, psychiatric, pastoral care, etc   Outcome: Progressing     Problem: BEHAVIOR  Goal: Pt/Family maintain appropriate behavior and adhere to behavioral management agreement, if implemented  Description: INTERVENTIONS:  - Assess the family dynamic   - Encourage verbalization of thoughts and concerns in a socially appropriate manner  - Assess patient/family's coping skills and non-compliant behavior (including use of illegal substances)  - Utilize positive, consistent limit setting strategies supporting safety of patient, staff and others  - Initiate consult with Case Management, Spiritual Care or other ancillary services as appropriate  - If a patient's/visitor's behavior jeopardizes the safety of the patient, staff, or others, refer to organization procedure     - Notify Security of behavior or suspected illegal substances which indicate the need for search of the patient and/or belongings  - Encourage participation in the decision making process about a behavioral management agreement; implement if patient meets criteria  Outcome: Progressing     Problem: METABOLIC, FLUID AND ELECTROLYTES - ADULT  Goal: Glucose maintained within target range  Description: INTERVENTIONS:  - Monitor Blood Glucose as ordered  - Assess for signs and symptoms of hyperglycemia and hypoglycemia  - Administer ordered medications to maintain glucose within target range  - Assess nutritional intake and initiate nutrition service referral as needed  Outcome: Progressing

## 2022-09-04 LAB
GLUCOSE SERPL-MCNC: 101 MG/DL (ref 65–140)
GLUCOSE SERPL-MCNC: 178 MG/DL (ref 65–140)
GLUCOSE SERPL-MCNC: 205 MG/DL (ref 65–140)
GLUCOSE SERPL-MCNC: 95 MG/DL (ref 65–140)

## 2022-09-04 PROCEDURE — 99232 SBSQ HOSP IP/OBS MODERATE 35: CPT | Performed by: INTERNAL MEDICINE

## 2022-09-04 PROCEDURE — 82948 REAGENT STRIP/BLOOD GLUCOSE: CPT

## 2022-09-04 RX ADMIN — INSULIN LISPRO 5 UNITS: 100 INJECTION, SOLUTION INTRAVENOUS; SUBCUTANEOUS at 08:32

## 2022-09-04 RX ADMIN — APIXABAN 2.5 MG: 2.5 TABLET, FILM COATED ORAL at 08:20

## 2022-09-04 RX ADMIN — PANTOPRAZOLE SODIUM 20 MG: 20 TABLET, DELAYED RELEASE ORAL at 08:20

## 2022-09-04 RX ADMIN — INSULIN LISPRO 5 UNITS: 100 INJECTION, SOLUTION INTRAVENOUS; SUBCUTANEOUS at 12:10

## 2022-09-04 RX ADMIN — INSULIN LISPRO 1 UNITS: 100 INJECTION, SOLUTION INTRAVENOUS; SUBCUTANEOUS at 16:41

## 2022-09-04 RX ADMIN — ESCITALOPRAM OXALATE 5 MG: 5 TABLET, FILM COATED ORAL at 08:20

## 2022-09-04 RX ADMIN — PRAVASTATIN SODIUM 80 MG: 40 TABLET ORAL at 16:30

## 2022-09-04 RX ADMIN — METOPROLOL SUCCINATE 50 MG: 50 TABLET, EXTENDED RELEASE ORAL at 08:20

## 2022-09-04 RX ADMIN — INSULIN LISPRO 5 UNITS: 100 INJECTION, SOLUTION INTRAVENOUS; SUBCUTANEOUS at 16:42

## 2022-09-04 RX ADMIN — INSULIN GLARGINE 15 UNITS: 100 INJECTION, SOLUTION SUBCUTANEOUS at 21:27

## 2022-09-04 RX ADMIN — METOPROLOL SUCCINATE 50 MG: 50 TABLET, EXTENDED RELEASE ORAL at 21:27

## 2022-09-04 RX ADMIN — INSULIN LISPRO 1 UNITS: 100 INJECTION, SOLUTION INTRAVENOUS; SUBCUTANEOUS at 21:27

## 2022-09-04 RX ADMIN — APIXABAN 2.5 MG: 2.5 TABLET, FILM COATED ORAL at 17:08

## 2022-09-04 NOTE — PLAN OF CARE
Problem: Potential for Falls  Goal: Patient will remain free of falls  Description: INTERVENTIONS:  - Educate patient/family on patient safety including physical limitations  - Instruct patient to call for assistance with activity   - Consult OT/PT to assist with strengthening/mobility   - Keep Call bell within reach  - Keep bed low and locked with side rails adjusted as appropriate  - Keep care items and personal belongings within reach  - Initiate and maintain comfort rounds  - Offer Toileting every 2 Hours, in advance of need  - Initiate/Maintain bed alarm  - Obtain necessary fall risk management equipment: walker  - Apply yellow socks and bracelet for high fall risk patients  - Consider moving patient to room near nurses station  Outcome: Progressing     Problem: MOBILITY - ADULT  Goal: Maintain or return to baseline ADL function  Description: INTERVENTIONS:  -  Assess patient's ability to carry out ADLs; assess patient's baseline for ADL function and identify physical deficits which impact ability to perform ADLs (bathing, care of mouth/teeth, toileting, grooming, dressing, etc )  - Assess/evaluate cause of self-care deficits   - Assess range of motion  - Assess patient's mobility; develop plan if impaired  - Assess patient's need for assistive devices and provide as appropriate  - Encourage maximum independence but intervene and supervise when necessary  - Involve family in performance of ADLs  - Assess for home care needs following discharge   - Consider OT consult to assist with ADL evaluation and planning for discharge  - Provide patient education as appropriate  Outcome: Progressing  Goal: Maintains/Returns to pre admission functional level  Description: INTERVENTIONS:  - Perform BMAT or MOVE assessment daily    - Set and communicate daily mobility goal to care team and patient/family/caregiver     - Collaborate with rehabilitation services on mobility goals if consulted  - Ambulate patient 2 times a day  - Out of bed to chair 3 times a day   - Out of bed for meals 3 times a day  - Out of bed for toileting  - Record patient progress and toleration of activity level   Outcome: Progressing     Problem: PAIN - ADULT  Goal: Verbalizes/displays adequate comfort level or baseline comfort level  Description: Interventions:  - Encourage patient to monitor pain and request assistance  - Assess pain using appropriate pain scale  - Administer analgesics based on type and severity of pain and evaluate response  - Implement non-pharmacological measures as appropriate and evaluate response  - Consider cultural and social influences on pain and pain management  - Notify physician/advanced practitioner if interventions unsuccessful or patient reports new pain  Outcome: Progressing     Problem: INFECTION - ADULT  Goal: Absence or prevention of progression during hospitalization  Description: INTERVENTIONS:  - Assess and monitor for signs and symptoms of infection  - Monitor lab/diagnostic results  - Monitor all insertion sites  - Administer medications as ordered  - Instruct and encourage patient and family to use good hand hygiene technique  - Identify and instruct in appropriate isolation precautions for identified infection/condition  Outcome: Progressing  Goal: Absence of fever/infection during neutropenic period  Description: INTERVENTIONS:  - Monitor WBC    Outcome: Progressing     Problem: SAFETY ADULT  Goal: Patient will remain free of falls  Description: INTERVENTIONS:  - Educate patient/family on patient safety including physical limitations  - Instruct patient to call for assistance with activity   - Consult OT/PT to assist with strengthening/mobility   - Keep Call bell within reach  - Keep bed low and locked with side rails adjusted as appropriate  - Keep care items and personal belongings within reach  - Initiate and maintain comfort rounds  - Offer Toileting every 2 Hours, in advance of need  - Initiate/Maintain bed alarm  - Obtain necessary fall risk management equipment: walker  - Apply yellow socks and bracelet for high fall risk patients  - Consider moving patient to room near nurses station  Outcome: Progressing  Goal: Maintain or return to baseline ADL function  Description: INTERVENTIONS:  -  Assess patient's ability to carry out ADLs; assess patient's baseline for ADL function and identify physical deficits which impact ability to perform ADLs (bathing, care of mouth/teeth, toileting, grooming, dressing, etc )  - Assess/evaluate cause of self-care deficits   - Assess range of motion  - Assess patient's mobility; develop plan if impaired  - Assess patient's need for assistive devices and provide as appropriate  - Encourage maximum independence but intervene and supervise when necessary  - Involve family in performance of ADLs  - Assess for home care needs following discharge   - Consider OT consult to assist with ADL evaluation and planning for discharge  - Provide patient education as appropriate  Outcome: Progressing  Goal: Maintains/Returns to pre admission functional level  Description: INTERVENTIONS:  - Perform BMAT or MOVE assessment daily    - Set and communicate daily mobility goal to care team and patient/family/caregiver     - Collaborate with rehabilitation services on mobility goals if consulted  - Ambulate patient 2 times a day  - Out of bed to chair 3 times a day   - Out of bed for meals 3 times a day  - Out of bed for toileting  - Record patient progress and toleration of activity level   Outcome: Progressing     Problem: DISCHARGE PLANNING  Goal: Discharge to home or other facility with appropriate resources  Description: INTERVENTIONS:  - Identify barriers to discharge w/patient and caregiver  - Arrange for needed discharge resources and transportation as appropriate  - Identify discharge learning needs (meds, wound care, etc )  - Arrange for interpretive services to assist at discharge as needed  - Refer to Case Management Department for coordinating discharge planning if the patient needs post-hospital services based on physician/advanced practitioner order or complex needs related to functional status, cognitive ability, or social support system  Outcome: Progressing     Problem: Knowledge Deficit  Goal: Patient/family/caregiver demonstrates understanding of disease process, treatment plan, medications, and discharge instructions  Description: Complete learning assessment and assess knowledge base  Interventions:  - Provide teaching at level of understanding  - Provide teaching via preferred learning methods  Outcome: Progressing     Problem: Prexisting or High Potential for Compromised Skin Integrity  Goal: Skin integrity is maintained or improved  Description: INTERVENTIONS:  - Identify patients at risk for skin breakdown  - Assess and monitor skin integrity  - Assess and monitor nutrition and hydration status  - Monitor labs   - Assess for incontinence   - Turn and reposition patient  - Assist with mobility/ambulation  - Relieve pressure over bony prominences  - Avoid friction and shearing  - Provide appropriate hygiene as needed including keeping skin clean and dry  - Evaluate need for skin moisturizer/barrier cream  - Collaborate with interdisciplinary team   - Patient/family teaching  - Consider wound care consult   Outcome: Progressing     Problem: Nutrition/Hydration-ADULT  Goal: Nutrient/Hydration intake appropriate for improving, restoring or maintaining nutritional needs  Description: Monitor and assess patient's nutrition/hydration status for malnutrition  Collaborate with interdisciplinary team and initiate plan and interventions as ordered  Monitor patient's weight and dietary intake as ordered or per policy  Utilize nutrition screening tool and intervene as necessary  Determine patient's food preferences and provide high-protein, high-caloric foods as appropriate       INTERVENTIONS:  - Monitor oral intake, urinary output, labs, and treatment plans  - Assess nutrition and hydration status and recommend course of action  - Evaluate amount of meals eaten  - Assist patient with eating if necessary   - Allow adequate time for meals  - Recommend/ encourage appropriate diets, oral nutritional supplements, and vitamin/mineral supplements  - Order, calculate, and assess calorie counts as needed  - Recommend, monitor, and adjust tube feedings and TPN/PPN based on assessed needs  - Assess need for intravenous fluids  - Provide specific nutrition/hydration education as appropriate  - Include patient/family/caregiver in decisions related to nutrition  Outcome: Progressing     Problem: CONFUSION/THOUGHT DISTURBANCE  Goal: Thought disturbances (confusion, delirium, depression, dementia or psychosis) are managed to maintain or return to baseline mental status and functional level  Description: INTERVENTIONS:  - Assess for possible contributors to  thought disturbance, including but not limited to medications, infection, impaired vision or hearing, underlying metabolic abnormalities, dehydration, respiratory compromise,  psychiatric diagnoses and notify attending PHYSICAN/AP  - Monitor and intervene to maintain adequate nutrition, hydration, elimination, sleep and activity  - Decrease environmental stimuli, including noise as appropriate  - Provide frequent contacts to provide refocusing, direction and reassurance as needed  Approach patient calmly with eye contact and at their level    - [place] high risk fall precautions, aspiration precautions and other safety measures, as indicated  - If delirium suspected, notify physician/AP of change in condition and request immediate in-person evaluation  - Pursue consults as appropriate including Geriatric (campus dependent), OT for cognitive evaluation/activity planning, psychiatric, pastoral care, etc   Outcome: Progressing     Problem: BEHAVIOR  Goal: Pt/Family maintain appropriate behavior and adhere to behavioral management agreement, if implemented  Description: INTERVENTIONS:  - Assess the family dynamic   - Encourage verbalization of thoughts and concerns in a socially appropriate manner  - Assess patient/family's coping skills and non-compliant behavior (including use of illegal substances)  - Utilize positive, consistent limit setting strategies supporting safety of patient, staff and others  - Initiate consult with Case Management, Spiritual Care or other ancillary services as appropriate  - If a patient's/visitor's behavior jeopardizes the safety of the patient, staff, or others, refer to organization procedure     - Notify Security of behavior or suspected illegal substances which indicate the need for search of the patient and/or belongings  - Encourage participation in the decision making process about a behavioral management agreement; implement if patient meets criteria  Outcome: Progressing     Problem: METABOLIC, FLUID AND ELECTROLYTES - ADULT  Goal: Glucose maintained within target range  Description: INTERVENTIONS:  - Monitor Blood Glucose as ordered  - Assess for signs and symptoms of hyperglycemia and hypoglycemia  - Administer ordered medications to maintain glucose within target range  - Assess nutritional intake and initiate nutrition service referral as needed  Outcome: Progressing

## 2022-09-04 NOTE — ASSESSMENT & PLAN NOTE
· Patient seems to have episode of confusion  · Discussed with the Reina Patterson- patient does not have official diagnosis of dementia before  However it seems to be an element of advanced dementia  · Neuro psychiatrist was consulted - At this time, patient does not appear to have capacity to make fully informed medical decisions  Unspecified Neurocognitive Disorder

## 2022-09-04 NOTE — PROGRESS NOTES
[facility]  Progress Note - Jourdan Romna  1933, 80 y o  male MRN: [id number]  Unit/Bed#: 7T [facility] 716-01 Encounter: [id number]  Primary Care Provider: Xu NUNEZ DO   Date and time admitted to hospital: 8/27/2022  5:56 AM    * General weakness  Assessment & Plan  · Patient states he lives at home alone and has been having difficulty with ambulation  · No recent traumatic events  · Laboratory analysis grossly unremarkable  · Hemodynamically stable saturating well on room air  · Case management consultation for safe disposition planning  · Patient is medically clear, awaiting safe disposition plan  · PT/OT recommended post acute rehab services  · Patient and the family would prefer home health care with 24/7 care    Severe protein-calorie malnutrition ([address] )  Assessment & Plan  Malnutrition Findings:   Adult Malnutrition type: Chronic illness  Adult Degree of Malnutrition: Other severe protein calorie malnutrition  Malnutrition Characteristics: Inadequate energy, Weight loss          continue nutritional supplement         360 Statement: Severe pro/singh malnutrition r/t depression as evidenced by 12% unplanned weight loss since 2/24/22, consuming < 75% energy intake compared to estimated energy needs > 1 month  Treated with Enlive bid    BMI Findings: Body mass index is 18 57 kg/m²  Confusion  Assessment & Plan  · Patient seems to have episode of confusion  · Discussed with the Eli Morales and Mag Morelos- patient does not have official diagnosis of dementia before  However it seems to be an element of advanced dementia  · Neuro psychiatrist was consulted - At this time, patient does not appear to have capacity to make fully informed medical decisions  Unspecified Neurocognitive Disorder  Unwitnessed fall  Assessment & Plan  · Patient had unwitnessed fall on 08/28/2022  · CT head was negative for acute intracranial abnormality   Mild chronic microangiopathic changes  · Continue to monitor    Depression with anxiety  Assessment & Plan  · Patient has appeared somewhat anxious about his health  · Family states there may be an element of depression possibly secondary to dementia  · Continue Lexapro 5 mg oral daily  · Ativan 0 5 mg oral daily at bedtime    Stage 3a chronic kidney disease [facility])  Assessment & Plan  Lab Results   Component Value Date    EGFR 51 08/30/2022    EGFR 46 08/29/2022    EGFR 54 08/28/2022    CREATININE 1 24 08/30/2022    CREATININE 1 34 08/29/2022    CREATININE 1 18 08/28/2022   · Creatinine appears to be at baseline  · Trend BMP  · Avoid hypotension and nephrotoxic agents    Atrial fibrillation (HCC)  Assessment & Plan  · Rates are controlled  · Continue home Eliquis 2 5 mg PO BID    Hyperlipidemia  Assessment & Plan  · Continue home statin therapy    Type 2 diabetes mellitus with hyperglycemia [facility])  Assessment & Plan  Lab Results   Component Value Date    HGBA1C 8 1 (H) 08/09/2022       Recent Labs     09/03/22  1538 09/03/22  2115 09/04/22  0555 09/04/22  1155   POCGLU 147* 282* 95 101       Blood Sugar Average: Last 72 hrs:  (P) 254 4   · Patient was on metformin 1000 b i d   · Continue Lantus to 15 units q h s , and lispro 5 units t i d  A c   · Continue sliding scale insulin with Accu-Cheks while inpatient  · Diabetic diet    Primary hypertension  Assessment & Plan  · BP acceptable  · Blood pressures elevated on presentation  · Continue home beta-blocker for atrial fibrillation  · Hydralazine 5 mg IV q6 hours PRN SBP > 180 mmHg  · Monitor blood pressures      VTE Pharmacologic Prophylaxis:   Pharmacologic: Apixaban (Eliquis)  Mechanical VTE Prophylaxis in Place: Yes    Patient Centered Rounds: I have performed bedside rounds with nursing staff today      Discussions with Specialists or Other Care Team Provider:  Discussed with nurse    Education and Discussions with Family / Patient:  Discussed with patient    Time Spent for Care: 30 minutes  More than 50% of total time spent on counseling and coordination of care as described above  Current Length of Stay: 8 day(s)    Current Patient Status: Inpatient   Certification Statement: The patient will continue to require additional inpatient hospital stay due to Patient is medically clear, pending placement    Discharge Plan / Estimated Discharge Date:  Pending      Code Status: Level 1 - Full Code      Subjective:   Patient was seen and examined at bedside  The patient has no major complaint  No acute overnight changes  Objective:     Vitals:   Temp (24hrs), Av 8 °F (37 1 °C), Min:98 5 °F (36 9 °C), Max:99 °F (37 2 °C)    Temp:  [98 5 °F (36 9 °C)-99 °F (37 2 °C)] 99 °F (37 2 °C)  HR:  [] 97  Resp:  [18] 18  BP: (143-161)/(85-99) 161/99  SpO2:  [95 %-97 %] 97 %  Body mass index is 18 57 kg/m²  Input and Output Summary (last 24 hours):        Intake/Output Summary (Last 24 hours) at 2022 1316  Last data filed at 9/3/2022 1851  Gross per 24 hour   Intake 120 ml   Output 200 ml   Net -80 ml       Physical Exam:     Physical Exam  General: breathing well on room air, no acute distress  HEENT: NC/AT, PERRL, EOM - normal  Neck: Supple  Pulm/Chest: Normal chest wall expansion, clear breath sounds on both side, no wheezing/rhonchi or crackles appreciated  CVS: RRR, normal S1&S2, no murmur appreciated, capillary refill <2s  Abd: soft, non tender, non distended, bowel sounds +  MSK: move all 4 extremities spontaneously  Skin: warm  CNS: no acute focal neuro deficit      Additional Data:     Labs:    Results from last 7 days   Lab Units 22  0524   WBC Thousand/uL 10 89*   HEMOGLOBIN g/dL 11 9*   HEMATOCRIT % 35 7*   PLATELETS Thousands/uL 154   NEUTROS PCT % 64   LYMPHS PCT % 15   MONOS PCT % 17*   EOS PCT % 2     Results from last 7 days   Lab Units 22  0524   POTASSIUM mmol/L 3 8   CHLORIDE mmol/L 103   CO2 mmol/L 28   BUN mg/dL 28*   CREATININE mg/dL 1 24 CALCIUM mg/dL 8 5           * I Have Reviewed All Lab Data Listed Above  * Additional Pertinent Lab Tests Reviewed: [address] Admission Reviewed    Imaging:      I have reviewed pertinent imaging  Recent Cultures (last 7 days):           Last 24 Hours Medication List:   Current Facility-Administered Medications   Medication Dose Route Frequency Provider Last Rate    acetaminophen  650 mg Oral Q4H PRN Tara Pata, DO      apixaban  2 5 mg Oral BID Tara Pata, DO      escitalopram  5 mg Oral Daily Tara Pata, DO      haloperidol lactate  1 mg Intramuscular Q6H PRN Tara Pata, DO      hydrALAZINE  5 mg Intravenous Q6H PRN Tara Pata, DO      insulin glargine  15 Units Subcutaneous HS Keri Loomis MD      insulin lispro  1-5 Units Subcutaneous TID AC Tara Pata, DO      insulin lispro  1-5 Units Subcutaneous HS Tara Pata, DO      insulin lispro  5 Units Subcutaneous TID With Meals Keri Loomis MD      metoprolol succinate  50 mg Oral BID Tara Pata, DO      ondansetron  4 mg Intravenous Q6H PRN Tara Pata, DO      pantoprazole  20 mg Oral Daily Tara Pata, DO      pravastatin  80 mg Oral Daily With [facility],           Today, Patient Was Seen By: Keri Loomis MD    ** Please Note: Dragon 360 Dictation voice to text software may have been used in the creation of this document   **

## 2022-09-04 NOTE — ASSESSMENT & PLAN NOTE
Lab Results   Component Value Date    HGBA1C 8 1 (H) 08/09/2022       Recent Labs     09/03/22  1538 09/03/22  2115 09/04/22  0555 09/04/22  1155   POCGLU 147* 282* 95 101       Blood Sugar Average: Last 72 hrs:  (P) 254 4   · Patient was on metformin 1000 b i d   · Continue Lantus to 15 units q h s , and lispro 5 units t i d  A c   · Continue sliding scale insulin with Accu-Cheks while inpatient  · Diabetic diet

## 2022-09-05 LAB
ANION GAP SERPL CALCULATED.3IONS-SCNC: 8 MMOL/L (ref 5–14)
BASOPHILS # BLD AUTO: 0.04 THOUSANDS/ΜL (ref 0–0.1)
BASOPHILS NFR BLD AUTO: 1 % (ref 0–1)
BUN SERPL-MCNC: 43 MG/DL (ref 5–25)
CALCIUM SERPL-MCNC: 8.9 MG/DL (ref 8.4–10.2)
CHLORIDE SERPL-SCNC: 103 MMOL/L (ref 96–108)
CO2 SERPL-SCNC: 32 MMOL/L (ref 21–32)
CREAT SERPL-MCNC: 1.43 MG/DL (ref 0.7–1.5)
EOSINOPHIL # BLD AUTO: 0.14 THOUSAND/ΜL (ref 0–0.61)
EOSINOPHIL NFR BLD AUTO: 2 % (ref 0–6)
ERYTHROCYTE [DISTWIDTH] IN BLOOD BY AUTOMATED COUNT: 12.4 % (ref 11.6–15.1)
GFR SERPL CREATININE-BSD FRML MDRD: 43 ML/MIN/1.73SQ M
GLUCOSE SERPL-MCNC: 247 MG/DL (ref 65–140)
GLUCOSE SERPL-MCNC: 280 MG/DL (ref 65–140)
GLUCOSE SERPL-MCNC: 284 MG/DL (ref 65–140)
GLUCOSE SERPL-MCNC: 75 MG/DL (ref 70–99)
GLUCOSE SERPL-MCNC: 80 MG/DL (ref 65–140)
HCT VFR BLD AUTO: 34 % (ref 36.5–49.3)
HGB BLD-MCNC: 10.7 G/DL (ref 12–17)
IMM GRANULOCYTES # BLD AUTO: 0.03 THOUSAND/UL (ref 0–0.2)
IMM GRANULOCYTES NFR BLD AUTO: 0 % (ref 0–2)
LYMPHOCYTES # BLD AUTO: 1.45 THOUSANDS/ΜL (ref 0.6–4.47)
LYMPHOCYTES NFR BLD AUTO: 18 % (ref 14–44)
MCH RBC QN AUTO: 33.6 PG (ref 26.8–34.3)
MCHC RBC AUTO-ENTMCNC: 31.5 G/DL (ref 31.4–37.4)
MCV RBC AUTO: 107 FL (ref 82–98)
MONOCYTES # BLD AUTO: 1.18 THOUSAND/ΜL (ref 0.17–1.22)
MONOCYTES NFR BLD AUTO: 15 % (ref 4–12)
NEUTROPHILS # BLD AUTO: 5.04 THOUSANDS/ΜL (ref 1.85–7.62)
NEUTS SEG NFR BLD AUTO: 64 % (ref 43–75)
NRBC BLD AUTO-RTO: 0 /100 WBCS
PLATELET # BLD AUTO: 185 THOUSANDS/UL (ref 149–390)
PMV BLD AUTO: 10.5 FL (ref 8.9–12.7)
POTASSIUM SERPL-SCNC: 4.1 MMOL/L (ref 3.5–5.3)
RBC # BLD AUTO: 3.18 MILLION/UL (ref 3.88–5.62)
SODIUM SERPL-SCNC: 143 MMOL/L (ref 135–147)
WBC # BLD AUTO: 7.88 THOUSAND/UL (ref 4.31–10.16)

## 2022-09-05 PROCEDURE — 80048 BASIC METABOLIC PNL TOTAL CA: CPT | Performed by: INTERNAL MEDICINE

## 2022-09-05 PROCEDURE — 99232 SBSQ HOSP IP/OBS MODERATE 35: CPT | Performed by: INTERNAL MEDICINE

## 2022-09-05 PROCEDURE — 82948 REAGENT STRIP/BLOOD GLUCOSE: CPT

## 2022-09-05 PROCEDURE — 85025 COMPLETE CBC W/AUTO DIFF WBC: CPT | Performed by: INTERNAL MEDICINE

## 2022-09-05 RX ADMIN — METOPROLOL SUCCINATE 50 MG: 50 TABLET, EXTENDED RELEASE ORAL at 08:56

## 2022-09-05 RX ADMIN — INSULIN LISPRO 2 UNITS: 100 INJECTION, SOLUTION INTRAVENOUS; SUBCUTANEOUS at 21:04

## 2022-09-05 RX ADMIN — PANTOPRAZOLE SODIUM 20 MG: 20 TABLET, DELAYED RELEASE ORAL at 08:56

## 2022-09-05 RX ADMIN — APIXABAN 2.5 MG: 2.5 TABLET, FILM COATED ORAL at 08:56

## 2022-09-05 RX ADMIN — INSULIN LISPRO 5 UNITS: 100 INJECTION, SOLUTION INTRAVENOUS; SUBCUTANEOUS at 16:24

## 2022-09-05 RX ADMIN — INSULIN LISPRO 3 UNITS: 100 INJECTION, SOLUTION INTRAVENOUS; SUBCUTANEOUS at 16:00

## 2022-09-05 RX ADMIN — INSULIN LISPRO 3 UNITS: 100 INJECTION, SOLUTION INTRAVENOUS; SUBCUTANEOUS at 11:40

## 2022-09-05 RX ADMIN — PRAVASTATIN SODIUM 80 MG: 40 TABLET ORAL at 16:23

## 2022-09-05 RX ADMIN — METOPROLOL SUCCINATE 50 MG: 50 TABLET, EXTENDED RELEASE ORAL at 21:04

## 2022-09-05 RX ADMIN — INSULIN LISPRO 5 UNITS: 100 INJECTION, SOLUTION INTRAVENOUS; SUBCUTANEOUS at 11:30

## 2022-09-05 RX ADMIN — ESCITALOPRAM OXALATE 5 MG: 5 TABLET, FILM COATED ORAL at 08:56

## 2022-09-05 RX ADMIN — INSULIN GLARGINE 15 UNITS: 100 INJECTION, SOLUTION SUBCUTANEOUS at 21:04

## 2022-09-05 RX ADMIN — APIXABAN 2.5 MG: 2.5 TABLET, FILM COATED ORAL at 17:15

## 2022-09-05 NOTE — ASSESSMENT & PLAN NOTE
Lab Results   Component Value Date    HGBA1C 8 1 (H) 08/09/2022       Recent Labs     09/04/22  1155 09/04/22  1619 09/04/22  2104 09/05/22  0550   POCGLU 101 205* 178* 80       Blood Sugar Average: Last 72 hrs:  (P) 218 [id number]   · Patient was on metformin 1000 b i d   · Continue Lantus to 15 units q h s , and lispro 5 units t i d  A c   · Continue sliding scale insulin with Accu-Cheks while inpatient  · Diabetic diet

## 2022-09-05 NOTE — ASSESSMENT & PLAN NOTE
· Patient seems to have episode of confusion  · Discussed with the Lowell Sepulveda and Brian Hernandez- patient does not have official diagnosis of dementia before  However it seems to be an element of advanced dementia  · Neuro psychiatrist was consulted - At this time, patient does not appear to have capacity to make fully informed medical decisions  Unspecified Neurocognitive Disorder

## 2022-09-05 NOTE — ASSESSMENT & PLAN NOTE
Lab Results   Component Value Date    EGFR 43 09/05/2022    EGFR 51 08/30/2022    EGFR 46 08/29/2022    CREATININE 1 43 09/05/2022    CREATININE 1 24 08/30/2022    CREATININE 1 34 08/29/2022   · Creatinine appears to be at baseline  · Trend BMP  · Avoid hypotension and nephrotoxic agents

## 2022-09-05 NOTE — PROGRESS NOTES
51 [facility]  Progress Note - Juni Heading  1933, 80 y o  male MRN: [id number]  Unit/Bed#: 7T [facility] 716-01 Encounter: [id number]  Primary Care Provider: Nika DOVER DO   Date and time admitted to hospital: 8/27/2022  5:56 AM    * General weakness  Assessment & Plan  · Patient states he lives at home alone and has been having difficulty with ambulation  · No recent traumatic events  · Laboratory analysis grossly unremarkable  · Hemodynamically stable saturating well on room air  · Case management consultation for safe disposition planning  · Patient is medically clear, awaiting safe disposition plan  · PT/OT recommended post acute rehab services  · Patient and the family would prefer home health care with /[facility]    Primary hypertension  Assessment & Plan  · BP acceptable  · Blood pressures elevated on presentation  · Continue home beta-blocker for atrial fibrillation  · Hydralazine 5 mg IV q6 hours PRN SBP > 180 mmHg  · Monitor blood pressures    Depression with anxiety  Assessment & Plan  · Patient has appeared somewhat anxious about his health  · Family states there may be an element of depression possibly secondary to dementia  · Continue Lexapro 5 mg oral daily  · Ativan 0 5 mg oral daily at bedtime    Type 2 diabetes mellitus with hyperglycemia [facility])  Assessment & Plan  Lab Results   Component Value Date    HGBA1C 8 1 (H) 08/09/2022       Recent Labs     09/04/22  1155 09/04/22  1619 09/04/22  2104 09/05/22  0550   POCGLU 101 205* 178* 80       Blood Sugar Average: Last 72 hrs:  (P) 218 [id number]   · Patient was on metformin 1000 b i d   · Continue Lantus to 15 units q h s , and lispro 5 units t i d  A c   · Continue sliding scale insulin with Accu-Cheks while inpatient  · Diabetic diet    Hyperlipidemia  Assessment & Plan  · Continue home statin therapy    Atrial fibrillation (HCC)  Assessment & Plan  · Rates are controlled  · Continue home Eliquis 2 5 mg PO BID    Stage 3a chronic kidney disease [facility])  Assessment & Plan  Lab Results   Component Value Date    EGFR 43 09/05/2022    EGFR 51 08/30/2022    EGFR 46 08/29/2022    CREATININE 1 43 09/05/2022    CREATININE 1 24 08/30/2022    CREATININE 1 34 08/29/2022   · Creatinine appears to be at baseline  · Trend BMP  · Avoid hypotension and nephrotoxic agents    Severe protein-calorie malnutrition ([address] )  Assessment & Plan  Malnutrition Findings:   Adult Malnutrition type: Chronic illness  Adult Degree of Malnutrition: Other severe protein calorie malnutrition  Malnutrition Characteristics: Inadequate energy, Weight loss          continue nutritional supplement         360 Statement: Severe pro/singh malnutrition r/t depression as evidenced by 12% unplanned weight loss since 2/24/22, consuming < 75% energy intake compared to estimated energy needs > 1 month  Treated with Enlive bid    BMI Findings: Body mass index is 18 57 kg/m²  Confusion  Assessment & Plan  · Patient seems to have episode of confusion  · Discussed with the Saadia Manuel and Vignesh Gifford- patient does not have official diagnosis of dementia before  However it seems to be an element of advanced dementia  · Neuro psychiatrist was consulted - At this time, patient does not appear to have capacity to make fully informed medical decisions  Unspecified Neurocognitive Disorder  Unwitnessed fall  Assessment & Plan  · Patient had unwitnessed fall on 08/28/2022  · CT head was negative for acute intracranial abnormality  Mild chronic microangiopathic changes     · Continue to monitor    VTE Pharmacologic Prophylaxis:  Eliquis    Patient Centered Rounds:  Patient care rounds were performed with nursing    Discussions with Specialists or Other Care Team Provider:  Case Management, nursing, PT/OT    Education and Discussions with Family / Patient:  Spoke with patient at bedside    Time Spent for Care: 30  More than 50% of total time spent on counseling and coordination of care as described above  Current Length of Stay: 9 day(s)    Current Patient Status: Inpatient     Certification Statement: The patient will continue to require additional inpatient hospital stay due to placement to  care    Discharge Plan:  Home with  care    Code Status: Level 1 - Full Code      Subjective:   Patient seen and examined at bedside  No acute events overnight  Denies chest pain, SOB, diaphoresis, nausea/vomiting/diarrhea, fevers/chills  Objective:     Vitals:   Temp (24hrs), Av °F (36 7 °C), Min:97 °F (36 1 °C), Max:98 8 °F (37 1 °C)    Temp:  [97 °F (36 1 °C)-98 8 °F (37 1 °C)] 97 °F (36 1 °C)  HR:  [] 101  Resp:  [18] 18  BP: (105-152)/(65-96) 110/83  SpO2:  [97 %-98 %] 98 %  Body mass index is 18 57 kg/m²  Input and Output Summary (last 24 hours): Intake/Output Summary (Last 24 hours) at 2022 1047  Last data filed at 2022 0847  Gross per 24 hour   Intake 580 ml   Output 800 ml   Net -220 ml       Physical Exam:     Physical Exam  Vitals and nursing note reviewed  Constitutional:       Appearance: He is well-developed  HENT:      Head: Normocephalic and atraumatic  Eyes:      Conjunctiva/sclera: Conjunctivae normal    Cardiovascular:      Rate and Rhythm: Normal rate and regular rhythm  Heart sounds: No murmur heard  Pulmonary:      Effort: Pulmonary effort is normal  No respiratory distress  Breath sounds: Normal breath sounds  Abdominal:      Palpations: Abdomen is soft  Tenderness: There is no abdominal tenderness  Musculoskeletal:      Cervical back: Neck supple  Skin:     General: Skin is warm and dry  Neurological:      Mental Status: He is alert  Additional Data:     Labs:  I have reviewed pertinent results     Results from last 7 days   Lab Units 22  0540   WBC Thousand/uL 7 88   HEMOGLOBIN g/dL 10 7*   HEMATOCRIT % 34 0*   PLATELETS Thousands/uL 185   NEUTROS PCT % 64   LYMPHS PCT % 18 MONOS PCT % 15*   EOS PCT % 2     Results from last 7 days   Lab Units 09/05/22  0540   SODIUM mmol/L 143   POTASSIUM mmol/L 4 1   CHLORIDE mmol/L 103   CO2 mmol/L 32   BUN mg/dL 43*   CREATININE mg/dL 1 43   ANION GAP mmol/L 8   CALCIUM mg/dL 8 9   GLUCOSE RANDOM mg/dL 75         Results from last 7 days   Lab Units 09/05/22  0550 09/04/22  2104 09/04/22  1619 09/04/22  1155 09/04/22  0555 09/03/22  2115 09/03/22  1538 09/03/22  1053 09/03/22  0601 09/02/22  2228 09/02/22  1609 09/02/22  1107   POC GLUCOSE mg/dl 80 178* 205* 101 95 282* 147* 113 77 382* 306* 445*                 Imaging: I have reviewed pertinent imaging       Recent Cultures (last 7 days):           Last 24 Hours Medication List:   Current Facility-Administered Medications   Medication Dose Route Frequency Provider Last Rate    acetaminophen  650 mg Oral Q4H PRN Nic Holt, DO      apixaban  2 5 mg Oral BID Nic Holt, DO      escitalopram  5 mg Oral Daily Nic Holt,       haloperidol lactate  1 mg Intramuscular Q6H PRN Nic Holt, DO      hydrALAZINE  5 mg Intravenous Q6H PRN Nic Holt, DO      insulin glargine  15 Units Subcutaneous HS Lawyer Rufino MD      insulin lispro  1-5 Units Subcutaneous TID AC Harpreet King, DO      insulin lispro  1-5 Units Subcutaneous HS Nic Holt, DO      insulin lispro  5 Units Subcutaneous TID With Meals Lawyer Rufino MD      metoprolol succinate  50 mg Oral BID Nic Holt, DO      ondansetron  4 mg Intravenous Q6H PRN Nic Holt, DO      pantoprazole  20 mg Oral Daily Nic Holt, DO      pravastatin  80 mg Oral Daily With Dinner Nic Holt,           Today, Patient Was Seen By: Nic Holt DO    ** Please Note: Dictation voice to text software may have been used in the creation of this document   **

## 2022-09-05 NOTE — PLAN OF CARE
Problem: MOBILITY - ADULT  Goal: Maintain or return to baseline ADL function  Description: INTERVENTIONS:  - Educate patient/family on patient safety including physical limitations  - Instruct patient to call for assistance with activity   - Consult OT/PT to assist with strengthening/mobility   - Keep Call bell within reach  - Keep bed low and locked with side rails adjusted as appropriate  - Keep care items and personal belongings within reach  - Initiate and maintain comfort rounds  - Offer Toileting every 2 Hours, in advance of need  - Initiate/Maintain bed alarm  - Obtain necessary fall risk management equipment: walker  - Apply yellow socks and bracelet for high fall risk patients  - Consider moving patient to room near nurses station  Outcome: Progressing     Problem: Nutrition/Hydration-ADULT  Goal: Nutrient/Hydration intake appropriate for improving, restoring or maintaining nutritional needs  Description: Monitor and assess patient's nutrition/hydration status for malnutrition  Collaborate with interdisciplinary team and initiate plan and interventions as ordered  Monitor patient's weight and dietary intake as ordered or per policy  Utilize nutrition screening tool and intervene as necessary  Determine patient's food preferences and provide high-protein, high-caloric foods as appropriate       INTERVENTIONS:  - Monitor oral intake, urinary output, labs, and treatment plans  - Assess nutrition and hydration status and recommend course of action  - Evaluate amount of meals eaten  - Assist patient with eating if necessary   - Allow adequate time for meals  - Recommend/ encourage appropriate diets, oral nutritional supplements, and vitamin/mineral supplements  - Order, calculate, and assess calorie counts as needed  - Recommend, monitor, and adjust tube feedings and TPN/PPN based on assessed needs  - Assess need for intravenous fluids  - Provide specific nutrition/hydration education as appropriate  - Include patient/family/caregiver in decisions related to nutrition  Outcome: Progressing     Problem: Prexisting or High Potential for Compromised Skin Integrity  Goal: Skin integrity is maintained or improved  Description: INTERVENTIONS:  - Identify patients at risk for skin breakdown  - Assess and monitor skin integrity  - Assess and monitor nutrition and hydration status  - Monitor labs   - Assess for incontinence   - Turn and reposition patient  - Assist with mobility/ambulation  - Relieve pressure over bony prominences  - Avoid friction and shearing  - Provide appropriate hygiene as needed including keeping skin clean and dry  - Evaluate need for skin moisturizer/barrier cream  - Collaborate with interdisciplinary team   - Patient/family teaching  - Consider wound care consult   Outcome: Progressing

## 2022-09-05 NOTE — PLAN OF CARE
Problem: Nutrition/Hydration-ADULT  Goal: Nutrient/Hydration intake appropriate for improving, restoring or maintaining nutritional needs  Description: Monitor and assess patient's nutrition/hydration status for malnutrition  Collaborate with interdisciplinary team and initiate plan and interventions as ordered  Monitor patient's weight and dietary intake as ordered or per policy  Utilize nutrition screening tool and intervene as necessary  Determine patient's food preferences and provide high-protein, high-caloric foods as appropriate       INTERVENTIONS:  - Monitor oral intake, urinary output, labs, and treatment plans  - Assess nutrition and hydration status and recommend course of action  - Evaluate amount of meals eaten  - Assist patient with eating if necessary   - Allow adequate time for meals  - Recommend/ encourage appropriate diets, oral nutritional supplements, and vitamin/mineral supplements  - Order, calculate, and assess calorie counts as needed  - Recommend, monitor, and adjust tube feedings and TPN/PPN based on assessed needs  - Assess need for intravenous fluids  - Provide specific nutrition/hydration education as appropriate  - Include patient/family/caregiver in decisions related to nutrition  Outcome: Progressing     Problem: SAFETY ADULT  Goal: Patient will remain free of falls  Description: INTERVENTIONS:  - Educate patient/family on patient safety including physical limitations  - Instruct patient to call for assistance with activity   - Consult OT/PT to assist with strengthening/mobility   - Keep Call bell within reach  - Keep bed low and locked with side rails adjusted as appropriate  - Keep care items and personal belongings within reach  - Initiate and maintain comfort rounds  - Offer Toileting every 2 Hours, in advance of need  - Initiate/Maintain bed alarm  - Obtain necessary fall risk management equipment: walker  - Apply yellow socks and bracelet for high fall risk patients  - Consider moving patient to room near nurses station  Outcome: Progressing     Problem: SAFETY ADULT  Goal: Maintain or return to baseline ADL function  Description: INTERVENTIONS:  -  Assess patient's ability to carry out ADLs; assess patient's baseline for ADL function and identify physical deficits which impact ability to perform ADLs (bathing, care of mouth/teeth, toileting, grooming, dressing, etc )  - Assess/evaluate cause of self-care deficits   - Assess range of motion  - Assess patient's mobility; develop plan if impaired  - Assess patient's need for assistive devices and provide as appropriate  - Encourage maximum independence but intervene and supervise when necessary  - Involve family in performance of ADLs  - Assess for home care needs following discharge   - Consider OT consult to assist with ADL evaluation and planning for discharge  - Provide patient education as appropriate  Outcome: Progressing

## 2022-09-06 LAB
ANION GAP SERPL CALCULATED.3IONS-SCNC: 6 MMOL/L (ref 5–14)
BACTERIA UR QL AUTO: ABNORMAL /HPF
BASOPHILS # BLD AUTO: 0.05 THOUSANDS/ΜL (ref 0–0.1)
BASOPHILS NFR BLD AUTO: 1 % (ref 0–1)
BILIRUB UR QL STRIP: NEGATIVE
BUN SERPL-MCNC: 54 MG/DL (ref 5–25)
CALCIUM SERPL-MCNC: 8.7 MG/DL (ref 8.4–10.2)
CHLORIDE SERPL-SCNC: 105 MMOL/L (ref 96–108)
CLARITY UR: CLEAR
CO2 SERPL-SCNC: 31 MMOL/L (ref 21–32)
COLOR UR: YELLOW
CREAT SERPL-MCNC: 1.55 MG/DL (ref 0.7–1.5)
EOSINOPHIL # BLD AUTO: 0.19 THOUSAND/ΜL (ref 0–0.61)
EOSINOPHIL NFR BLD AUTO: 2 % (ref 0–6)
ERYTHROCYTE [DISTWIDTH] IN BLOOD BY AUTOMATED COUNT: 12.7 % (ref 11.6–15.1)
GFR SERPL CREATININE-BSD FRML MDRD: 39 ML/MIN/1.73SQ M
GLUCOSE SERPL-MCNC: 142 MG/DL (ref 65–140)
GLUCOSE SERPL-MCNC: 145 MG/DL (ref 70–99)
GLUCOSE SERPL-MCNC: 167 MG/DL (ref 65–140)
GLUCOSE SERPL-MCNC: 240 MG/DL (ref 65–140)
GLUCOSE UR STRIP-MCNC: NEGATIVE MG/DL
HCT VFR BLD AUTO: 32.9 % (ref 36.5–49.3)
HGB BLD-MCNC: 10.6 G/DL (ref 12–17)
HGB UR QL STRIP.AUTO: NEGATIVE
IMM GRANULOCYTES # BLD AUTO: 0.03 THOUSAND/UL (ref 0–0.2)
IMM GRANULOCYTES NFR BLD AUTO: 0 % (ref 0–2)
KETONES UR STRIP-MCNC: NEGATIVE MG/DL
LEUKOCYTE ESTERASE UR QL STRIP: NEGATIVE
LYMPHOCYTES # BLD AUTO: 1.32 THOUSANDS/ΜL (ref 0.6–4.47)
LYMPHOCYTES NFR BLD AUTO: 16 % (ref 14–44)
MAGNESIUM SERPL-MCNC: 2.3 MG/DL (ref 1.6–2.3)
MCH RBC QN AUTO: 34 PG (ref 26.8–34.3)
MCHC RBC AUTO-ENTMCNC: 32.2 G/DL (ref 31.4–37.4)
MCV RBC AUTO: 105 FL (ref 82–98)
MONOCYTES # BLD AUTO: 1.3 THOUSAND/ΜL (ref 0.17–1.22)
MONOCYTES NFR BLD AUTO: 16 % (ref 4–12)
NEUTROPHILS # BLD AUTO: 5.25 THOUSANDS/ΜL (ref 1.85–7.62)
NEUTS SEG NFR BLD AUTO: 65 % (ref 43–75)
NITRITE UR QL STRIP: NEGATIVE
NON-SQ EPI CELLS URNS QL MICRO: ABNORMAL /HPF
NRBC BLD AUTO-RTO: 0 /100 WBCS
PH UR STRIP.AUTO: 5 [PH]
PHOSPHATE SERPL-MCNC: 3.7 MG/DL (ref 2.5–4.8)
PLATELET # BLD AUTO: 203 THOUSANDS/UL (ref 149–390)
PMV BLD AUTO: 11.7 FL (ref 8.9–12.7)
POTASSIUM SERPL-SCNC: 4.6 MMOL/L (ref 3.5–5.3)
PROT UR STRIP-MCNC: NEGATIVE MG/DL
RBC # BLD AUTO: 3.12 MILLION/UL (ref 3.88–5.62)
RBC #/AREA URNS AUTO: ABNORMAL /HPF
SODIUM SERPL-SCNC: 142 MMOL/L (ref 135–147)
SP GR UR STRIP.AUTO: 1.02 (ref 1–1.04)
UROBILINOGEN UA: NEGATIVE MG/DL
WBC # BLD AUTO: 8.14 THOUSAND/UL (ref 4.31–10.16)
WBC #/AREA URNS AUTO: ABNORMAL /HPF

## 2022-09-06 PROCEDURE — 99232 SBSQ HOSP IP/OBS MODERATE 35: CPT | Performed by: INTERNAL MEDICINE

## 2022-09-06 PROCEDURE — 83735 ASSAY OF MAGNESIUM: CPT | Performed by: INTERNAL MEDICINE

## 2022-09-06 PROCEDURE — 84100 ASSAY OF PHOSPHORUS: CPT | Performed by: INTERNAL MEDICINE

## 2022-09-06 PROCEDURE — 81001 URINALYSIS AUTO W/SCOPE: CPT | Performed by: INTERNAL MEDICINE

## 2022-09-06 PROCEDURE — 85025 COMPLETE CBC W/AUTO DIFF WBC: CPT | Performed by: INTERNAL MEDICINE

## 2022-09-06 PROCEDURE — 82948 REAGENT STRIP/BLOOD GLUCOSE: CPT

## 2022-09-06 PROCEDURE — 80048 BASIC METABOLIC PNL TOTAL CA: CPT | Performed by: INTERNAL MEDICINE

## 2022-09-06 RX ORDER — SODIUM CHLORIDE 9 MG/ML
75 INJECTION, SOLUTION INTRAVENOUS CONTINUOUS
Status: DISCONTINUED | OUTPATIENT
Start: 2022-09-06 | End: 2022-09-06

## 2022-09-06 RX ADMIN — INSULIN LISPRO 5 UNITS: 100 INJECTION, SOLUTION INTRAVENOUS; SUBCUTANEOUS at 09:22

## 2022-09-06 RX ADMIN — APIXABAN 2.5 MG: 2.5 TABLET, FILM COATED ORAL at 17:51

## 2022-09-06 RX ADMIN — METOPROLOL SUCCINATE 50 MG: 50 TABLET, EXTENDED RELEASE ORAL at 09:21

## 2022-09-06 RX ADMIN — APIXABAN 2.5 MG: 2.5 TABLET, FILM COATED ORAL at 09:22

## 2022-09-06 RX ADMIN — ESCITALOPRAM OXALATE 5 MG: 5 TABLET, FILM COATED ORAL at 09:22

## 2022-09-06 RX ADMIN — PANTOPRAZOLE SODIUM 20 MG: 20 TABLET, DELAYED RELEASE ORAL at 09:22

## 2022-09-06 NOTE — PROGRESS NOTES
51 [facility]  Progress Note - Fariha Varela  1933, 80 y o  male MRN: [id number]  Unit/Bed#: 7T [facility] 716-01 Encounter: [id number]  Primary Care Provider: Zabrina CASTLE DO   Date and time admitted to hospital: 8/27/2022  5:56 AM    * General weakness  Assessment & Plan  · Patient states he lives at home alone and has been having difficulty with ambulation  · No recent traumatic events  · Laboratory analysis grossly unremarkable  · Hemodynamically stable saturating well on room air  · Case management consultation for safe disposition planning  · Patient is medically clear, awaiting safe disposition plan  · PT/OT recommended post acute rehab services  · Patient and the family would prefer home health care with 24/[facility]    Primary hypertension  Assessment & Plan  · BP acceptable  · Blood pressures elevated on presentation  · Continue home beta-blocker for atrial fibrillation  · Hydralazine 5 mg IV q6 hours PRN SBP > 180 mmHg  · Monitor blood pressures    Depression with anxiety  Assessment & Plan  · Patient has appeared somewhat anxious about his health  · Family states there may be an element of depression possibly secondary to dementia  · Continue Lexapro 5 mg oral daily  · Ativan 0 5 mg oral daily at bedtime    Type 2 diabetes mellitus with hyperglycemia [facility])  Assessment & Plan  Lab Results   Component Value Date    HGBA1C 8 1 (H) 08/09/2022       Recent Labs     09/05/22  1132 09/05/22  1545 09/05/22 2024 09/06/22  0625   POCGLU 284* 280* 247* 142*       Blood Sugar Average: Last 72 hrs:  (P) 684 [id number]   · Patient was on metformin 1000 b i d   · Continue Lantus to 15 units q h s , and lispro 5 units t i d  A c   · Continue sliding scale insulin with Accu-Cheks while inpatient  · Diabetic diet    Hyperlipidemia  Assessment & Plan  · Continue home statin therapy    Atrial fibrillation (HCC)  Assessment & Plan  · Rates are controlled  · Continue home Eliquis 2 5 mg PO BID    Stage 3a chronic kidney disease [facility])  Assessment & Plan  Lab Results   Component Value Date    EGFR 39 09/06/2022    EGFR 43 09/05/2022    EGFR 51 08/30/2022    CREATININE 1 55 (H) 09/06/2022    CREATININE 1 43 09/05/2022    CREATININE 1 24 08/30/2022   · Slight NGUYỄN this morning  · Trend BMP  · Avoid hypotension and nephrotoxic agents  · Urinary retention protocol  · Strict intake and output  · Daily standing weights    Severe protein-calorie malnutrition ([address] )  Assessment & Plan  Malnutrition Findings:   Adult Malnutrition type: Chronic illness  Adult Degree of Malnutrition: Other severe protein calorie malnutrition  Malnutrition Characteristics: Inadequate energy, Weight loss          continue nutritional supplement         360 Statement: Severe pro/singh malnutrition r/t depression as evidenced by 12% unplanned weight loss since 2/24/22, consuming < 75% energy intake compared to estimated energy needs > 1 month  Treated with Enlive bid    BMI Findings: Body mass index is 18 57 kg/m²  Confusion  Assessment & Plan  · Patient seems to have episode of confusion  · Discussed with the Deng Scott Smoker- patient does not have official diagnosis of dementia before  However it seems to be an element of advanced dementia  · Neuro psychiatrist was consulted - At this time, patient does not appear to have capacity to make fully informed medical decisions  Unspecified Neurocognitive Disorder  Unwitnessed fall  Assessment & Plan  · Patient had unwitnessed fall on 08/28/2022  · CT head was negative for acute intracranial abnormality  Mild chronic microangiopathic changes     · Continue to monitor    VTE Pharmacologic Prophylaxis:  Eliquis    Patient Centered Rounds:  Patient care rounds were performed with nursing    Discussions with Specialists or Other Care Team Provider:  Case Management, nursing, PT/OT    Education and Discussions with Family / Patient:  Spoke with patient at bedside    Time Spent for Care: 30  More than 50% of total time spent on counseling and coordination of care as described above  Current Length of Stay: 10 day(s)    Current Patient Status: Inpatient     Certification Statement: The patient will continue to require additional inpatient hospital stay due to placement to  care    Discharge Plan:  Home with  care    Code Status: Level 1 - Full Code      Subjective:   Patient seen and examined at bedside  No acute events overnight  Denies chest pain, SOB, diaphoresis, nausea/vomiting/diarrhea, fevers/chills  Objective:     Vitals:   Temp (24hrs), Av °F (36 7 °C), Min:97 8 °F (36 6 °C), Max:98 2 °F (36 8 °C)    Temp:  [97 8 °F (36 6 °C)-98 2 °F (36 8 °C)] 98 1 °F (36 7 °C)  HR:  [58-94] 88  Resp:  [18] 18  BP: (112-153)/(61-99) 112/61  SpO2:  [99 %-100 %] 100 %  Body mass index is 18 57 kg/m²  Input and Output Summary (last 24 hours): Intake/Output Summary (Last 24 hours) at 2022 1100  Last data filed at 2022 0833  Gross per 24 hour   Intake 760 ml   Output 902 ml   Net -142 ml       Physical Exam:     Physical Exam  Vitals and nursing note reviewed  Constitutional:       Appearance: He is well-developed  HENT:      Head: Normocephalic and atraumatic  Eyes:      Conjunctiva/sclera: Conjunctivae normal    Cardiovascular:      Rate and Rhythm: Normal rate and regular rhythm  Heart sounds: No murmur heard  Pulmonary:      Effort: Pulmonary effort is normal  No respiratory distress  Breath sounds: Normal breath sounds  Abdominal:      Palpations: Abdomen is soft  Tenderness: There is no abdominal tenderness  Musculoskeletal:      Cervical back: Neck supple  Skin:     General: Skin is warm and dry  Neurological:      Mental Status: He is alert  Additional Data:     Labs:  I have reviewed pertinent results     Results from last 7 days   Lab Units 22  0548   WBC Thousand/uL 8 14   HEMOGLOBIN g/dL 10 6*   HEMATOCRIT % 32 9*   PLATELETS Thousands/uL 203   NEUTROS PCT % 65   LYMPHS PCT % 16   MONOS PCT % 16*   EOS PCT % 2     Results from last 7 days   Lab Units 09/06/22  0548   SODIUM mmol/L 142   POTASSIUM mmol/L 4 6   CHLORIDE mmol/L 105   CO2 mmol/L 31   BUN mg/dL 54*   CREATININE mg/dL 1 55*   ANION GAP mmol/L 6   CALCIUM mg/dL 8 7   GLUCOSE RANDOM mg/dL 145*         Results from last 7 days   Lab Units 09/06/22  0625 09/05/22  2024 09/05/22  1545 09/05/22  1132 09/05/22  0550 09/04/22  2104 09/04/22  1619 09/04/22  1155 09/04/22  0555 09/03/22  2115 09/03/22  1538 09/03/22  1053   POC GLUCOSE mg/dl 142* 247* 280* 284* 80 178* 205* 101 95 282* 147* 113                 Imaging: I have reviewed pertinent imaging       Recent Cultures (last 7 days):           Last 24 Hours Medication List:   Current Facility-Administered Medications   Medication Dose Route Frequency Provider Last Rate    acetaminophen  650 mg Oral Q4H PRN Bk Reese, DO      apixaban  2 5 mg Oral BID Bk Reese, DO      escitalopram  5 mg Oral Daily Bk Reese, DO      haloperidol lactate  1 mg Intramuscular Q6H PRN Bk Reese, DO      hydrALAZINE  5 mg Intravenous Q6H PRN Bk Reese, DO      insulin glargine  15 Units Subcutaneous HS Dwaine Henriquez MD      insulin lispro  1-5 Units Subcutaneous TID AC Harpreet King, DO      insulin lispro  1-5 Units Subcutaneous HS Bk Reese, DO      insulin lispro  5 Units Subcutaneous TID With Meals Dwaine Henriquez MD      metoprolol succinate  50 mg Oral BID Bk Reese, DO      ondansetron  4 mg Intravenous Q6H PRN Bk Reese, DO      pantoprazole  20 mg Oral Daily Bk Reese, DO      pravastatin  80 mg Oral Daily With Dinner Bk Reese,           Today, Patient Was Seen By: Bk Reese DO    ** Please Note: Dictation voice to text software may have been used in the creation of this document   **

## 2022-09-06 NOTE — NURSING NOTE
Patient refusing evening vital signs, blood sugar checks, and meds  I explained the reasoning behind those tasks  Doctor Dhara Osuna made aware

## 2022-09-06 NOTE — ASSESSMENT & PLAN NOTE
Lab Results   Component Value Date    EGFR 39 09/06/2022    EGFR 43 09/05/2022    EGFR 51 08/30/2022    CREATININE 1 55 (H) 09/06/2022    CREATININE 1 43 09/05/2022    CREATININE 1 24 08/30/2022   · Slight NGUYỄN on 09/06/2022 with creat at 1 55, currently at 1 32  · Trend BMP  · Avoid hypotension and nephrotoxic agents  · Urinary retention protocol  · Strict intake and output

## 2022-09-06 NOTE — ASSESSMENT & PLAN NOTE
· BP acceptable  · Continue home beta-blocker for atrial fibrillation  · Hydralazine 5 mg IV q6 hours PRN SBP > 180 mmHg  · Monitor blood pressures

## 2022-09-06 NOTE — ASSESSMENT & PLAN NOTE
· Possibly due to advancing dementia as evidenced by increasing confusion and agitation  · Neuropsychology has deemed the patient incapacitated  · Patient states he lives at home alone and has been having difficulty with ambulation  · No recent traumatic events  · Laboratory analysis grossly unremarkable  · Hemodynamically stable saturating well on room air  · Case management consultation for safe disposition planning  · Patient is medically clear, awaiting safe disposition plan  · PT/OT recommended post acute rehab services  · Patient and the family would prefer home health care with 24/7 care  · Willing to consider short-term rehab temporarily  · Ongoing discussions with family  · Patient lacks capacity so pending guardianship/POA process today in court

## 2022-09-06 NOTE — PLAN OF CARE
Problem: Potential for Falls  Goal: Patient will remain free of falls  Description: INTERVENTIONS:  - Educate patient/family on patient safety including physical limitations  - Instruct patient to call for assistance with activity   - Consult OT/PT to assist with strengthening/mobility   - Keep Call bell within reach  - Keep bed low and locked with side rails adjusted as appropriate  - Keep care items and personal belongings within reach  - Initiate and maintain comfort rounds  - Offer Toileting every 2 Hours, in advance of need  - Initiate/Maintain bed alarm  - Obtain necessary fall risk management equipment: walker  - Apply yellow socks and bracelet for high fall risk patients  - Consider moving patient to room near nurses station  Outcome: Progressing     Problem: MOBILITY - ADULT  Goal: Maintain or return to baseline ADL function  Description: INTERVENTIONS:  -  Assess patient's ability to carry out ADLs; assess patient's baseline for ADL function and identify physical deficits which impact ability to perform ADLs (bathing, care of mouth/teeth, toileting, grooming, dressing, etc )  - Assess/evaluate cause of self-care deficits   - Assess range of motion  - Assess patient's mobility; develop plan if impaired  - Assess patient's need for assistive devices and provide as appropriate  - Encourage maximum independence but intervene and supervise when necessary  - Involve family in performance of ADLs  - Assess for home care needs following discharge   - Consider OT consult to assist with ADL evaluation and planning for discharge  - Provide patient education as appropriate  Outcome: Progressing     Problem: Prexisting or High Potential for Compromised Skin Integrity  Goal: Skin integrity is maintained or improved  Description: INTERVENTIONS:  - Identify patients at risk for skin breakdown  - Assess and monitor skin integrity  - Assess and monitor nutrition and hydration status  - Monitor labs   - Assess for incontinence   - Turn and reposition patient  - Assist with mobility/ambulation  - Relieve pressure over bony prominences  - Avoid friction and shearing  - Provide appropriate hygiene as needed including keeping skin clean and dry  - Evaluate need for skin moisturizer/barrier cream  - Collaborate with interdisciplinary team   - Patient/family teaching  - Consider wound care consult   Outcome: Progressing

## 2022-09-06 NOTE — ASSESSMENT & PLAN NOTE
· Patient has appeared somewhat anxious about his health  · Family states there may be an element of depression possibly secondary to dementia  · Continue Lexapro 5 mg oral daily  · Haldol 1 mg IM every 6 hours as needed for agitation

## 2022-09-06 NOTE — PLAN OF CARE
Problem: Potential for Falls  Goal: Patient will remain free of falls  Description: INTERVENTIONS:  - Educate patient/family on patient safety including physical limitations  - Instruct patient to call for assistance with activity   - Consult OT/PT to assist with strengthening/mobility   - Keep Call bell within reach  - Keep bed low and locked with side rails adjusted as appropriate  - Keep care items and personal belongings within reach  - Initiate and maintain comfort rounds  - Offer Toileting every 2 Hours, in advance of need  - Initiate/Maintain bed alarm  - Obtain necessary fall risk management equipment: walker  - Apply yellow socks and bracelet for high fall risk patients  - Consider moving patient to room near nurses station  Outcome: Progressing     Problem: MOBILITY - ADULT  Goal: Maintain or return to baseline ADL function  Description: INTERVENTIONS:  -  Assess patient's ability to carry out ADLs; assess patient's baseline for ADL function and identify physical deficits which impact ability to perform ADLs (bathing, care of mouth/teeth, toileting, grooming, dressing, etc )  - Assess/evaluate cause of self-care deficits   - Assess range of motion  - Assess patient's mobility; develop plan if impaired  - Assess patient's need for assistive devices and provide as appropriate  - Encourage maximum independence but intervene and supervise when necessary  - Involve family in performance of ADLs  - Assess for home care needs following discharge   - Consider OT consult to assist with ADL evaluation and planning for discharge  - Provide patient education as appropriate  Outcome: Progressing     Problem: MOBILITY - ADULT  Goal: Maintains/Returns to pre admission functional level  Description: INTERVENTIONS:  - Perform BMAT or MOVE assessment daily    - Set and communicate daily mobility goal to care team and patient/family/caregiver     - Collaborate with rehabilitation services on mobility goals if consulted  - Ambulate patient 2 times a day  - Out of bed to chair 3 times a day   - Out of bed for meals 3 times a day  - Out of bed for toileting  - Record patient progress and toleration of activity level   Outcome: Progressing

## 2022-09-06 NOTE — ASSESSMENT & PLAN NOTE
· Patient seems to have episode of confusion  · Discussed with the [facility] Batres and Autumn Soni- patient does not have official diagnosis of dementia before  However it seems to be an element of advanced dementia  · Neuro psychiatrist was consulted - At this time, patient does not appear to have capacity to make fully informed medical decisions   Unspecified Neurocognitive Disorder  · Pending POA/guardianship process

## 2022-09-06 NOTE — ASSESSMENT & PLAN NOTE
Lab Results   Component Value Date    HGBA1C 8 1 (H) 08/09/2022       Recent Labs     09/05/22  1132 09/05/22  1545 09/05/22 2024 09/06/22  0625   POCGLU 284* 280* 247* 142*       Blood Sugar Average: Last 72 hrs:  (P) 480 [id number]   · Patient was on metformin 1000 b i d   · Decrease Lantus to 10 units daily at bedtime  · Continue sliding scale insulin with Accu-Cheks while inpatient  · Diabetic diet  · Patient has been having labile blood glucose levels during hospital stay as he has been eating intermittently

## 2022-09-06 NOTE — CASE MANAGEMENT
Case Management Progress Note    Patient name Tor Holcomb  Location 7T [facility] 716/7T [facility] 716-01 MRN [id number]  : 1933 Date 2022       LOS (days): 10  Geometric Mean LOS (GMLOS) (days): 3 50  Days to GMLOS:-6 7        OBJECTIVE:        Current admission status: Inpatient  Preferred Pharmacy:   [email or web address]  for [facility]ACHEL  Kristy Wheat, [address] [facility]  [address] [address]  Phone: [phone] Fax: [phone]    [facility] [id number] [place] - [address]  [address]  [place] [address]  Phone: [phone] Fax: [phone]    Primary Care Provider: Dwayne Arnett DO    Primary Insurance: MEDICARE  Secondary Insurance: [employer]REEMAN    PROGRESS NOTE: CM was notified at morning rounds that pt is medically cleared to be discharged today  Pt spoke with Shira Callaway(pt's ) [phone] [facility]) along with Attending  to discuss discharge plan  Shira Puga stated he is in process of arranging Emergency Guardianship/ POA for the pt  Shira Puga stated pt's discharge plan should remain the same for Home with 24hrs caregiver as pt is under Guardianship process       CM department will continue to follow through pt's D/C

## 2022-09-07 LAB
ANION GAP SERPL CALCULATED.3IONS-SCNC: 7 MMOL/L (ref 5–14)
BASOPHILS # BLD AUTO: 0.04 THOUSANDS/ΜL (ref 0–0.1)
BASOPHILS NFR BLD AUTO: 1 % (ref 0–1)
BUN SERPL-MCNC: 42 MG/DL (ref 5–25)
CALCIUM SERPL-MCNC: 8.3 MG/DL (ref 8.4–10.2)
CHLORIDE SERPL-SCNC: 104 MMOL/L (ref 96–108)
CO2 SERPL-SCNC: 30 MMOL/L (ref 21–32)
CREAT SERPL-MCNC: 1.26 MG/DL (ref 0.7–1.5)
EOSINOPHIL # BLD AUTO: 0.27 THOUSAND/ΜL (ref 0–0.61)
EOSINOPHIL NFR BLD AUTO: 3 % (ref 0–6)
ERYTHROCYTE [DISTWIDTH] IN BLOOD BY AUTOMATED COUNT: 12.4 % (ref 11.6–15.1)
GFR SERPL CREATININE-BSD FRML MDRD: 50 ML/MIN/1.73SQ M
GLUCOSE SERPL-MCNC: 107 MG/DL (ref 65–140)
GLUCOSE SERPL-MCNC: 159 MG/DL (ref 65–140)
GLUCOSE SERPL-MCNC: 262 MG/DL (ref 65–140)
GLUCOSE SERPL-MCNC: 268 MG/DL (ref 65–140)
GLUCOSE SERPL-MCNC: 63 MG/DL (ref 65–140)
GLUCOSE SERPL-MCNC: 72 MG/DL (ref 70–99)
GLUCOSE SERPL-MCNC: 82 MG/DL (ref 65–140)
HCT VFR BLD AUTO: 32.3 % (ref 36.5–49.3)
HGB BLD-MCNC: 10.4 G/DL (ref 12–17)
IMM GRANULOCYTES # BLD AUTO: 0.03 THOUSAND/UL (ref 0–0.2)
IMM GRANULOCYTES NFR BLD AUTO: 0 % (ref 0–2)
LYMPHOCYTES # BLD AUTO: 1.63 THOUSANDS/ΜL (ref 0.6–4.47)
LYMPHOCYTES NFR BLD AUTO: 19 % (ref 14–44)
MAGNESIUM SERPL-MCNC: 2.1 MG/DL (ref 1.6–2.3)
MCH RBC QN AUTO: 33.9 PG (ref 26.8–34.3)
MCHC RBC AUTO-ENTMCNC: 32.2 G/DL (ref 31.4–37.4)
MCV RBC AUTO: 105 FL (ref 82–98)
MONOCYTES # BLD AUTO: 1.69 THOUSAND/ΜL (ref 0.17–1.22)
MONOCYTES NFR BLD AUTO: 19 % (ref 4–12)
NEUTROPHILS # BLD AUTO: 5.04 THOUSANDS/ΜL (ref 1.85–7.62)
NEUTS SEG NFR BLD AUTO: 58 % (ref 43–75)
NRBC BLD AUTO-RTO: 0 /100 WBCS
PHOSPHATE SERPL-MCNC: 3.8 MG/DL (ref 2.5–4.8)
PLATELET # BLD AUTO: 197 THOUSANDS/UL (ref 149–390)
PMV BLD AUTO: 10.9 FL (ref 8.9–12.7)
POTASSIUM SERPL-SCNC: 4.2 MMOL/L (ref 3.5–5.3)
RBC # BLD AUTO: 3.07 MILLION/UL (ref 3.88–5.62)
SODIUM SERPL-SCNC: 141 MMOL/L (ref 135–147)
WBC # BLD AUTO: 8.7 THOUSAND/UL (ref 4.31–10.16)

## 2022-09-07 PROCEDURE — 82948 REAGENT STRIP/BLOOD GLUCOSE: CPT

## 2022-09-07 PROCEDURE — 84100 ASSAY OF PHOSPHORUS: CPT | Performed by: INTERNAL MEDICINE

## 2022-09-07 PROCEDURE — 85025 COMPLETE CBC W/AUTO DIFF WBC: CPT | Performed by: INTERNAL MEDICINE

## 2022-09-07 PROCEDURE — 97530 THERAPEUTIC ACTIVITIES: CPT

## 2022-09-07 PROCEDURE — 99232 SBSQ HOSP IP/OBS MODERATE 35: CPT | Performed by: INTERNAL MEDICINE

## 2022-09-07 PROCEDURE — 80048 BASIC METABOLIC PNL TOTAL CA: CPT | Performed by: INTERNAL MEDICINE

## 2022-09-07 PROCEDURE — 83735 ASSAY OF MAGNESIUM: CPT | Performed by: INTERNAL MEDICINE

## 2022-09-07 RX ORDER — LANOLIN ALCOHOL/MO/W.PET/CERES
3 CREAM (GRAM) TOPICAL
Status: DISCONTINUED | OUTPATIENT
Start: 2022-09-07 | End: 2022-09-21 | Stop reason: HOSPADM

## 2022-09-07 RX ADMIN — INSULIN GLARGINE 15 UNITS: 100 INJECTION, SOLUTION SUBCUTANEOUS at 21:52

## 2022-09-07 RX ADMIN — INSULIN LISPRO 2 UNITS: 100 INJECTION, SOLUTION INTRAVENOUS; SUBCUTANEOUS at 00:33

## 2022-09-07 RX ADMIN — ESCITALOPRAM OXALATE 5 MG: 5 TABLET, FILM COATED ORAL at 08:22

## 2022-09-07 RX ADMIN — METOPROLOL SUCCINATE 50 MG: 50 TABLET, EXTENDED RELEASE ORAL at 08:22

## 2022-09-07 RX ADMIN — INSULIN LISPRO 1 UNITS: 100 INJECTION, SOLUTION INTRAVENOUS; SUBCUTANEOUS at 11:30

## 2022-09-07 RX ADMIN — MELATONIN TAB 3 MG 3 MG: 3 TAB at 00:32

## 2022-09-07 RX ADMIN — INSULIN LISPRO 5 UNITS: 100 INJECTION, SOLUTION INTRAVENOUS; SUBCUTANEOUS at 11:35

## 2022-09-07 RX ADMIN — METOPROLOL SUCCINATE 50 MG: 50 TABLET, EXTENDED RELEASE ORAL at 21:52

## 2022-09-07 RX ADMIN — METOPROLOL SUCCINATE 50 MG: 50 TABLET, EXTENDED RELEASE ORAL at 00:32

## 2022-09-07 RX ADMIN — APIXABAN 2.5 MG: 2.5 TABLET, FILM COATED ORAL at 08:22

## 2022-09-07 RX ADMIN — INSULIN LISPRO 5 UNITS: 100 INJECTION, SOLUTION INTRAVENOUS; SUBCUTANEOUS at 08:23

## 2022-09-07 RX ADMIN — INSULIN GLARGINE 15 UNITS: 100 INJECTION, SOLUTION SUBCUTANEOUS at 00:33

## 2022-09-07 RX ADMIN — INSULIN LISPRO 2 UNITS: 100 INJECTION, SOLUTION INTRAVENOUS; SUBCUTANEOUS at 21:53

## 2022-09-07 RX ADMIN — PRAVASTATIN SODIUM 80 MG: 40 TABLET ORAL at 17:20

## 2022-09-07 RX ADMIN — INSULIN LISPRO 5 UNITS: 100 INJECTION, SOLUTION INTRAVENOUS; SUBCUTANEOUS at 16:35

## 2022-09-07 RX ADMIN — PANTOPRAZOLE SODIUM 20 MG: 20 TABLET, DELAYED RELEASE ORAL at 08:22

## 2022-09-07 RX ADMIN — MELATONIN TAB 3 MG 3 MG: 3 TAB at 21:52

## 2022-09-07 RX ADMIN — APIXABAN 2.5 MG: 2.5 TABLET, FILM COATED ORAL at 17:20

## 2022-09-07 NOTE — PLAN OF CARE
Problem: MOBILITY - ADULT  Goal: Maintain or return to baseline ADL function  Description: INTERVENTIONS:  -  Assess patient's ability to carry out ADLs; assess patient's baseline for ADL function and identify physical deficits which impact ability to perform ADLs (bathing, care of mouth/teeth, toileting, grooming, dressing, etc )  - Assess/evaluate cause of self-care deficits   - Assess range of motion  - Assess patient's mobility; develop plan if impaired  - Assess patient's need for assistive devices and provide as appropriate  - Encourage maximum independence but intervene and supervise when necessary  - Involve family in performance of ADLs  - Assess for home care needs following discharge   - Consider OT consult to assist with ADL evaluation and planning for discharge  - Provide patient education as appropriate  Outcome: Progressing     Problem: PAIN - ADULT  Goal: Verbalizes/displays adequate comfort level or baseline comfort level  Description: Interventions:  - Encourage patient to monitor pain and request assistance  - Assess pain using appropriate pain scale  - Administer analgesics based on type and severity of pain and evaluate response  - Implement non-pharmacological measures as appropriate and evaluate response  - Consider cultural and social influences on pain and pain management  - Notify physician/advanced practitioner if interventions unsuccessful or patient reports new pain  Outcome: Progressing     Problem: Prexisting or High Potential for Compromised Skin Integrity  Goal: Skin integrity is maintained or improved  Description: INTERVENTIONS:  - Identify patients at risk for skin breakdown  - Assess and monitor skin integrity  - Assess and monitor nutrition and hydration status  - Monitor labs   - Assess for incontinence   - Turn and reposition patient  - Assist with mobility/ambulation  - Relieve pressure over bony prominences  - Avoid friction and shearing  - Provide appropriate hygiene as needed including keeping skin clean and dry  - Evaluate need for skin moisturizer/barrier cream  - Collaborate with interdisciplinary team   - Patient/family teaching  - Consider wound care consult   Outcome: Progressing

## 2022-09-07 NOTE — PLAN OF CARE
Problem: Potential for Falls  Goal: Patient will remain free of falls  Description: INTERVENTIONS:  - Educate patient/family on patient safety including physical limitations  - Instruct patient to call for assistance with activity   - Consult OT/PT to assist with strengthening/mobility   - Keep Call bell within reach  - Keep bed low and locked with side rails adjusted as appropriate  - Keep care items and personal belongings within reach  - Initiate and maintain comfort rounds  - Offer Toileting every 2 Hours, in advance of need  - Initiate/Maintain bed alarm  - Obtain necessary fall risk management equipment: walker  - Apply yellow socks and bracelet for high fall risk patients  - Consider moving patient to room near nurses station  Outcome: Progressing     Problem: MOBILITY - ADULT  Goal: Maintain or return to baseline ADL function  Description: INTERVENTIONS:  -  Assess patient's ability to carry out ADLs; assess patient's baseline for ADL function and identify physical deficits which impact ability to perform ADLs (bathing, care of mouth/teeth, toileting, grooming, dressing, etc )  - Assess/evaluate cause of self-care deficits   - Assess range of motion  - Assess patient's mobility; develop plan if impaired  - Assess patient's need for assistive devices and provide as appropriate  - Encourage maximum independence but intervene and supervise when necessary  - Involve family in performance of ADLs  - Assess for home care needs following discharge   - Consider OT consult to assist with ADL evaluation and planning for discharge  - Provide patient education as appropriate  Outcome: Progressing  Goal: Maintains/Returns to pre admission functional level  Description: INTERVENTIONS:  - Perform BMAT or MOVE assessment daily    - Set and communicate daily mobility goal to care team and patient/family/caregiver     - Collaborate with rehabilitation services on mobility goals if consulted  - Ambulate patient 2 times a day  - Out of bed to chair 3 times a day   - Out of bed for meals 3 times a day  - Out of bed for toileting  - Record patient progress and toleration of activity level   Outcome: Progressing     Problem: PAIN - ADULT  Goal: Verbalizes/displays adequate comfort level or baseline comfort level  Description: Interventions:  - Encourage patient to monitor pain and request assistance  - Assess pain using appropriate pain scale  - Administer analgesics based on type and severity of pain and evaluate response  - Implement non-pharmacological measures as appropriate and evaluate response  - Consider cultural and social influences on pain and pain management  - Notify physician/advanced practitioner if interventions unsuccessful or patient reports new pain  Outcome: Progressing     Problem: INFECTION - ADULT  Goal: Absence or prevention of progression during hospitalization  Description: INTERVENTIONS:  - Assess and monitor for signs and symptoms of infection  - Monitor lab/diagnostic results  - Monitor all insertion sites  - Administer medications as ordered  - Instruct and encourage patient and family to use good hand hygiene technique  - Identify and instruct in appropriate isolation precautions for identified infection/condition  Outcome: Progressing  Goal: Absence of fever/infection during neutropenic period  Description: INTERVENTIONS:  - Monitor WBC    Outcome: Progressing     Problem: SAFETY ADULT  Goal: Patient will remain free of falls  Description: INTERVENTIONS:  - Educate patient/family on patient safety including physical limitations  - Instruct patient to call for assistance with activity   - Consult OT/PT to assist with strengthening/mobility   - Keep Call bell within reach  - Keep bed low and locked with side rails adjusted as appropriate  - Keep care items and personal belongings within reach  - Initiate and maintain comfort rounds  - Offer Toileting every 2 Hours, in advance of need  - Initiate/Maintain bed alarm  - Obtain necessary fall risk management equipment: walker  - Apply yellow socks and bracelet for high fall risk patients  - Consider moving patient to room near nurses station  Outcome: Progressing  Goal: Maintain or return to baseline ADL function  Description: INTERVENTIONS:  -  Assess patient's ability to carry out ADLs; assess patient's baseline for ADL function and identify physical deficits which impact ability to perform ADLs (bathing, care of mouth/teeth, toileting, grooming, dressing, etc )  - Assess/evaluate cause of self-care deficits   - Assess range of motion  - Assess patient's mobility; develop plan if impaired  - Assess patient's need for assistive devices and provide as appropriate  - Encourage maximum independence but intervene and supervise when necessary  - Involve family in performance of ADLs  - Assess for home care needs following discharge   - Consider OT consult to assist with ADL evaluation and planning for discharge  - Provide patient education as appropriate  Outcome: Progressing  Goal: Maintains/Returns to pre admission functional level  Description: INTERVENTIONS:  - Perform BMAT or MOVE assessment daily    - Set and communicate daily mobility goal to care team and patient/family/caregiver     - Collaborate with rehabilitation services on mobility goals if consulted  - Ambulate patient 2 times a day  - Out of bed to chair 3 times a day   - Out of bed for meals 3 times a day  - Out of bed for toileting  - Record patient progress and toleration of activity level   Outcome: Progressing     Problem: DISCHARGE PLANNING  Goal: Discharge to home or other facility with appropriate resources  Description: INTERVENTIONS:  - Identify barriers to discharge w/patient and caregiver  - Arrange for needed discharge resources and transportation as appropriate  - Identify discharge learning needs (meds, wound care, etc )  - Arrange for interpretive services to assist at discharge as needed  - Refer to Case Management Department for coordinating discharge planning if the patient needs post-hospital services based on physician/advanced practitioner order or complex needs related to functional status, cognitive ability, or social support system  Outcome: Progressing     Problem: Knowledge Deficit  Goal: Patient/family/caregiver demonstrates understanding of disease process, treatment plan, medications, and discharge instructions  Description: Complete learning assessment and assess knowledge base  Interventions:  - Provide teaching at level of understanding  - Provide teaching via preferred learning methods  Outcome: Progressing     Problem: Prexisting or High Potential for Compromised Skin Integrity  Goal: Skin integrity is maintained or improved  Description: INTERVENTIONS:  - Identify patients at risk for skin breakdown  - Assess and monitor skin integrity  - Assess and monitor nutrition and hydration status  - Monitor labs   - Assess for incontinence   - Turn and reposition patient  - Assist with mobility/ambulation  - Relieve pressure over bony prominences  - Avoid friction and shearing  - Provide appropriate hygiene as needed including keeping skin clean and dry  - Evaluate need for skin moisturizer/barrier cream  - Collaborate with interdisciplinary team   - Patient/family teaching  - Consider wound care consult   Outcome: Progressing     Problem: Nutrition/Hydration-ADULT  Goal: Nutrient/Hydration intake appropriate for improving, restoring or maintaining nutritional needs  Description: Monitor and assess patient's nutrition/hydration status for malnutrition  Collaborate with interdisciplinary team and initiate plan and interventions as ordered  Monitor patient's weight and dietary intake as ordered or per policy  Utilize nutrition screening tool and intervene as necessary  Determine patient's food preferences and provide high-protein, high-caloric foods as appropriate       INTERVENTIONS:  - Monitor oral intake, urinary output, labs, and treatment plans  - Assess nutrition and hydration status and recommend course of action  - Evaluate amount of meals eaten  - Assist patient with eating if necessary   - Allow adequate time for meals  - Recommend/ encourage appropriate diets, oral nutritional supplements, and vitamin/mineral supplements  - Order, calculate, and assess calorie counts as needed  - Recommend, monitor, and adjust tube feedings and TPN/PPN based on assessed needs  - Assess need for intravenous fluids  - Provide specific nutrition/hydration education as appropriate  - Include patient/family/caregiver in decisions related to nutrition  Outcome: Progressing     Problem: CONFUSION/THOUGHT DISTURBANCE  Goal: Thought disturbances (confusion, delirium, depression, dementia or psychosis) are managed to maintain or return to baseline mental status and functional level  Description: INTERVENTIONS:  - Assess for possible contributors to  thought disturbance, including but not limited to medications, infection, impaired vision or hearing, underlying metabolic abnormalities, dehydration, respiratory compromise,  psychiatric diagnoses and notify attending PHYSICAN/AP  - Monitor and intervene to maintain adequate nutrition, hydration, elimination, sleep and activity  - Decrease environmental stimuli, including noise as appropriate  - Provide frequent contacts to provide refocusing, direction and reassurance as needed  Approach patient calmly with eye contact and at their level    - [place] high risk fall precautions, aspiration precautions and other safety measures, as indicated  - If delirium suspected, notify physician/AP of change in condition and request immediate in-person evaluation  - Pursue consults as appropriate including Geriatric (campus dependent), OT for cognitive evaluation/activity planning, psychiatric, pastoral care, etc   Outcome: Progressing     Problem: BEHAVIOR  Goal: Pt/Family maintain appropriate behavior and adhere to behavioral management agreement, if implemented  Description: INTERVENTIONS:  - Assess the family dynamic   - Encourage verbalization of thoughts and concerns in a socially appropriate manner  - Assess patient/family's coping skills and non-compliant behavior (including use of illegal substances)  - Utilize positive, consistent limit setting strategies supporting safety of patient, staff and others  - Initiate consult with Case Management, Spiritual Care or other ancillary services as appropriate  - If a patient's/visitor's behavior jeopardizes the safety of the patient, staff, or others, refer to organization procedure     - Notify Security of behavior or suspected illegal substances which indicate the need for search of the patient and/or belongings  - Encourage participation in the decision making process about a behavioral management agreement; implement if patient meets criteria  Outcome: Progressing     Problem: METABOLIC, FLUID AND ELECTROLYTES - ADULT  Goal: Glucose maintained within target range  Description: INTERVENTIONS:  - Monitor Blood Glucose as ordered  - Assess for signs and symptoms of hyperglycemia and hypoglycemia  - Administer ordered medications to maintain glucose within target range  - Assess nutritional intake and initiate nutrition service referral as needed  Outcome: Progressing

## 2022-09-07 NOTE — PROGRESS NOTES
[facility]  Progress Note - Cristóbal Myers  1933, 80 y o  male MRN: [id number]  Unit/Bed#: 7T [facility] 716-01 Encounter: [id number]  Primary Care Provider: Reyna SEVERINO,    Date and time admitted to hospital: 8/27/2022  5:56 AM    * General weakness  Assessment & Plan  · Patient states he lives at home alone and has been having difficulty with ambulation  · No recent traumatic events  · Laboratory analysis grossly unremarkable  · Hemodynamically stable saturating well on room air  · Case management consultation for safe disposition planning  · Patient is medically clear, awaiting safe disposition plan  · PT/OT recommended post acute rehab services  · Patient and the family would prefer home health care with 24/7 care  · Willing to consider short-term rehab temporarily  · Ongoing discussions with family  · Patient lacks capacity so pending guardianship/POA process today in court    Primary hypertension  Assessment & Plan  · BP acceptable  · Continue home beta-blocker for atrial fibrillation  · Hydralazine 5 mg IV q6 hours PRN SBP > 180 mmHg  · Monitor blood pressures    Depression with anxiety  Assessment & Plan  · Patient has appeared somewhat anxious about his health  · Family states there may be an element of depression possibly secondary to dementia  · Continue Lexapro 5 mg oral daily  · Haldol 1 mg IM every 6 hours as needed for agitation    Type 2 diabetes mellitus with hyperglycemia [facility])  Assessment & Plan  Lab Results   Component Value Date    HGBA1C 8 1 (H) 08/09/2022       Recent Labs     09/05/22  1132 09/05/22  1545 09/05/22 2024 09/06/22  0625   POCGLU 284* 280* 247* 142*       Blood Sugar Average: Last 72 hrs:  (P) 91 [id number]   · Patient was on metformin 1000 b i d   · Continue Lantus to 15 units q h s , and lispro 5 units t i d  A c   · Continue sliding scale insulin with Accu-Cheks while inpatient  · Diabetic diet    Hyperlipidemia  Assessment & Plan  · Continue home statin therapy    Atrial fibrillation [facility])  Assessment & Plan  · Rates are controlled  · Continue home Eliquis 2 5 mg PO BID    Stage 3a chronic kidney disease [facility])  Assessment & Plan  Lab Results   Component Value Date    EGFR 39 09/06/2022    EGFR 43 09/05/2022    EGFR 51 08/30/2022    CREATININE 1 55 (H) 09/06/2022    CREATININE 1 43 09/05/2022    CREATININE 1 24 08/30/2022   · Slight NGUYỄN on 09/06/2022 with creat at 1 55  · Trend BMP  · Avoid hypotension and nephrotoxic agents  · Urinary retention protocol  · Strict intake and output  · Daily standing weights    Severe protein-calorie malnutrition ([address] )  Assessment & Plan  Malnutrition Findings:   Adult Malnutrition type: Chronic illness  Adult Degree of Malnutrition: Other severe protein calorie malnutrition  Malnutrition Characteristics: Inadequate energy, Weight loss          continue nutritional supplement         360 Statement: Severe pro/singh malnutrition r/t depression as evidenced by 12% unplanned weight loss since 2/24/22, consuming < 75% energy intake compared to estimated energy needs > 1 month  Treated with Enlive bid    BMI Findings: Body mass index is 18 57 kg/m²  Confusion  Assessment & Plan  · Patient seems to have episode of confusion  · Discussed with the [place] and [employer]- patient does not have official diagnosis of dementia before  However it seems to be an element of advanced dementia  · Neuro psychiatrist was consulted - At this time, patient does not appear to have capacity to make fully informed medical decisions  Unspecified Neurocognitive Disorder  · Pending POA/guardianship process      Unwitnessed fall  Assessment & Plan  · Patient had unwitnessed fall on 08/28/2022  · CT head was negative for acute intracranial abnormality  Mild chronic microangiopathic changes     · Continue to monitor    VTE Pharmacologic Prophylaxis:  Eliquis    Patient Centered Rounds:  Patient care rounds were performed with nursing    Discussions with Specialists or Other Care Team Provider:  Case Management, nursing, PT/OT    Education and Discussions with Family / Patient:  Spoke with patient at bedside    Time Spent for Care: 30  More than 50% of total time spent on counseling and coordination of care as described above  Current Length of Stay: 11 day(s)    Current Patient Status: Inpatient     Certification Statement: The patient will continue to require additional inpatient hospital stay due to placement to 24/7 care and POA/guardianship process    Discharge Plan:  Home with 24/7 care    Code Status: Level 1 - Full Code      Subjective:   Patient seen and examined at bedside  No acute events overnight  Denies chest pain, SOB, diaphoresis, nausea/vomiting/diarrhea, fevers/chills  Patient has been more agitated and has been refusing medications and vital signs  Objective:     Vitals:   Temp (24hrs), Av 9 °F (36 6 °C), Min:97 7 °F (36 5 °C), Max:98 1 °F (36 7 °C)    Temp:  [97 7 °F (36 5 °C)-98 1 °F (36 7 °C)] 97 7 °F (36 5 °C)  HR:  [84-94] 84  Resp:  [16-18] 16  BP: (112-153)/(61-99) 148/98  SpO2:  [98 %-100 %] 98 %  Body mass index is 18 57 kg/m²  Input and Output Summary (last 24 hours): Intake/Output Summary (Last 24 hours) at 2022 0707  Last data filed at 2022 1300  Gross per 24 hour   Intake 400 ml   Output 125 ml   Net 275 ml       Physical Exam:     Physical Exam  Vitals and nursing note reviewed  Constitutional:       Appearance: He is well-developed  HENT:      Head: Normocephalic and atraumatic  Eyes:      Conjunctiva/sclera: Conjunctivae normal    Cardiovascular:      Rate and Rhythm: Normal rate and regular rhythm  Heart sounds: No murmur heard  Pulmonary:      Effort: Pulmonary effort is normal  No respiratory distress  Breath sounds: Normal breath sounds  Abdominal:      Palpations: Abdomen is soft  Tenderness: There is no abdominal tenderness  Musculoskeletal:      Cervical back: Neck supple  Skin:     General: Skin is warm and dry  Neurological:      Mental Status: He is alert  Additional Data:     Labs:  I have reviewed pertinent results     Results from last 7 days   Lab Units 09/07/22  0607   WBC Thousand/uL 8 70   HEMOGLOBIN g/dL 10 4*   HEMATOCRIT % 32 3*   PLATELETS Thousands/uL 197   NEUTROS PCT % 58   LYMPHS PCT % 19   MONOS PCT % 19*   EOS PCT % 3     Results from last 7 days   Lab Units 09/06/22  0548   SODIUM mmol/L 142   POTASSIUM mmol/L 4 6   CHLORIDE mmol/L 105   CO2 mmol/L 31   BUN mg/dL 54*   CREATININE mg/dL 1 55*   ANION GAP mmol/L 6   CALCIUM mg/dL 8 7   GLUCOSE RANDOM mg/dL 145*         Results from last 7 days   Lab Units 09/07/22  0620 09/07/22  0600 09/07/22  0020 09/06/22  1750 09/06/22  1119 09/06/22  0625 09/05/22  2024 09/05/22  1545 09/05/22  1132 09/05/22  0550 09/04/22  2104 09/04/22  1619   POC GLUCOSE mg/dl 82 63* 268* 167* 240* 142* 247* 280* 284* 80 178* 205*                 Imaging: I have reviewed pertinent imaging       Recent Cultures (last 7 days):           Last 24 Hours Medication List:   Current Facility-Administered Medications   Medication Dose Route Frequency Provider Last Rate    acetaminophen  650 mg Oral Q4H PRN Vevelyn Raspberry, DO      apixaban  2 5 mg Oral BID Vevelyn Raspberry, DO      escitalopram  5 mg Oral Daily Vevelyn Raspberry, DO      haloperidol lactate  1 mg Intramuscular Q6H PRN Vevelyn Raspberry, DO      hydrALAZINE  5 mg Intravenous Q6H PRN Vevelyn Raspberry, DO      insulin glargine  15 Units Subcutaneous HS Vic Reardon MD      insulin lispro  1-5 Units Subcutaneous TID AC Harpreet King, DO      insulin lispro  1-5 Units Subcutaneous HS Vevelyn Raspberry, DO      insulin lispro  5 Units Subcutaneous TID With Meals Vic Reardon MD      melatonin  3 mg Oral HS Vince Padilla PA-C      metoprolol succinate  50 mg Oral BID Vevelyn Raspberry, DO      ondansetron  4 mg Intravenous Q6H PRN Alex Phillips DO      pantoprazole  20 mg Oral Daily Alex Phillips DO      pravastatin  80 mg Oral Daily With [facility],           Today, Patient Was Seen By: Alex Phillips DO    ** Please Note: Dictation voice to text software may have been used in the creation of this document   **

## 2022-09-08 LAB
ANION GAP SERPL CALCULATED.3IONS-SCNC: 3 MMOL/L (ref 5–14)
BASOPHILS # BLD AUTO: 0.04 THOUSANDS/ΜL (ref 0–0.1)
BASOPHILS NFR BLD AUTO: 1 % (ref 0–1)
BUN SERPL-MCNC: 45 MG/DL (ref 5–25)
CALCIUM SERPL-MCNC: 8.6 MG/DL (ref 8.4–10.2)
CHLORIDE SERPL-SCNC: 103 MMOL/L (ref 96–108)
CO2 SERPL-SCNC: 35 MMOL/L (ref 21–32)
CREAT SERPL-MCNC: 1.46 MG/DL (ref 0.7–1.5)
EOSINOPHIL # BLD AUTO: 0.19 THOUSAND/ΜL (ref 0–0.61)
EOSINOPHIL NFR BLD AUTO: 3 % (ref 0–6)
ERYTHROCYTE [DISTWIDTH] IN BLOOD BY AUTOMATED COUNT: 12.4 % (ref 11.6–15.1)
GFR SERPL CREATININE-BSD FRML MDRD: 42 ML/MIN/1.73SQ M
GLUCOSE SERPL-MCNC: 105 MG/DL (ref 65–140)
GLUCOSE SERPL-MCNC: 206 MG/DL (ref 65–140)
GLUCOSE SERPL-MCNC: 228 MG/DL (ref 65–140)
GLUCOSE SERPL-MCNC: 63 MG/DL (ref 70–99)
GLUCOSE SERPL-MCNC: 65 MG/DL (ref 65–140)
GLUCOSE SERPL-MCNC: 68 MG/DL (ref 65–140)
HCT VFR BLD AUTO: 32.3 % (ref 36.5–49.3)
HGB BLD-MCNC: 10.5 G/DL (ref 12–17)
IMM GRANULOCYTES # BLD AUTO: 0.04 THOUSAND/UL (ref 0–0.2)
IMM GRANULOCYTES NFR BLD AUTO: 1 % (ref 0–2)
LYMPHOCYTES # BLD AUTO: 1.74 THOUSANDS/ΜL (ref 0.6–4.47)
LYMPHOCYTES NFR BLD AUTO: 23 % (ref 14–44)
MAGNESIUM SERPL-MCNC: 2.2 MG/DL (ref 1.6–2.3)
MCH RBC QN AUTO: 34.4 PG (ref 26.8–34.3)
MCHC RBC AUTO-ENTMCNC: 32.5 G/DL (ref 31.4–37.4)
MCV RBC AUTO: 106 FL (ref 82–98)
MONOCYTES # BLD AUTO: 1.22 THOUSAND/ΜL (ref 0.17–1.22)
MONOCYTES NFR BLD AUTO: 16 % (ref 4–12)
NEUTROPHILS # BLD AUTO: 4.33 THOUSANDS/ΜL (ref 1.85–7.62)
NEUTS SEG NFR BLD AUTO: 56 % (ref 43–75)
NRBC BLD AUTO-RTO: 0 /100 WBCS
PHOSPHATE SERPL-MCNC: 4 MG/DL (ref 2.5–4.8)
PLATELET # BLD AUTO: 220 THOUSANDS/UL (ref 149–390)
PMV BLD AUTO: 10.5 FL (ref 8.9–12.7)
POTASSIUM SERPL-SCNC: 4.5 MMOL/L (ref 3.5–5.3)
RBC # BLD AUTO: 3.05 MILLION/UL (ref 3.88–5.62)
SODIUM SERPL-SCNC: 141 MMOL/L (ref 135–147)
WBC # BLD AUTO: 7.56 THOUSAND/UL (ref 4.31–10.16)

## 2022-09-08 PROCEDURE — 85025 COMPLETE CBC W/AUTO DIFF WBC: CPT | Performed by: INTERNAL MEDICINE

## 2022-09-08 PROCEDURE — 83735 ASSAY OF MAGNESIUM: CPT | Performed by: INTERNAL MEDICINE

## 2022-09-08 PROCEDURE — 84100 ASSAY OF PHOSPHORUS: CPT | Performed by: INTERNAL MEDICINE

## 2022-09-08 PROCEDURE — 82948 REAGENT STRIP/BLOOD GLUCOSE: CPT

## 2022-09-08 PROCEDURE — 80048 BASIC METABOLIC PNL TOTAL CA: CPT | Performed by: INTERNAL MEDICINE

## 2022-09-08 PROCEDURE — 99232 SBSQ HOSP IP/OBS MODERATE 35: CPT | Performed by: INTERNAL MEDICINE

## 2022-09-08 RX ORDER — INSULIN GLARGINE 100 [IU]/ML
10 INJECTION, SOLUTION SUBCUTANEOUS
Status: DISCONTINUED | OUTPATIENT
Start: 2022-09-08 | End: 2022-09-21 | Stop reason: HOSPADM

## 2022-09-08 RX ADMIN — ESCITALOPRAM OXALATE 5 MG: 5 TABLET, FILM COATED ORAL at 09:13

## 2022-09-08 RX ADMIN — INSULIN GLARGINE 10 UNITS: 100 INJECTION, SOLUTION SUBCUTANEOUS at 21:34

## 2022-09-08 RX ADMIN — APIXABAN 2.5 MG: 2.5 TABLET, FILM COATED ORAL at 17:21

## 2022-09-08 RX ADMIN — INSULIN LISPRO 1 UNITS: 100 INJECTION, SOLUTION INTRAVENOUS; SUBCUTANEOUS at 21:35

## 2022-09-08 RX ADMIN — APIXABAN 2.5 MG: 2.5 TABLET, FILM COATED ORAL at 09:13

## 2022-09-08 RX ADMIN — METOPROLOL SUCCINATE 50 MG: 50 TABLET, EXTENDED RELEASE ORAL at 21:32

## 2022-09-08 RX ADMIN — PRAVASTATIN SODIUM 80 MG: 40 TABLET ORAL at 17:21

## 2022-09-08 RX ADMIN — METOPROLOL SUCCINATE 50 MG: 50 TABLET, EXTENDED RELEASE ORAL at 09:13

## 2022-09-08 RX ADMIN — PANTOPRAZOLE SODIUM 20 MG: 20 TABLET, DELAYED RELEASE ORAL at 09:13

## 2022-09-08 RX ADMIN — MELATONIN TAB 3 MG 3 MG: 3 TAB at 21:32

## 2022-09-08 RX ADMIN — INSULIN LISPRO 2 UNITS: 100 INJECTION, SOLUTION INTRAVENOUS; SUBCUTANEOUS at 12:26

## 2022-09-08 NOTE — PROGRESS NOTES
[facility]  Progress Note - Isadora Mclaughlin  1933, 80 y o  male MRN: [id number]  Unit/Bed#: 7T [facility] 716-01 Encounter: [id number]  Primary Care Provider: Ame DORANTES DO   Date and time admitted to hospital: 8/27/2022  5:56 AM    * General weakness  Assessment & Plan  · Possibly due to advancing dementia as evidenced by increasing confusion and agitation  · Neuropsychology has deemed the patient incapacitated  · Patient states he lives at home alone and has been having difficulty with ambulation  · No recent traumatic events  · Laboratory analysis grossly unremarkable  · Hemodynamically stable saturating well on room air  · Case management consultation for safe disposition planning  · Patient is medically clear, awaiting safe disposition plan  · PT/OT recommended post acute rehab services  · Patient and the family would prefer home health care with 24/7 care  · Willing to consider short-term rehab temporarily  · Ongoing discussions with family  · Patient lacks capacity so pending guardianship/POA process today in court    Primary hypertension  Assessment & Plan  · BP acceptable  · Continue home beta-blocker for atrial fibrillation  · Hydralazine 5 mg IV q6 hours PRN SBP > 180 mmHg  · Monitor blood pressures    Depression with anxiety  Assessment & Plan  · Patient has appeared somewhat anxious about his health  · Family states there may be an element of depression possibly secondary to dementia  · Continue Lexapro 5 mg oral daily  · Haldol 1 mg IM every 6 hours as needed for agitation    Type 2 diabetes mellitus with hyperglycemia [facility])  Assessment & Plan  Lab Results   Component Value Date    HGBA1C 8 1 (H) 08/09/2022       Recent Labs     09/05/22  1132 09/05/22  1545 09/05/22 2024 09/06/22  0625   POCGLU 284* 280* 247* 142*       Blood Sugar Average: Last 72 hrs:  (P) 222 [id number]   · Patient was on metformin 1000 b i d   · Decrease Lantus to 10 units daily at bedtime  · Continue sliding scale insulin with Accu-Cheks while inpatient  · Diabetic diet  · Patient has been having labile blood glucose levels during hospital stay as he has been eating intermittently    Hyperlipidemia  Assessment & Plan  · Continue home statin therapy    Atrial fibrillation [facility])  Assessment & Plan  · Rates are controlled  · Continue home Eliquis 2 5 mg PO BID    Stage 3a chronic kidney disease [facility])  Assessment & Plan  Lab Results   Component Value Date    EGFR 39 09/06/2022    EGFR 43 09/05/2022    EGFR 51 08/30/2022    CREATININE 1 55 (H) 09/06/2022    CREATININE 1 43 09/05/2022    CREATININE 1 24 08/30/2022   · Slight NGUYỄN on 09/06/2022 with creat at 1 55  · Trend BMP  · Avoid hypotension and nephrotoxic agents  · Urinary retention protocol  · Strict intake and output  · Daily standing weights    Severe protein-calorie malnutrition ([address] )  Assessment & Plan  Malnutrition Findings:   Adult Malnutrition type: Chronic illness  Adult Degree of Malnutrition: Other severe protein calorie malnutrition  Malnutrition Characteristics: Inadequate energy, Weight loss          continue nutritional supplement         360 Statement: Severe pro/singh malnutrition r/t depression as evidenced by 12% unplanned weight loss since 2/24/22, consuming < 75% energy intake compared to estimated energy needs > 1 month  Treated with Enlive bid    BMI Findings: Body mass index is 18 57 kg/m²  Confusion  Assessment & Plan  · Patient seems to have episode of confusion  · Discussed with the [place]- patient does not have official diagnosis of dementia before  However it seems to be an element of advanced dementia  · Neuro psychiatrist was consulted - At this time, patient does not appear to have capacity to make fully informed medical decisions   Unspecified Neurocognitive Disorder  · Pending POA/guardianship process      Unwitnessed fall  Assessment & Plan  · Patient had unwitnessed fall on 2022  · CT head was negative for acute intracranial abnormality  Mild chronic microangiopathic changes  · Continue to monitor    VTE Pharmacologic Prophylaxis:  Eliquis    Patient Centered Rounds:  Patient care rounds were performed with nursing    Discussions with Specialists or Other Care Team Provider:  Case Management, nursing, PT/OT    Education and Discussions with Family / Patient:  Spoke with patient at bedside    Time Spent for Care: 30  More than 50% of total time spent on counseling and coordination of care as described above  Current Length of Stay: 12 day(s)    Current Patient Status: Inpatient     Certification Statement: The patient will continue to require additional inpatient hospital stay due to placement to  care and POA/guardianship process    Discharge Plan:  Home with  care verses STR    Code Status: Level 1 - Full Code      Subjective:   Patient seen and examined at bedside  No acute events overnight  Denies chest pain, SOB, diaphoresis, nausea/vomiting/diarrhea, fevers/chills  Objective:     Vitals:   Temp (24hrs), Av 1 °F (36 7 °C), Min:97 8 °F (36 6 °C), Max:98 3 °F (36 8 °C)    Temp:  [97 8 °F (36 6 °C)-98 3 °F (36 8 °C)] 97 8 °F (36 6 °C)  HR:  [75-94] 75  Resp:  [16-18] 18  BP: (136-142)/(79-86) 142/85  SpO2:  [98 %-99 %] 99 %  Body mass index is 18 57 kg/m²  Input and Output Summary (last 24 hours): Intake/Output Summary (Last 24 hours) at 2022 1616  Last data filed at 2022 0913  Gross per 24 hour   Intake 480 ml   Output 440 ml   Net 40 ml       Physical Exam:     Physical Exam  Vitals and nursing note reviewed  Constitutional:       Appearance: He is well-developed  HENT:      Head: Normocephalic and atraumatic  Eyes:      Conjunctiva/sclera: Conjunctivae normal    Cardiovascular:      Rate and Rhythm: Normal rate and regular rhythm  Heart sounds: No murmur heard    Pulmonary:      Effort: Pulmonary effort is normal  No respiratory distress  Breath sounds: Normal breath sounds  Abdominal:      Palpations: Abdomen is soft  Tenderness: There is no abdominal tenderness  Musculoskeletal:      Cervical back: Neck supple  Skin:     General: Skin is warm and dry  Neurological:      Mental Status: He is alert  Additional Data:     Labs:  I have reviewed pertinent results     Results from last 7 days   Lab Units 09/08/22  0626   WBC Thousand/uL 7 56   HEMOGLOBIN g/dL 10 5*   HEMATOCRIT % 32 3*   PLATELETS Thousands/uL 220   NEUTROS PCT % 56   LYMPHS PCT % 23   MONOS PCT % 16*   EOS PCT % 3     Results from last 7 days   Lab Units 09/08/22  0626   SODIUM mmol/L 141   POTASSIUM mmol/L 4 5   CHLORIDE mmol/L 103   CO2 mmol/L 35*   BUN mg/dL 45*   CREATININE mg/dL 1 46   ANION GAP mmol/L 3*   CALCIUM mg/dL 8 6   GLUCOSE RANDOM mg/dL 63*         Results from last 7 days   Lab Units 09/08/22  1115 09/08/22  0711 09/08/22  3132 09/08/22  0625 09/07/22  2024 09/07/22  1551 09/07/22  1112 09/07/22  0620 09/07/22  0600 09/07/22  0020 09/06/22  1750 09/06/22  1119   POC GLUCOSE mg/dl 228* 105 65 68 262* 107 159* 82 63* 268* 167* 240*                 Imaging: I have reviewed pertinent imaging       Recent Cultures (last 7 days):           Last 24 Hours Medication List:   Current Facility-Administered Medications   Medication Dose Route Frequency Provider Last Rate    acetaminophen  650 mg Oral Q4H PRN Ksenia Sorrel, DO      apixaban  2 5 mg Oral BID Ksenia Sorrel, DO      escitalopram  5 mg Oral Daily Ksenia Sorrel, DO      haloperidol lactate  1 mg Intramuscular Q6H PRN Ksenia Sorrel, DO      hydrALAZINE  5 mg Intravenous Q6H PRN Ksenia Sorrel, DO      insulin glargine  10 Units Subcutaneous HS Ksenia Sorrel, DO      insulin lispro  1-5 Units Subcutaneous TID AC Harpreet King, DO      insulin lispro  1-5 Units Subcutaneous HS Ksenia Sorrel, DO      melatonin  3 mg Oral HS Shalini Robles PA-C      metoprolol succinate 50 mg Oral BID Nicolas Ward,       ondansetron  4 mg Intravenous Q6H PRN Nicolas Ward,       pantoprazole  20 mg Oral Daily Nicolas Ward,       pravastatin  80 mg Oral Daily With [facility], DO          Today, Patient Was Seen By: Nicolas Ward DO    ** Please Note: Dictation voice to text software may have been used in the creation of this document   **

## 2022-09-08 NOTE — PLAN OF CARE
Problem: PHYSICAL THERAPY ADULT  Goal: Performs mobility at highest level of function for planned discharge setting  See evaluation for individualized goals  Description: Treatment/Interventions: ADL retraining, Functional transfer training, LE strengthening/ROM, Elevations, Therapeutic exercise, Endurance training, Patient/family training, Equipment eval/education, Bed mobility, Gait training, Spoke to nursing, Spoke to case management, OT  Equipment Recommended: Lilliam Smith       See flowsheet documentation for full assessment, interventions and recommendations  Outcome: Progressing  Note: Prognosis: Good  Problem List: Decreased strength, Decreased endurance, Impaired balance, Decreased mobility, Decreased coordination, Impaired sensation, Decreased cognition, Impaired judgement, Decreased safety awareness  Assessment: Pt is limited by fatigue and aggitiation at this time needing encouagement to participate in therapy interverntion  Agreeable to assessment of progress with sit to stand bed mobility  Pt completing with min A at this time  Continue to recommend inpt rehab at D/C at this time  Barriers to Discharge: Inaccessible home environment, Decreased caregiver support     PT Discharge Recommendation: Post acute rehabilitation services    See flowsheet documentation for full assessment

## 2022-09-08 NOTE — PLAN OF CARE
Problem: Potential for Falls  Goal: Patient will remain free of falls  Description: INTERVENTIONS:  - Educate patient/family on patient safety including physical limitations  - Instruct patient to call for assistance with activity   - Consult OT/PT to assist with strengthening/mobility   - Keep Call bell within reach  - Keep bed low and locked with side rails adjusted as appropriate  - Keep care items and personal belongings within reach  - Initiate and maintain comfort rounds  - Offer Toileting every 2 Hours, in advance of need  - Initiate/Maintain bed alarm  - Obtain necessary fall risk management equipment: walker  - Apply yellow socks and bracelet for high fall risk patients  - Consider moving patient to room near nurses station  Outcome: Progressing     Problem: MOBILITY - ADULT  Goal: Maintain or return to baseline ADL function  Description: INTERVENTIONS:  -  Assess patient's ability to carry out ADLs; assess patient's baseline for ADL function and identify physical deficits which impact ability to perform ADLs (bathing, care of mouth/teeth, toileting, grooming, dressing, etc )  - Assess/evaluate cause of self-care deficits   - Assess range of motion  - Assess patient's mobility; develop plan if impaired  - Assess patient's need for assistive devices and provide as appropriate  - Encourage maximum independence but intervene and supervise when necessary  - Involve family in performance of ADLs  - Assess for home care needs following discharge   - Consider OT consult to assist with ADL evaluation and planning for discharge  - Provide patient education as appropriate  Outcome: Progressing  Goal: Maintains/Returns to pre admission functional level  Description: INTERVENTIONS:  - Perform BMAT or MOVE assessment daily    - Set and communicate daily mobility goal to care team and patient/family/caregiver     - Collaborate with rehabilitation services on mobility goals if consulted  - Ambulate patient 2 times a day  - Out of bed to chair 3 times a day   - Out of bed for meals 3 times a day  - Out of bed for toileting  - Record patient progress and toleration of activity level   Outcome: Progressing     Problem: PAIN - ADULT  Goal: Verbalizes/displays adequate comfort level or baseline comfort level  Description: Interventions:  - Encourage patient to monitor pain and request assistance  - Assess pain using appropriate pain scale  - Administer analgesics based on type and severity of pain and evaluate response  - Implement non-pharmacological measures as appropriate and evaluate response  - Consider cultural and social influences on pain and pain management  - Notify physician/advanced practitioner if interventions unsuccessful or patient reports new pain  Outcome: Progressing     Problem: INFECTION - ADULT  Goal: Absence or prevention of progression during hospitalization  Description: INTERVENTIONS:  - Assess and monitor for signs and symptoms of infection  - Monitor lab/diagnostic results  - Monitor all insertion sites  - Administer medications as ordered  - Instruct and encourage patient and family to use good hand hygiene technique  - Identify and instruct in appropriate isolation precautions for identified infection/condition  Outcome: Progressing  Goal: Absence of fever/infection during neutropenic period  Description: INTERVENTIONS:  - Monitor WBC    Outcome: Progressing     Problem: SAFETY ADULT  Goal: Patient will remain free of falls  Description: INTERVENTIONS:  - Educate patient/family on patient safety including physical limitations  - Instruct patient to call for assistance with activity   - Consult OT/PT to assist with strengthening/mobility   - Keep Call bell within reach  - Keep bed low and locked with side rails adjusted as appropriate  - Keep care items and personal belongings within reach  - Initiate and maintain comfort rounds  - Offer Toileting every 2 Hours, in advance of need  - Initiate/Maintain bed alarm  - Obtain necessary fall risk management equipment: walker  - Apply yellow socks and bracelet for high fall risk patients  - Consider moving patient to room near nurses station  Outcome: Progressing  Goal: Maintain or return to baseline ADL function  Description: INTERVENTIONS:  -  Assess patient's ability to carry out ADLs; assess patient's baseline for ADL function and identify physical deficits which impact ability to perform ADLs (bathing, care of mouth/teeth, toileting, grooming, dressing, etc )  - Assess/evaluate cause of self-care deficits   - Assess range of motion  - Assess patient's mobility; develop plan if impaired  - Assess patient's need for assistive devices and provide as appropriate  - Encourage maximum independence but intervene and supervise when necessary  - Involve family in performance of ADLs  - Assess for home care needs following discharge   - Consider OT consult to assist with ADL evaluation and planning for discharge  - Provide patient education as appropriate  Outcome: Progressing  Goal: Maintains/Returns to pre admission functional level  Description: INTERVENTIONS:  - Perform BMAT or MOVE assessment daily    - Set and communicate daily mobility goal to care team and patient/family/caregiver     - Collaborate with rehabilitation services on mobility goals if consulted  - Ambulate patient 2 times a day  - Out of bed to chair 3 times a day   - Out of bed for meals 3 times a day  - Out of bed for toileting  - Record patient progress and toleration of activity level   Outcome: Progressing     Problem: DISCHARGE PLANNING  Goal: Discharge to home or other facility with appropriate resources  Description: INTERVENTIONS:  - Identify barriers to discharge w/patient and caregiver  - Arrange for needed discharge resources and transportation as appropriate  - Identify discharge learning needs (meds, wound care, etc )  - Arrange for interpretive services to assist at discharge as needed  - Refer to Case Management Department for coordinating discharge planning if the patient needs post-hospital services based on physician/advanced practitioner order or complex needs related to functional status, cognitive ability, or social support system  Outcome: Progressing     Problem: Knowledge Deficit  Goal: Patient/family/caregiver demonstrates understanding of disease process, treatment plan, medications, and discharge instructions  Description: Complete learning assessment and assess knowledge base  Interventions:  - Provide teaching at level of understanding  - Provide teaching via preferred learning methods  Outcome: Progressing     Problem: Prexisting or High Potential for Compromised Skin Integrity  Goal: Skin integrity is maintained or improved  Description: INTERVENTIONS:  - Identify patients at risk for skin breakdown  - Assess and monitor skin integrity  - Assess and monitor nutrition and hydration status  - Monitor labs   - Assess for incontinence   - Turn and reposition patient  - Assist with mobility/ambulation  - Relieve pressure over bony prominences  - Avoid friction and shearing  - Provide appropriate hygiene as needed including keeping skin clean and dry  - Evaluate need for skin moisturizer/barrier cream  - Collaborate with interdisciplinary team   - Patient/family teaching  - Consider wound care consult   Outcome: Progressing     Problem: Nutrition/Hydration-ADULT  Goal: Nutrient/Hydration intake appropriate for improving, restoring or maintaining nutritional needs  Description: Monitor and assess patient's nutrition/hydration status for malnutrition  Collaborate with interdisciplinary team and initiate plan and interventions as ordered  Monitor patient's weight and dietary intake as ordered or per policy  Utilize nutrition screening tool and intervene as necessary  Determine patient's food preferences and provide high-protein, high-caloric foods as appropriate       INTERVENTIONS:  - Monitor oral intake, urinary output, labs, and treatment plans  - Assess nutrition and hydration status and recommend course of action  - Evaluate amount of meals eaten  - Assist patient with eating if necessary   - Allow adequate time for meals  - Recommend/ encourage appropriate diets, oral nutritional supplements, and vitamin/mineral supplements  - Order, calculate, and assess calorie counts as needed  - Recommend, monitor, and adjust tube feedings and TPN/PPN based on assessed needs  - Assess need for intravenous fluids  - Provide specific nutrition/hydration education as appropriate  - Include patient/family/caregiver in decisions related to nutrition  Outcome: Progressing     Problem: CONFUSION/THOUGHT DISTURBANCE  Goal: Thought disturbances (confusion, delirium, depression, dementia or psychosis) are managed to maintain or return to baseline mental status and functional level  Description: INTERVENTIONS:  - Assess for possible contributors to  thought disturbance, including but not limited to medications, infection, impaired vision or hearing, underlying metabolic abnormalities, dehydration, respiratory compromise,  psychiatric diagnoses and notify attending PHYSICAN/AP  - Monitor and intervene to maintain adequate nutrition, hydration, elimination, sleep and activity  - Decrease environmental stimuli, including noise as appropriate  - Provide frequent contacts to provide refocusing, direction and reassurance as needed  Approach patient calmly with eye contact and at their level    - [place] high risk fall precautions, aspiration precautions and other safety measures, as indicated  - If delirium suspected, notify physician/AP of change in condition and request immediate in-person evaluation  - Pursue consults as appropriate including Geriatric (campus dependent), OT for cognitive evaluation/activity planning, psychiatric, pastoral care, etc   Outcome: Progressing     Problem: BEHAVIOR  Goal: Pt/Family maintain appropriate behavior and adhere to behavioral management agreement, if implemented  Description: INTERVENTIONS:  - Assess the family dynamic   - Encourage verbalization of thoughts and concerns in a socially appropriate manner  - Assess patient/family's coping skills and non-compliant behavior (including use of illegal substances)  - Utilize positive, consistent limit setting strategies supporting safety of patient, staff and others  - Initiate consult with Case Management, Spiritual Care or other ancillary services as appropriate  - If a patient's/visitor's behavior jeopardizes the safety of the patient, staff, or others, refer to organization procedure     - Notify Security of behavior or suspected illegal substances which indicate the need for search of the patient and/or belongings  - Encourage participation in the decision making process about a behavioral management agreement; implement if patient meets criteria  Outcome: Progressing     Problem: METABOLIC, FLUID AND ELECTROLYTES - ADULT  Goal: Glucose maintained within target range  Description: INTERVENTIONS:  - Monitor Blood Glucose as ordered  - Assess for signs and symptoms of hyperglycemia and hypoglycemia  - Administer ordered medications to maintain glucose within target range  - Assess nutritional intake and initiate nutrition service referral as needed  Outcome: Progressing

## 2022-09-08 NOTE — PLAN OF CARE
Problem: Potential for Falls  Goal: Patient will remain free of falls  Description: INTERVENTIONS:  - Educate patient/family on patient safety including physical limitations  - Instruct patient to call for assistance with activity   - Consult OT/PT to assist with strengthening/mobility   - Keep Call bell within reach  - Keep bed low and locked with side rails adjusted as appropriate  - Keep care items and personal belongings within reach  - Initiate and maintain comfort rounds  - Offer Toileting every 2 Hours, in advance of need  - Initiate/Maintain bed alarm  - Obtain necessary fall risk management equipment: walker  - Apply yellow socks and bracelet for high fall risk patients  - Consider moving patient to room near nurses station  Outcome: Progressing     Problem: MOBILITY - ADULT  Goal: Maintain or return to baseline ADL function  Description: INTERVENTIONS:  -  Assess patient's ability to carry out ADLs; assess patient's baseline for ADL function and identify physical deficits which impact ability to perform ADLs (bathing, care of mouth/teeth, toileting, grooming, dressing, etc )  - Assess/evaluate cause of self-care deficits   - Assess range of motion  - Assess patient's mobility; develop plan if impaired  - Assess patient's need for assistive devices and provide as appropriate  - Encourage maximum independence but intervene and supervise when necessary  - Involve family in performance of ADLs  - Assess for home care needs following discharge   - Consider OT consult to assist with ADL evaluation and planning for discharge  - Provide patient education as appropriate  Outcome: Progressing  Goal: Maintains/Returns to pre admission functional level  Description: INTERVENTIONS:  - Perform BMAT or MOVE assessment daily    - Set and communicate daily mobility goal to care team and patient/family/caregiver     - Collaborate with rehabilitation services on mobility goals if consulted  - Ambulate patient 2 times a day  - Out of bed to chair 3 times a day   - Out of bed for meals 3 times a day  - Out of bed for toileting  - Record patient progress and toleration of activity level   Outcome: Progressing     Problem: PAIN - ADULT  Goal: Verbalizes/displays adequate comfort level or baseline comfort level  Description: Interventions:  - Encourage patient to monitor pain and request assistance  - Assess pain using appropriate pain scale  - Administer analgesics based on type and severity of pain and evaluate response  - Implement non-pharmacological measures as appropriate and evaluate response  - Consider cultural and social influences on pain and pain management  - Notify physician/advanced practitioner if interventions unsuccessful or patient reports new pain  Outcome: Progressing     Problem: INFECTION - ADULT  Goal: Absence or prevention of progression during hospitalization  Description: INTERVENTIONS:  - Assess and monitor for signs and symptoms of infection  - Monitor lab/diagnostic results  - Monitor all insertion sites  - Administer medications as ordered  - Instruct and encourage patient and family to use good hand hygiene technique  - Identify and instruct in appropriate isolation precautions for identified infection/condition  Outcome: Progressing  Goal: Absence of fever/infection during neutropenic period  Description: INTERVENTIONS:  - Monitor WBC    Outcome: Progressing     Problem: SAFETY ADULT  Goal: Patient will remain free of falls  Description: INTERVENTIONS:  - Educate patient/family on patient safety including physical limitations  - Instruct patient to call for assistance with activity   - Consult OT/PT to assist with strengthening/mobility   - Keep Call bell within reach  - Keep bed low and locked with side rails adjusted as appropriate  - Keep care items and personal belongings within reach  - Initiate and maintain comfort rounds  - Offer Toileting every 2 Hours, in advance of need  - Initiate/Maintain bed alarm  - Obtain necessary fall risk management equipment: walker  - Apply yellow socks and bracelet for high fall risk patients  - Consider moving patient to room near nurses station  Outcome: Progressing  Goal: Maintain or return to baseline ADL function  Description: INTERVENTIONS:  -  Assess patient's ability to carry out ADLs; assess patient's baseline for ADL function and identify physical deficits which impact ability to perform ADLs (bathing, care of mouth/teeth, toileting, grooming, dressing, etc )  - Assess/evaluate cause of self-care deficits   - Assess range of motion  - Assess patient's mobility; develop plan if impaired  - Assess patient's need for assistive devices and provide as appropriate  - Encourage maximum independence but intervene and supervise when necessary  - Involve family in performance of ADLs  - Assess for home care needs following discharge   - Consider OT consult to assist with ADL evaluation and planning for discharge  - Provide patient education as appropriate  Outcome: Progressing  Goal: Maintains/Returns to pre admission functional level  Description: INTERVENTIONS:  - Perform BMAT or MOVE assessment daily    - Set and communicate daily mobility goal to care team and patient/family/caregiver     - Collaborate with rehabilitation services on mobility goals if consulted  - Ambulate patient 2 times a day  - Out of bed to chair 3 times a day   - Out of bed for meals 3 times a day  - Out of bed for toileting  - Record patient progress and toleration of activity level   Outcome: Progressing     Problem: DISCHARGE PLANNING  Goal: Discharge to home or other facility with appropriate resources  Description: INTERVENTIONS:  - Identify barriers to discharge w/patient and caregiver  - Arrange for needed discharge resources and transportation as appropriate  - Identify discharge learning needs (meds, wound care, etc )  - Arrange for interpretive services to assist at discharge as needed  - Refer to Case Management Department for coordinating discharge planning if the patient needs post-hospital services based on physician/advanced practitioner order or complex needs related to functional status, cognitive ability, or social support system  Outcome: Progressing     Problem: Knowledge Deficit  Goal: Patient/family/caregiver demonstrates understanding of disease process, treatment plan, medications, and discharge instructions  Description: Complete learning assessment and assess knowledge base  Interventions:  - Provide teaching at level of understanding  - Provide teaching via preferred learning methods  Outcome: Progressing     Problem: Prexisting or High Potential for Compromised Skin Integrity  Goal: Skin integrity is maintained or improved  Description: INTERVENTIONS:  - Identify patients at risk for skin breakdown  - Assess and monitor skin integrity  - Assess and monitor nutrition and hydration status  - Monitor labs   - Assess for incontinence   - Turn and reposition patient  - Assist with mobility/ambulation  - Relieve pressure over bony prominences  - Avoid friction and shearing  - Provide appropriate hygiene as needed including keeping skin clean and dry  - Evaluate need for skin moisturizer/barrier cream  - Collaborate with interdisciplinary team   - Patient/family teaching  - Consider wound care consult   Outcome: Progressing     Problem: Nutrition/Hydration-ADULT  Goal: Nutrient/Hydration intake appropriate for improving, restoring or maintaining nutritional needs  Description: Monitor and assess patient's nutrition/hydration status for malnutrition  Collaborate with interdisciplinary team and initiate plan and interventions as ordered  Monitor patient's weight and dietary intake as ordered or per policy  Utilize nutrition screening tool and intervene as necessary  Determine patient's food preferences and provide high-protein, high-caloric foods as appropriate       INTERVENTIONS:  - Monitor oral intake, urinary output, labs, and treatment plans  - Assess nutrition and hydration status and recommend course of action  - Evaluate amount of meals eaten  - Assist patient with eating if necessary   - Allow adequate time for meals  - Recommend/ encourage appropriate diets, oral nutritional supplements, and vitamin/mineral supplements  - Order, calculate, and assess calorie counts as needed  - Recommend, monitor, and adjust tube feedings and TPN/PPN based on assessed needs  - Assess need for intravenous fluids  - Provide specific nutrition/hydration education as appropriate  - Include patient/family/caregiver in decisions related to nutrition  Outcome: Progressing     Problem: CONFUSION/THOUGHT DISTURBANCE  Goal: Thought disturbances (confusion, delirium, depression, dementia or psychosis) are managed to maintain or return to baseline mental status and functional level  Description: INTERVENTIONS:  - Assess for possible contributors to  thought disturbance, including but not limited to medications, infection, impaired vision or hearing, underlying metabolic abnormalities, dehydration, respiratory compromise,  psychiatric diagnoses and notify attending PHYSICAN/AP  - Monitor and intervene to maintain adequate nutrition, hydration, elimination, sleep and activity  - Decrease environmental stimuli, including noise as appropriate  - Provide frequent contacts to provide refocusing, direction and reassurance as needed  Approach patient calmly with eye contact and at their level    - [place] high risk fall precautions, aspiration precautions and other safety measures, as indicated  - If delirium suspected, notify physician/AP of change in condition and request immediate in-person evaluation  - Pursue consults as appropriate including Geriatric (campus dependent), OT for cognitive evaluation/activity planning, psychiatric, pastoral care, etc   Outcome: Progressing     Problem: BEHAVIOR  Goal: Pt/Family maintain appropriate behavior and adhere to behavioral management agreement, if implemented  Description: INTERVENTIONS:  - Assess the family dynamic   - Encourage verbalization of thoughts and concerns in a socially appropriate manner  - Assess patient/family's coping skills and non-compliant behavior (including use of illegal substances)  - Utilize positive, consistent limit setting strategies supporting safety of patient, staff and others  - Initiate consult with Case Management, Spiritual Care or other ancillary services as appropriate  - If a patient's/visitor's behavior jeopardizes the safety of the patient, staff, or others, refer to organization procedure     - Notify Security of behavior or suspected illegal substances which indicate the need for search of the patient and/or belongings  - Encourage participation in the decision making process about a behavioral management agreement; implement if patient meets criteria  Outcome: Progressing     Problem: METABOLIC, FLUID AND ELECTROLYTES - ADULT  Goal: Glucose maintained within target range  Description: INTERVENTIONS:  - Monitor Blood Glucose as ordered  - Assess for signs and symptoms of hyperglycemia and hypoglycemia  - Administer ordered medications to maintain glucose within target range  - Assess nutritional intake and initiate nutrition service referral as needed  Outcome: Progressing

## 2022-09-08 NOTE — PHYSICAL THERAPY NOTE
Physical TherapyTreatment Note    Patient's Name: Nisha Romero  Admitting Diagnosis  Hypomagnesemia [E83 42]  General weakness [R53 1]  Generalized weakness [R53 1]  Ambulatory dysfunction [R26 2]    Problem List  Patient Active Problem List   Diagnosis    Primary hypertension    Type 2 diabetes mellitus with hyperglycemia ([facility] [facility] )    Hyperlipidemia    Atrial fibrillation ([facility] [facility] )    Inguinal hernia    Anemia    Onychomycosis    Hematemesis    Gross hematuria    Thrombocytopenia (HCC)    Stage 3a chronic kidney disease (HCC)    General weakness    Depression with anxiety    Unwitnessed fall    Confusion    Severe protein-calorie malnutrition ([facility] [address] )       Past Medical History  History reviewed  No pertinent past medical history  Past Surgical History  Past Surgical History:   Procedure Laterality Date    CATARACT EXTRACTION      [address]       Recent Imaging  CT head wo contrast   Final Result by Gene Jalene Collet, DO (08/28 1133)      No acute intracranial abnormality  Mild chronic microangiopathic changes  Workstation performed: [id number]         XR chest portable   Final Result by Phan Teran MD (08/27 0744)      Bilateral lower lobe opacities suggesting combination of scarring and or chronic interstitial lung disease, progressed at the right lung base              Workstation performed: [id number]             Recent Vital Signs  Vitals:    09/07/22 0721 09/07/22 1500 09/07/22 2150 09/08/22 0715   BP: 126/79 141/90 142/86 136/79   BP Location: Right arm Right arm Right arm Right arm   Pulse: 87 90 94 80   Resp: 20 18 16 18   Temp: (!) 97 3 °F (36 3 °C) 98 3 °F (36 8 °C) 98 3 °F (36 8 °C) 97 8 °F (36 6 °C)   TempSrc: Temporal Temporal Temporal Temporal   SpO2: 95% 100% 98% 99%   Weight:       Height:            09/07/22 1320   PT Last Visit   PT Visit Date 09/07/22   Note Type   Note Type Treatment   Pain Assessment   Pain Assessment Tool 0-10   Pain Score No Pain   Restrictions/Precautions   Weight Bearing Precautions Per Order No   Other Precautions Fall Risk;Bed Alarm; Chair Alarm;Cognitive   General   Chart Reviewed Yes   Response to Previous Treatment Patient with no complaints from previous session  Family/Caregiver Present No   Cognition   Overall Cognitive Status Impaired   Bed Mobility   Supine to Sit 4  Minimal assistance   Additional items Assist x 1;Bedrails; Increased time required;Verbal cues   Sit to Supine 4  Minimal assistance   Additional items Assist x 1;Bedrails; Increased time required;Verbal cues   Transfers   Sit to Stand 4  Minimal assistance   Additional items Assist x 1; Armrests; Increased time required;Verbal cues   Stand to Sit 4  Minimal assistance   Additional items Assist x 1; Armrests; Increased time required;Verbal cues   Additional Comments with RW   Balance   Static Sitting Fair +   Dynamic Sitting Fair   Static Standing Fair   Dynamic Standing Fair -   Ambulatory Fair -   Endurance Deficit   Endurance Deficit Yes   Endurance Deficit Description fatigue   Activity Tolerance   Activity Tolerance Patient limited by fatigue   Medical Staff Made Aware spoke to CM   Nurse Made Aware spoke to RN   Assessment   Prognosis Good   Problem List Decreased strength;Decreased endurance; Impaired balance;Decreased mobility; Decreased coordination; Impaired sensation;Decreased cognition; Impaired judgement;Decreased safety awareness   Assessment Pt is limited by fatigue and aggitiation at this time needing encouagement to participate in therapy interverntion  Agreeable to assessment of progress with sit to stand bed mobility  Pt completing with min A at this time  Continue to recommend inpt rehab at D/C at this time     Barriers to Discharge Inaccessible home environment;Decreased caregiver support   Goals   Patient Goals to get back in bed   STG Expiration Date 09/09/22   Short Term Goal #1 see eval note   PT Treatment Day 2   Plan Treatment/Interventions ADL retraining;Functional transfer training;LE strengthening/ROM; Elevations; Therapeutic exercise; Endurance training;Patient/family training;Equipment eval/education; Bed mobility;Gait training;Spoke to nursing;Spoke to case management;OT   Progress Progressing toward goals   PT Frequency 2-3x/wk   Recommendation   PT Discharge Recommendation Post acute rehabilitation services   [employer] Recommended Wheeled walker   AM-PAC Basic Mobility Inpatient   Turning in Bed Without Bedrails 4   Lying on Back to Sitting on Edge of Flat Bed 3   Moving Bed to Chair 3   Standing Up From Chair 3   Walk in Room 2   Climb 3-5 Stairs 1   Basic Mobility Inpatient Raw Score 16   Basic Mobility Standardized Score 38 32   Highest Level Of Mobility   -HLM Goal 5: Stand one or more mins   -HLM Achieved 5: Stand (1 or more minutes)   Education   Education Provided Mobility training;Assistive device   Patient Explanation/teachback used   End of Consult   Patient Position at End of Consult Supine; All needs within reach       [facility] PT, DPT

## 2022-09-08 NOTE — CASE MANAGEMENT
Case Management Progress Note    Patient name Hero Small  Location 7T [facility] 716/7T [facility] 716-01 MRN [id number]  : 1933 Date 2022       LOS (days): 12  Geometric Mean LOS (GMLOS) (days): 3 50  Days to GMLOS:-8 9        OBJECTIVE:        Current admission status: Inpatient  Preferred Pharmacy:   [facility]  for RACHEL Guardado, [address] [place] [address]  Phone: [phone] Fax: Laura [facility] [address], [place] - [address] [address]  Phone: [phone] Fax: [phone]    Primary Care Provider: Anitra Raymundoo Nino Calvo DO    Primary Insurance: MEDICARE  Secondary Insurance: [facility]    PROGRESS NOTE: CM spoke with pt's family member listed below   Omer Becerra (Relative)   [phone] (H)   [phone] Christiane [facility]) Marion Ramirez  (Relative)   [phone] (M)   CM was notified through [address] that pt is accepted at [facility]  CM was notified that family  will discuss with pt's  to see if pt can discharge there  Pt will need covid booster prior to discharge  Meanwhile CM was notified that  pt is under emergency Guardianship process through the court    CM department will continue to follow through pt's D/C

## 2022-09-09 LAB
ANION GAP SERPL CALCULATED.3IONS-SCNC: 7 MMOL/L (ref 5–14)
BASOPHILS # BLD AUTO: 0.07 THOUSANDS/ΜL (ref 0–0.1)
BASOPHILS NFR BLD AUTO: 1 % (ref 0–1)
BUN SERPL-MCNC: 41 MG/DL (ref 5–25)
CALCIUM SERPL-MCNC: 9 MG/DL (ref 8.4–10.2)
CHLORIDE SERPL-SCNC: 101 MMOL/L (ref 96–108)
CO2 SERPL-SCNC: 33 MMOL/L (ref 21–32)
CREAT SERPL-MCNC: 1.41 MG/DL (ref 0.7–1.5)
EOSINOPHIL # BLD AUTO: 0.19 THOUSAND/ΜL (ref 0–0.61)
EOSINOPHIL NFR BLD AUTO: 2 % (ref 0–6)
ERYTHROCYTE [DISTWIDTH] IN BLOOD BY AUTOMATED COUNT: 12.6 % (ref 11.6–15.1)
GFR SERPL CREATININE-BSD FRML MDRD: 43 ML/MIN/1.73SQ M
GLUCOSE SERPL-MCNC: 101 MG/DL (ref 70–99)
GLUCOSE SERPL-MCNC: 290 MG/DL (ref 65–140)
GLUCOSE SERPL-MCNC: 385 MG/DL (ref 65–140)
GLUCOSE SERPL-MCNC: 427 MG/DL (ref 65–140)
GLUCOSE SERPL-MCNC: 74 MG/DL (ref 65–140)
GLUCOSE SERPL-MCNC: 79 MG/DL (ref 65–140)
HCT VFR BLD AUTO: 36.3 % (ref 36.5–49.3)
HGB BLD-MCNC: 11.8 G/DL (ref 12–17)
IMM GRANULOCYTES # BLD AUTO: 0.07 THOUSAND/UL (ref 0–0.2)
IMM GRANULOCYTES NFR BLD AUTO: 1 % (ref 0–2)
LYMPHOCYTES # BLD AUTO: 1.5 THOUSANDS/ΜL (ref 0.6–4.47)
LYMPHOCYTES NFR BLD AUTO: 12 % (ref 14–44)
MAGNESIUM SERPL-MCNC: 2.1 MG/DL (ref 1.6–2.3)
MCH RBC QN AUTO: 34.3 PG (ref 26.8–34.3)
MCHC RBC AUTO-ENTMCNC: 32.5 G/DL (ref 31.4–37.4)
MCV RBC AUTO: 106 FL (ref 82–98)
MONOCYTES # BLD AUTO: 2.45 THOUSAND/ΜL (ref 0.17–1.22)
MONOCYTES NFR BLD AUTO: 19 % (ref 4–12)
NEUTROPHILS # BLD AUTO: 8.53 THOUSANDS/ΜL (ref 1.85–7.62)
NEUTS SEG NFR BLD AUTO: 65 % (ref 43–75)
NRBC BLD AUTO-RTO: 0 /100 WBCS
PHOSPHATE SERPL-MCNC: 4.3 MG/DL (ref 2.5–4.8)
PLATELET # BLD AUTO: 261 THOUSANDS/UL (ref 149–390)
PMV BLD AUTO: 10.8 FL (ref 8.9–12.7)
POTASSIUM SERPL-SCNC: 4.9 MMOL/L (ref 3.5–5.3)
RBC # BLD AUTO: 3.44 MILLION/UL (ref 3.88–5.62)
SODIUM SERPL-SCNC: 141 MMOL/L (ref 135–147)
WBC # BLD AUTO: 12.81 THOUSAND/UL (ref 4.31–10.16)

## 2022-09-09 PROCEDURE — 83735 ASSAY OF MAGNESIUM: CPT | Performed by: INTERNAL MEDICINE

## 2022-09-09 PROCEDURE — 84100 ASSAY OF PHOSPHORUS: CPT | Performed by: INTERNAL MEDICINE

## 2022-09-09 PROCEDURE — 80048 BASIC METABOLIC PNL TOTAL CA: CPT | Performed by: INTERNAL MEDICINE

## 2022-09-09 PROCEDURE — 82948 REAGENT STRIP/BLOOD GLUCOSE: CPT

## 2022-09-09 PROCEDURE — 85025 COMPLETE CBC W/AUTO DIFF WBC: CPT | Performed by: INTERNAL MEDICINE

## 2022-09-09 PROCEDURE — 99232 SBSQ HOSP IP/OBS MODERATE 35: CPT | Performed by: INTERNAL MEDICINE

## 2022-09-09 RX ADMIN — METOPROLOL SUCCINATE 50 MG: 50 TABLET, EXTENDED RELEASE ORAL at 21:01

## 2022-09-09 RX ADMIN — PRAVASTATIN SODIUM 80 MG: 40 TABLET ORAL at 16:20

## 2022-09-09 RX ADMIN — INSULIN LISPRO 4 UNITS: 100 INJECTION, SOLUTION INTRAVENOUS; SUBCUTANEOUS at 21:02

## 2022-09-09 RX ADMIN — INSULIN LISPRO 5 UNITS: 100 INJECTION, SOLUTION INTRAVENOUS; SUBCUTANEOUS at 12:11

## 2022-09-09 RX ADMIN — INSULIN LISPRO 3 UNITS: 100 INJECTION, SOLUTION INTRAVENOUS; SUBCUTANEOUS at 16:23

## 2022-09-09 RX ADMIN — APIXABAN 2.5 MG: 2.5 TABLET, FILM COATED ORAL at 09:06

## 2022-09-09 RX ADMIN — MELATONIN TAB 3 MG 3 MG: 3 TAB at 21:01

## 2022-09-09 RX ADMIN — APIXABAN 2.5 MG: 2.5 TABLET, FILM COATED ORAL at 17:01

## 2022-09-09 RX ADMIN — INSULIN GLARGINE 10 UNITS: 100 INJECTION, SOLUTION SUBCUTANEOUS at 21:01

## 2022-09-09 RX ADMIN — PANTOPRAZOLE SODIUM 20 MG: 20 TABLET, DELAYED RELEASE ORAL at 09:06

## 2022-09-09 RX ADMIN — ESCITALOPRAM OXALATE 5 MG: 5 TABLET, FILM COATED ORAL at 09:06

## 2022-09-09 RX ADMIN — METOPROLOL SUCCINATE 50 MG: 50 TABLET, EXTENDED RELEASE ORAL at 09:06

## 2022-09-09 NOTE — PLAN OF CARE
Problem: Potential for Falls  Goal: Patient will remain free of falls  Description: INTERVENTIONS:  - Educate patient/family on patient safety including physical limitations  - Instruct patient to call for assistance with activity   - Consult OT/PT to assist with strengthening/mobility   - Keep Call bell within reach  - Keep bed low and locked with side rails adjusted as appropriate  - Keep care items and personal belongings within reach  - Initiate and maintain comfort rounds  - Offer Toileting every 2 Hours, in advance of need  - Initiate/Maintain bed alarm  - Obtain necessary fall risk management equipment: walker  - Apply yellow socks and bracelet for high fall risk patients  - Consider moving patient to room near nurses station  Outcome: Progressing     Problem: MOBILITY - ADULT  Goal: Maintain or return to baseline ADL function  Description: INTERVENTIONS:  -  Assess patient's ability to carry out ADLs; assess patient's baseline for ADL function and identify physical deficits which impact ability to perform ADLs (bathing, care of mouth/teeth, toileting, grooming, dressing, etc )  - Assess/evaluate cause of self-care deficits   - Assess range of motion  - Assess patient's mobility; develop plan if impaired  - Assess patient's need for assistive devices and provide as appropriate  - Encourage maximum independence but intervene and supervise when necessary  - Involve family in performance of ADLs  - Assess for home care needs following discharge   - Consider OT consult to assist with ADL evaluation and planning for discharge  - Provide patient education as appropriate  Outcome: Progressing     Problem: Nutrition/Hydration-ADULT  Goal: Nutrient/Hydration intake appropriate for improving, restoring or maintaining nutritional needs  Description: Monitor and assess patient's nutrition/hydration status for malnutrition  Collaborate with interdisciplinary team and initiate plan and interventions as ordered    Monitor patient's weight and dietary intake as ordered or per policy  Utilize nutrition screening tool and intervene as necessary  Determine patient's food preferences and provide high-protein, high-caloric foods as appropriate       INTERVENTIONS:  - Monitor oral intake, urinary output, labs, and treatment plans  - Assess nutrition and hydration status and recommend course of action  - Evaluate amount of meals eaten  - Assist patient with eating if necessary   - Allow adequate time for meals  - Recommend/ encourage appropriate diets, oral nutritional supplements, and vitamin/mineral supplements  - Order, calculate, and assess calorie counts as needed  - Recommend, monitor, and adjust tube feedings and TPN/PPN based on assessed needs  - Assess need for intravenous fluids  - Provide specific nutrition/hydration education as appropriate  - Include patient/family/caregiver in decisions related to nutrition  Outcome: Progressing

## 2022-09-09 NOTE — PROGRESS NOTES
[facility]  Progress Note - Dennis Francis  1933, 80 y o  male MRN: [id number]  Unit/Bed#: 7T [facility] 716-01 Encounter: [id number]  Primary Care Provider: Burak DUARTE DO   Date and time admitted to hospital: 8/27/2022  5:56 AM    * General weakness  Assessment & Plan  · Possibly due to advancing dementia as evidenced by increasing confusion and agitation  · Neuropsychology has deemed the patient incapacitated  · Patient states he lives at home alone and has been having difficulty with ambulation  · No recent traumatic events  · Laboratory analysis grossly unremarkable  · Hemodynamically stable saturating well on room air  · Case management consultation for safe disposition planning  · Patient is medically clear, awaiting safe disposition plan  · PT/OT recommended post acute rehab services  · Patient and the family would prefer home health care with 24/7 care  · Willing to consider short-term rehab temporarily  · Ongoing discussions with family  · Patient lacks capacity so pending guardianship/POA process today in court    Primary hypertension  Assessment & Plan  · BP acceptable  · Continue home beta-blocker for atrial fibrillation  · Hydralazine 5 mg IV q6 hours PRN SBP > 180 mmHg  · Monitor blood pressures    Depression with anxiety  Assessment & Plan  · Patient has appeared somewhat anxious about his health  · Family states there may be an element of depression possibly secondary to dementia  · Continue Lexapro 5 mg oral daily  · Haldol 1 mg IM every 6 hours as needed for agitation    Type 2 diabetes mellitus with hyperglycemia [facility])  Assessment & Plan  Lab Results   Component Value Date    HGBA1C 8 1 (H) 08/09/2022       Recent Labs     09/05/22  1132 09/05/22  1545 09/05/22 2024 09/06/22  0625   POCGLU 284* 280* 247* 142*       Blood Sugar Average: Last 72 hrs:  (P) 859 [id number]   · Patient was on metformin 1000 b i d   · Decrease Lantus to 10 units daily at bedtime  · Continue sliding scale insulin with Accu-Cheks while inpatient  · Diabetic diet  · Patient has been having labile blood glucose levels during hospital stay as he has been eating intermittently    Hyperlipidemia  Assessment & Plan  · Continue home statin therapy    Atrial fibrillation [facility])  Assessment & Plan  · Rates are controlled  · Continue home Eliquis 2 5 mg PO BID    Stage 3a chronic kidney disease [facility])  Assessment & Plan  Lab Results   Component Value Date    EGFR 39 09/06/2022    EGFR 43 09/05/2022    EGFR 51 08/30/2022    CREATININE 1 55 (H) 09/06/2022    CREATININE 1 43 09/05/2022    CREATININE 1 24 08/30/2022   · Slight NGUYỄN on 09/06/2022 with creat at 1 55  · Trend BMP  · Avoid hypotension and nephrotoxic agents  · Urinary retention protocol  · Strict intake and output  · Daily standing weights    Severe protein-calorie malnutrition ([address] )  Assessment & Plan  Malnutrition Findings:   Adult Malnutrition type: Chronic illness  Adult Degree of Malnutrition: Other severe protein calorie malnutrition  Malnutrition Characteristics: Inadequate energy, Weight loss          continue nutritional supplement         360 Statement: Severe pro/singh malnutrition r/t depression as evidenced by 12% unplanned weight loss since 2/24/22, consuming < 75% energy intake compared to estimated energy needs > 1 month  Treated with Enlive bid    BMI Findings: Body mass index is 18 57 kg/m²  Confusion  Assessment & Plan  · Patient seems to have episode of confusion  · Discussed with the Baldo Holter and Antionette Bridegroom- patient does not have official diagnosis of dementia before  However it seems to be an element of advanced dementia  · Neuro psychiatrist was consulted - At this time, patient does not appear to have capacity to make fully informed medical decisions   Unspecified Neurocognitive Disorder  · Pending POA/guardianship process      Unwitnessed fall  Assessment & Plan  · Patient had unwitnessed fall on 2022  · CT head was negative for acute intracranial abnormality  Mild chronic microangiopathic changes  · Continue to monitor    VTE Pharmacologic Prophylaxis:  Eliquis    Patient Centered Rounds:  Patient care rounds were performed with nursing    Discussions with Specialists or Other Care Team Provider:  Case Management, nursing, PT/OT    Education and Discussions with Family / Patient:  Spoke with patient at bedside    Time Spent for Care: 30  More than 50% of total time spent on counseling and coordination of care as described above  Current Length of Stay: 13 day(s)    Current Patient Status: Inpatient     Certification Statement: The patient will continue to require additional inpatient hospital stay due to placement to  care and POA/guardianship process    Discharge Plan:  Home with 24/7 care vs STR ; pending guardianship process    Code Status: Level 1 - Full Code      Subjective:   Patient seen and examined at bedside  No acute events overnight  Denies chest pain, SOB, diaphoresis, nausea/vomiting/diarrhea, fevers/chills  Objective:     Vitals:   Temp (24hrs), Av 2 °F (36 8 °C), Min:97 7 °F (36 5 °C), Max:98 7 °F (37 1 °C)    Temp:  [97 7 °F (36 5 °C)-98 7 °F (37 1 °C)] 97 7 °F (36 5 °C)  HR:  [75-94] 81  Resp:  [18] 18  BP: (120-142)/(70-85) 120/70  SpO2:  [96 %-98 %] 96 %  Body mass index is 18 15 kg/m²  Input and Output Summary (last 24 hours): Intake/Output Summary (Last 24 hours) at 2022 0737  Last data filed at 2022 0500  Gross per 24 hour   Intake 360 ml   Output 790 ml   Net -430 ml       Physical Exam:     Physical Exam  Vitals and nursing note reviewed  Constitutional:       Appearance: He is well-developed  HENT:      Head: Normocephalic and atraumatic  Eyes:      Conjunctiva/sclera: Conjunctivae normal    Cardiovascular:      Rate and Rhythm: Normal rate and regular rhythm  Heart sounds: No murmur heard    Pulmonary:      Effort: Pulmonary effort is normal  No respiratory distress  Breath sounds: Normal breath sounds  Abdominal:      Palpations: Abdomen is soft  Tenderness: There is no abdominal tenderness  Musculoskeletal:      Cervical back: Neck supple  Skin:     General: Skin is warm and dry  Neurological:      Mental Status: He is alert  Additional Data:     Labs:  I have reviewed pertinent results     Results from last 7 days   Lab Units 09/09/22  0624   WBC Thousand/uL 12 81*   HEMOGLOBIN g/dL 11 8*   HEMATOCRIT % 36 3*   PLATELETS Thousands/uL 261   NEUTROS PCT % 65   LYMPHS PCT % 12*   MONOS PCT % 19*   EOS PCT % 2     Results from last 7 days   Lab Units 09/09/22  0624   SODIUM mmol/L 141   POTASSIUM mmol/L 4 9   CHLORIDE mmol/L 101   CO2 mmol/L 33*   BUN mg/dL 41*   CREATININE mg/dL 1 41   ANION GAP mmol/L 7   CALCIUM mg/dL 9 0   GLUCOSE RANDOM mg/dL 101*         Results from last 7 days   Lab Units 09/09/22  0625 09/09/22  0606 09/08/22  2058 09/08/22  1115 09/08/22  0711 09/08/22  6236 09/08/22  0625 09/07/22  2024 09/07/22  1551 09/07/22  1112 09/07/22  0620 09/07/22  0600   POC GLUCOSE mg/dl 79 74 206* 228* 105 65 68 262* 107 159* 82 63*                 Imaging: I have reviewed pertinent imaging       Recent Cultures (last 7 days):           Last 24 Hours Medication List:   Current Facility-Administered Medications   Medication Dose Route Frequency Provider Last Rate    acetaminophen  650 mg Oral Q4H PRN Christina Citron, DO      apixaban  2 5 mg Oral BID Christina Citron, DO      escitalopram  5 mg Oral Daily Christina Citron, DO      haloperidol lactate  1 mg Intramuscular Q6H PRN Christina Citron, DO      hydrALAZINE  5 mg Intravenous Q6H PRN Christina Citron, DO      insulin glargine  10 Units Subcutaneous HS Christina Citron, DO      insulin lispro  1-5 Units Subcutaneous TID AC Harpreet King, DO      insulin lispro  1-5 Units Subcutaneous HS Christina Citron, DO      melatonin  3 mg Oral HS David Morales PA-C  metoprolol succinate  50 mg Oral BID Araceli Campuzano DO      ondansetron  4 mg Intravenous Q6H PRN Araceli Campuzano,       pantoprazole  20 mg Oral Daily Araceli Campuzano,       pravastatin  80 mg Oral Daily With [address], DO          Today, Patient Was Seen By: Araceli Campuzano DO    ** Please Note: Dictation voice to text software may have been used in the creation of this document   **

## 2022-09-09 NOTE — CASE MANAGEMENT
Case Management Progress Note    Patient name Maru Claire  Location 7T [facility] 716/7T [facility] 716-01 MRN [id number]  : 1933 Date 2022       LOS (days): 13  Geometric Mean LOS (GMLOS) (days): 4 50  Days to GMLOS:-8 9        OBJECTIVE:        Current admission status: Inpatient  Preferred Pharmacy:   [facility], [address] [place] [address] Drive [facility]  Phone: [phone] Fax: [phone]    [facility] [address] - [address] [address]  Phone: [phone] Fax: [phone]    Primary Care Provider: Dwayne Hurt DO    Primary Insurance: MEDICARE  Secondary Insurance: [facility]    PROGRESS NOTE: CM discussed the case with directors and [facility]  : Laney Chaidez CM was notified that if patient's   have stated that the Emergency guardianship process will be completed by  then the patient can remain at the hospital until guardianship process is completed  If the process takes longer then a week  Laney Chaidez will call family to request them to agree for short term rehab until they can arrange 24hrs nurse for the patient  According to Act 169 next of kin is responsible to make good and accurate decision  on behalf of the patient, if they Fail to make good decisions then court will have to decide on behalf of the pt along with the medical care team about pt's discharge     CM department will continue to follow through pt's D/C

## 2022-09-09 NOTE — PLAN OF CARE
Problem: Potential for Falls  Goal: Patient will remain free of falls  Description: INTERVENTIONS:  - Educate patient/family on patient safety including physical limitations  - Instruct patient to call for assistance with activity   - Consult OT/PT to assist with strengthening/mobility   - Keep Call bell within reach  - Keep bed low and locked with side rails adjusted as appropriate  - Keep care items and personal belongings within reach  - Initiate and maintain comfort rounds  - Offer Toileting every 2 Hours, in advance of need  - Initiate/Maintain bed alarm  - Obtain necessary fall risk management equipment: walker  - Apply yellow socks and bracelet for high fall risk patients  - Consider moving patient to room near nurses station  Outcome: Progressing     Problem: MOBILITY - ADULT  Goal: Maintain or return to baseline ADL function  Description: INTERVENTIONS:  -  Assess patient's ability to carry out ADLs; assess patient's baseline for ADL function and identify physical deficits which impact ability to perform ADLs (bathing, care of mouth/teeth, toileting, grooming, dressing, etc )  - Assess/evaluate cause of self-care deficits   - Assess range of motion  - Assess patient's mobility; develop plan if impaired  - Assess patient's need for assistive devices and provide as appropriate  - Encourage maximum independence but intervene and supervise when necessary  - Involve family in performance of ADLs  - Assess for home care needs following discharge   - Consider OT consult to assist with ADL evaluation and planning for discharge  - Provide patient education as appropriate  Outcome: Progressing  Goal: Maintains/Returns to pre admission functional level  Description: INTERVENTIONS:  - Perform BMAT or MOVE assessment daily    - Set and communicate daily mobility goal to care team and patient/family/caregiver     - Collaborate with rehabilitation services on mobility goals if consulted  - Ambulate patient 2 times a day  - Out of bed to chair 3 times a day   - Out of bed for meals 3 times a day  - Out of bed for toileting  - Record patient progress and toleration of activity level   Outcome: Progressing     Problem: PAIN - ADULT  Goal: Verbalizes/displays adequate comfort level or baseline comfort level  Description: Interventions:  - Encourage patient to monitor pain and request assistance  - Assess pain using appropriate pain scale  - Administer analgesics based on type and severity of pain and evaluate response  - Implement non-pharmacological measures as appropriate and evaluate response  - Consider cultural and social influences on pain and pain management  - Notify physician/advanced practitioner if interventions unsuccessful or patient reports new pain  Outcome: Progressing     Problem: INFECTION - ADULT  Goal: Absence or prevention of progression during hospitalization  Description: INTERVENTIONS:  - Assess and monitor for signs and symptoms of infection  - Monitor lab/diagnostic results  - Monitor all insertion sites  - Administer medications as ordered  - Instruct and encourage patient and family to use good hand hygiene technique  - Identify and instruct in appropriate isolation precautions for identified infection/condition  Outcome: Progressing  Goal: Absence of fever/infection during neutropenic period  Description: INTERVENTIONS:  - Monitor WBC    Outcome: Progressing     Problem: SAFETY ADULT  Goal: Patient will remain free of falls  Description: INTERVENTIONS:  - Educate patient/family on patient safety including physical limitations  - Instruct patient to call for assistance with activity   - Consult OT/PT to assist with strengthening/mobility   - Keep Call bell within reach  - Keep bed low and locked with side rails adjusted as appropriate  - Keep care items and personal belongings within reach  - Initiate and maintain comfort rounds  - Offer Toileting every 2 Hours, in advance of need  - Initiate/Maintain bed alarm  - Obtain necessary fall risk management equipment: walker  - Apply yellow socks and bracelet for high fall risk patients  - Consider moving patient to room near nurses station  Outcome: Progressing  Goal: Maintain or return to baseline ADL function  Description: INTERVENTIONS:  -  Assess patient's ability to carry out ADLs; assess patient's baseline for ADL function and identify physical deficits which impact ability to perform ADLs (bathing, care of mouth/teeth, toileting, grooming, dressing, etc )  - Assess/evaluate cause of self-care deficits   - Assess range of motion  - Assess patient's mobility; develop plan if impaired  - Assess patient's need for assistive devices and provide as appropriate  - Encourage maximum independence but intervene and supervise when necessary  - Involve family in performance of ADLs  - Assess for home care needs following discharge   - Consider OT consult to assist with ADL evaluation and planning for discharge  - Provide patient education as appropriate  Outcome: Progressing  Goal: Maintains/Returns to pre admission functional level  Description: INTERVENTIONS:  - Perform BMAT or MOVE assessment daily    - Set and communicate daily mobility goal to care team and patient/family/caregiver     - Collaborate with rehabilitation services on mobility goals if consulted  - Ambulate patient 2 times a day  - Out of bed to chair 3 times a day   - Out of bed for meals 3 times a day  - Out of bed for toileting  - Record patient progress and toleration of activity level   Outcome: Progressing     Problem: DISCHARGE PLANNING  Goal: Discharge to home or other facility with appropriate resources  Description: INTERVENTIONS:  - Identify barriers to discharge w/patient and caregiver  - Arrange for needed discharge resources and transportation as appropriate  - Identify discharge learning needs (meds, wound care, etc )  - Arrange for interpretive services to assist at discharge as needed  - Refer to Case Management Department for coordinating discharge planning if the patient needs post-hospital services based on physician/advanced practitioner order or complex needs related to functional status, cognitive ability, or social support system  Outcome: Progressing     Problem: Knowledge Deficit  Goal: Patient/family/caregiver demonstrates understanding of disease process, treatment plan, medications, and discharge instructions  Description: Complete learning assessment and assess knowledge base  Interventions:  - Provide teaching at level of understanding  - Provide teaching via preferred learning methods  Outcome: Progressing     Problem: Prexisting or High Potential for Compromised Skin Integrity  Goal: Skin integrity is maintained or improved  Description: INTERVENTIONS:  - Identify patients at risk for skin breakdown  - Assess and monitor skin integrity  - Assess and monitor nutrition and hydration status  - Monitor labs   - Assess for incontinence   - Turn and reposition patient  - Assist with mobility/ambulation  - Relieve pressure over bony prominences  - Avoid friction and shearing  - Provide appropriate hygiene as needed including keeping skin clean and dry  - Evaluate need for skin moisturizer/barrier cream  - Collaborate with interdisciplinary team   - Patient/family teaching  - Consider wound care consult   Outcome: Progressing     Problem: Nutrition/Hydration-ADULT  Goal: Nutrient/Hydration intake appropriate for improving, restoring or maintaining nutritional needs  Description: Monitor and assess patient's nutrition/hydration status for malnutrition  Collaborate with interdisciplinary team and initiate plan and interventions as ordered  Monitor patient's weight and dietary intake as ordered or per policy  Utilize nutrition screening tool and intervene as necessary  Determine patient's food preferences and provide high-protein, high-caloric foods as appropriate       INTERVENTIONS:  - Monitor oral intake, urinary output, labs, and treatment plans  - Assess nutrition and hydration status and recommend course of action  - Evaluate amount of meals eaten  - Assist patient with eating if necessary   - Allow adequate time for meals  - Recommend/ encourage appropriate diets, oral nutritional supplements, and vitamin/mineral supplements  - Order, calculate, and assess calorie counts as needed  - Recommend, monitor, and adjust tube feedings and TPN/PPN based on assessed needs  - Assess need for intravenous fluids  - Provide specific nutrition/hydration education as appropriate  - Include patient/family/caregiver in decisions related to nutrition  Outcome: Progressing     Problem: CONFUSION/THOUGHT DISTURBANCE  Goal: Thought disturbances (confusion, delirium, depression, dementia or psychosis) are managed to maintain or return to baseline mental status and functional level  Description: INTERVENTIONS:  - Assess for possible contributors to  thought disturbance, including but not limited to medications, infection, impaired vision or hearing, underlying metabolic abnormalities, dehydration, respiratory compromise,  psychiatric diagnoses and notify attending PHYSICAN/AP  - Monitor and intervene to maintain adequate nutrition, hydration, elimination, sleep and activity  - Decrease environmental stimuli, including noise as appropriate  - Provide frequent contacts to provide refocusing, direction and reassurance as needed  Approach patient calmly with eye contact and at their level    - [place] high risk fall precautions, aspiration precautions and other safety measures, as indicated  - If delirium suspected, notify physician/AP of change in condition and request immediate in-person evaluation  - Pursue consults as appropriate including Geriatric (campus dependent), OT for cognitive evaluation/activity planning, psychiatric, pastoral care, etc   Outcome: Progressing     Problem: BEHAVIOR  Goal: Pt/Family maintain appropriate behavior and adhere to behavioral management agreement, if implemented  Description: INTERVENTIONS:  - Assess the family dynamic   - Encourage verbalization of thoughts and concerns in a socially appropriate manner  - Assess patient/family's coping skills and non-compliant behavior (including use of illegal substances)  - Utilize positive, consistent limit setting strategies supporting safety of patient, staff and others  - Initiate consult with Case Management, Spiritual Care or other ancillary services as appropriate  - If a patient's/visitor's behavior jeopardizes the safety of the patient, staff, or others, refer to organization procedure     - Notify Security of behavior or suspected illegal substances which indicate the need for search of the patient and/or belongings  - Encourage participation in the decision making process about a behavioral management agreement; implement if patient meets criteria  Outcome: Progressing     Problem: METABOLIC, FLUID AND ELECTROLYTES - ADULT  Goal: Glucose maintained within target range  Description: INTERVENTIONS:  - Monitor Blood Glucose as ordered  - Assess for signs and symptoms of hyperglycemia and hypoglycemia  - Administer ordered medications to maintain glucose within target range  - Assess nutritional intake and initiate nutrition service referral as needed  Outcome: Progressing

## 2022-09-10 LAB
ANION GAP SERPL CALCULATED.3IONS-SCNC: 6 MMOL/L (ref 5–14)
BASOPHILS # BLD AUTO: 0.04 THOUSANDS/ΜL (ref 0–0.1)
BASOPHILS NFR BLD AUTO: 0 % (ref 0–1)
BUN SERPL-MCNC: 48 MG/DL (ref 5–25)
CALCIUM SERPL-MCNC: 8.3 MG/DL (ref 8.4–10.2)
CHLORIDE SERPL-SCNC: 102 MMOL/L (ref 96–108)
CO2 SERPL-SCNC: 31 MMOL/L (ref 21–32)
CREAT SERPL-MCNC: 1.68 MG/DL (ref 0.7–1.5)
EOSINOPHIL # BLD AUTO: 0.11 THOUSAND/ΜL (ref 0–0.61)
EOSINOPHIL NFR BLD AUTO: 1 % (ref 0–6)
ERYTHROCYTE [DISTWIDTH] IN BLOOD BY AUTOMATED COUNT: 12.8 % (ref 11.6–15.1)
GFR SERPL CREATININE-BSD FRML MDRD: 35 ML/MIN/1.73SQ M
GLUCOSE SERPL-MCNC: 136 MG/DL (ref 70–99)
GLUCOSE SERPL-MCNC: 142 MG/DL (ref 65–140)
GLUCOSE SERPL-MCNC: 222 MG/DL (ref 65–140)
GLUCOSE SERPL-MCNC: 257 MG/DL (ref 65–140)
GLUCOSE SERPL-MCNC: 277 MG/DL (ref 65–140)
GLUCOSE SERPL-MCNC: 308 MG/DL (ref 65–140)
HCT VFR BLD AUTO: 30.8 % (ref 36.5–49.3)
HGB BLD-MCNC: 9.9 G/DL (ref 12–17)
IMM GRANULOCYTES # BLD AUTO: 0.04 THOUSAND/UL (ref 0–0.2)
IMM GRANULOCYTES NFR BLD AUTO: 0 % (ref 0–2)
LYMPHOCYTES # BLD AUTO: 1.44 THOUSANDS/ΜL (ref 0.6–4.47)
LYMPHOCYTES NFR BLD AUTO: 14 % (ref 14–44)
MAGNESIUM SERPL-MCNC: 2.1 MG/DL (ref 1.6–2.3)
MCH RBC QN AUTO: 34.1 PG (ref 26.8–34.3)
MCHC RBC AUTO-ENTMCNC: 32.1 G/DL (ref 31.4–37.4)
MCV RBC AUTO: 106 FL (ref 82–98)
MONOCYTES # BLD AUTO: 1.93 THOUSAND/ΜL (ref 0.17–1.22)
MONOCYTES NFR BLD AUTO: 19 % (ref 4–12)
NEUTROPHILS # BLD AUTO: 6.42 THOUSANDS/ΜL (ref 1.85–7.62)
NEUTS SEG NFR BLD AUTO: 66 % (ref 43–75)
NRBC BLD AUTO-RTO: 0 /100 WBCS
PHOSPHATE SERPL-MCNC: 3.9 MG/DL (ref 2.5–4.8)
PLATELET # BLD AUTO: 227 THOUSANDS/UL (ref 149–390)
PMV BLD AUTO: 11.2 FL (ref 8.9–12.7)
POTASSIUM SERPL-SCNC: 4.8 MMOL/L (ref 3.5–5.3)
PROCALCITONIN SERPL-MCNC: 0.23 NG/ML
RBC # BLD AUTO: 2.9 MILLION/UL (ref 3.88–5.62)
SODIUM SERPL-SCNC: 139 MMOL/L (ref 135–147)
WBC # BLD AUTO: 9.98 THOUSAND/UL (ref 4.31–10.16)

## 2022-09-10 PROCEDURE — 99232 SBSQ HOSP IP/OBS MODERATE 35: CPT | Performed by: INTERNAL MEDICINE

## 2022-09-10 PROCEDURE — 80048 BASIC METABOLIC PNL TOTAL CA: CPT | Performed by: INTERNAL MEDICINE

## 2022-09-10 PROCEDURE — 82948 REAGENT STRIP/BLOOD GLUCOSE: CPT

## 2022-09-10 PROCEDURE — 85025 COMPLETE CBC W/AUTO DIFF WBC: CPT | Performed by: INTERNAL MEDICINE

## 2022-09-10 PROCEDURE — 84145 PROCALCITONIN (PCT): CPT | Performed by: INTERNAL MEDICINE

## 2022-09-10 PROCEDURE — 83735 ASSAY OF MAGNESIUM: CPT | Performed by: INTERNAL MEDICINE

## 2022-09-10 PROCEDURE — 84100 ASSAY OF PHOSPHORUS: CPT | Performed by: INTERNAL MEDICINE

## 2022-09-10 RX ADMIN — INSULIN GLARGINE 10 UNITS: 100 INJECTION, SOLUTION SUBCUTANEOUS at 21:10

## 2022-09-10 RX ADMIN — PANTOPRAZOLE SODIUM 20 MG: 20 TABLET, DELAYED RELEASE ORAL at 08:22

## 2022-09-10 RX ADMIN — ESCITALOPRAM OXALATE 5 MG: 5 TABLET, FILM COATED ORAL at 08:22

## 2022-09-10 RX ADMIN — APIXABAN 2.5 MG: 2.5 TABLET, FILM COATED ORAL at 17:06

## 2022-09-10 RX ADMIN — INSULIN LISPRO 2 UNITS: 100 INJECTION, SOLUTION INTRAVENOUS; SUBCUTANEOUS at 15:52

## 2022-09-10 RX ADMIN — APIXABAN 2.5 MG: 2.5 TABLET, FILM COATED ORAL at 08:23

## 2022-09-10 RX ADMIN — INSULIN LISPRO 3 UNITS: 100 INJECTION, SOLUTION INTRAVENOUS; SUBCUTANEOUS at 11:33

## 2022-09-10 RX ADMIN — INSULIN LISPRO 2 UNITS: 100 INJECTION, SOLUTION INTRAVENOUS; SUBCUTANEOUS at 21:08

## 2022-09-10 RX ADMIN — PRAVASTATIN SODIUM 80 MG: 40 TABLET ORAL at 15:33

## 2022-09-10 NOTE — PLAN OF CARE
Problem: Potential for Falls  Goal: Patient will remain free of falls  Description: INTERVENTIONS:  - Educate patient/family on patient safety including physical limitations  - Instruct patient to call for assistance with activity   - Consult OT/PT to assist with strengthening/mobility   - Keep Call bell within reach  - Keep bed low and locked with side rails adjusted as appropriate  - Keep care items and personal belongings within reach  - Initiate and maintain comfort rounds  - Offer Toileting every 2 Hours, in advance of need  - Initiate/Maintain bed alarm  - Obtain necessary fall risk management equipment: walker  - Apply yellow socks and bracelet for high fall risk patients  - Consider moving patient to room near nurses station  Outcome: Progressing     Problem: MOBILITY - ADULT  Goal: Maintain or return to baseline ADL function  Description: INTERVENTIONS:  -  Assess patient's ability to carry out ADLs; assess patient's baseline for ADL function and identify physical deficits which impact ability to perform ADLs (bathing, care of mouth/teeth, toileting, grooming, dressing, etc )  - Assess/evaluate cause of self-care deficits   - Assess range of motion  - Assess patient's mobility; develop plan if impaired  - Assess patient's need for assistive devices and provide as appropriate  - Encourage maximum independence but intervene and supervise when necessary  - Involve family in performance of ADLs  - Assess for home care needs following discharge   - Consider OT consult to assist with ADL evaluation and planning for discharge  - Provide patient education as appropriate  Outcome: Progressing     Problem: DISCHARGE PLANNING  Goal: Discharge to home or other facility with appropriate resources  Description: INTERVENTIONS:  - Identify barriers to discharge w/patient and caregiver  - Arrange for needed discharge resources and transportation as appropriate  - Identify discharge learning needs (meds, wound care, etc )  - Arrange for interpretive services to assist at discharge as needed  - Refer to Case Management Department for coordinating discharge planning if the patient needs post-hospital services based on physician/advanced practitioner order or complex needs related to functional status, cognitive ability, or social support system  Outcome: Progressing     Problem: Knowledge Deficit  Goal: Patient/family/caregiver demonstrates understanding of disease process, treatment plan, medications, and discharge instructions  Description: Complete learning assessment and assess knowledge base    Interventions:  - Provide teaching at level of understanding  - Provide teaching via preferred learning methods  Outcome: Progressing     Problem: METABOLIC, FLUID AND ELECTROLYTES - ADULT  Goal: Glucose maintained within target range  Description: INTERVENTIONS:  - Monitor Blood Glucose as ordered  - Assess for signs and symptoms of hyperglycemia and hypoglycemia  - Administer ordered medications to maintain glucose within target range  - Assess nutritional intake and initiate nutrition service referral as needed  Outcome: Progressing

## 2022-09-10 NOTE — PROGRESS NOTES
[address] [facility], [place]  Progress Note - Diane Melendez  1933, 80 y o  male MRN: [id number]  Unit/Bed#: 7T [facility] 716-01 Encounter: [id number]  Primary Care Provider: Sampson MILLIGAN DO   Date and time admitted to hospital: 8/27/2022  5:56 AM    * General weakness  Assessment & Plan  · Possibly due to advancing dementia as evidenced by increasing confusion and agitation  · Neuropsychology has deemed the patient incapacitated  · Patient states he lives at home alone and has been having difficulty with ambulation  · No recent traumatic events  · Laboratory analysis grossly unremarkable  · Hemodynamically stable saturating well on room air  · Case management consultation for safe disposition planning  · Patient is medically clear, awaiting safe disposition plan  · PT/OT recommended post acute rehab services  · Patient and the family would prefer home health care with 24/7 care  · Willing to consider short-term rehab temporarily  · Ongoing discussions with family  · Patient lacks capacity so pending guardianship/POA process today in court    Primary hypertension  Assessment & Plan  · BP acceptable  · Continue home beta-blocker for atrial fibrillation  · Hydralazine 5 mg IV q6 hours PRN SBP > 180 mmHg  · Monitor blood pressures    Depression with anxiety  Assessment & Plan  · Patient has appeared somewhat anxious about his health  · Family states there may be an element of depression possibly secondary to dementia  · Continue Lexapro 5 mg oral daily  · Haldol 1 mg IM every 6 hours as needed for agitation    Type 2 diabetes mellitus with hyperglycemia [facility])  Assessment & Plan  Lab Results   Component Value Date    HGBA1C 8 1 (H) 08/09/2022       Recent Labs     09/05/22  1132 09/05/22  1545 09/05/22 2024 09/06/22  0625   POCGLU 284* 280* 247* 142*       Blood Sugar Average: Last 72 hrs:  (P) 033 [id number]   · Patient was on metformin 1000 b i d   · Decrease Lantus to 10 units daily at bedtime  · Continue sliding scale insulin with Accu-Cheks while inpatient  · Diabetic diet  · Patient has been having labile blood glucose levels during hospital stay as he has been eating intermittently    Hyperlipidemia  Assessment & Plan  · Continue home statin therapy    Atrial fibrillation [facility])  Assessment & Plan  · Rates are controlled  · Continue home Eliquis 2 5 mg PO BID    Stage 3a chronic kidney disease [facility])  Assessment & Plan  Lab Results   Component Value Date    EGFR 39 09/06/2022    EGFR 43 09/05/2022    EGFR 51 08/30/2022    CREATININE 1 55 (H) 09/06/2022    CREATININE 1 43 09/05/2022    CREATININE 1 24 08/30/2022   · Slight NGUYỄN on 09/06/2022 with creat at 1 55  · Trend BMP  · Avoid hypotension and nephrotoxic agents  · Urinary retention protocol  · Strict intake and output  · Daily standing weights    Severe protein-calorie malnutrition ([address] )  Assessment & Plan  Malnutrition Findings:   Adult Malnutrition type: Chronic illness  Adult Degree of Malnutrition: Other severe protein calorie malnutrition  Malnutrition Characteristics: Inadequate energy, Weight loss          continue nutritional supplement         360 Statement: Severe pro/singh malnutrition r/t depression as evidenced by 12% unplanned weight loss since 2/24/22, consuming < 75% energy intake compared to estimated energy needs > 1 month  Treated with Enlive bid    BMI Findings: Body mass index is 18 57 kg/m²  Confusion  Assessment & Plan  · Patient seems to have episode of confusion  · Discussed with the Ira nguyen- patient does not have official diagnosis of dementia before  However it seems to be an element of advanced dementia  · Neuro psychiatrist was consulted - At this time, patient does not appear to have capacity to make fully informed medical decisions   Unspecified Neurocognitive Disorder  · Pending POA/guardianship process      Unwitnessed fall  Assessment & Plan  · Patient had unwitnessed fall on 2022  · CT head was negative for acute intracranial abnormality  Mild chronic microangiopathic changes  · Continue to monitor    VTE Pharmacologic Prophylaxis:  Eliquis    Patient Centered Rounds:  Patient care rounds were performed with nursing    Discussions with Specialists or Other Care Team Provider:  Case Management, nursing, PT/OT    Education and Discussions with Family / Patient:  Spoke with patient at bedside    Time Spent for Care: 30  More than 50% of total time spent on counseling and coordination of care as described above  Current Length of Stay: 14 day(s)    Current Patient Status: Inpatient     Certification Statement: The patient will continue to require additional inpatient hospital stay due to placement to  care and POA/guardianship process    Discharge Plan: Home with 24/7 care vs STR ; pending guardianship process    Code Status: Level 1 - Full Code      Subjective:   Patient seen and examined at bedside  No acute events overnight  Denies chest pain, SOB, diaphoresis, nausea/vomiting/diarrhea, fevers/chills  Objective:     Vitals:   Temp (24hrs), Av 5 °F (36 4 °C), Min:97 3 °F (36 3 °C), Max:97 8 °F (36 6 °C)    Temp:  [97 3 °F (36 3 °C)-97 8 °F (36 6 °C)] 97 4 °F (36 3 °C)  HR:  [83-92] 83  Resp:  [18] 18  BP: (106-118)/(55-79) 106/73  SpO2:  [95 %-98 %] 97 %  Body mass index is 18 15 kg/m²  Input and Output Summary (last 24 hours): Intake/Output Summary (Last 24 hours) at 9/10/2022 0732  Last data filed at 2022 1901  Gross per 24 hour   Intake 500 ml   Output 300 ml   Net 200 ml       Physical Exam:     Physical Exam  Vitals and nursing note reviewed  Constitutional:       Appearance: He is well-developed  HENT:      Head: Normocephalic and atraumatic  Eyes:      Conjunctiva/sclera: Conjunctivae normal    Cardiovascular:      Rate and Rhythm: Normal rate and regular rhythm  Heart sounds: No murmur heard    Pulmonary:      Effort: Pulmonary effort is normal  No respiratory distress  Breath sounds: Normal breath sounds  Abdominal:      Palpations: Abdomen is soft  Tenderness: There is no abdominal tenderness  Musculoskeletal:      Cervical back: Neck supple  Skin:     General: Skin is warm and dry  Neurological:      Mental Status: He is alert  Additional Data:     Labs:  I have reviewed pertinent results     Results from last 7 days   Lab Units 09/10/22  0528   WBC Thousand/uL 9 98   HEMOGLOBIN g/dL 9 9*   HEMATOCRIT % 30 8*   PLATELETS Thousands/uL 227   NEUTROS PCT % 66   LYMPHS PCT % 14   MONOS PCT % 19*   EOS PCT % 1     Results from last 7 days   Lab Units 09/09/22  0624   SODIUM mmol/L 141   POTASSIUM mmol/L 4 9   CHLORIDE mmol/L 101   CO2 mmol/L 33*   BUN mg/dL 41*   CREATININE mg/dL 1 41   ANION GAP mmol/L 7   CALCIUM mg/dL 9 0   GLUCOSE RANDOM mg/dL 101*         Results from last 7 days   Lab Units 09/10/22  0532 09/09/22 2054 09/09/22  1622 09/09/22  1119 09/09/22  0625 09/09/22  0606 09/08/22  2058 09/08/22  1115 09/08/22  0711 09/08/22  7743 09/08/22  0625 09/07/22 2024   POC GLUCOSE mg/dl 142* 385* 290* 427* 79 74 206* 228* 105 65 68 262*                 Imaging: I have reviewed pertinent imaging       Recent Cultures (last 7 days):           Last 24 Hours Medication List:   Current Facility-Administered Medications   Medication Dose Route Frequency Provider Last Rate    acetaminophen  650 mg Oral Q4H PRN Bonnie Magaña, DO      apixaban  2 5 mg Oral BID Bonnie Magaña, DO      escitalopram  5 mg Oral Daily Bonnie Magaña, DO      haloperidol lactate  1 mg Intramuscular Q6H PRN Bonnie Gómez, DO      hydrALAZINE  5 mg Intravenous Q6H PRN Bonnie Magaña, DO      insulin glargine  10 Units Subcutaneous HS Bonnie Magaña, DO      insulin lispro  1-5 Units Subcutaneous TID AC Harpreet King, DO      insulin lispro  1-5 Units Subcutaneous HS Bonnie Magaña, DO      melatonin  3 mg Oral HS [place] PA-C  metoprolol succinate  50 mg Oral BID Carmen Trujillo DO      ondansetron  4 mg Intravenous Q6H PRN Carmen Trujillo DO      pantoprazole  20 mg Oral Daily Carmen Trujillo DO      pravastatin  80 mg Oral Daily With [facility], DO          Today, Patient Was Seen By: Carmen Trujillo DO    ** Please Note: Dictation voice to text software may have been used in the creation of this document   **

## 2022-09-11 LAB
ANION GAP SERPL CALCULATED.3IONS-SCNC: 7 MMOL/L (ref 5–14)
BASOPHILS # BLD AUTO: 0.05 THOUSANDS/ΜL (ref 0–0.1)
BASOPHILS NFR BLD AUTO: 0 % (ref 0–1)
BUN SERPL-MCNC: 40 MG/DL (ref 5–25)
CALCIUM SERPL-MCNC: 8.8 MG/DL (ref 8.4–10.2)
CHLORIDE SERPL-SCNC: 103 MMOL/L (ref 96–108)
CO2 SERPL-SCNC: 30 MMOL/L (ref 21–32)
CREAT SERPL-MCNC: 1.32 MG/DL (ref 0.7–1.5)
EOSINOPHIL # BLD AUTO: 0.11 THOUSAND/ΜL (ref 0–0.61)
EOSINOPHIL NFR BLD AUTO: 1 % (ref 0–6)
ERYTHROCYTE [DISTWIDTH] IN BLOOD BY AUTOMATED COUNT: 12.5 % (ref 11.6–15.1)
GFR SERPL CREATININE-BSD FRML MDRD: 47 ML/MIN/1.73SQ M
GLUCOSE SERPL-MCNC: 151 MG/DL (ref 65–140)
GLUCOSE SERPL-MCNC: 158 MG/DL (ref 70–99)
GLUCOSE SERPL-MCNC: 256 MG/DL (ref 65–140)
GLUCOSE SERPL-MCNC: 319 MG/DL (ref 65–140)
GLUCOSE SERPL-MCNC: 338 MG/DL (ref 65–140)
HCT VFR BLD AUTO: 34.8 % (ref 36.5–49.3)
HGB BLD-MCNC: 10.9 G/DL (ref 12–17)
IMM GRANULOCYTES # BLD AUTO: 0.06 THOUSAND/UL (ref 0–0.2)
IMM GRANULOCYTES NFR BLD AUTO: 1 % (ref 0–2)
LYMPHOCYTES # BLD AUTO: 1.64 THOUSANDS/ΜL (ref 0.6–4.47)
LYMPHOCYTES NFR BLD AUTO: 14 % (ref 14–44)
MAGNESIUM SERPL-MCNC: 2.1 MG/DL (ref 1.6–2.3)
MCH RBC QN AUTO: 34 PG (ref 26.8–34.3)
MCHC RBC AUTO-ENTMCNC: 31.3 G/DL (ref 31.4–37.4)
MCV RBC AUTO: 108 FL (ref 82–98)
MONOCYTES # BLD AUTO: 1.94 THOUSAND/ΜL (ref 0.17–1.22)
MONOCYTES NFR BLD AUTO: 17 % (ref 4–12)
NEUTROPHILS # BLD AUTO: 7.68 THOUSANDS/ΜL (ref 1.85–7.62)
NEUTS SEG NFR BLD AUTO: 67 % (ref 43–75)
NRBC BLD AUTO-RTO: 0 /100 WBCS
PHOSPHATE SERPL-MCNC: 4.1 MG/DL (ref 2.5–4.8)
PLATELET # BLD AUTO: 259 THOUSANDS/UL (ref 149–390)
PMV BLD AUTO: 10.7 FL (ref 8.9–12.7)
POTASSIUM SERPL-SCNC: 4.4 MMOL/L (ref 3.5–5.3)
RBC # BLD AUTO: 3.21 MILLION/UL (ref 3.88–5.62)
SODIUM SERPL-SCNC: 140 MMOL/L (ref 135–147)
WBC # BLD AUTO: 11.48 THOUSAND/UL (ref 4.31–10.16)

## 2022-09-11 PROCEDURE — 82948 REAGENT STRIP/BLOOD GLUCOSE: CPT

## 2022-09-11 PROCEDURE — 80048 BASIC METABOLIC PNL TOTAL CA: CPT | Performed by: INTERNAL MEDICINE

## 2022-09-11 PROCEDURE — 99232 SBSQ HOSP IP/OBS MODERATE 35: CPT | Performed by: INTERNAL MEDICINE

## 2022-09-11 PROCEDURE — 83735 ASSAY OF MAGNESIUM: CPT | Performed by: INTERNAL MEDICINE

## 2022-09-11 PROCEDURE — 84100 ASSAY OF PHOSPHORUS: CPT | Performed by: INTERNAL MEDICINE

## 2022-09-11 PROCEDURE — 85025 COMPLETE CBC W/AUTO DIFF WBC: CPT | Performed by: INTERNAL MEDICINE

## 2022-09-11 RX ADMIN — APIXABAN 2.5 MG: 2.5 TABLET, FILM COATED ORAL at 17:02

## 2022-09-11 RX ADMIN — APIXABAN 2.5 MG: 2.5 TABLET, FILM COATED ORAL at 08:14

## 2022-09-11 RX ADMIN — METOPROLOL SUCCINATE 50 MG: 50 TABLET, EXTENDED RELEASE ORAL at 21:18

## 2022-09-11 RX ADMIN — ESCITALOPRAM OXALATE 5 MG: 5 TABLET, FILM COATED ORAL at 08:13

## 2022-09-11 RX ADMIN — INSULIN LISPRO 4 UNITS: 100 INJECTION, SOLUTION INTRAVENOUS; SUBCUTANEOUS at 11:37

## 2022-09-11 RX ADMIN — ACETAMINOPHEN 650 MG: 325 TABLET ORAL at 08:14

## 2022-09-11 RX ADMIN — INSULIN LISPRO 2 UNITS: 100 INJECTION, SOLUTION INTRAVENOUS; SUBCUTANEOUS at 16:08

## 2022-09-11 RX ADMIN — PANTOPRAZOLE SODIUM 20 MG: 20 TABLET, DELAYED RELEASE ORAL at 08:14

## 2022-09-11 RX ADMIN — PRAVASTATIN SODIUM 80 MG: 40 TABLET ORAL at 15:59

## 2022-09-11 RX ADMIN — MELATONIN TAB 3 MG 3 MG: 3 TAB at 21:18

## 2022-09-11 RX ADMIN — INSULIN LISPRO 3 UNITS: 100 INJECTION, SOLUTION INTRAVENOUS; SUBCUTANEOUS at 21:14

## 2022-09-11 RX ADMIN — INSULIN GLARGINE 10 UNITS: 100 INJECTION, SOLUTION SUBCUTANEOUS at 21:13

## 2022-09-11 RX ADMIN — METOPROLOL SUCCINATE 50 MG: 50 TABLET, EXTENDED RELEASE ORAL at 08:14

## 2022-09-11 NOTE — PROGRESS NOTES
[address] [facility]  Progress Note - Isadora Mclaughlin  1933, 80 y o  male MRN: [id number]  Unit/Bed#: 7T [facility] 716-01 Encounter: [id number]  Primary Care Provider: Ame DOLL DO   Date and time admitted to hospital: 8/27/2022  5:56 AM    * General weakness  Assessment & Plan  · Possibly due to advancing dementia as evidenced by increasing confusion and agitation  · Neuropsychology has deemed the patient incapacitated  · Patient states he lives at home alone and has been having difficulty with ambulation  · No recent traumatic events  · Laboratory analysis grossly unremarkable  · Hemodynamically stable saturating well on room air  · Case management consultation for safe disposition planning  · Patient is medically clear, awaiting safe disposition plan  · PT/OT recommended post acute rehab services  · Patient and the family would prefer home health care with 24/7 care  · Willing to consider short-term rehab temporarily  · Ongoing discussions with family  · Patient lacks capacity so pending guardianship/POA process today in court    Severe protein-calorie malnutrition ([address] )  Assessment & Plan  Malnutrition Findings:   Adult Malnutrition type: Chronic illness  Adult Degree of Malnutrition: Other severe protein calorie malnutrition  Malnutrition Characteristics: Inadequate energy, Weight loss          continue nutritional supplement         360 Statement: Severe pro/singh malnutrition r/t depression as evidenced by 12% unplanned weight loss since 2/24/22, consuming < 75% energy intake compared to estimated energy needs > 1 month  Treated with Enlive bid    BMI Findings: Body mass index is 18 57 kg/m²  Confusion  Assessment & Plan  · Patient seems to have episode of confusion  · Discussed with the Martha Juan and Cynthia Dallas- patient does not have official diagnosis of dementia before    However it seems to be an element of advanced dementia  · Neuro psychiatrist was consulted - At this time, patient does not appear to have capacity to make fully informed medical decisions  Unspecified Neurocognitive Disorder  · Pending POA/guardianship process      Unwitnessed fall  Assessment & Plan  · Patient had unwitnessed fall on 08/28/2022  · CT head was negative for acute intracranial abnormality  Mild chronic microangiopathic changes     · Continue to monitor    Depression with anxiety  Assessment & Plan  · Patient has appeared somewhat anxious about his health  · Family states there may be an element of depression possibly secondary to dementia  · Continue Lexapro 5 mg oral daily  · Haldol 1 mg IM every 6 hours as needed for agitation    Stage 3a chronic kidney disease [facility])  Assessment & Plan  Lab Results   Component Value Date    EGFR 39 09/06/2022    EGFR 43 09/05/2022    EGFR 51 08/30/2022    CREATININE 1 55 (H) 09/06/2022    CREATININE 1 43 09/05/2022    CREATININE 1 24 08/30/2022   · Slight NGUYỄN on 09/06/2022 with creat at 1 55, currently at 1 32  · Trend BMP  · Avoid hypotension and nephrotoxic agents  · Urinary retention protocol  · Strict intake and output      Atrial fibrillation (HCC)  Assessment & Plan  · Rates are controlled  · Continue home Eliquis 2 5 mg PO BID    Hyperlipidemia  Assessment & Plan  · Continue home statin therapy    Type 2 diabetes mellitus with hyperglycemia [facility])  Assessment & Plan  Lab Results   Component Value Date    HGBA1C 8 1 (H) 08/09/2022       Recent Labs     09/05/22  1132 09/05/22  1545 09/05/22 2024 09/06/22  0625   POCGLU 284* 280* 247* 142*       Blood Sugar Average: Last 72 hrs:  (P) 724 [id number]   · Patient was on metformin 1000 b i d   · Decrease Lantus to 10 units daily at bedtime  · Continue sliding scale insulin with Accu-Cheks while inpatient  · Diabetic diet  · Patient has been having labile blood glucose levels during hospital stay as he has been eating intermittently    Primary hypertension  Assessment & Plan  · BP acceptable  · Continue home beta-blocker for atrial fibrillation  · Hydralazine 5 mg IV q6 hours PRN SBP > 180 mmHg  · Monitor blood pressures      VTE Pharmacologic Prophylaxis:   Pharmacologic: Apixaban (Eliquis)  Mechanical VTE Prophylaxis in Place: Yes    Patient Centered Rounds: I have performed bedside rounds with nursing staff today  Discussions with Specialists or Other Care Team Provider:  Discussed with nurse    Education and Discussions with Family / Patient:  Discussed with patient    Time Spent for Care: 30 minutes  More than 50% of total time spent on counseling and coordination of care as described above  Current Length of Stay: 15 day(s)    Current Patient Status: Inpatient   Certification Statement: The patient will continue to require additional inpatient hospital stay due to Patient is medically clear, pending guardianship process    Discharge Plan / Estimated Discharge Date:  Pending      Code Status: Level 1 - Full Code      Subjective:   Patient was seen and examined at bedside  No acute overnight changes  Patient has been eating intermittently  Objective:     Vitals:   Temp (24hrs), Av 5 °F (36 4 °C), Min:96 9 °F (36 1 °C), Max:97 9 °F (36 6 °C)    Temp:  [96 9 °F (36 1 °C)-97 9 °F (36 6 °C)] 97 4 °F (36 3 °C)  HR:  [87-98] 98  Resp:  [17-18] 18  BP: (133-138)/(77-91) 138/80  SpO2:  [94 %-99 %] 94 %  Body mass index is 18 45 kg/m²  Input and Output Summary (last 24 hours):        Intake/Output Summary (Last 24 hours) at 2022 1047  Last data filed at 2022 0915  Gross per 24 hour   Intake 540 ml   Output 200 ml   Net 340 ml       Physical Exam:     Physical Exam  General: breathing well on room air, no acute distress  HEENT: NC/AT, PERRL, EOM - normal  Neck: Supple  Pulm/Chest: Normal chest wall expansion, clear breath sounds on both side, no wheezing/rhonchi or crackles appreciated  CVS: RRR, normal S1&S2, no murmur appreciated, capillary refill <2s  Abd: soft, non tender, non distended, bowel sounds +  MSK: move all 4 extremities spontaneously  Skin: warm  CNS: no acute focal neuro deficit      Additional Data:     Labs:    Results from last 7 days   Lab Units 09/11/22  0507   WBC Thousand/uL 11 48*   HEMOGLOBIN g/dL 10 9*   HEMATOCRIT % 34 8*   PLATELETS Thousands/uL 259   NEUTROS PCT % 67   LYMPHS PCT % 14   MONOS PCT % 17*   EOS PCT % 1     Results from last 7 days   Lab Units 09/11/22  0507   POTASSIUM mmol/L 4 4   CHLORIDE mmol/L 103   CO2 mmol/L 30   BUN mg/dL 40*   CREATININE mg/dL 1 32   CALCIUM mg/dL 8 8           * I Have Reviewed All Lab Data Listed Above  * Additional Pertinent Lab Tests Reviewed: [address] Admission Reviewed    Imaging:      I have reviewed pertinent imaging  Recent Cultures (last 7 days):           Last 24 Hours Medication List:   Current Facility-Administered Medications   Medication Dose Route Frequency Provider Last Rate    acetaminophen  650 mg Oral Q4H PRN [facility], DO      apixaban  2 5 mg Oral BID [facility], DO      escitalopram  5 mg Oral Daily [facility], DO      haloperidol lactate  1 mg Intramuscular Q6H PRN [facility], DO      hydrALAZINE  5 mg Intravenous Q6H PRN [facility], DO      insulin glargine  10 Units Subcutaneous HS [facility], DO      insulin lispro  1-5 Units Subcutaneous TID AC Harpreet King, DO      insulin lispro  1-5 Units Subcutaneous HS [facility], DO      melatonin  3 mg Oral HS Joey Woodward PA-C      metoprolol succinate  50 mg Oral BID [facility], DO      ondansetron  4 mg Intravenous Q6H PRN [facility], DO      pantoprazole  20 mg Oral Daily [facility], DO      pravastatin  80 mg Oral Daily With Dinner [facility], DO          Today, Patient Was Seen By: Victorino Walker MD    ** Please Note: Dragon 360 Dictation voice to text software may have been used in the creation of this document   **

## 2022-09-11 NOTE — PLAN OF CARE
Problem: Potential for Falls  Goal: Patient will remain free of falls  Description: INTERVENTIONS:  - Educate patient/family on patient safety including physical limitations  - Instruct patient to call for assistance with activity   - Consult OT/PT to assist with strengthening/mobility   - Keep Call bell within reach  - Keep bed low and locked with side rails adjusted as appropriate  - Keep care items and personal belongings within reach  - Initiate and maintain comfort rounds  - Offer Toileting every 2 Hours, in advance of need  - Initiate/Maintain bed alarm  - Obtain necessary fall risk management equipment: walker  - Apply yellow socks and bracelet for high fall risk patients  - Consider moving patient to room near nurses station  9/10/2022 2251 by Damon Rendon RN  Outcome: Progressing  9/10/2022 2251 by Damon Rendon RN  Outcome: Progressing     Problem: MOBILITY - ADULT  Goal: Maintain or return to baseline ADL function  Description: INTERVENTIONS:  -  Assess patient's ability to carry out ADLs; assess patient's baseline for ADL function and identify physical deficits which impact ability to perform ADLs (bathing, care of mouth/teeth, toileting, grooming, dressing, etc )  - Assess/evaluate cause of self-care deficits   - Assess range of motion  - Assess patient's mobility; develop plan if impaired  - Assess patient's need for assistive devices and provide as appropriate  - Encourage maximum independence but intervene and supervise when necessary  - Involve family in performance of ADLs  - Assess for home care needs following discharge   - Consider OT consult to assist with ADL evaluation and planning for discharge  - Provide patient education as appropriate  9/10/2022 2251 by Damon Rendon RN  Outcome: Progressing  9/10/2022 2251 by Damon Rendon RN  Outcome: Progressing     Problem: PAIN - ADULT  Goal: Verbalizes/displays adequate comfort level or baseline comfort level  Description: Interventions:  - Encourage patient to monitor pain and request assistance  - Assess pain using appropriate pain scale  - Administer analgesics based on type and severity of pain and evaluate response  - Implement non-pharmacological measures as appropriate and evaluate response  - Consider cultural and social influences on pain and pain management  - Notify physician/advanced practitioner if interventions unsuccessful or patient reports new pain  9/10/2022 2251 by Winnie Hernandez RN  Outcome: Progressing  9/10/2022 2251 by Winnie Hernandez RN  Outcome: Progressing     Problem: DISCHARGE PLANNING  Goal: Discharge to home or other facility with appropriate resources  Description: INTERVENTIONS:  - Identify barriers to discharge w/patient and caregiver  - Arrange for needed discharge resources and transportation as appropriate  - Identify discharge learning needs (meds, wound care, etc )  - Arrange for interpretive services to assist at discharge as needed  - Refer to Case Management Department for coordinating discharge planning if the patient needs post-hospital services based on physician/advanced practitioner order or complex needs related to functional status, cognitive ability, or social support system  9/10/2022 2251 by Winnie Hernandez RN  Outcome: Progressing  9/10/2022 2251 by Winnie Hernandez RN  Outcome: Progressing     Problem: Knowledge Deficit  Goal: Patient/family/caregiver demonstrates understanding of disease process, treatment plan, medications, and discharge instructions  Description: Complete learning assessment and assess knowledge base    Interventions:  - Provide teaching at level of understanding  - Provide teaching via preferred learning methods  9/10/2022 2251 by Winnie Hernandez RN  Outcome: Progressing  9/10/2022 2251 by Winnie Hernandez RN  Outcome: Progressing     Problem: Nutrition/Hydration-ADULT  Goal: Nutrient/Hydration intake appropriate for improving, restoring or maintaining nutritional needs  Description: Monitor and assess patient's nutrition/hydration status for malnutrition  Collaborate with interdisciplinary team and initiate plan and interventions as ordered  Monitor patient's weight and dietary intake as ordered or per policy  Utilize nutrition screening tool and intervene as necessary  Determine patient's food preferences and provide high-protein, high-caloric foods as appropriate       INTERVENTIONS:  - Monitor oral intake, urinary output, labs, and treatment plans  - Assess nutrition and hydration status and recommend course of action  - Evaluate amount of meals eaten  - Assist patient with eating if necessary   - Allow adequate time for meals  - Recommend/ encourage appropriate diets, oral nutritional supplements, and vitamin/mineral supplements  - Order, calculate, and assess calorie counts as needed  - Recommend, monitor, and adjust tube feedings and TPN/PPN based on assessed needs  - Assess need for intravenous fluids  - Provide specific nutrition/hydration education as appropriate  - Include patient/family/caregiver in decisions related to nutrition  9/10/2022 2251 by Heidy Faust RN  Outcome: Progressing  9/10/2022 2251 by Heidy Faust RN  Outcome: Progressing     Problem: METABOLIC, FLUID AND ELECTROLYTES - ADULT  Goal: Glucose maintained within target range  Description: INTERVENTIONS:  - Monitor Blood Glucose as ordered  - Assess for signs and symptoms of hyperglycemia and hypoglycemia  - Administer ordered medications to maintain glucose within target range  - Assess nutritional intake and initiate nutrition service referral as needed  9/10/2022 2251 by Heidy Faust RN  Outcome: Progressing  9/10/2022 2251 by Heidy Faust RN  Outcome: Progressing

## 2022-09-12 LAB
GLUCOSE SERPL-MCNC: 105 MG/DL (ref 65–140)
GLUCOSE SERPL-MCNC: 216 MG/DL (ref 65–140)
GLUCOSE SERPL-MCNC: 292 MG/DL (ref 65–140)
GLUCOSE SERPL-MCNC: 337 MG/DL (ref 65–140)

## 2022-09-12 PROCEDURE — 82948 REAGENT STRIP/BLOOD GLUCOSE: CPT

## 2022-09-12 PROCEDURE — 99232 SBSQ HOSP IP/OBS MODERATE 35: CPT | Performed by: INTERNAL MEDICINE

## 2022-09-12 RX ORDER — INSULIN LISPRO 100 [IU]/ML
3 INJECTION, SOLUTION INTRAVENOUS; SUBCUTANEOUS
Status: DISCONTINUED | OUTPATIENT
Start: 2022-09-12 | End: 2022-09-13

## 2022-09-12 RX ADMIN — INSULIN LISPRO 2 UNITS: 100 INJECTION, SOLUTION INTRAVENOUS; SUBCUTANEOUS at 22:07

## 2022-09-12 RX ADMIN — INSULIN LISPRO 4 UNITS: 100 INJECTION, SOLUTION INTRAVENOUS; SUBCUTANEOUS at 16:51

## 2022-09-12 RX ADMIN — PANTOPRAZOLE SODIUM 20 MG: 20 TABLET, DELAYED RELEASE ORAL at 09:18

## 2022-09-12 RX ADMIN — INSULIN GLARGINE 10 UNITS: 100 INJECTION, SOLUTION SUBCUTANEOUS at 22:06

## 2022-09-12 RX ADMIN — MELATONIN TAB 3 MG 3 MG: 3 TAB at 22:08

## 2022-09-12 RX ADMIN — ESCITALOPRAM OXALATE 5 MG: 5 TABLET, FILM COATED ORAL at 09:18

## 2022-09-12 RX ADMIN — INSULIN LISPRO 3 UNITS: 100 INJECTION, SOLUTION INTRAVENOUS; SUBCUTANEOUS at 11:48

## 2022-09-12 RX ADMIN — METOPROLOL SUCCINATE 50 MG: 50 TABLET, EXTENDED RELEASE ORAL at 22:08

## 2022-09-12 RX ADMIN — INSULIN LISPRO 3 UNITS: 100 INJECTION, SOLUTION INTRAVENOUS; SUBCUTANEOUS at 16:50

## 2022-09-12 RX ADMIN — METOPROLOL SUCCINATE 50 MG: 50 TABLET, EXTENDED RELEASE ORAL at 09:18

## 2022-09-12 RX ADMIN — APIXABAN 2.5 MG: 2.5 TABLET, FILM COATED ORAL at 09:18

## 2022-09-12 NOTE — ASSESSMENT & PLAN NOTE
Lab Results   Component Value Date    EGFR 47 09/11/2022    EGFR 35 09/10/2022    EGFR 43 09/09/2022    CREATININE 1 32 09/11/2022    CREATININE 1 68 (H) 09/10/2022    CREATININE 1 41 09/09/2022   · Slight NGUYỄN on 09/06/2022 with creat at 1 55, currently at 1 32  · Trend BMP  · Avoid hypotension and nephrotoxic agents  · Urinary retention protocol  · Strict intake and output

## 2022-09-12 NOTE — ASSESSMENT & PLAN NOTE
· Patient seems to have episode of confusion  · Discussed with the Roger Brittle and Kristin Butler- patient does not have official diagnosis of dementia before  However it seems to be an element of advanced dementia  · Neuro psychiatrist was consulted - At this time, patient does not appear to have capacity to make fully informed medical decisions   Unspecified Neurocognitive Disorder  · Pending POA/guardianship process

## 2022-09-12 NOTE — ASSESSMENT & PLAN NOTE
Lab Results   Component Value Date    HGBA1C 8 1 (H) 08/09/2022       Recent Labs     09/11/22  1557 09/11/22  2107 09/12/22  0540 09/12/22  1113   POCGLU 256* 319* 105 292*       Blood Sugar Average: Last 72 hrs:  (P) 245  125   · Patient was on metformin 1000 b i d   · Continue Lantus 10 units daily at bedtime, added Humalog 3 units t i d  A c   · Continue sliding scale insulin with Accu-Cheks while inpatient  · Diabetic diet  · Patient has been having labile blood glucose levels during hospital stay as he has been eating intermittently Patient Instructions by Salena Basurto MD at 08/05/17 08:15 AM     Author:  Salena Basurto MD Service:  (none) Author Type:  Physician     Filed:  08/05/17 08:16 AM Encounter Date:  8/5/2017 Status:  Signed     :  Salena Basurto MD (Physician)            PATIENT INFORMATION   -- Please go to X-ray now to have your test performed.   -- Frozen water bottle for massage  -- May need to see Podiatrist    Follow-Up  -- If not better in 2 weeks, call or make a follow-up appointment.     Additional Educational Resources:  For additional resources regarding your symptoms, diagnosis, or further health information, please visit the Health Resources section on [email or web address] or the Online Health Resources section in [employer].          Revision History        User Key Date/Time User Provider Type Action    > [N/A] 08/05/17 08:16 AM Salena Basurto MD Physician Sign

## 2022-09-12 NOTE — PROGRESS NOTES
[facility]  Progress Note - Godwin De Guzman  1933, 80 y o  male MRN: [id number]  Unit/Bed#: 7T [facility] 716-01 Encounter: [id number]  Primary Care Provider: Jena DIANA DO   Date and time admitted to hospital: 8/27/2022  5:56 AM    * General weakness  Assessment & Plan  · Possibly due to advancing dementia as evidenced by increasing confusion and agitation  · Neuropsychology has deemed the patient incapacitated  · Patient states he lives at home alone and has been having difficulty with ambulation  · No recent traumatic events  · Laboratory analysis grossly unremarkable  · Hemodynamically stable saturating well on room air  · Case management consultation for safe disposition planning  · Patient is medically clear, awaiting safe disposition plan  · PT/OT recommended post acute rehab services  · Patient and the family would prefer home health care with 24/7 care  · Willing to consider short-term rehab temporarily  · Ongoing discussions with family  · Patient lacks capacity so pending guardianship/POA process    Severe protein-calorie malnutrition ([address] )  Assessment & Plan  Malnutrition Findings:   Adult Malnutrition type: Chronic illness  Adult Degree of Malnutrition: Other severe protein calorie malnutrition  Malnutrition Characteristics: Inadequate energy, Weight loss          continue nutritional supplement         360 Statement: Severe pro/singh malnutrition r/t depression as evidenced by 12% unplanned weight loss since 2/24/22, consuming < 75% energy intake compared to estimated energy needs > 1 month  Treated with Enlive bid    BMI Findings: Body mass index is 18 54 kg/m²  Confusion  Assessment & Plan  · Patient seems to have episode of confusion  · Discussed with the Ekaterina Flowers and Dinah Coombs- patient does not have official diagnosis of dementia before    However it seems to be an element of advanced dementia  · Neuro psychiatrist was consulted - At this time, patient does not appear to have capacity to make fully informed medical decisions  Unspecified Neurocognitive Disorder  · Pending POA/guardianship process      Unwitnessed fall  Assessment & Plan  · Patient had unwitnessed fall on 08/28/2022  · CT head was negative for acute intracranial abnormality  Mild chronic microangiopathic changes  · Continue to monitor    Depression with anxiety  Assessment & Plan  · Patient has appeared somewhat anxious about his health  · Family states there may be an element of depression possibly secondary to dementia  · Continue Lexapro 5 mg oral daily  · Haldol 1 mg IM every 6 hours as needed for agitation    Stage 3a chronic kidney disease [facility])  Assessment & Plan  Lab Results   Component Value Date    EGFR 47 09/11/2022    EGFR 35 09/10/2022    EGFR 43 09/09/2022    CREATININE 1 32 09/11/2022    CREATININE 1 68 (H) 09/10/2022    CREATININE 1 41 09/09/2022   · Slight NGUYỄN on 09/06/2022 with creat at 1 55, currently at 1 32  · Trend BMP  · Avoid hypotension and nephrotoxic agents  · Urinary retention protocol  · Strict intake and output      Atrial fibrillation (HCC)  Assessment & Plan  · Rates are controlled  · Continue home Eliquis 2 5 mg PO BID    Hyperlipidemia  Assessment & Plan  · Continue home statin therapy    Type 2 diabetes mellitus with hyperglycemia [facility])  Assessment & Plan  Lab Results   Component Value Date    HGBA1C 8 1 (H) 08/09/2022       Recent Labs     09/11/22  1557 09/11/22  2107 09/12/22  0540 09/12/22  1113   POCGLU 256* 319* 105 292*       Blood Sugar Average: Last 72 hrs:  (P) 245  125   · Patient was on metformin 1000 b i d   · Continue Lantus 10 units daily at bedtime, added Humalog 3 units t i d  A c   · Continue sliding scale insulin with Accu-Cheks while inpatient  · Diabetic diet  · Patient has been having labile blood glucose levels during hospital stay as he has been eating intermittently    Primary hypertension  Assessment & Plan  · BP acceptable  · Continue home beta-blocker for atrial fibrillation  · Hydralazine 5 mg IV q6 hours PRN SBP > 180 mmHg  · Monitor blood pressures      VTE Pharmacologic Prophylaxis:   Pharmacologic: Apixaban (Eliquis)  Mechanical VTE Prophylaxis in Place: Yes    Patient Centered Rounds: I have performed bedside rounds with nursing staff today  Discussions with Specialists or Other Care Team Provider:  Discussed with Case Management, nurse    Education and Discussions with Family / Patient:  Discussed with patient    Time Spent for Care: 45 minutes  More than 50% of total time spent on counseling and coordination of care as described above  Current Length of Stay: 16 day(s)    Current Patient Status: Inpatient   Certification Statement: The patient will continue to require additional inpatient hospital stay due to Patient is medically clear, pending placement    Discharge Plan / Estimated Discharge Date:  Pending placement      Code Status: Level 1 - Full Code      Subjective:   Patient was seen and examined at bedside  The patient denies any shortness of breath, N/V, abd pain  No acute overnight changes  Objective:     Vitals:   Temp (24hrs), Av 8 °F (36 6 °C), Min:97 8 °F (36 6 °C), Max:97 8 °F (36 6 °C)    Temp:  [97 8 °F (36 6 °C)] 97 8 °F (36 6 °C)  HR:  [82-97] 97  Resp:  [18-20] 18  BP: (122-142)/(66-92) 122/68  SpO2:  [96 %-99 %] 96 %  Body mass index is 18 54 kg/m²  Input and Output Summary (last 24 hours):        Intake/Output Summary (Last 24 hours) at 2022 1357  Last data filed at 2022 1229  Gross per 24 hour   Intake 880 ml   Output 200 ml   Net 680 ml       Physical Exam:     Physical Exam  General: breathing well on room air, no acute distress  HEENT: NC/AT, PERRL, EOM - normal  Neck: Supple  Pulm/Chest: Normal chest wall expansion, clear breath sounds on both side, no wheezing/rhonchi or crackles appreciated  CVS: RRR, normal S1&S2, no murmur appreciated, capillary refill <2s  Abd: soft, non tender, non distended, bowel sounds +  MSK: move all 4 extremities spontaneously  Skin: warm  CNS: no acute focal neuro deficit      Additional Data:     Labs:    Results from last 7 days   Lab Units 09/11/22  0507   WBC Thousand/uL 11 48*   HEMOGLOBIN g/dL 10 9*   HEMATOCRIT % 34 8*   PLATELETS Thousands/uL 259   NEUTROS PCT % 67   LYMPHS PCT % 14   MONOS PCT % 17*   EOS PCT % 1     Results from last 7 days   Lab Units 09/11/22  0507   POTASSIUM mmol/L 4 4   CHLORIDE mmol/L 103   CO2 mmol/L 30   BUN mg/dL 40*   CREATININE mg/dL 1 32   CALCIUM mg/dL 8 8           * I Have Reviewed All Lab Data Listed Above  * Additional Pertinent Lab Tests Reviewed: [address] Admission Reviewed    Imaging:      I have reviewed pertinent imaging        Recent Cultures (last 7 days):           Last 24 Hours Medication List:   Current Facility-Administered Medications   Medication Dose Route Frequency Provider Last Rate    acetaminophen  650 mg Oral Q4H PRN [place], DO      apixaban  2 5 mg Oral BID [place], DO      escitalopram  5 mg Oral Daily [place], DO      haloperidol lactate  1 mg Intramuscular Q6H PRN [place], DO      hydrALAZINE  5 mg Intravenous Q6H PRN [place], DO      insulin glargine  10 Units Subcutaneous HS [place], DO      insulin lispro  1-5 Units Subcutaneous TID AC [place], DO      insulin lispro  1-5 Units Subcutaneous HS [place], DO      insulin lispro  3 Units Subcutaneous TID With Meals Saúl Chaparro MD      melatonin  3 mg Oral HS Marissa Ennis PA-C      metoprolol succinate  50 mg Oral BID [place], DO      ondansetron  4 mg Intravenous Q6H PRN [place], DO      pantoprazole  20 mg Oral Daily [place], DO      pravastatin  80 mg Oral Daily With Dinner [place], DO          Today, Patient Was Seen By: Saúl Chaparro MD    ** Please Note: Dragon 360 Dictation voice to text software may have been used in the creation of this document   **

## 2022-09-12 NOTE — CASE MANAGEMENT
Case Management Progress Note    Patient name Juni Heading  Location [facility] 716/[facility] 716-01 MRN [id number]  : 1933 Date 2022       LOS (days): 16  Geometric Mean LOS (GMLOS) (days): 4 50  Days to GMLOS:-11 8        OBJECTIVE:        Current admission status: Inpatient  Preferred Pharmacy:   [facility]  Rodney Cooks, [address] [place] [address]  [address] [place] [address]  Phone: [phone] Fax: [phone]    [facility] [address] [place] - [address]  [address]  [place] [address]  Phone: [phone] Fax: [phone]    Primary Care Provider: Dwayne Arreola DO    Primary Insurance: MEDICARE  Secondary Insurance: Alexis FREEMAN    PROGRESS NOTE:    CM left a VM message for pt's  requesting info on pt's guardianship  CM left a full VM message requesting a call back at their earliest convenience  CM received a call from pt's family member, Lane Brock requesting a referral to Timo Sheffield at [facility] [employer]  Referral was placed via [address]  CM also called and s/w liaison, Dima Liz, she was aware of the referral (phone [phone], fax [phone])  They will review and follow-up after review

## 2022-09-12 NOTE — ASSESSMENT & PLAN NOTE
· Possibly due to advancing dementia as evidenced by increasing confusion and agitation  · Neuropsychology has deemed the patient incapacitated  · Patient states he lives at home alone and has been having difficulty with ambulation  · No recent traumatic events  · Laboratory analysis grossly unremarkable  · Hemodynamically stable saturating well on room air  · Case management consultation for safe disposition planning  · Patient is medically clear, awaiting safe disposition plan  · PT/OT recommended post acute rehab services  · Patient and the family would prefer home health care with 24/7 care  · Willing to consider short-term rehab temporarily  · Ongoing discussions with family  · Patient lacks capacity so pending guardianship/POA process

## 2022-09-12 NOTE — PLAN OF CARE
Problem: Potential for Falls  Goal: Patient will remain free of falls  Description: INTERVENTIONS:  - Educate patient/family on patient safety including physical limitations  - Instruct patient to call for assistance with activity   - Consult OT/PT to assist with strengthening/mobility   - Keep Call bell within reach  - Keep bed low and locked with side rails adjusted as appropriate  - Keep care items and personal belongings within reach  - Initiate and maintain comfort rounds  - Offer Toileting every 2 Hours, in advance of need  - Initiate/Maintain bed alarm  - Obtain necessary fall risk management equipment: walker  - Apply yellow socks and bracelet for high fall risk patients  - Consider moving patient to room near nurses station  Outcome: Progressing     Problem: MOBILITY - ADULT  Goal: Maintain or return to baseline ADL function  Description: INTERVENTIONS:  - Educate patient/family on patient safety including physical limitations  - Instruct patient to call for assistance with activity   - Consult OT/PT to assist with strengthening/mobility   - Keep Call bell within reach  - Keep bed low and locked with side rails adjusted as appropriate  - Keep care items and personal belongings within reach  - Initiate and maintain comfort rounds  - Offer Toileting every 2 Hours, in advance of need  - Initiate/Maintain bed alarm  - Obtain necessary fall risk management equipment: walker  - Apply yellow socks and bracelet for high fall risk patients  - Consider moving patient to room near nurses station  Outcome: Progressing  Goal: Maintains/Returns to pre admission functional level  Description: INTERVENTIONS:  -  Assess patient's ability to carry out ADLs; assess patient's baseline for ADL function and identify physical deficits which impact ability to perform ADLs (bathing, care of mouth/teeth, toileting, grooming, dressing, etc )  - Assess/evaluate cause of self-care deficits   - Assess range of motion  - Assess patient's mobility; develop plan if impaired  - Assess patient's need for assistive devices and provide as appropriate  - Encourage maximum independence but intervene and supervise when necessary  - Involve family in performance of ADLs  - Assess for home care needs following discharge   - Consider OT consult to assist with ADL evaluation and planning for discharge  - Provide patient education as appropriate  Outcome: Progressing     Problem: PAIN - ADULT  Goal: Verbalizes/displays adequate comfort level or baseline comfort level  Description: Interventions:  - Encourage patient to monitor pain and request assistance  - Assess pain using appropriate pain scale  - Administer analgesics based on type and severity of pain and evaluate response  - Implement non-pharmacological measures as appropriate and evaluate response  - Consider cultural and social influences on pain and pain management  - Notify physician/advanced practitioner if interventions unsuccessful or patient reports new pain  Outcome: Progressing     Problem: INFECTION - ADULT  Goal: Absence or prevention of progression during hospitalization  Description: INTERVENTIONS:  - Assess and monitor for signs and symptoms of infection  - Monitor lab/diagnostic results  - Monitor all insertion sites  - Administer medications as ordered  - Instruct and encourage patient and family to use good hand hygiene technique  - Identify and instruct in appropriate isolation precautions for identified infection/condition  Outcome: Progressing  Goal: Absence of fever/infection during neutropenic period  Description: INTERVENTIONS:  - Monitor WBC    Outcome: Progressing     Problem: SAFETY ADULT  Goal: Patient will remain free of falls  Description: INTERVENTIONS:  - Educate patient/family on patient safety including physical limitations  - Instruct patient to call for assistance with activity   - Consult OT/PT to assist with strengthening/mobility   - Keep Call bell within reach  - Keep bed low and locked with side rails adjusted as appropriate  - Keep care items and personal belongings within reach  - Initiate and maintain comfort rounds  - Offer Toileting every 2 Hours, in advance of need  - Initiate/Maintain bed alarm  - Obtain necessary fall risk management equipment: walker  - Apply yellow socks and bracelet for high fall risk patients  - Consider moving patient to room near nurses station  Outcome: Progressing  Goal: Maintain or return to baseline ADL function  Description: INTERVENTIONS:  - Educate patient/family on patient safety including physical limitations  - Instruct patient to call for assistance with activity   - Consult OT/PT to assist with strengthening/mobility   - Keep Call bell within reach  - Keep bed low and locked with side rails adjusted as appropriate  - Keep care items and personal belongings within reach  - Initiate and maintain comfort rounds  - Offer Toileting every 2 Hours, in advance of need  - Initiate/Maintain bed alarm  - Obtain necessary fall risk management equipment: walker  - Apply yellow socks and bracelet for high fall risk patients  - Consider moving patient to room near nurses station  Outcome: Progressing  Goal: Maintains/Returns to pre admission functional level  Description: INTERVENTIONS:  -  Assess patient's ability to carry out ADLs; assess patient's baseline for ADL function and identify physical deficits which impact ability to perform ADLs (bathing, care of mouth/teeth, toileting, grooming, dressing, etc )  - Assess/evaluate cause of self-care deficits   - Assess range of motion  - Assess patient's mobility; develop plan if impaired  - Assess patient's need for assistive devices and provide as appropriate  - Encourage maximum independence but intervene and supervise when necessary  - Involve family in performance of ADLs  - Assess for home care needs following discharge   - Consider OT consult to assist with ADL evaluation and planning for discharge  - Provide patient education as appropriate  Outcome: Progressing     Problem: DISCHARGE PLANNING  Goal: Discharge to home or other facility with appropriate resources  Description: INTERVENTIONS:  - Identify barriers to discharge w/patient and caregiver  - Arrange for needed discharge resources and transportation as appropriate  - Identify discharge learning needs (meds, wound care, etc )  - Arrange for interpretive services to assist at discharge as needed  - Refer to Case Management Department for coordinating discharge planning if the patient needs post-hospital services based on physician/advanced practitioner order or complex needs related to functional status, cognitive ability, or social support system  Outcome: Progressing     Problem: Knowledge Deficit  Goal: Patient/family/caregiver demonstrates understanding of disease process, treatment plan, medications, and discharge instructions  Description: Complete learning assessment and assess knowledge base    Interventions:  - Provide teaching at level of understanding  - Provide teaching via preferred learning methods  Outcome: Progressing     Problem: Prexisting or High Potential for Compromised Skin Integrity  Goal: Skin integrity is maintained or improved  Description: INTERVENTIONS:  - Identify patients at risk for skin breakdown  - Assess and monitor skin integrity  - Assess and monitor nutrition and hydration status  - Monitor labs   - Assess for incontinence   - Turn and reposition patient  - Assist with mobility/ambulation  - Relieve pressure over bony prominences  - Avoid friction and shearing  - Provide appropriate hygiene as needed including keeping skin clean and dry  - Evaluate need for skin moisturizer/barrier cream  - Collaborate with interdisciplinary team   - Patient/family teaching  - Consider wound care consult   Outcome: Progressing     Problem: Nutrition/Hydration-ADULT  Goal: Nutrient/Hydration intake appropriate for improving, restoring or maintaining nutritional needs  Description: Monitor and assess patient's nutrition/hydration status for malnutrition  Collaborate with interdisciplinary team and initiate plan and interventions as ordered  Monitor patient's weight and dietary intake as ordered or per policy  Utilize nutrition screening tool and intervene as necessary  Determine patient's food preferences and provide high-protein, high-caloric foods as appropriate  INTERVENTIONS:  - Monitor oral intake, urinary output, labs, and treatment plans  - Assess nutrition and hydration status and recommend course of action  - Evaluate amount of meals eaten  - Assist patient with eating if necessary   - Allow adequate time for meals  - Recommend/ encourage appropriate diets, oral nutritional supplements, and vitamin/mineral supplements  - Order, calculate, and assess calorie counts as needed  - Recommend, monitor, and adjust tube feedings and TPN/PPN based on assessed needs  - Assess need for intravenous fluids  - Provide specific nutrition/hydration education as appropriate  - Include patient/family/caregiver in decisions related to nutrition  Outcome: Progressing     Problem: CONFUSION/THOUGHT DISTURBANCE  Goal: Thought disturbances (confusion, delirium, depression, dementia or psychosis) are managed to maintain or return to baseline mental status and functional level  Description: INTERVENTIONS:  - Assess for possible contributors to  thought disturbance, including but not limited to medications, infection, impaired vision or hearing, underlying metabolic abnormalities, dehydration, respiratory compromise,  psychiatric diagnoses and notify attending PHYSICAN/AP  - Monitor and intervene to maintain adequate nutrition, hydration, elimination, sleep and activity  - Decrease environmental stimuli, including noise as appropriate  - Provide frequent contacts to provide refocusing, direction and reassurance as needed   Approach patient calmly with eye contact and at their level  - [place] high risk fall precautions, aspiration precautions and other safety measures, as indicated  - If delirium suspected, notify physician/AP of change in condition and request immediate in-person evaluation  - Pursue consults as appropriate including Geriatric (campus dependent), OT for cognitive evaluation/activity planning, psychiatric, pastoral care, etc   Outcome: Progressing     Problem: BEHAVIOR  Goal: Pt/Family maintain appropriate behavior and adhere to behavioral management agreement, if implemented  Description: INTERVENTIONS:  - Assess the family dynamic   - Encourage verbalization of thoughts and concerns in a socially appropriate manner  - Assess patient/family's coping skills and non-compliant behavior (including use of illegal substances)  - Utilize positive, consistent limit setting strategies supporting safety of patient, staff and others  - Initiate consult with Case Management, Spiritual Care or other ancillary services as appropriate  - If a patient's/visitor's behavior jeopardizes the safety of the patient, staff, or others, refer to organization procedure     - Notify Security of behavior or suspected illegal substances which indicate the need for search of the patient and/or belongings  - Encourage participation in the decision making process about a behavioral management agreement; implement if patient meets criteria  Outcome: Progressing     Problem: METABOLIC, FLUID AND ELECTROLYTES - ADULT  Goal: Glucose maintained within target range  Description: INTERVENTIONS:  - Monitor Blood Glucose as ordered  - Assess for signs and symptoms of hyperglycemia and hypoglycemia  - Administer ordered medications to maintain glucose within target range  - Assess nutritional intake and initiate nutrition service referral as needed  Outcome: Progressing     Problem: GASTROINTESTINAL - ADULT  Goal: Maintains adequate nutritional intake  Description: INTERVENTIONS:  - Monitor percentage of each meal consumed  - Identify factors contributing to decreased intake, treat as appropriate  - Assist with meals as needed  - Monitor I&O, weight, and lab values if indicated  - Obtain nutrition services referral as needed  Outcome: Progressing

## 2022-09-12 NOTE — PLAN OF CARE
Problem: Potential for Falls  Goal: Patient will remain free of falls  Description: INTERVENTIONS:  - Educate patient/family on patient safety including physical limitations  - Instruct patient to call for assistance with activity   - Consult OT/PT to assist with strengthening/mobility   - Keep Call bell within reach  - Keep bed low and locked with side rails adjusted as appropriate  - Keep care items and personal belongings within reach  - Initiate and maintain comfort rounds  - Offer Toileting every 2 Hours, in advance of need  - Initiate/Maintain bed alarm  - Obtain necessary fall risk management equipment: walker  - Apply yellow socks and bracelet for high fall risk patients  - Consider moving patient to room near nurses station  Outcome: Progressing     Problem: DISCHARGE PLANNING  Goal: Discharge to home or other facility with appropriate resources  Description: INTERVENTIONS:  - Identify barriers to discharge w/patient and caregiver  - Arrange for needed discharge resources and transportation as appropriate  - Identify discharge learning needs (meds, wound care, etc )  - Arrange for interpretive services to assist at discharge as needed  - Refer to Case Management Department for coordinating discharge planning if the patient needs post-hospital services based on physician/advanced practitioner order or complex needs related to functional status, cognitive ability, or social support system  Outcome: Progressing     Problem: Prexisting or High Potential for Compromised Skin Integrity  Goal: Skin integrity is maintained or improved  Description: INTERVENTIONS:  - Identify patients at risk for skin breakdown  - Assess and monitor skin integrity  - Assess and monitor nutrition and hydration status  - Monitor labs   - Assess for incontinence   - Turn and reposition patient  - Assist with mobility/ambulation  - Relieve pressure over bony prominences  - Avoid friction and shearing  - Provide appropriate hygiene as needed including keeping skin clean and dry  - Evaluate need for skin moisturizer/barrier cream  - Collaborate with interdisciplinary team   - Patient/family teaching  - Consider wound care consult   Outcome: Progressing     Problem: Nutrition/Hydration-ADULT  Goal: Nutrient/Hydration intake appropriate for improving, restoring or maintaining nutritional needs  Description: Monitor and assess patient's nutrition/hydration status for malnutrition  Collaborate with interdisciplinary team and initiate plan and interventions as ordered  Monitor patient's weight and dietary intake as ordered or per policy  Utilize nutrition screening tool and intervene as necessary  Determine patient's food preferences and provide high-protein, high-caloric foods as appropriate       INTERVENTIONS:  - Monitor oral intake, urinary output, labs, and treatment plans  - Assess nutrition and hydration status and recommend course of action  - Evaluate amount of meals eaten  - Assist patient with eating if necessary   - Allow adequate time for meals  - Recommend/ encourage appropriate diets, oral nutritional supplements, and vitamin/mineral supplements  - Order, calculate, and assess calorie counts as needed  - Recommend, monitor, and adjust tube feedings and TPN/PPN based on assessed needs  - Assess need for intravenous fluids  - Provide specific nutrition/hydration education as appropriate  - Include patient/family/caregiver in decisions related to nutrition  Outcome: Progressing     Problem: METABOLIC, FLUID AND ELECTROLYTES - ADULT  Goal: Glucose maintained within target range  Description: INTERVENTIONS:  - Monitor Blood Glucose as ordered  - Assess for signs and symptoms of hyperglycemia and hypoglycemia  - Administer ordered medications to maintain glucose within target range  - Assess nutritional intake and initiate nutrition service referral as needed  Outcome: Progressing

## 2022-09-13 LAB
GLUCOSE SERPL-MCNC: 115 MG/DL (ref 65–140)
GLUCOSE SERPL-MCNC: 180 MG/DL (ref 65–140)
GLUCOSE SERPL-MCNC: 282 MG/DL (ref 65–140)
GLUCOSE SERPL-MCNC: 318 MG/DL (ref 65–140)

## 2022-09-13 PROCEDURE — 99232 SBSQ HOSP IP/OBS MODERATE 35: CPT | Performed by: INTERNAL MEDICINE

## 2022-09-13 PROCEDURE — 82948 REAGENT STRIP/BLOOD GLUCOSE: CPT

## 2022-09-13 RX ORDER — INSULIN LISPRO 100 [IU]/ML
7 INJECTION, SOLUTION INTRAVENOUS; SUBCUTANEOUS
Status: DISCONTINUED | OUTPATIENT
Start: 2022-09-13 | End: 2022-09-21 | Stop reason: HOSPADM

## 2022-09-13 RX ADMIN — APIXABAN 2.5 MG: 2.5 TABLET, FILM COATED ORAL at 17:36

## 2022-09-13 RX ADMIN — INSULIN LISPRO 3 UNITS: 100 INJECTION, SOLUTION INTRAVENOUS; SUBCUTANEOUS at 11:30

## 2022-09-13 RX ADMIN — INSULIN LISPRO 3 UNITS: 100 INJECTION, SOLUTION INTRAVENOUS; SUBCUTANEOUS at 11:34

## 2022-09-13 RX ADMIN — INSULIN GLARGINE 10 UNITS: 100 INJECTION, SOLUTION SUBCUTANEOUS at 22:22

## 2022-09-13 RX ADMIN — INSULIN LISPRO 3 UNITS: 100 INJECTION, SOLUTION INTRAVENOUS; SUBCUTANEOUS at 16:35

## 2022-09-13 RX ADMIN — INSULIN LISPRO 1 UNITS: 100 INJECTION, SOLUTION INTRAVENOUS; SUBCUTANEOUS at 22:23

## 2022-09-13 RX ADMIN — INSULIN LISPRO 3 UNITS: 100 INJECTION, SOLUTION INTRAVENOUS; SUBCUTANEOUS at 08:24

## 2022-09-13 RX ADMIN — INSULIN LISPRO 7 UNITS: 100 INJECTION, SOLUTION INTRAVENOUS; SUBCUTANEOUS at 16:36

## 2022-09-13 RX ADMIN — PRAVASTATIN SODIUM 80 MG: 40 TABLET ORAL at 16:41

## 2022-09-13 NOTE — ASSESSMENT & PLAN NOTE
Lab Results   Component Value Date    EGFR 47 09/11/2022    EGFR 35 09/10/2022    EGFR 43 09/09/2022    CREATININE 1 32 09/11/2022    CREATININE 1 68 (H) 09/10/2022    CREATININE 1 41 09/09/2022   · Slight [id number] on 09/06/2022 with creat at 1 55, most recent at 1 32  · Trend BMP  · Avoid hypotension and nephrotoxic agents  · Urinary retention protocol  · Strict intake and output

## 2022-09-13 NOTE — ASSESSMENT & PLAN NOTE
· Patient seems to have episode of confusion  · Discussed with the Sergio Rosales and Deanna Wylie- patient does not have official diagnosis of dementia before  However it seems to be an element of advanced dementia  · Neuro psychiatrist was consulted - At this time, patient does not appear to have capacity to make fully informed medical decisions   Unspecified Neurocognitive Disorder  · Pending POA/guardianship process

## 2022-09-13 NOTE — PROGRESS NOTES
51 [facility]  Progress Note - Quinten Joy  1933, 80 y o  male MRN: [id number]  Unit/Bed#: 7T [facility] 716-01 Encounter: [id number]  Primary Care Provider: Tianna GASPAR,    Date and time admitted to hospital: 8/27/2022  5:56 AM    * General weakness  Assessment & Plan  · Possibly due to advancing dementia as evidenced by increasing confusion and agitation  · Neuropsychology has deemed the patient incapacitated  · Patient states he lives at home alone and has been having difficulty with ambulation  · No recent traumatic events  · Laboratory analysis grossly unremarkable  · Hemodynamically stable saturating well on room air  · Case management consultation for safe disposition planning  · Patient is medically clear, awaiting safe disposition plan  · PT/OT recommended post acute rehab services  · Patient and the family would prefer home health care with 24/7 care  · Willing to consider short-term rehab temporarily  · Ongoing discussions with family  · Patient lacks capacity so pending guardianship/POA process    Severe protein-calorie malnutrition ([address] )  Assessment & Plan  Malnutrition Findings:   Adult Malnutrition type: Chronic illness  Adult Degree of Malnutrition: Other severe protein calorie malnutrition  Malnutrition Characteristics: Inadequate energy, Weight loss          continue nutritional supplement         360 Statement: Severe pro/singh malnutrition r/t depression as evidenced by 12% unplanned weight loss since 2/24/22, consuming < 75% energy intake compared to estimated energy needs > 1 month  Treated with Enlive bid    BMI Findings: Body mass index is 17 83 kg/m²  Confusion  Assessment & Plan  · Patient seems to have episode of confusion  · Discussed with the Paulina Radha and Kristian Courts- patient does not have official diagnosis of dementia before    However it seems to be an element of advanced dementia  · Neuro psychiatrist was consulted - At this time, patient does not appear to have capacity to make fully informed medical decisions  Unspecified Neurocognitive Disorder  · Pending POA/guardianship process      Unwitnessed fall  Assessment & Plan  · Patient had unwitnessed fall on 08/28/2022  · CT head was negative for acute intracranial abnormality  Mild chronic microangiopathic changes     · Continue to monitor    Depression with anxiety  Assessment & Plan  · Patient has appeared somewhat anxious about his health  · Family states there may be an element of depression possibly secondary to dementia  · Continue Lexapro 5 mg oral daily  · Haldol 1 mg IM every 6 hours as needed for agitation    Stage 3a chronic kidney disease [facility])  Assessment & Plan  Lab Results   Component Value Date    EGFR 47 09/11/2022    EGFR 35 09/10/2022    EGFR 43 09/09/2022    CREATININE 1 32 09/11/2022    CREATININE 1 68 (H) 09/10/2022    CREATININE 1 41 09/09/2022   · Slight NGUYỄN on 09/06/2022 with creat at 1 55, most recent at 1 32  · Trend BMP  · Avoid hypotension and nephrotoxic agents  · Urinary retention protocol  · Strict intake and output      Atrial fibrillation (HCC)  Assessment & Plan  · Rates are controlled  · Continue home Eliquis 2 5 mg PO BID    Hyperlipidemia  Assessment & Plan  · Continue home statin therapy    Type 2 diabetes mellitus with hyperglycemia [facility])  Assessment & Plan  Lab Results   Component Value Date    HGBA1C 8 1 (H) 08/09/2022       Recent Labs     09/12/22  1555 09/12/22 2015 09/13/22  0551 09/13/22  1054   POCGLU 337* 216* 115 318*       Blood Sugar Average: Last 72 hrs:  (P) 371 [id number]   · Patient was on metformin 1000 b i d   · Continue Lantus 10 units daily at bedtime, increased Humalog 7 units t i d  A c   · Continue sliding scale insulin with Accu-Cheks while inpatient  · Diabetic diet  · Patient has been having labile blood glucose levels during hospital stay as he has been eating intermittently    Primary hypertension  Assessment & Plan  · BP acceptable  · Continue home beta-blocker for atrial fibrillation  · Hydralazine 5 mg IV q6 hours PRN SBP > 180 mmHg  · Monitor blood pressures      VTE Pharmacologic Prophylaxis:   Pharmacologic: Apixaban (Eliquis)  Mechanical VTE Prophylaxis in Place: Yes    Patient Centered Rounds: I have performed bedside rounds with nursing staff today  Discussions with Specialists or Other Care Team Provider:  Discussed with Case Management, nurse    Education and Discussions with Family / Patient:  Discussed with patient    Time Spent for Care: 30 minutes  More than 50% of total time spent on counseling and coordination of care as described above  Current Length of Stay: 17 day(s)    Current Patient Status: Inpatient   Certification Statement: The patient will continue to require additional inpatient hospital stay due to Patient is medically clear, pending placement    Discharge Plan / Estimated Discharge Date:  Pending placement      Code Status: Level 1 - Full Code      Subjective:   Patient was seen and examined at bedside  No acute overnight changes  Patient refused medication this morning  Objective:     Vitals:   Temp (24hrs), Av °F (36 7 °C), Min:97 8 °F (36 6 °C), Max:98 4 °F (36 9 °C)    Temp:  [97 8 °F (36 6 °C)-98 4 °F (36 9 °C)] 97 8 °F (36 6 °C)  HR:  [83-97] 86  Resp:  [16-19] 19  BP: (124-145)/(77-80) 131/80  SpO2:  [96 %-97 %] 96 %  Body mass index is 17 83 kg/m²  Input and Output Summary (last 24 hours):        Intake/Output Summary (Last 24 hours) at 2022 1157  Last data filed at 2022 0847  Gross per 24 hour   Intake 1200 ml   Output --   Net 1200 ml       Physical Exam:     Physical Exam  General: breathing well on room air, no acute distress  HEENT: NC/AT, PERRL, EOM - normal  Neck: Supple  Pulm/Chest: Normal chest wall expansion, clear breath sounds on both side, no wheezing/rhonchi or crackles appreciated  CVS: RRR, normal S1&S2, no murmur appreciated, capillary refill <2s  Abd: soft, non tender, non distended, bowel sounds +  MSK: move all 4 extremities spontaneously  Skin: warm  CNS: no acute focal neuro deficit      Additional Data:     Labs:    Results from last 7 days   Lab Units 09/11/22  0507   WBC Thousand/uL 11 48*   HEMOGLOBIN g/dL 10 9*   HEMATOCRIT % 34 8*   PLATELETS Thousands/uL 259   NEUTROS PCT % 67   LYMPHS PCT % 14   MONOS PCT % 17*   EOS PCT % 1     Results from last 7 days   Lab Units 09/11/22  0507   POTASSIUM mmol/L 4 4   CHLORIDE mmol/L 103   CO2 mmol/L 30   BUN mg/dL 40*   CREATININE mg/dL 1 32   CALCIUM mg/dL 8 8           * I Have Reviewed All Lab Data Listed Above  * Additional Pertinent Lab Tests Reviewed: [address] 66 Admission Reviewed    Imaging:      I have reviewed pertinent imaging        Recent Cultures (last 7 days):           Last 24 Hours Medication List:   Current Facility-Administered Medications   Medication Dose Route Frequency Provider Last Rate    acetaminophen  650 mg Oral Q4H PRN Aurther Lowers, DO      apixaban  2 5 mg Oral BID Aurther Lowers, DO      escitalopram  5 mg Oral Daily Aurther Lowers, DO      haloperidol lactate  1 mg Intramuscular Q6H PRN Aurther Lowers, DO      hydrALAZINE  5 mg Intravenous Q6H PRN Aurther Lowers, DO      insulin glargine  10 Units Subcutaneous HS Aurther Lowers, DO      insulin lispro  1-5 Units Subcutaneous TID AC Aurther Lowers, DO      insulin lispro  1-5 Units Subcutaneous HS Aurther Lowers, DO      insulin lispro  7 Units Subcutaneous TID With Meals Vinnie Kelley MD      melatonin  3 mg Oral HS Margarita Bear PA-C      metoprolol succinate  50 mg Oral BID Aurther Lowers, DO      ondansetron  4 mg Intravenous Q6H PRN Aurther Lowers, DO      pantoprazole  20 mg Oral Daily Aurther Lowers, DO      pravastatin  80 mg Oral Daily With Dinner Aurther Lowers, DO          Today, Patient Was Seen By: Vinnie Kelley MD    ** Please Note: Dragon 360 Dictation voice to text software may have been used in the creation of this document   **

## 2022-09-13 NOTE — PLAN OF CARE
Problem: Potential for Falls  Goal: Patient will remain free of falls  Description: INTERVENTIONS:  - Educate patient/family on patient safety including physical limitations  - Instruct patient to call for assistance with activity   - Consult OT/PT to assist with strengthening/mobility   - Keep Call bell within reach  - Keep bed low and locked with side rails adjusted as appropriate  - Keep care items and personal belongings within reach  - Initiate and maintain comfort rounds  - Offer Toileting every 2 Hours, in advance of need  - Initiate/Maintain bed alarm  - Obtain necessary fall risk management equipment: walker  - Apply yellow socks and bracelet for high fall risk patients  - Consider moving patient to room near nurses station  Outcome: Progressing     Problem: MOBILITY - ADULT  Goal: Maintain or return to baseline ADL function  Description: INTERVENTIONS:  -  Assess patient's ability to carry out ADLs; assess patient's baseline for ADL function and identify physical deficits which impact ability to perform ADLs (bathing, care of mouth/teeth, toileting, grooming, dressing, etc )  - Assess/evaluate cause of self-care deficits   - Assess range of motion  - Assess patient's mobility; develop plan if impaired  - Assess patient's need for assistive devices and provide as appropriate  - Encourage maximum independence but intervene and supervise when necessary  - Involve family in performance of ADLs  - Assess for home care needs following discharge   - Consider OT consult to assist with ADL evaluation and planning for discharge  - Provide patient education as appropriate  Outcome: Progressing     Problem: PAIN - ADULT  Goal: Verbalizes/displays adequate comfort level or baseline comfort level  Description: Interventions:  - Encourage patient to monitor pain and request assistance  - Assess pain using appropriate pain scale  - Administer analgesics based on type and severity of pain and evaluate response  - Implement non-pharmacological measures as appropriate and evaluate response  - Consider cultural and social influences on pain and pain management  - Notify physician/advanced practitioner if interventions unsuccessful or patient reports new pain  Outcome: Progressing

## 2022-09-13 NOTE — ASSESSMENT & PLAN NOTE
Malnutrition Findings:   Adult Malnutrition type: Chronic illness  Adult Degree of Malnutrition: Other severe protein calorie malnutrition  Malnutrition Characteristics: Inadequate energy, Weight loss          continue nutritional supplement         360 Statement: Severe pro/singh malnutrition r/t depression as evidenced by 12% unplanned weight loss since 2/24/22, consuming < 75% energy intake compared to estimated energy needs > 1 month  Treated with Enlive bid    BMI Findings: Body mass index is 17 83 kg/m²

## 2022-09-13 NOTE — ASSESSMENT & PLAN NOTE
Lab Results   Component Value Date    HGBA1C 8 1 (H) 08/09/2022       Recent Labs     09/12/22  1555 09/12/22 2015 09/13/22  0551 09/13/22  1054   POCGLU 337* 216* 115 318*       Blood Sugar Average: Last 72 hrs:  (P) 155 [id number]   · Patient was on metformin 1000 b i d   · Continue Lantus 10 units daily at bedtime, increased Humalog 7 units t i d  A c   · Continue sliding scale insulin with Accu-Cheks while inpatient  · Diabetic diet  · Patient has been having labile blood glucose levels during hospital stay as he has been eating intermittently

## 2022-09-13 NOTE — CASE MANAGEMENT
Case Management Progress Note    Patient name Cristóbal Myers  Location 7T [facility] 716/7T -01 MRN [id number]  : 1933 Date 2022       LOS (days): 17  Geometric Mean LOS (GMLOS) (days): 4 50  Days to GMLOS:-12 7        OBJECTIVE:        Current admission status: Inpatient  Preferred Pharmacy:   [facility] [address]  [address] [place] [address]  Phone: [phone] Fax: [phone]    [facility] [address] [place] - [address]  [address]  [facility] [address] [address]  Phone: [phone] Fax: [phone]    Primary Care Provider: Dwayne Lebron DO    Primary Insurance: MEDICARE  Secondary Insurance: [employer]otha [employer] SHIELD    PROGRESS NOTE:  CM called pt's  regarding guardianship paperwork  Per Rachid Panda, guardianship paperwork was completed  CM requested he fax the completed document to [phone]

## 2022-09-13 NOTE — NURSING NOTE
Patient refused oral medications this AM after breakfast, pt stated "I cannot speak, I cannot swallow"  Pt is capable of both  Doctor made aware

## 2022-09-14 LAB
GLUCOSE SERPL-MCNC: 109 MG/DL (ref 65–140)
GLUCOSE SERPL-MCNC: 164 MG/DL (ref 65–140)
GLUCOSE SERPL-MCNC: 221 MG/DL (ref 65–140)
GLUCOSE SERPL-MCNC: 99 MG/DL (ref 65–140)

## 2022-09-14 PROCEDURE — 82948 REAGENT STRIP/BLOOD GLUCOSE: CPT

## 2022-09-14 PROCEDURE — 99232 SBSQ HOSP IP/OBS MODERATE 35: CPT | Performed by: INTERNAL MEDICINE

## 2022-09-14 RX ADMIN — INSULIN LISPRO 7 UNITS: 100 INJECTION, SOLUTION INTRAVENOUS; SUBCUTANEOUS at 11:51

## 2022-09-14 RX ADMIN — INSULIN LISPRO 7 UNITS: 100 INJECTION, SOLUTION INTRAVENOUS; SUBCUTANEOUS at 17:28

## 2022-09-14 RX ADMIN — INSULIN LISPRO 1 UNITS: 100 INJECTION, SOLUTION INTRAVENOUS; SUBCUTANEOUS at 22:25

## 2022-09-14 RX ADMIN — APIXABAN 2.5 MG: 2.5 TABLET, FILM COATED ORAL at 17:26

## 2022-09-14 RX ADMIN — ESCITALOPRAM OXALATE 5 MG: 5 TABLET, FILM COATED ORAL at 08:33

## 2022-09-14 RX ADMIN — APIXABAN 2.5 MG: 2.5 TABLET, FILM COATED ORAL at 08:33

## 2022-09-14 RX ADMIN — INSULIN LISPRO 2 UNITS: 100 INJECTION, SOLUTION INTRAVENOUS; SUBCUTANEOUS at 11:30

## 2022-09-14 RX ADMIN — PRAVASTATIN SODIUM 80 MG: 40 TABLET ORAL at 17:26

## 2022-09-14 RX ADMIN — METOPROLOL SUCCINATE 50 MG: 50 TABLET, EXTENDED RELEASE ORAL at 08:33

## 2022-09-14 RX ADMIN — PANTOPRAZOLE SODIUM 20 MG: 20 TABLET, DELAYED RELEASE ORAL at 08:33

## 2022-09-14 RX ADMIN — INSULIN GLARGINE 10 UNITS: 100 INJECTION, SOLUTION SUBCUTANEOUS at 22:25

## 2022-09-14 RX ADMIN — INSULIN LISPRO 7 UNITS: 100 INJECTION, SOLUTION INTRAVENOUS; SUBCUTANEOUS at 08:43

## 2022-09-14 NOTE — ASSESSMENT & PLAN NOTE
Malnutrition Findings:   Adult Malnutrition type: Chronic illness  Adult Degree of Malnutrition: Other severe protein calorie malnutrition  Malnutrition Characteristics: Inadequate energy, Weight loss          continue nutritional supplement         360 Statement: Severe pro/singh malnutrition r/t depression as evidenced by 12% unplanned weight loss since 2/24/22, consuming < 75% energy intake compared to estimated energy needs > 1 month  Treated with Enlive bid    BMI Findings: Body mass index is 17 77 kg/m²

## 2022-09-14 NOTE — CASE MANAGEMENT
Case Management Progress Note    Patient name Esvin Standing  Location 7T [facility] 716/7T -01 MRN [id number]  : 1933 Date 2022       LOS (days): 18  Geometric Mean LOS (GMLOS) (days): 4 50  Days to GMLOS:-13 8        OBJECTIVE:        Current admission status: Inpatient  Preferred Pharmacy:   [facility]  for [facility], [address] [address]  Phone: [phone] Fax: [address], [place] - [address]  [address]  [place] [address] [address]  Phone: [phone] Fax: [phone]    Primary Care Provider: Dwayne Peña DO    Primary Insurance: MEDICARE  Secondary Insurance: [address] [address] [place]    PROGRESS NOTE:  CM was notified by Morrell Libman (pt's )  through fax about patient's  Order Appointing emergency Guardian and guardianship was assigned to Support agency as Emergency limited Guardian: Erlinda Castillo  CM called Deepthi Bueno and representative who is  working for pt is Smita Barrett Ph: [phone]  CM also reached out to [employer]: Zonia Common and fax him the guardianship paper work for accuracy and Cheryl Null stated guardianship is accurate,  process is valid and Chata Scott should arrange pt's discharge that is Home with 24hrs Nurse care     CM department will continue to follow through pt's D/C

## 2022-09-14 NOTE — ASSESSMENT & PLAN NOTE
· Patient seems to have episode of confusion  · Discussed with the Jerilyn Escobedo- patient does not have official diagnosis of dementia before  However it seems to be an element of advanced dementia  · Neuro psychiatrist was consulted - At this time, patient does not appear to have capacity to make fully informed medical decisions   Unspecified Neurocognitive Disorder  · Pending POA/guardianship process

## 2022-09-14 NOTE — NURSING NOTE
During vital signs PCA found patient on the floor  Patient was laying with his head on the pillow and curled up with his blankets  I got assistance while the PCA stayed with the patient  Patient was asked multiple times if he fell off the bed  Patient stated no each time, he stated "no I did not fall, I laid on the floor"  Patients bed alarm was on but did not alarm  Myself and Hershal Maki did a skin check  Nothing was found to be abnormal  Patient was placed back into bed, bed alarm on, three rails are up, and doctor Fausto Mccarty was made aware

## 2022-09-14 NOTE — PROGRESS NOTES
51 [facility]  Progress Note - Nisha Romero  1933, 80 y o  male MRN: [id number]  Unit/Bed#: 7T [facility] 716-01 Encounter: [id number]  Primary Care Provider: Gasper ROSEN DO   Date and time admitted to hospital: 8/27/2022  5:56 AM    * General weakness  Assessment & Plan  · Possibly due to advancing dementia as evidenced by increasing confusion and agitation  · Neuropsychology has deemed the patient incapacitated  · Patient states he lives at home alone and has been having difficulty with ambulation  · No recent traumatic events  · Laboratory analysis grossly unremarkable  · Hemodynamically stable saturating well on room air  · Case management consultation for safe disposition planning  · Patient is medically clear, awaiting safe disposition plan  · PT/OT recommended post acute rehab services  · Patient and the family would prefer home health care with 24/7 care  · Willing to consider short-term rehab temporarily  · Ongoing discussions with family  · Patient lacks capacity so pending guardianship/POA process    Severe protein-calorie malnutrition ([address] )  Assessment & Plan  Malnutrition Findings:   Adult Malnutrition type: Chronic illness  Adult Degree of Malnutrition: Other severe protein calorie malnutrition  Malnutrition Characteristics: Inadequate energy, Weight loss          continue nutritional supplement         360 Statement: Severe pro/singh malnutrition r/t depression as evidenced by 12% unplanned weight loss since 2/24/22, consuming < 75% energy intake compared to estimated energy needs > 1 month  Treated with Enlive bid    BMI Findings: Body mass index is 17 77 kg/m²  Confusion  Assessment & Plan  · Patient seems to have episode of confusion  · Discussed with the Eunice Lawrence and Aaron Ellison- patient does not have official diagnosis of dementia before    However it seems to be an element of advanced dementia  · Neuro psychiatrist was consulted - At this time, patient does not appear to have capacity to make fully informed medical decisions  Unspecified Neurocognitive Disorder  · Pending POA/guardianship process      Unwitnessed fall  Assessment & Plan  · Patient had unwitnessed fall on 08/28/2022  · CT head was negative for acute intracranial abnormality  Mild chronic microangiopathic changes     · Continue to monitor    Depression with anxiety  Assessment & Plan  · Patient has appeared somewhat anxious about his health  · Family states there may be an element of depression possibly secondary to dementia  · Continue Lexapro 5 mg oral daily  · Haldol 1 mg IM every 6 hours as needed for agitation    Stage 3a chronic kidney disease [facility])  Assessment & Plan  Lab Results   Component Value Date    EGFR 47 09/11/2022    EGFR 35 09/10/2022    EGFR 43 09/09/2022    CREATININE 1 32 09/11/2022    CREATININE 1 68 (H) 09/10/2022    CREATININE 1 41 09/09/2022   · Slight NGUYỄN on 09/06/2022 with creat at 1 55, most recent at 1 32  · Trend BMP  · Avoid hypotension and nephrotoxic agents  · Urinary retention protocol  · Strict intake and output      Atrial fibrillation (HCC)  Assessment & Plan  · Rates are controlled  · Continue home Eliquis 2 5 mg PO BID    Hyperlipidemia  Assessment & Plan  · Continue home statin therapy    Type 2 diabetes mellitus with hyperglycemia [facility])  Assessment & Plan  Lab Results   Component Value Date    HGBA1C 8 1 (H) 08/09/2022       Recent Labs     09/13/22  1054 09/13/22  1612 09/13/22 2015 09/14/22  0539   POCGLU 318* 282* 180* 109       Blood Sugar Average: Last 72 hrs:  (P) 765 [id number]   · Patient was on metformin 1000 b i d   · Continue Lantus 10 units daily at bedtime, recently increased Humalog 7 units t i d  A c   · Continue sliding scale insulin with Accu-Cheks while inpatient  · Diabetic diet  · Patient has been having labile blood glucose levels during hospital stay as he has been eating intermittently    Primary hypertension  Assessment & Plan  · BP acceptable  · Continue home beta-blocker for atrial fibrillation  · Hydralazine 5 mg IV q6 hours PRN SBP > 180 mmHg  · Monitor blood pressures      VTE Pharmacologic Prophylaxis:   Pharmacologic: Apixaban (Eliquis)  Mechanical VTE Prophylaxis in Place: Yes    Patient Centered Rounds: I have performed bedside rounds with nursing staff today  Discussions with Specialists or Other Care Team Provider:  Discussed with Case Management, nurse    Education and Discussions with Family / Patient:  Discussed with patient    Time Spent for Care: 30 minutes  More than 50% of total time spent on counseling and coordination of care as described above  Current Length of Stay: 18 day(s)    Current Patient Status: Inpatient   Certification Statement: The patient will continue to require additional inpatient hospital stay due to Patient is medically clear, pending safe disposition planning    Discharge Plan / Estimated Discharge Date:  Pending      Code Status: Level 1 - Full Code      Subjective:   Patient was seen and examined at bedside  The patient is sleepy this morning  No acute overnight changes  Objective:     Vitals:   Temp (24hrs), Av 8 °F (36 6 °C), Min:97 4 °F (36 3 °C), Max:98 2 °F (36 8 °C)    Temp:  [97 4 °F (36 3 °C)-98 2 °F (36 8 °C)] 97 4 °F (36 3 °C)  HR:  [88-96] 88  Resp:  [18] 18  BP: (140-151)/(78-94) 149/94  SpO2:  [96 %-98 %] 98 %  Body mass index is 17 77 kg/m²  Input and Output Summary (last 24 hours):        Intake/Output Summary (Last 24 hours) at 2022 1059  Last data filed at 2022 0741  Gross per 24 hour   Intake 1240 ml   Output 200 ml   Net 1040 ml       Physical Exam:     Physical Exam  General: breathing well on room air, no acute distress  HEENT: NC/AT, PERRL, EOM - normal  Neck: Supple  Pulm/Chest: Normal chest wall expansion, clear breath sounds on both side, no wheezing/rhonchi or crackles appreciated  CVS: RRR, normal S1&S2, no murmur appreciated, capillary refill <2s  Abd: soft, non tender, non distended, bowel sounds +  MSK: move all 4 extremities spontaneously  Skin: warm  CNS: no acute focal neuro deficit      Additional Data:     Labs:    Results from last 7 days   Lab Units 09/11/22  0507   WBC Thousand/uL 11 48*   HEMOGLOBIN g/dL 10 9*   HEMATOCRIT % 34 8*   PLATELETS Thousands/uL 259   NEUTROS PCT % 67   LYMPHS PCT % 14   MONOS PCT % 17*   EOS PCT % 1     Results from last 7 days   Lab Units 09/11/22  0507   POTASSIUM mmol/L 4 4   CHLORIDE mmol/L 103   CO2 mmol/L 30   BUN mg/dL 40*   CREATININE mg/dL 1 32   CALCIUM mg/dL 8 8           * I Have Reviewed All Lab Data Listed Above  * Additional Pertinent Lab Tests Reviewed: [address] 66 Admission Reviewed    Imaging:      I have reviewed pertinent imaging        Recent Cultures (last 7 days):           Last 24 Hours Medication List:   Current Facility-Administered Medications   Medication Dose Route Frequency Provider Last Rate    acetaminophen  650 mg Oral Q4H PRN Othelia Peaks, DO      apixaban  2 5 mg Oral BID Othelia Peaks, DO      escitalopram  5 mg Oral Daily Othelia Peaks, DO      haloperidol lactate  1 mg Intramuscular Q6H PRN Othelia Peaks, DO      hydrALAZINE  5 mg Intravenous Q6H PRN Othelia Peaks, DO      insulin glargine  10 Units Subcutaneous HS Othelia Peaks, DO      insulin lispro  1-5 Units Subcutaneous TID AC Othelia Peaks, DO      insulin lispro  1-5 Units Subcutaneous HS Othelia Peaks, DO      insulin lispro  7 Units Subcutaneous TID With Meals Jericho Rivera MD      melatonin  3 mg Oral HS Tacho Abernathy PA-C      metoprolol succinate  50 mg Oral BID Othelia Peaks, DO      ondansetron  4 mg Intravenous Q6H PRN Othelia Peaks, DO      pantoprazole  20 mg Oral Daily Othelia Peaks, DO      pravastatin  80 mg Oral Daily With Dinner Othelia Peaks, DO          Today, Patient Was Seen By: Jericho Rivera MD    ** Please Note: Dragon 360 Dictation voice to text software may have been used in the creation of this document   **

## 2022-09-14 NOTE — PLAN OF CARE
Problem: SAFETY ADULT  Goal: Patient will remain free of falls  Description: INTERVENTIONS:  - Educate patient/family on patient safety including physical limitations  - Instruct patient to call for assistance with activity   - Consult OT/PT to assist with strengthening/mobility   - Keep Call bell within reach  - Keep bed low and locked with side rails adjusted as appropriate  - Keep care items and personal belongings within reach  - Initiate and maintain comfort rounds  - Offer Toileting every 2 Hours, in advance of need  - Initiate/Maintain bed alarm  - Obtain necessary fall risk management equipment: walker  - Apply yellow socks and bracelet for high fall risk patients  - Consider moving patient to room near nurses station  Outcome: Progressing     Problem: SAFETY ADULT  Goal: Maintains/Returns to pre admission functional level  Description: INTERVENTIONS:  - Perform BMAT or MOVE assessment daily    - Set and communicate daily mobility goal to care team and patient/family/caregiver  - Collaborate with rehabilitation services on mobility goals if consulted  - Ambulate patient 2 times a day  - Out of bed to chair 3 times a day   - Out of bed for meals 3 times a day  - Out of bed for toileting  - Record patient progress and toleration of activity level   Outcome: Progressing     Problem: Nutrition/Hydration-ADULT  Goal: Nutrient/Hydration intake appropriate for improving, restoring or maintaining nutritional needs  Description: Monitor and assess patient's nutrition/hydration status for malnutrition  Collaborate with interdisciplinary team and initiate plan and interventions as ordered  Monitor patient's weight and dietary intake as ordered or per policy  Utilize nutrition screening tool and intervene as necessary  Determine patient's food preferences and provide high-protein, high-caloric foods as appropriate       INTERVENTIONS:  - Monitor oral intake, urinary output, labs, and treatment plans  - Assess nutrition and hydration status and recommend course of action  - Evaluate amount of meals eaten  - Assist patient with eating if necessary   - Allow adequate time for meals  - Recommend/ encourage appropriate diets, oral nutritional supplements, and vitamin/mineral supplements  - Order, calculate, and assess calorie counts as needed  - Recommend, monitor, and adjust tube feedings and TPN/PPN based on assessed needs  - Assess need for intravenous fluids  - Provide specific nutrition/hydration education as appropriate  - Include patient/family/caregiver in decisions related to nutrition  Outcome: Progressing     Problem: CONFUSION/THOUGHT DISTURBANCE  Goal: Thought disturbances (confusion, delirium, depression, dementia or psychosis) are managed to maintain or return to baseline mental status and functional level  Description: INTERVENTIONS:  - Assess for possible contributors to  thought disturbance, including but not limited to medications, infection, impaired vision or hearing, underlying metabolic abnormalities, dehydration, respiratory compromise,  psychiatric diagnoses and notify attending PHYSICAN/AP  - Monitor and intervene to maintain adequate nutrition, hydration, elimination, sleep and activity  - Decrease environmental stimuli, including noise as appropriate  - Provide frequent contacts to provide refocusing, direction and reassurance as needed  Approach patient calmly with eye contact and at their level    - [place] high risk fall precautions, aspiration precautions and other safety measures, as indicated  - If delirium suspected, notify physician/AP of change in condition and request immediate in-person evaluation  - Pursue consults as appropriate including Geriatric (campus dependent), OT for cognitive evaluation/activity planning, psychiatric, pastoral care, etc   Outcome: Progressing

## 2022-09-14 NOTE — PLAN OF CARE
Problem: PAIN - ADULT  Goal: Verbalizes/displays adequate comfort level or baseline comfort level  Description: Interventions:  - Encourage patient to monitor pain and request assistance  - Assess pain using appropriate pain scale  - Administer analgesics based on type and severity of pain and evaluate response  - Implement non-pharmacological measures as appropriate and evaluate response  - Consider cultural and social influences on pain and pain management  - Notify physician/advanced practitioner if interventions unsuccessful or patient reports new pain  Outcome: Progressing     Problem: SAFETY ADULT  Goal: Patient will remain free of falls  Description: INTERVENTIONS:  - Educate patient/family on patient safety including physical limitations  - Instruct patient to call for assistance with activity   - Consult OT/PT to assist with strengthening/mobility   - Keep Call bell within reach  - Keep bed low and locked with side rails adjusted as appropriate  - Keep care items and personal belongings within reach  - Initiate and maintain comfort rounds  - Offer Toileting every 2 Hours, in advance of need  - Initiate/Maintain bed alarm  - Obtain necessary fall risk management equipment: walker  - Apply yellow socks and bracelet for high fall risk patients  - Consider moving patient to room near nurses station  Outcome: Progressing     Problem: SAFETY ADULT  Goal: Maintain or return to baseline ADL function  Description: INTERVENTIONS:  -  Assess patient's ability to carry out ADLs; assess patient's baseline for ADL function and identify physical deficits which impact ability to perform ADLs (bathing, care of mouth/teeth, toileting, grooming, dressing, etc )  - Assess/evaluate cause of self-care deficits   - Assess range of motion  - Assess patient's mobility; develop plan if impaired  - Assess patient's need for assistive devices and provide as appropriate  - Encourage maximum independence but intervene and supervise when necessary  - Involve family in performance of ADLs  - Assess for home care needs following discharge   - Consider OT consult to assist with ADL evaluation and planning for discharge  - Provide patient education as appropriate  Outcome: Progressing

## 2022-09-14 NOTE — ASSESSMENT & PLAN NOTE
Lab Results   Component Value Date    HGBA1C 8 1 (H) 08/09/2022       Recent Labs     09/13/22  1054 09/13/22  1612 09/13/22 2015 09/14/22  0539   POCGLU 318* 282* 180* 109       Blood Sugar Average: Last 72 hrs:  (P) 413 [id number]   · Patient was on metformin 1000 b i d   · Continue Lantus 10 units daily at bedtime, recently increased Humalog 7 units t i d  A c   · Continue sliding scale insulin with Accu-Cheks while inpatient  · Diabetic diet  · Patient has been having labile blood glucose levels during hospital stay as he has been eating intermittently

## 2022-09-15 LAB
GLUCOSE SERPL-MCNC: 134 MG/DL (ref 65–140)
GLUCOSE SERPL-MCNC: 303 MG/DL (ref 65–140)
GLUCOSE SERPL-MCNC: 329 MG/DL (ref 65–140)

## 2022-09-15 PROCEDURE — 99232 SBSQ HOSP IP/OBS MODERATE 35: CPT | Performed by: INTERNAL MEDICINE

## 2022-09-15 PROCEDURE — 82948 REAGENT STRIP/BLOOD GLUCOSE: CPT

## 2022-09-15 RX ADMIN — INSULIN LISPRO 7 UNITS: 100 INJECTION, SOLUTION INTRAVENOUS; SUBCUTANEOUS at 16:30

## 2022-09-15 RX ADMIN — INSULIN LISPRO 7 UNITS: 100 INJECTION, SOLUTION INTRAVENOUS; SUBCUTANEOUS at 08:45

## 2022-09-15 RX ADMIN — ESCITALOPRAM OXALATE 5 MG: 5 TABLET, FILM COATED ORAL at 09:21

## 2022-09-15 RX ADMIN — APIXABAN 2.5 MG: 2.5 TABLET, FILM COATED ORAL at 17:16

## 2022-09-15 RX ADMIN — INSULIN LISPRO 7 UNITS: 100 INJECTION, SOLUTION INTRAVENOUS; SUBCUTANEOUS at 11:22

## 2022-09-15 RX ADMIN — METOPROLOL SUCCINATE 50 MG: 50 TABLET, EXTENDED RELEASE ORAL at 09:21

## 2022-09-15 RX ADMIN — PRAVASTATIN SODIUM 80 MG: 40 TABLET ORAL at 16:31

## 2022-09-15 RX ADMIN — APIXABAN 2.5 MG: 2.5 TABLET, FILM COATED ORAL at 09:21

## 2022-09-15 RX ADMIN — INSULIN LISPRO 3 UNITS: 100 INJECTION, SOLUTION INTRAVENOUS; SUBCUTANEOUS at 16:29

## 2022-09-15 RX ADMIN — INSULIN LISPRO 3 UNITS: 100 INJECTION, SOLUTION INTRAVENOUS; SUBCUTANEOUS at 11:20

## 2022-09-15 RX ADMIN — PANTOPRAZOLE SODIUM 20 MG: 20 TABLET, DELAYED RELEASE ORAL at 09:21

## 2022-09-15 NOTE — PROGRESS NOTES
[facility]  Progress Note - Fariha Varela  1933, 80 y o  male MRN: [id number]  Unit/Bed#: 7T [facility] 716-01 Encounter: [id number]  Primary Care Provider: Zabrina VILLAFUERTE DO   Date and time admitted to hospital: 8/27/2022  5:56 AM    * General weakness  Assessment & Plan  · Possibly due to advancing dementia as evidenced by increasing confusion and agitation  · Neuropsychology has deemed the patient incapacitated  · Patient states he lives at home alone and has been having difficulty with ambulation  · No recent traumatic events  · Laboratory analysis grossly unremarkable  · Hemodynamically stable saturating well on room air  · Case management consultation for safe disposition planning  · Patient is medically clear, awaiting safe disposition plan  · PT/OT recommended post acute rehab services  · Patient and the family would prefer home health care with 24/7 care  · Willing to consider short-term rehab temporarily  · Ongoing discussions with family  · Patient lacks capacity so pending guardianship/POA process    Severe protein-calorie malnutrition ([address] )  Assessment & Plan  Malnutrition Findings:   Adult Malnutrition type: Chronic illness  Adult Degree of Malnutrition: Other severe protein calorie malnutrition  Malnutrition Characteristics: Inadequate energy, Weight loss          continue nutritional supplement         360 Statement: Severe pro/singh malnutrition r/t depression as evidenced by 12% unplanned weight loss since 2/24/22, consuming < 75% energy intake compared to estimated energy needs > 1 month  Treated with Enlive bid    BMI Findings: Body mass index is 17 71 kg/m²  Confusion  Assessment & Plan  · Patient seems to have episode of confusion  · Discussed with the Reina Patterson- patient does not have official diagnosis of dementia before    However it seems to be an element of advanced dementia  · Neuro psychiatrist was consulted - At this time, patient does not appear to have capacity to make fully informed medical decisions  Unspecified Neurocognitive Disorder  · Pending safe disposition plan      Unwitnessed fall  Assessment & Plan  · Patient had unwitnessed fall on 08/28/2022  · CT head was negative for acute intracranial abnormality  Mild chronic microangiopathic changes     · Continue to monitor    Depression with anxiety  Assessment & Plan  · Patient has appeared somewhat anxious about his health  · Family states there may be an element of depression possibly secondary to dementia  · Continue Lexapro 5 mg oral daily  · Haldol 1 mg IM every 6 hours as needed for agitation    Stage 3a chronic kidney disease [facility])  Assessment & Plan  Lab Results   Component Value Date    EGFR 47 09/11/2022    EGFR 35 09/10/2022    EGFR 43 09/09/2022    CREATININE 1 32 09/11/2022    CREATININE 1 68 (H) 09/10/2022    CREATININE 1 41 09/09/2022   · Slight NGUYỄN on 09/06/2022 with creat at 1 55, most recent at 1 32  · Trend BMP  · Avoid hypotension and nephrotoxic agents  · Urinary retention protocol  · Strict intake and output      Atrial fibrillation (HCC)  Assessment & Plan  · Rates are controlled  · Continue home Eliquis 2 5 mg PO BID    Hyperlipidemia  Assessment & Plan  · Continue home statin therapy    Type 2 diabetes mellitus with hyperglycemia [facility])  Assessment & Plan  Lab Results   Component Value Date    HGBA1C 8 1 (H) 08/09/2022       Recent Labs     09/14/22  2055 09/15/22  0558 09/15/22  1111 09/15/22  1619   POCGLU 164* 134 329* 303*       Blood Sugar Average: Last 72 hrs:  (P) 213 6   · Patient was on metformin 1000 b i d   · Continue Lantus 10 units daily at bedtime, recently increased Humalog to 7 units t i d  A c   · Continue sliding scale insulin with Accu-Cheks while inpatient  · Diabetic diet  · Patient has been having labile blood glucose levels during hospital stay as he has been eating intermittently  · Today blood sugar is 300s however his blood sugar has been very fluctuating  Keep the current regimen for now  Primary hypertension  Assessment & Plan  · BP acceptable  · Continue home beta-blocker for atrial fibrillation  · Hydralazine 5 mg IV q6 hours PRN SBP > 180 mmHg  · Monitor blood pressures      VTE Pharmacologic Prophylaxis:   Pharmacologic: Apixaban (Eliquis)  Mechanical VTE Prophylaxis in Place: Yes    Patient Centered Rounds: I have performed bedside rounds with nursing staff today  Discussions with Specialists or Other Care Team Provider:  Discussed with Case Management, nurse    Education and Discussions with Family / Patient:  Discussed with patient    Time Spent for Care: 45 minutes  More than 50% of total time spent on counseling and coordination of care as described above  Current Length of Stay: 19 day(s)    Current Patient Status: Inpatient   Certification Statement: The patient will continue to require additional inpatient hospital stay due to Patient is medically clear, safe disposition plan    Discharge Plan / Estimated Discharge Date:  Pending      Code Status: Level 1 - Full Code      Subjective:   Patient was seen and examined at bedside  The patient did ambulation with RN  Patient denies any headache, blurry vision  No acute overnight changes  Objective:     Vitals:   Temp (24hrs), Av 9 °F (36 1 °C), Min:96 1 °F (35 6 °C), Max:97 6 °F (36 4 °C)    Temp:  [96 1 °F (35 6 °C)-97 6 °F (36 4 °C)] 96 9 °F (36 1 °C)  HR:  [85-98] 90  Resp:  [18-19] 18  BP: (103-142)/(62-91) 103/62  SpO2:  [97 %-99 %] 97 %  Body mass index is 17 71 kg/m²  Input and Output Summary (last 24 hours):        Intake/Output Summary (Last 24 hours) at 9/15/2022 1646  Last data filed at 9/15/2022 0921  Gross per 24 hour   Intake 720 ml   Output 400 ml   Net 320 ml       Physical Exam:     Physical Exam  General: breathing well on room air, no acute distress  HEENT: NC/AT, PERRL, EOM - normal  Neck: Supple  Pulm/Chest: Normal chest wall expansion, clear breath sounds on both side, no wheezing/rhonchi or crackles appreciated  CVS: RRR, normal S1&S2, no murmur appreciated, capillary refill <2s  Abd: soft, non tender, non distended, bowel sounds +  MSK: move all 4 extremities spontaneously  Skin: warm  CNS: no acute focal neuro deficit      Additional Data:     Labs:    Results from last 7 days   Lab Units 09/11/22  0507   WBC Thousand/uL 11 48*   HEMOGLOBIN g/dL 10 9*   HEMATOCRIT % 34 8*   PLATELETS Thousands/uL 259   NEUTROS PCT % 67   LYMPHS PCT % 14   MONOS PCT % 17*   EOS PCT % 1     Results from last 7 days   Lab Units 09/11/22  0507   POTASSIUM mmol/L 4 4   CHLORIDE mmol/L 103   CO2 mmol/L 30   BUN mg/dL 40*   CREATININE mg/dL 1 32   CALCIUM mg/dL 8 8           * I Have Reviewed All Lab Data Listed Above  * Additional Pertinent Lab Tests Reviewed: Abida Mercer Admission Reviewed    Imaging:      I have reviewed pertinent imaging        Recent Cultures (last 7 days):           Last 24 Hours Medication List:   Current Facility-Administered Medications   Medication Dose Route Frequency Provider Last Rate    acetaminophen  650 mg Oral Q4H PRN Ksenia Sorrel, DO      apixaban  2 5 mg Oral BID Ksenia Sorrel, DO      escitalopram  5 mg Oral Daily Ksenia Sorrel, DO      haloperidol lactate  1 mg Intramuscular Q6H PRN Ksenia Sorrel, DO      hydrALAZINE  5 mg Intravenous Q6H PRN Ksenia Sorrel, DO      insulin glargine  10 Units Subcutaneous HS Ksenia Sorrel, DO      insulin lispro  1-5 Units Subcutaneous TID AC Ksenia Sorrel, DO      insulin lispro  1-5 Units Subcutaneous HS Ksenia Sorrel, DO      insulin lispro  7 Units Subcutaneous TID With Meals Elyse Pena MD      melatonin  3 mg Oral HS Shalini Robles PA-C      metoprolol succinate  50 mg Oral BID Ksenia Sorrel, DO      ondansetron  4 mg Intravenous Q6H PRN Ksenia Sorrel, DO      pantoprazole  20 mg Oral Daily Ksenia Sorrel, DO      pravastatin  80 mg Oral Daily With [facility], DO          Today, Patient Was Seen By: Flor Gordon MD    ** Please Note: Dragon 360 Dictation voice to text software may have been used in the creation of this document   **

## 2022-09-15 NOTE — ASSESSMENT & PLAN NOTE
Lab Results   Component Value Date    HGBA1C 8 1 (H) 08/09/2022       Recent Labs     09/14/22  2055 09/15/22  0558 09/15/22  1111 09/15/22  1619   POCGLU 164* 134 329* 303*       Blood Sugar Average: Last 72 hrs:  (P) 213 6   · Patient was on metformin 1000 b i d   · Continue Lantus 10 units daily at bedtime, recently increased Humalog to 7 units t i d  A c   · Continue sliding scale insulin with Accu-Cheks while inpatient  · Diabetic diet  · Patient has been having labile blood glucose levels during hospital stay as he has been eating intermittently  · Today blood sugar is 300s however his blood sugar has been very fluctuating  Keep the current regimen for now

## 2022-09-15 NOTE — PLAN OF CARE
Problem: Nutrition/Hydration-ADULT  Goal: Nutrient/Hydration intake appropriate for improving, restoring or maintaining nutritional needs  Description: Monitor and assess patient's nutrition/hydration status for malnutrition  Collaborate with interdisciplinary team and initiate plan and interventions as ordered  Monitor patient's weight and dietary intake as ordered or per policy  Utilize nutrition screening tool and intervene as necessary  Determine patient's food preferences and provide high-protein, high-caloric foods as appropriate       INTERVENTIONS:  - Monitor oral intake, urinary output, labs, and treatment plans  - Assess nutrition and hydration status and recommend course of action  - Evaluate amount of meals eaten  - Assist patient with eating if necessary   - Allow adequate time for meals  - Recommend/ encourage appropriate diets, oral nutritional supplements, and vitamin/mineral supplements  - Order, calculate, and assess calorie counts as needed  - Recommend, monitor, and adjust tube feedings and TPN/PPN based on assessed needs  - Assess need for intravenous fluids  - Provide specific nutrition/hydration education as appropriate  - Include patient/family/caregiver in decisions related to nutrition  Outcome: Progressing     Problem: Prexisting or High Potential for Compromised Skin Integrity  Goal: Skin integrity is maintained or improved  Description: INTERVENTIONS:  - Identify patients at risk for skin breakdown  - Assess and monitor skin integrity  - Assess and monitor nutrition and hydration status  - Monitor labs   - Assess for incontinence   - Turn and reposition patient  - Assist with mobility/ambulation  - Relieve pressure over bony prominences  - Avoid friction and shearing  - Provide appropriate hygiene as needed including keeping skin clean and dry  - Evaluate need for skin moisturizer/barrier cream  - Collaborate with interdisciplinary team   - Patient/family teaching  - Consider wound care consult   Outcome: Progressing     Problem: SAFETY ADULT  Goal: Patient will remain free of falls  Description: INTERVENTIONS:  - Educate patient/family on patient safety including physical limitations  - Instruct patient to call for assistance with activity   - Consult OT/PT to assist with strengthening/mobility   - Keep Call bell within reach  - Keep bed low and locked with side rails adjusted as appropriate  - Keep care items and personal belongings within reach  - Initiate and maintain comfort rounds  - Offer Toileting every 2 Hours, in advance of need  - Initiate/Maintain bed alarm  - Obtain necessary fall risk management equipment: walker  - Apply yellow socks and bracelet for high fall risk patients  - Consider moving patient to room near nurses station  Outcome: Progressing

## 2022-09-15 NOTE — CASE MANAGEMENT
Case Management Progress Note    Patient name Edison Perales  Location 7T [facility] 716/7T [facility] 716-01 MRN [id number]  : 1933 Date 9/15/2022       LOS (days): 19  Geometric Mean LOS (GMLOS) (days): 4 50  Days to GMLOS:-14 7        OBJECTIVE:        Current admission status: Inpatient  Preferred Pharmacy:   [facility]  [address], [address] [address]  Phone: [phone] Fax: [phone]    [facility] [address] - [address]  [place] [address] [address]  Phone: [phone] Fax: [phone]    Primary Care Provider: Dwayne Hoffman DO    Primary Insurance: MEDICARE  Secondary Insurance: [address] SHIELD    PROGRESS NOTE:  CM spoke with Nathanielmichelle Todd Ph: [phone] from guardianship agency  JULIO was notified that pt is under review with  Home health care agency for 24hrs nursing care named [address]  [facility]  [facility] states she will contact CM soon, when patient is approved with 24hours visiting nurse     CM department will continue to follow through pt's D/C

## 2022-09-15 NOTE — ASSESSMENT & PLAN NOTE
· Patient seems to have episode of confusion  · Discussed with the Baldo Holter and Antionette Brideglizbet- patient does not have official diagnosis of dementia before  However it seems to be an element of advanced dementia  · Neuro psychiatrist was consulted - At this time, patient does not appear to have capacity to make fully informed medical decisions   Unspecified Neurocognitive Disorder  · Pending safe disposition plan

## 2022-09-15 NOTE — ASSESSMENT & PLAN NOTE
Malnutrition Findings:   Adult Malnutrition type: Chronic illness  Adult Degree of Malnutrition: Other severe protein calorie malnutrition  Malnutrition Characteristics: Inadequate energy, Weight loss          continue nutritional supplement         360 Statement: Severe pro/singh malnutrition r/t depression as evidenced by 12% unplanned weight loss since 2/24/22, consuming < 75% energy intake compared to estimated energy needs > 1 month  Treated with Enlive bid    BMI Findings: Body mass index is 17 71 kg/m²

## 2022-09-16 LAB
GLUCOSE SERPL-MCNC: 104 MG/DL (ref 65–140)
GLUCOSE SERPL-MCNC: 177 MG/DL (ref 65–140)
GLUCOSE SERPL-MCNC: 324 MG/DL (ref 65–140)
GLUCOSE SERPL-MCNC: 398 MG/DL (ref 65–140)

## 2022-09-16 PROCEDURE — 99232 SBSQ HOSP IP/OBS MODERATE 35: CPT | Performed by: INTERNAL MEDICINE

## 2022-09-16 PROCEDURE — 82948 REAGENT STRIP/BLOOD GLUCOSE: CPT

## 2022-09-16 RX ADMIN — APIXABAN 2.5 MG: 2.5 TABLET, FILM COATED ORAL at 08:03

## 2022-09-16 RX ADMIN — PRAVASTATIN SODIUM 80 MG: 40 TABLET ORAL at 16:02

## 2022-09-16 RX ADMIN — INSULIN LISPRO 1 UNITS: 100 INJECTION, SOLUTION INTRAVENOUS; SUBCUTANEOUS at 08:03

## 2022-09-16 RX ADMIN — INSULIN LISPRO 7 UNITS: 100 INJECTION, SOLUTION INTRAVENOUS; SUBCUTANEOUS at 12:12

## 2022-09-16 RX ADMIN — INSULIN LISPRO 7 UNITS: 100 INJECTION, SOLUTION INTRAVENOUS; SUBCUTANEOUS at 08:04

## 2022-09-16 RX ADMIN — METOPROLOL SUCCINATE 50 MG: 50 TABLET, EXTENDED RELEASE ORAL at 21:01

## 2022-09-16 RX ADMIN — INSULIN GLARGINE 10 UNITS: 100 INJECTION, SOLUTION SUBCUTANEOUS at 21:01

## 2022-09-16 RX ADMIN — ESCITALOPRAM OXALATE 5 MG: 5 TABLET, FILM COATED ORAL at 08:03

## 2022-09-16 RX ADMIN — APIXABAN 2.5 MG: 2.5 TABLET, FILM COATED ORAL at 17:03

## 2022-09-16 RX ADMIN — INSULIN LISPRO 3 UNITS: 100 INJECTION, SOLUTION INTRAVENOUS; SUBCUTANEOUS at 21:01

## 2022-09-16 RX ADMIN — METOPROLOL SUCCINATE 50 MG: 50 TABLET, EXTENDED RELEASE ORAL at 08:03

## 2022-09-16 RX ADMIN — PANTOPRAZOLE SODIUM 20 MG: 20 TABLET, DELAYED RELEASE ORAL at 08:03

## 2022-09-16 RX ADMIN — INSULIN LISPRO 5 UNITS: 100 INJECTION, SOLUTION INTRAVENOUS; SUBCUTANEOUS at 12:12

## 2022-09-16 RX ADMIN — MELATONIN TAB 3 MG 3 MG: 3 TAB at 21:01

## 2022-09-16 RX ADMIN — INSULIN LISPRO 7 UNITS: 100 INJECTION, SOLUTION INTRAVENOUS; SUBCUTANEOUS at 16:03

## 2022-09-16 NOTE — ASSESSMENT & PLAN NOTE
Malnutrition Findings:   Adult Malnutrition type: Chronic illness  Adult Degree of Malnutrition: Other severe protein calorie malnutrition  Malnutrition Characteristics: Inadequate energy, Weight loss          continue nutritional supplement         360 Statement: Severe pro/singh malnutrition r/t depression as evidenced by 12% unplanned weight loss since 2/24/22, consuming < 75% energy intake compared to estimated energy needs > 1 month  Treated with Enlive bid    BMI Findings: Body mass index is 19 06 kg/m²

## 2022-09-16 NOTE — PLAN OF CARE
Problem: SAFETY ADULT  Goal: Patient will remain free of falls  Description: INTERVENTIONS:  - Educate patient/family on patient safety including physical limitations  - Instruct patient to call for assistance with activity   - Consult OT/PT to assist with strengthening/mobility   - Keep Call bell within reach  - Keep bed low and locked with side rails adjusted as appropriate  - Keep care items and personal belongings within reach  - Initiate and maintain comfort rounds  - Offer Toileting every 2 Hours, in advance of need  - Initiate/Maintain bed alarm  - Obtain necessary fall risk management equipment: walker  - Apply yellow socks and bracelet for high fall risk patients  - Consider moving patient to room near nurses station  Outcome: Progressing     Problem: SAFETY ADULT  Goal: Maintain or return to baseline ADL function  Description: INTERVENTIONS:  -  Assess patient's ability to carry out ADLs; assess patient's baseline for ADL function and identify physical deficits which impact ability to perform ADLs (bathing, care of mouth/teeth, toileting, grooming, dressing, etc )  - Assess/evaluate cause of self-care deficits   - Assess range of motion  - Assess patient's mobility; develop plan if impaired  - Assess patient's need for assistive devices and provide as appropriate  - Encourage maximum independence but intervene and supervise when necessary  - Involve family in performance of ADLs  - Assess for home care needs following discharge   - Consider OT consult to assist with ADL evaluation and planning for discharge  - Provide patient education as appropriate  Outcome: Progressing     Problem: SAFETY ADULT  Goal: Maintains/Returns to pre admission functional level  Description: INTERVENTIONS:  - Perform BMAT or MOVE assessment daily    - Set and communicate daily mobility goal to care team and patient/family/caregiver     - Collaborate with rehabilitation services on mobility goals if consulted  - Ambulate patient 2 times a day  - Out of bed to chair 3 times a day   - Out of bed for meals 3 times a day  - Out of bed for toileting  - Record patient progress and toleration of activity level   Outcome: Progressing     Problem: Nutrition/Hydration-ADULT  Goal: Nutrient/Hydration intake appropriate for improving, restoring or maintaining nutritional needs  Description: Monitor and assess patient's nutrition/hydration status for malnutrition  Collaborate with interdisciplinary team and initiate plan and interventions as ordered  Monitor patient's weight and dietary intake as ordered or per policy  Utilize nutrition screening tool and intervene as necessary  Determine patient's food preferences and provide high-protein, high-caloric foods as appropriate       INTERVENTIONS:  - Monitor oral intake, urinary output, labs, and treatment plans  - Assess nutrition and hydration status and recommend course of action  - Evaluate amount of meals eaten  - Assist patient with eating if necessary   - Allow adequate time for meals  - Recommend/ encourage appropriate diets, oral nutritional supplements, and vitamin/mineral supplements  - Order, calculate, and assess calorie counts as needed  - Recommend, monitor, and adjust tube feedings and TPN/PPN based on assessed needs  - Assess need for intravenous fluids  - Provide specific nutrition/hydration education as appropriate  - Include patient/family/caregiver in decisions related to nutrition  Outcome: Progressing

## 2022-09-16 NOTE — ASSESSMENT & PLAN NOTE
Lab Results   Component Value Date    HGBA1C 8 1 (H) 08/09/2022       Recent Labs     09/15/22  1619 09/16/22  0557 09/16/22  1108 09/16/22  1530   POCGLU 303* 177* 398* 104       Blood Sugar Average: Last 72 hrs:  (P) 209 5   · Patient was on metformin 1000 b i d   · Continue Lantus 10 units daily at bedtime, recently increased Humalog to 7 units t i d  A c   · Continue sliding scale insulin with Accu-Cheks while inpatient  · Diabetic diet  · Patient has been having labile blood glucose levels during hospital stay

## 2022-09-16 NOTE — PROGRESS NOTES
[facility]  Progress Note - Maru Claire  1933, 80 y o  male MRN: [id number]  Unit/Bed#: 7T [facility] 716-01 Encounter: [id number]  Primary Care Provider: Avril WELSH DO   Date and time admitted to hospital: 8/27/2022  5:56 AM    * General weakness  Assessment & Plan  · Possibly due to advancing dementia as evidenced by increasing confusion and agitation  · Neuropsychology has deemed the patient incapacitated  · Patient states he lives at home alone and has been having difficulty with ambulation  · No recent traumatic events  · Laboratory analysis grossly unremarkable  · Hemodynamically stable saturating well on room air  · Case management consultation for safe disposition planning  · Patient is medically clear, awaiting safe disposition plan  · PT/OT recommended post acute rehab services  · Patient and the family would prefer home health care with 24/7 care  · Willing to consider short-term rehab temporarily  · Ongoing discussions with family  · Patient lacks capacity so pending guardianship/POA process    Severe protein-calorie malnutrition ([address] )  Assessment & Plan  Malnutrition Findings:   Adult Malnutrition type: Chronic illness  Adult Degree of Malnutrition: Other severe protein calorie malnutrition  Malnutrition Characteristics: Inadequate energy, Weight loss          continue nutritional supplement         360 Statement: Severe pro/singh malnutrition r/t depression as evidenced by 12% unplanned weight loss since 2/24/22, consuming < 75% energy intake compared to estimated energy needs > 1 month  Treated with Enlive bid    BMI Findings: Body mass index is 19 06 kg/m²  Confusion  Assessment & Plan  · Patient seems to have episode of confusion  · Discussed with the Obey Wilson- patient does not have official diagnosis of dementia before    However it seems to be an element of advanced dementia  · Neuro psychiatrist was consulted - At this time, patient does not appear to have capacity to make fully informed medical decisions  Unspecified Neurocognitive Disorder  · Pending safe disposition plan      Unwitnessed fall  Assessment & Plan  · Patient had unwitnessed fall on 08/28/2022  · CT head was negative for acute intracranial abnormality  Mild chronic microangiopathic changes     · Continue to monitor    Depression with anxiety  Assessment & Plan  · Patient has appeared somewhat anxious about his health  · Family states there may be an element of depression possibly secondary to dementia  · Continue Lexapro 5 mg oral daily  · Haldol 1 mg IM every 6 hours as needed for agitation    Stage 3a chronic kidney disease [facility])  Assessment & Plan  Lab Results   Component Value Date    EGFR 47 09/11/2022    EGFR 35 09/10/2022    EGFR 43 09/09/2022    CREATININE 1 32 09/11/2022    CREATININE 1 68 (H) 09/10/2022    CREATININE 1 41 09/09/2022   · Creatinine stable, most recent at 1 32  · Trend BMP  · Avoid hypotension and nephrotoxic agents  · Urinary retention protocol  · Strict intake and output      Atrial fibrillation (HCC)  Assessment & Plan  · Rates are controlled  · Continue home Eliquis 2 5 mg PO BID    Hyperlipidemia  Assessment & Plan  · Continue home statin therapy    Type 2 diabetes mellitus with hyperglycemia [facility])  Assessment & Plan  Lab Results   Component Value Date    HGBA1C 8 1 (H) 08/09/2022       Recent Labs     09/15/22  1619 09/16/22  0557 09/16/22  1108 09/16/22  1530   POCGLU 303* 177* 398* 104       Blood Sugar Average: Last 72 hrs:  (P) 209 5   · Patient was on metformin 1000 b i d   · Continue Lantus 10 units daily at bedtime, recently increased Humalog to 7 units t i d  A c   · Continue sliding scale insulin with Accu-Cheks while inpatient  · Diabetic diet  · Patient has been having labile blood glucose levels during hospital stay    Primary hypertension  Assessment & Plan  · BP acceptable  · Continue home beta-blocker for atrial fibrillation  · Hydralazine 5 mg IV q6 hours PRN SBP > 180 mmHg  · Monitor blood pressures      VTE Pharmacologic Prophylaxis:   Pharmacologic: Apixaban (Eliquis)  Mechanical VTE Prophylaxis in Place: Yes    Patient Centered Rounds: I have performed bedside rounds with nursing staff today  Discussions with Specialists or Other Care Team Provider:  Discussed with Case Management, nurse    Education and Discussions with Family / Patient:  Discussed with patient    Time Spent for Care: 30 minutes  More than 50% of total time spent on counseling and coordination of care as described above  Current Length of Stay: 20 day(s)    Current Patient Status: Inpatient   Certification Statement: The patient will continue to require additional inpatient hospital stay due to Patient is medically clear, awaiting safe disposition    Discharge Plan / Estimated Discharge Date:  Pending      Code Status: Level 1 - Full Code      Subjective:   Patient was seen and examined at bedside  The patient has no major complaint  No acute overnight changes  Objective:     Vitals:   Temp (24hrs), Av 5 °F (36 4 °C), Min:97 1 °F (36 2 °C), Max:97 8 °F (36 6 °C)    Temp:  [97 1 °F (36 2 °C)-97 8 °F (36 6 °C)] 97 6 °F (36 4 °C)  HR:  [77-92] 77  Resp:  [18] 18  BP: (117-150)/(61-84) 117/61  SpO2:  [96 %-99 %] 96 %  Body mass index is 19 06 kg/m²  Input and Output Summary (last 24 hours):        Intake/Output Summary (Last 24 hours) at 2022 1544  Last data filed at 2022 1300  Gross per 24 hour   Intake 600 ml   Output 120 ml   Net 480 ml       Physical Exam:     Physical Exam  General: breathing well on room air, no acute distress  HEENT: NC/AT, PERRL, EOM - normal  Neck: Supple  Pulm/Chest: Normal chest wall expansion, clear breath sounds on both side, no wheezing/rhonchi or crackles appreciated  CVS: RRR, normal S1&S2, no murmur appreciated, capillary refill <2s  Abd: soft, non tender, non distended, bowel sounds +  MSK: move all 4 extremities spontaneously  Skin: warm  CNS: no acute focal neuro deficit      Additional Data:     Labs:    Results from last 7 days   Lab Units 09/11/22  0507   WBC Thousand/uL 11 48*   HEMOGLOBIN g/dL 10 9*   HEMATOCRIT % 34 8*   PLATELETS Thousands/uL 259   NEUTROS PCT % 67   LYMPHS PCT % 14   MONOS PCT % 17*   EOS PCT % 1     Results from last 7 days   Lab Units 09/11/22  0507   POTASSIUM mmol/L 4 4   CHLORIDE mmol/L 103   CO2 mmol/L 30   BUN mg/dL 40*   CREATININE mg/dL 1 32   CALCIUM mg/dL 8 8           * I Have Reviewed All Lab Data Listed Above  * Additional Pertinent Lab Tests Reviewed: Abida Mercer Admission Reviewed    Imaging:      I have reviewed pertinent imaging  Recent Cultures (last 7 days):           Last 24 Hours Medication List:   Current Facility-Administered Medications   Medication Dose Route Frequency Provider Last Rate    acetaminophen  650 mg Oral Q4H PRN Burnard Cassandra, DO      apixaban  2 5 mg Oral BID Burnard Cassandra, DO      escitalopram  5 mg Oral Daily Burnard Cassandra, DO      haloperidol lactate  1 mg Intramuscular Q6H PRN Burnard Cassandra, DO      hydrALAZINE  5 mg Intravenous Q6H PRN Burnard Cassandra, DO      insulin glargine  10 Units Subcutaneous HS Burnard Cassandra, DO      insulin lispro  1-5 Units Subcutaneous TID AC Burnard Cassandra, DO      insulin lispro  1-5 Units Subcutaneous HS Burnard Cassandra, DO      insulin lispro  7 Units Subcutaneous TID With Meals Aurora Overton MD      melatonin  3 mg Oral HS Gary [address] PA-C      metoprolol succinate  50 mg Oral BID Burnard Cassandra, DO      ondansetron  4 mg Intravenous Q6H PRN Burnard Cassandra, DO      pantoprazole  20 mg Oral Daily Burnard Cassandra, DO      pravastatin  80 mg Oral Daily With Dinner Burnard Cassandra, DO          Today, Patient Was Seen By: Aurora Overton MD    ** Please Note: Dragon 360 Dictation voice to text software may have been used in the creation of this document  **

## 2022-09-16 NOTE — ASSESSMENT & PLAN NOTE
· Patient seems to have episode of confusion  · Discussed with the Paris Batres and Autumn Soni- patient does not have official diagnosis of dementia before  However it seems to be an element of advanced dementia  · Neuro psychiatrist was consulted - At this time, patient does not appear to have capacity to make fully informed medical decisions   Unspecified Neurocognitive Disorder  · Pending safe disposition plan

## 2022-09-16 NOTE — ASSESSMENT & PLAN NOTE
Lab Results   Component Value Date    EGFR 47 09/11/2022    EGFR 35 09/10/2022    EGFR 43 09/09/2022    CREATININE 1 32 09/11/2022    CREATININE 1 68 (H) 09/10/2022    CREATININE 1 41 09/09/2022   · Creatinine stable, most recent at 1 32  · Trend BMP  · Avoid hypotension and nephrotoxic agents  · Urinary retention protocol  · Strict intake and output

## 2022-09-16 NOTE — PHYSICIAN ADVISOR
Current patient class: Inpatient  The patient is currently on Hospital Day: 21      The patient was admitted to the hospital at  7:11 AM on 8/27/22 for the following diagnosis:  Hypomagnesemia [E83 42]  General weakness [R53 1]  Generalized weakness [R53 1]  Ambulatory dysfunction [R26 2]     CMS OUTLIER STAY REVIEW    After review of the relevant documentation, labs, vital signs and test results, the patient is appropriate for CONTINUED INPATIENT ADMISSION  The patient continues to remain hospitalized receiving acute medical care  The patient has surpassed the expected duration of stay, however given the clinical condition, need for further acute care management, the patient is appropriate to remain in an inpatient status  The patient still being actively managed, and does have unresolved medical issues requiring further hospitalization  This review is conducted at 10 day intervals, to help satisfy the requirements for significant outlier stay review as per CMS  Given the current condition of this patient, the patient satisfies this review was determination for continued inpatient stay  Rationale is as follows: The patient is a 80 yrs old Male who presented to the ED at 8/27/2022  5:56 AM with a chief complaint of Weakness - Generalized (Pt states he was recently discharged from the hospital at [address]  He was recommended to go to rehab post discharge but did not go  Today pt had a BM and felt weaker afterwards (since this morning)  Pt is stating he is having ambulatory issues and is unable to get around his home  Pt answers questions appropriately but seems to veer off topic frequently  In example: this RN asked pt about his home medications and pt told me about a property his family has owned since 1889  ) Pt with h/o dementia and weakness  It is felt his dementia is progressing  He was evaluated by neuropsychology and lacks capacity for decision making    Guardianship/POA was undertaken and safe discharge plan is pending  He remains inpatient appropriate  The patients vitals on arrival were   ED Triage Vitals   Temperature Pulse Respirations Blood Pressure SpO2   08/27/22 0609 08/27/22 0609 08/27/22 0609 08/27/22 0609 08/27/22 0609   99 1 °F (37 3 °C) 102 20 (!) 184/111 98 %      Temp Source Heart Rate Source Patient Position - Orthostatic VS BP Location FiO2 (%)   08/27/22 0609 08/27/22 0609 08/27/22 0609 08/27/22 0800 --   Tympanic Monitor Lying Right arm       Pain Score       08/27/22 0609       No Pain           History reviewed  No pertinent past medical history  Past Surgical History:   Procedure Laterality Date    CATARACT EXTRACTION      HERNIA REPAIR  1985    HERNIA REPAIR  1999           Consults have been placed to:   IP CONSULT TO CASE MANAGEMENT  IP CONSULT TO NEUROPSYCHOLOGY    Vitals:    09/15/22 1500 09/15/22 2300 09/16/22 0600 09/16/22 0733   BP: 103/62 139/83  150/84   BP Location: Left arm Left arm  Left arm   Pulse: 90 87  92   Resp: 18 18  18   Temp: (!) 96 9 °F (36 1 °C) 97 8 °F (36 6 °C)  (!) 97 1 °F (36 2 °C)   TempSrc: Temporal Temporal  Temporal   SpO2: 97% 99%  98%   Weight:   62 kg (136 lb 11 oz)    Height:           Most recent labs:    No results for input(s): WBC, HGB, HCT, PLT, K, NA, CALCIUM, BUN, CREATININE, LIPASE, AMYLASE, INR, TROPONINI, CKTOTAL, AST, ALT, ALKPHOS, BILITOT in the last 72 hours      Scheduled Meds:  Current Facility-Administered Medications   Medication Dose Route Frequency Provider Last Rate    acetaminophen  650 mg Oral Q4H PRN Lavada Milder, DO      apixaban  2 5 mg Oral BID Lavada Milder, DO      escitalopram  5 mg Oral Daily Lavada Milder, DO      haloperidol lactate  1 mg Intramuscular Q6H PRN Lavada Milder, DO      hydrALAZINE  5 mg Intravenous Q6H PRN Lavada Milder, DO      insulin glargine  10 Units Subcutaneous HS Lavada Milder, DO      insulin lispro  1-5 Units Subcutaneous TID AC Lavada Milder, DO      insulin lispro  1-5 Units Subcutaneous HS Dyan Spotted, DO      insulin lispro  7 Units Subcutaneous TID With Meals Wendy Crowe MD      melatonin  3 mg Oral HS Marlen Johnson PA-C      metoprolol succinate  50 mg Oral BID Dyan Spotted, DO      ondansetron  4 mg Intravenous Q6H PRN Dyan Spotted, DO      pantoprazole  20 mg Oral Daily Dyan Spotted, DO      pravastatin  80 mg Oral Daily With Dinner Dyan Spotted, DO       Continuous Infusions:   PRN Meds:   acetaminophen    haloperidol lactate    hydrALAZINE    ondansetron    Surgical procedures (if appropriate):

## 2022-09-17 LAB
ANION GAP SERPL CALCULATED.3IONS-SCNC: 5 MMOL/L (ref 5–14)
BASOPHILS # BLD AUTO: 0.07 THOUSANDS/ΜL (ref 0–0.1)
BASOPHILS NFR BLD AUTO: 1 % (ref 0–1)
BUN SERPL-MCNC: 39 MG/DL (ref 5–25)
CALCIUM SERPL-MCNC: 8.5 MG/DL (ref 8.4–10.2)
CHLORIDE SERPL-SCNC: 106 MMOL/L (ref 96–108)
CO2 SERPL-SCNC: 29 MMOL/L (ref 21–32)
CREAT SERPL-MCNC: 1.05 MG/DL (ref 0.7–1.5)
EOSINOPHIL # BLD AUTO: 0.22 THOUSAND/ΜL (ref 0–0.61)
EOSINOPHIL NFR BLD AUTO: 2 % (ref 0–6)
ERYTHROCYTE [DISTWIDTH] IN BLOOD BY AUTOMATED COUNT: 12.7 % (ref 11.6–15.1)
GFR SERPL CREATININE-BSD FRML MDRD: 62 ML/MIN/1.73SQ M
GLUCOSE SERPL-MCNC: 140 MG/DL (ref 70–99)
GLUCOSE SERPL-MCNC: 142 MG/DL (ref 65–140)
GLUCOSE SERPL-MCNC: 297 MG/DL (ref 65–140)
GLUCOSE SERPL-MCNC: 371 MG/DL (ref 65–140)
GLUCOSE SERPL-MCNC: 415 MG/DL (ref 65–140)
HCT VFR BLD AUTO: 31.2 % (ref 36.5–49.3)
HGB BLD-MCNC: 10 G/DL (ref 12–17)
IMM GRANULOCYTES # BLD AUTO: 0.04 THOUSAND/UL (ref 0–0.2)
IMM GRANULOCYTES NFR BLD AUTO: 0 % (ref 0–2)
LYMPHOCYTES # BLD AUTO: 1.69 THOUSANDS/ΜL (ref 0.6–4.47)
LYMPHOCYTES NFR BLD AUTO: 18 % (ref 14–44)
MCH RBC QN AUTO: 34.5 PG (ref 26.8–34.3)
MCHC RBC AUTO-ENTMCNC: 32.1 G/DL (ref 31.4–37.4)
MCV RBC AUTO: 108 FL (ref 82–98)
MONOCYTES # BLD AUTO: 1.47 THOUSAND/ΜL (ref 0.17–1.22)
MONOCYTES NFR BLD AUTO: 16 % (ref 4–12)
NEUTROPHILS # BLD AUTO: 6.01 THOUSANDS/ΜL (ref 1.85–7.62)
NEUTS SEG NFR BLD AUTO: 63 % (ref 43–75)
NRBC BLD AUTO-RTO: 0 /100 WBCS
PLATELET # BLD AUTO: 220 THOUSANDS/UL (ref 149–390)
PMV BLD AUTO: 10.3 FL (ref 8.9–12.7)
POTASSIUM SERPL-SCNC: 4.3 MMOL/L (ref 3.5–5.3)
RBC # BLD AUTO: 2.9 MILLION/UL (ref 3.88–5.62)
SODIUM SERPL-SCNC: 140 MMOL/L (ref 135–147)
WBC # BLD AUTO: 9.5 THOUSAND/UL (ref 4.31–10.16)

## 2022-09-17 PROCEDURE — 80048 BASIC METABOLIC PNL TOTAL CA: CPT | Performed by: INTERNAL MEDICINE

## 2022-09-17 PROCEDURE — 85025 COMPLETE CBC W/AUTO DIFF WBC: CPT | Performed by: INTERNAL MEDICINE

## 2022-09-17 PROCEDURE — 82948 REAGENT STRIP/BLOOD GLUCOSE: CPT

## 2022-09-17 PROCEDURE — 99232 SBSQ HOSP IP/OBS MODERATE 35: CPT | Performed by: INTERNAL MEDICINE

## 2022-09-17 RX ADMIN — APIXABAN 2.5 MG: 2.5 TABLET, FILM COATED ORAL at 17:01

## 2022-09-17 RX ADMIN — PRAVASTATIN SODIUM 80 MG: 40 TABLET ORAL at 15:53

## 2022-09-17 RX ADMIN — ACETAMINOPHEN 650 MG: 325 TABLET ORAL at 21:00

## 2022-09-17 RX ADMIN — PANTOPRAZOLE SODIUM 20 MG: 20 TABLET, DELAYED RELEASE ORAL at 08:52

## 2022-09-17 RX ADMIN — MELATONIN TAB 3 MG 3 MG: 3 TAB at 21:00

## 2022-09-17 RX ADMIN — METOPROLOL SUCCINATE 50 MG: 50 TABLET, EXTENDED RELEASE ORAL at 08:52

## 2022-09-17 RX ADMIN — INSULIN LISPRO 3 UNITS: 100 INJECTION, SOLUTION INTRAVENOUS; SUBCUTANEOUS at 21:01

## 2022-09-17 RX ADMIN — METOPROLOL SUCCINATE 50 MG: 50 TABLET, EXTENDED RELEASE ORAL at 21:00

## 2022-09-17 RX ADMIN — APIXABAN 2.5 MG: 2.5 TABLET, FILM COATED ORAL at 08:52

## 2022-09-17 RX ADMIN — INSULIN LISPRO 7 UNITS: 100 INJECTION, SOLUTION INTRAVENOUS; SUBCUTANEOUS at 15:51

## 2022-09-17 RX ADMIN — INSULIN LISPRO 7 UNITS: 100 INJECTION, SOLUTION INTRAVENOUS; SUBCUTANEOUS at 08:55

## 2022-09-17 RX ADMIN — INSULIN LISPRO 7 UNITS: 100 INJECTION, SOLUTION INTRAVENOUS; SUBCUTANEOUS at 11:01

## 2022-09-17 RX ADMIN — INSULIN GLARGINE 10 UNITS: 100 INJECTION, SOLUTION SUBCUTANEOUS at 21:00

## 2022-09-17 RX ADMIN — INSULIN LISPRO 5 UNITS: 100 INJECTION, SOLUTION INTRAVENOUS; SUBCUTANEOUS at 15:51

## 2022-09-17 RX ADMIN — INSULIN LISPRO 4 UNITS: 100 INJECTION, SOLUTION INTRAVENOUS; SUBCUTANEOUS at 11:00

## 2022-09-17 RX ADMIN — ESCITALOPRAM OXALATE 5 MG: 5 TABLET, FILM COATED ORAL at 08:52

## 2022-09-17 NOTE — ASSESSMENT & PLAN NOTE
· Patient seems to have episode of confusion  · Discussed with the Roger Brittle and Kristin Butler- patient does not have official diagnosis of dementia before  However it seems to be an element of advanced dementia  · Neuro psychiatrist was consulted - At this time, patient does not appear to have capacity to make fully informed medical decisions   Unspecified Neurocognitive Disorder  · Pending safe disposition plan

## 2022-09-17 NOTE — ASSESSMENT & PLAN NOTE
Malnutrition Findings:   Adult Malnutrition type: Chronic illness  Adult Degree of Malnutrition: Other severe protein calorie malnutrition  Malnutrition Characteristics: Inadequate energy, Weight loss          continue nutritional supplement         360 Statement: Severe pro/singh malnutrition r/t depression as evidenced by 12% unplanned weight loss since 2/24/22, consuming < 75% energy intake compared to estimated energy needs > 1 month  Treated with Enlive bid    BMI Findings: Body mass index is 18 48 kg/m²

## 2022-09-17 NOTE — ASSESSMENT & PLAN NOTE
Lab Results   Component Value Date    EGFR 62 09/17/2022    EGFR 47 09/11/2022    EGFR 35 09/10/2022    CREATININE 1 05 09/17/2022    CREATININE 1 32 09/11/2022    CREATININE 1 68 (H) 09/10/2022   · Creatinine stable, 1 05  · Trend BMP  · Avoid hypotension and nephrotoxic agents  · Urinary retention protocol  · Strict intake and output

## 2022-09-17 NOTE — PLAN OF CARE
Problem: Potential for Falls  Goal: Patient will remain free of falls  Description: INTERVENTIONS:  - Educate patient/family on patient safety including physical limitations  - Instruct patient to call for assistance with activity   - Consult OT/PT to assist with strengthening/mobility   - Keep Call bell within reach  - Keep bed low and locked with side rails adjusted as appropriate  - Keep care items and personal belongings within reach  - Initiate and maintain comfort rounds  - Offer Toileting every 2 Hours, in advance of need  - Initiate/Maintain bed alarm  - Obtain necessary fall risk management equipment: walker  - Apply yellow socks and bracelet for high fall risk patients  - Consider moving patient to room near nurses station  Outcome: Progressing     Problem: MOBILITY - ADULT  Goal: Maintain or return to baseline ADL function  Description: INTERVENTIONS:  -  Assess patient's ability to carry out ADLs; assess patient's baseline for ADL function and identify physical deficits which impact ability to perform ADLs (bathing, care of mouth/teeth, toileting, grooming, dressing, etc )  - Assess/evaluate cause of self-care deficits   - Assess range of motion  - Assess patient's mobility; develop plan if impaired  - Assess patient's need for assistive devices and provide as appropriate  - Encourage maximum independence but intervene and supervise when necessary  - Involve family in performance of ADLs  - Assess for home care needs following discharge   - Consider OT consult to assist with ADL evaluation and planning for discharge  - Provide patient education as appropriate  Outcome: Progressing     Problem: DISCHARGE PLANNING  Goal: Discharge to home or other facility with appropriate resources  Description: INTERVENTIONS:  - Identify barriers to discharge w/patient and caregiver  - Arrange for needed discharge resources and transportation as appropriate  - Identify discharge learning needs (meds, wound care, etc )  - Arrange for interpretive services to assist at discharge as needed  - Refer to Case Management Department for coordinating discharge planning if the patient needs post-hospital services based on physician/advanced practitioner order or complex needs related to functional status, cognitive ability, or social support system  Outcome: Progressing     Problem: Knowledge Deficit  Goal: Patient/family/caregiver demonstrates understanding of disease process, treatment plan, medications, and discharge instructions  Description: Complete learning assessment and assess knowledge base    Interventions:  - Provide teaching at level of understanding  - Provide teaching via preferred learning methods  Outcome: Progressing     Problem: METABOLIC, FLUID AND ELECTROLYTES - ADULT  Goal: Glucose maintained within target range  Description: INTERVENTIONS:  - Monitor Blood Glucose as ordered  - Assess for signs and symptoms of hyperglycemia and hypoglycemia  - Administer ordered medications to maintain glucose within target range  - Assess nutritional intake and initiate nutrition service referral as needed  Outcome: Progressing     Problem: GASTROINTESTINAL - ADULT  Goal: Maintains adequate nutritional intake  Description: INTERVENTIONS:  - Monitor percentage of each meal consumed  - Identify factors contributing to decreased intake, treat as appropriate  - Assist with meals as needed  - Monitor I&O, weight, and lab values if indicated  - Obtain nutrition services referral as needed  Outcome: Progressing

## 2022-09-17 NOTE — PLAN OF CARE
Problem: Potential for Falls  Goal: Patient will remain free of falls  Description: INTERVENTIONS:  - Educate patient/family on patient safety including physical limitations  - Instruct patient to call for assistance with activity   - Consult OT/PT to assist with strengthening/mobility   - Keep Call bell within reach  - Keep bed low and locked with side rails adjusted as appropriate  - Keep care items and personal belongings within reach  - Initiate and maintain comfort rounds  - Offer Toileting every 2 Hours, in advance of need  - Initiate/Maintain bed alarm  - Obtain necessary fall risk management equipment: walker  - Apply yellow socks and bracelet for high fall risk patients  - Consider moving patient to room near nurses station  Outcome: Progressing     Problem: MOBILITY - ADULT  Goal: Maintain or return to baseline ADL function  Description: INTERVENTIONS:  -  Assess patient's ability to carry out ADLs; assess patient's baseline for ADL function and identify physical deficits which impact ability to perform ADLs (bathing, care of mouth/teeth, toileting, grooming, dressing, etc )  - Assess/evaluate cause of self-care deficits   - Assess range of motion  - Assess patient's mobility; develop plan if impaired  - Assess patient's need for assistive devices and provide as appropriate  - Encourage maximum independence but intervene and supervise when necessary  - Involve family in performance of ADLs  - Assess for home care needs following discharge   - Consider OT consult to assist with ADL evaluation and planning for discharge  - Provide patient education as appropriate  Outcome: Progressing  Goal: Maintains/Returns to pre admission functional level  Description: INTERVENTIONS:  - Perform BMAT or MOVE assessment daily    - Set and communicate daily mobility goal to care team and patient/family/caregiver     - Collaborate with rehabilitation services on mobility goals if consulted  - Ambulate patient 2 times a day  - Out of bed to chair 3 times a day   - Out of bed for meals 3 times a day  - Out of bed for toileting  - Record patient progress and toleration of activity level   Outcome: Progressing     Problem: PAIN - ADULT  Goal: Verbalizes/displays adequate comfort level or baseline comfort level  Description: Interventions:  - Encourage patient to monitor pain and request assistance  - Assess pain using appropriate pain scale  - Administer analgesics based on type and severity of pain and evaluate response  - Implement non-pharmacological measures as appropriate and evaluate response  - Consider cultural and social influences on pain and pain management  - Notify physician/advanced practitioner if interventions unsuccessful or patient reports new pain  Outcome: Progressing     Problem: SAFETY ADULT  Goal: Maintain or return to baseline ADL function  Description: INTERVENTIONS:  -  Assess patient's ability to carry out ADLs; assess patient's baseline for ADL function and identify physical deficits which impact ability to perform ADLs (bathing, care of mouth/teeth, toileting, grooming, dressing, etc )  - Assess/evaluate cause of self-care deficits   - Assess range of motion  - Assess patient's mobility; develop plan if impaired  - Assess patient's need for assistive devices and provide as appropriate  - Encourage maximum independence but intervene and supervise when necessary  - Involve family in performance of ADLs  - Assess for home care needs following discharge   - Consider OT consult to assist with ADL evaluation and planning for discharge  - Provide patient education as appropriate  Outcome: Progressing     Problem: DISCHARGE PLANNING  Goal: Discharge to home or other facility with appropriate resources  Description: INTERVENTIONS:  - Identify barriers to discharge w/patient and caregiver  - Arrange for needed discharge resources and transportation as appropriate  - Identify discharge learning needs (meds, wound care, etc )  - Arrange for interpretive services to assist at discharge as needed  - Refer to Case Management Department for coordinating discharge planning if the patient needs post-hospital services based on physician/advanced practitioner order or complex needs related to functional status, cognitive ability, or social support system  Outcome: Progressing     Problem: Knowledge Deficit  Goal: Patient/family/caregiver demonstrates understanding of disease process, treatment plan, medications, and discharge instructions  Description: Complete learning assessment and assess knowledge base  Interventions:  - Provide teaching at level of understanding  - Provide teaching via preferred learning methods  Outcome: Progressing     Problem: BEHAVIOR  Goal: Pt/Family maintain appropriate behavior and adhere to behavioral management agreement, if implemented  Description: INTERVENTIONS:  - Assess the family dynamic   - Encourage verbalization of thoughts and concerns in a socially appropriate manner  - Assess patient/family's coping skills and non-compliant behavior (including use of illegal substances)  - Utilize positive, consistent limit setting strategies supporting safety of patient, staff and others  - Initiate consult with Case Management, Spiritual Care or other ancillary services as appropriate  - If a patient's/visitor's behavior jeopardizes the safety of the patient, staff, or others, refer to organization procedure     - Notify Security of behavior or suspected illegal substances which indicate the need for search of the patient and/or belongings  - Encourage participation in the decision making process about a behavioral management agreement; implement if patient meets criteria  Outcome: Progressing

## 2022-09-17 NOTE — PROGRESS NOTES
[facility]  Progress Note - Diane Melendez  1933, 80 y o  male MRN: [id number]  Unit/Bed#: 7T [facility] 716-01 Encounter: [id number]  Primary Care Provider: Sampson CHAPA DO   Date and time admitted to hospital: 8/27/2022  5:56 AM    * General weakness  Assessment & Plan  · Possibly due to advancing dementia as evidenced by increasing confusion and agitation  · Neuropsychology has deemed the patient incapacitated  · Patient states he lives at home alone and has been having difficulty with ambulation  · No recent traumatic events  · Laboratory analysis grossly unremarkable  · Hemodynamically stable saturating well on room air  · Case management consultation for safe disposition planning  · Patient is medically clear, awaiting safe disposition plan  · PT/OT recommended post acute rehab services  · Patient and the family would prefer home health care with 24/7 care  · Willing to consider short-term rehab temporarily  · Ongoing discussions with family  · Patient lacks capacity so pending guardianship/POA process    Severe protein-calorie malnutrition ([address] )  Assessment & Plan  Malnutrition Findings:   Adult Malnutrition type: Chronic illness  Adult Degree of Malnutrition: Other severe protein calorie malnutrition  Malnutrition Characteristics: Inadequate energy, Weight loss          continue nutritional supplement         360 Statement: Severe pro/singh malnutrition r/t depression as evidenced by 12% unplanned weight loss since 2/24/22, consuming < 75% energy intake compared to estimated energy needs > 1 month  Treated with Enlive bid    BMI Findings: Body mass index is 18 48 kg/m²  Confusion  Assessment & Plan  · Patient seems to have episode of confusion  · Discussed with the Saadia Manuel and Vignesh Gifford- patient does not have official diagnosis of dementia before    However it seems to be an element of advanced dementia  · Neuro psychiatrist was consulted - At this time, patient does not appear to have capacity to make fully informed medical decisions  Unspecified Neurocognitive Disorder  · Pending safe disposition plan      Unwitnessed fall  Assessment & Plan  · Patient had unwitnessed fall on 08/28/2022  · CT head was negative for acute intracranial abnormality  Mild chronic microangiopathic changes     · Continue to monitor    Depression with anxiety  Assessment & Plan  · Patient has appeared somewhat anxious about his health  · Family states there may be an element of depression possibly secondary to dementia  · Continue Lexapro 5 mg oral daily  · Haldol 1 mg IM every 6 hours as needed for agitation    Stage 3a chronic kidney disease [facility])  Assessment & Plan  Lab Results   Component Value Date    EGFR 62 09/17/2022    EGFR 47 09/11/2022    EGFR 35 09/10/2022    CREATININE 1 05 09/17/2022    CREATININE 1 32 09/11/2022    CREATININE 1 68 (H) 09/10/2022   · Creatinine stable, 1 05  · Trend BMP  · Avoid hypotension and nephrotoxic agents  · Urinary retention protocol  · Strict intake and output      Atrial fibrillation (HCC)  Assessment & Plan  · Rates are controlled  · Continue home Eliquis 2 5 mg PO BID    Hyperlipidemia  Assessment & Plan  · Continue home statin therapy    Type 2 diabetes mellitus with hyperglycemia [facility])  Assessment & Plan  Lab Results   Component Value Date    HGBA1C 8 1 (H) 08/09/2022       Recent Labs     09/16/22  1530 09/16/22 2004 09/17/22  0552 09/17/22  1038   POCGLU 104 324* 142* 371*       Blood Sugar Average: Last 72 hrs:  (P) 173 [id number]   · Patient was on metformin 1000 b i d   · Continue Lantus 10 units daily at bedtime, recently increased Humalog to 7 units t i d  A c   · Continue sliding scale insulin with Accu-Cheks while inpatient  · Diabetic diet  · Patient has been having labile blood glucose levels during hospital stay    Primary hypertension  Assessment & Plan  · BP acceptable  · Continue home beta-blocker for atrial fibrillation  · Hydralazine 5 mg IV q6 hours PRN SBP > 180 mmHg  · Monitor blood pressures      VTE Pharmacologic Prophylaxis:   Pharmacologic: Apixaban (Eliquis)  Mechanical VTE Prophylaxis in Place: Yes    Patient Centered Rounds: I have performed bedside rounds with nursing staff today  Discussions with Specialists or Other Care Team Provider:  Discussed with nurse    Education and Discussions with Family / Patient:  Discussed with patient    Time Spent for Care: 30 minutes  More than 50% of total time spent on counseling and coordination of care as described above  Current Length of Stay: 21 day(s)    Current Patient Status: Inpatient   Certification Statement: The patient will continue to require additional inpatient hospital stay due to Patient is medically clear, pending safe disposition plan    Discharge Plan / Estimated Discharge Date:  Pending      Code Status: Level 1 - Full Code      Subjective:   Patient was seen and examined at bedside  The patient denies any pain, headache, blurry vision, chest pain, palpitation, shortness of breath, N/V, abd pain  Objective:     Vitals:   Temp (24hrs), Av 5 °F (36 4 °C), Min:97 2 °F (36 2 °C), Max:97 6 °F (36 4 °C)    Temp:  [97 2 °F (36 2 °C)-97 6 °F (36 4 °C)] 97 5 °F (36 4 °C)  HR:  [77-80] 77  Resp:  [18] 18  BP: (117-149)/(61-89) 134/77  SpO2:  [96 %-99 %] 97 %  Body mass index is 18 48 kg/m²  Input and Output Summary (last 24 hours):        Intake/Output Summary (Last 24 hours) at 2022 1408  Last data filed at 2022 1205  Gross per 24 hour   Intake 770 ml   Output 240 ml   Net 530 ml       Physical Exam:     Physical Exam  General: breathing well on room air, no acute distress  HEENT: NC/AT, PERRL, EOM - normal  Neck: Supple  Pulm/Chest: Normal chest wall expansion, clear breath sounds on both side, no wheezing/rhonchi or crackles appreciated  CVS: IRIR, normal S1&S2, no murmur appreciated, capillary refill <2s  Abd: soft, non tender, non distended, bowel sounds +  MSK: move all 4 extremities spontaneously  Skin: warm  CNS: no acute focal neuro deficit      Additional Data:     Labs:    Results from last 7 days   Lab Units 09/17/22  0600   WBC Thousand/uL 9 50   HEMOGLOBIN g/dL 10 0*   HEMATOCRIT % 31 2*   PLATELETS Thousands/uL 220   NEUTROS PCT % 63   LYMPHS PCT % 18   MONOS PCT % 16*   EOS PCT % 2     Results from last 7 days   Lab Units 09/17/22  0600   POTASSIUM mmol/L 4 3   CHLORIDE mmol/L 106   CO2 mmol/L 29   BUN mg/dL 39*   CREATININE mg/dL 1 05   CALCIUM mg/dL 8 5           * I Have Reviewed All Lab Data Listed Above  * Additional Pertinent Lab Tests Reviewed: [address] Admission Reviewed    Imaging:      I have reviewed pertinent imaging        Recent Cultures (last 7 days):           Last 24 Hours Medication List:   Current Facility-Administered Medications   Medication Dose Route Frequency Provider Last Rate    acetaminophen  650 mg Oral Q4H PRN Laurann Sack, DO      apixaban  2 5 mg Oral BID Laurann Sack, DO      escitalopram  5 mg Oral Daily Laurann Sack, DO      haloperidol lactate  1 mg Intramuscular Q6H PRN Laurann Sack, DO      hydrALAZINE  5 mg Intravenous Q6H PRN Laurann Sack, DO      insulin glargine  10 Units Subcutaneous HS [facility]      insulin lispro  1-5 Units Subcutaneous TID AC Laurann Sack, DO      insulin lispro  1-5 Units Subcutaneous HS Laurann Sack, DO      insulin lispro  7 Units Subcutaneous TID With Meals Ulises Acuña MD      melatonin  3 mg Oral HS Constantine Valderrama PA-C      metoprolol succinate  50 mg Oral BID Laurann Sack, DO      ondansetron  4 mg Intravenous Q6H PRN Laurann Sack, DO      pantoprazole  20 mg Oral Daily Laurann Sack, DO      pravastatin  80 mg Oral Daily With Dinner Markelann Sack, DO          Today, Patient Was Seen By: Ulises Acuña MD    ** Please Note: Dragon 360 Dictation voice to text software may have been used in the creation of this document   **

## 2022-09-17 NOTE — ASSESSMENT & PLAN NOTE
Lab Results   Component Value Date    HGBA1C 8 1 (H) 08/09/2022       Recent Labs     09/16/22  1530 09/16/22  2004 09/17/22  0552 09/17/22  1038   POCGLU 104 324* 142* 371*       Blood Sugar Average: Last 72 hrs:  (P) 398 [id number]   · Patient was on metformin 1000 b i d   · Continue Lantus 10 units daily at bedtime, recently increased Humalog to 7 units t i d  A c   · Continue sliding scale insulin with Accu-Cheks while inpatient  · Diabetic diet  · Patient has been having labile blood glucose levels during hospital stay

## 2022-09-18 LAB
GLUCOSE SERPL-MCNC: 120 MG/DL (ref 65–140)
GLUCOSE SERPL-MCNC: 236 MG/DL (ref 65–140)
GLUCOSE SERPL-MCNC: 330 MG/DL (ref 65–140)
GLUCOSE SERPL-MCNC: 402 MG/DL (ref 65–140)

## 2022-09-18 PROCEDURE — 82948 REAGENT STRIP/BLOOD GLUCOSE: CPT

## 2022-09-18 PROCEDURE — 99232 SBSQ HOSP IP/OBS MODERATE 35: CPT | Performed by: INTERNAL MEDICINE

## 2022-09-18 RX ADMIN — INSULIN LISPRO 7 UNITS: 100 INJECTION, SOLUTION INTRAVENOUS; SUBCUTANEOUS at 08:24

## 2022-09-18 RX ADMIN — PANTOPRAZOLE SODIUM 20 MG: 20 TABLET, DELAYED RELEASE ORAL at 08:23

## 2022-09-18 RX ADMIN — INSULIN GLARGINE 10 UNITS: 100 INJECTION, SOLUTION SUBCUTANEOUS at 21:13

## 2022-09-18 RX ADMIN — INSULIN LISPRO 7 UNITS: 100 INJECTION, SOLUTION INTRAVENOUS; SUBCUTANEOUS at 15:47

## 2022-09-18 RX ADMIN — ESCITALOPRAM OXALATE 5 MG: 5 TABLET, FILM COATED ORAL at 08:23

## 2022-09-18 RX ADMIN — ACETAMINOPHEN 650 MG: 325 TABLET ORAL at 07:37

## 2022-09-18 RX ADMIN — APIXABAN 2.5 MG: 2.5 TABLET, FILM COATED ORAL at 17:06

## 2022-09-18 RX ADMIN — INSULIN LISPRO 4 UNITS: 100 INJECTION, SOLUTION INTRAVENOUS; SUBCUTANEOUS at 11:10

## 2022-09-18 RX ADMIN — APIXABAN 2.5 MG: 2.5 TABLET, FILM COATED ORAL at 08:23

## 2022-09-18 RX ADMIN — INSULIN LISPRO 7 UNITS: 100 INJECTION, SOLUTION INTRAVENOUS; SUBCUTANEOUS at 11:11

## 2022-09-18 RX ADMIN — METOPROLOL SUCCINATE 50 MG: 50 TABLET, EXTENDED RELEASE ORAL at 08:23

## 2022-09-18 RX ADMIN — INSULIN LISPRO 5 UNITS: 100 INJECTION, SOLUTION INTRAVENOUS; SUBCUTANEOUS at 21:13

## 2022-09-18 RX ADMIN — PRAVASTATIN SODIUM 80 MG: 40 TABLET ORAL at 15:45

## 2022-09-18 RX ADMIN — MELATONIN TAB 3 MG 3 MG: 3 TAB at 21:13

## 2022-09-18 RX ADMIN — METOPROLOL SUCCINATE 50 MG: 50 TABLET, EXTENDED RELEASE ORAL at 21:13

## 2022-09-18 RX ADMIN — INSULIN LISPRO 2 UNITS: 100 INJECTION, SOLUTION INTRAVENOUS; SUBCUTANEOUS at 15:46

## 2022-09-18 NOTE — ASSESSMENT & PLAN NOTE
Malnutrition Findings:   Adult Malnutrition type: Chronic illness  Adult Degree of Malnutrition: Other severe protein calorie malnutrition  Malnutrition Characteristics: Inadequate energy, Weight loss          continue nutritional supplement         360 Statement: Severe pro/singh malnutrition r/t depression as evidenced by 12% unplanned weight loss since 2/24/22, consuming < 75% energy intake compared to estimated energy needs > 1 month  Treated with Enlive bid    BMI Findings: Body mass index is 18 91 kg/m²

## 2022-09-18 NOTE — PLAN OF CARE
Problem: Potential for Falls  Goal: Patient will remain free of falls  Description: INTERVENTIONS:  - Educate patient/family on patient safety including physical limitations  - Instruct patient to call for assistance with activity   - Consult OT/PT to assist with strengthening/mobility   - Keep Call bell within reach  - Keep bed low and locked with side rails adjusted as appropriate  - Keep care items and personal belongings within reach  - Initiate and maintain comfort rounds  - Offer Toileting every 2 Hours, in advance of need  - Initiate/Maintain bed alarm  - Obtain necessary fall risk management equipment: walker  - Apply yellow socks and bracelet for high fall risk patients  - Consider moving patient to room near nurses station  Outcome: Progressing     Problem: MOBILITY - ADULT  Goal: Maintain or return to baseline ADL function  Description: INTERVENTIONS:  -  Assess patient's ability to carry out ADLs; assess patient's baseline for ADL function and identify physical deficits which impact ability to perform ADLs (bathing, care of mouth/teeth, toileting, grooming, dressing, etc )  - Assess/evaluate cause of self-care deficits   - Assess range of motion  - Assess patient's mobility; develop plan if impaired  - Assess patient's need for assistive devices and provide as appropriate  - Encourage maximum independence but intervene and supervise when necessary  - Involve family in performance of ADLs  - Assess for home care needs following discharge   - Consider OT consult to assist with ADL evaluation and planning for discharge  - Provide patient education as appropriate  Outcome: Progressing     Problem: PAIN - ADULT  Goal: Verbalizes/displays adequate comfort level or baseline comfort level  Description: Interventions:  - Encourage patient to monitor pain and request assistance  - Assess pain using appropriate pain scale  - Administer analgesics based on type and severity of pain and evaluate response  - Implement non-pharmacological measures as appropriate and evaluate response  - Consider cultural and social influences on pain and pain management  - Notify physician/advanced practitioner if interventions unsuccessful or patient reports new pain  Outcome: Progressing     Problem: DISCHARGE PLANNING  Goal: Discharge to home or other facility with appropriate resources  Description: INTERVENTIONS:  - Identify barriers to discharge w/patient and caregiver  - Arrange for needed discharge resources and transportation as appropriate  - Identify discharge learning needs (meds, wound care, etc )  - Arrange for interpretive services to assist at discharge as needed  - Refer to Case Management Department for coordinating discharge planning if the patient needs post-hospital services based on physician/advanced practitioner order or complex needs related to functional status, cognitive ability, or social support system  Outcome: Progressing     Problem: Knowledge Deficit  Goal: Patient/family/caregiver demonstrates understanding of disease process, treatment plan, medications, and discharge instructions  Description: Complete learning assessment and assess knowledge base  Interventions:  - Provide teaching at level of understanding  - Provide teaching via preferred learning methods  Outcome: Progressing     Problem: Nutrition/Hydration-ADULT  Goal: Nutrient/Hydration intake appropriate for improving, restoring or maintaining nutritional needs  Description: Monitor and assess patient's nutrition/hydration status for malnutrition  Collaborate with interdisciplinary team and initiate plan and interventions as ordered  Monitor patient's weight and dietary intake as ordered or per policy  Utilize nutrition screening tool and intervene as necessary  Determine patient's food preferences and provide high-protein, high-caloric foods as appropriate       INTERVENTIONS:  - Monitor oral intake, urinary output, labs, and treatment plans  - Assess nutrition and hydration status and recommend course of action  - Evaluate amount of meals eaten  - Assist patient with eating if necessary   - Allow adequate time for meals  - Recommend/ encourage appropriate diets, oral nutritional supplements, and vitamin/mineral supplements  - Order, calculate, and assess calorie counts as needed  - Recommend, monitor, and adjust tube feedings and TPN/PPN based on assessed needs  - Assess need for intravenous fluids  - Provide specific nutrition/hydration education as appropriate  - Include patient/family/caregiver in decisions related to nutrition  Outcome: Progressing

## 2022-09-18 NOTE — ASSESSMENT & PLAN NOTE
Lab Results   Component Value Date    HGBA1C 8 1 (H) 08/09/2022       Recent Labs     09/17/22  1038 09/17/22  1523 09/17/22 2010 09/18/22  0542   POCGLU 371* 415* 297* 120       Blood Sugar Average: Last 72 hrs:  (P) 259 5   · Patient was on metformin 1000 b i d   · Continue Lantus 10 units daily at bedtime, recently increased Humalog to 7 units t i d  A c   · Continue sliding scale insulin with Accu-Cheks while inpatient  · Diabetic diet  · Patient has been having labile blood glucose levels during hospital stay

## 2022-09-18 NOTE — PROGRESS NOTES
[facility]  Progress Note - Fariha Varela  1933, 80 y o  male MRN: [id number]  Unit/Bed#: 7T [facility] 716-01 Encounter: [id number]  Primary Care Provider: Zabrina BRIDGESZJDO YEMI   Date and time admitted to hospital: 8/27/2022  5:56 AM    * General weakness  Assessment & Plan  · Possibly due to advancing dementia as evidenced by increasing confusion and agitation  · Neuropsychology has deemed the patient incapacitated  · Patient states he lives at home alone and has been having difficulty with ambulation  · No recent traumatic events  · Laboratory analysis grossly unremarkable  · Hemodynamically stable saturating well on room air  · Case management consultation for safe disposition planning  · Patient is medically clear, awaiting safe disposition plan  · PT/OT recommended post acute rehab services  · Patient and the family would prefer home health care with 24/7 care  · Willing to consider short-term rehab temporarily  · Ongoing discussions with family  · Patient lacks capacity so pending guardianship/POA process    Severe protein-calorie malnutrition ([address] )  Assessment & Plan  Malnutrition Findings:   Adult Malnutrition type: Chronic illness  Adult Degree of Malnutrition: Other severe protein calorie malnutrition  Malnutrition Characteristics: Inadequate energy, Weight loss          continue nutritional supplement         360 Statement: Severe pro/singh malnutrition r/t depression as evidenced by 12% unplanned weight loss since 2/24/22, consuming < 75% energy intake compared to estimated energy needs > 1 month  Treated with Enlive bid    BMI Findings: Body mass index is 18 91 kg/m²  Confusion  Assessment & Plan  · Patient seems to have episode of confusion  · Discussed with the Reina Patterson- patient does not have official diagnosis of dementia before    However it seems to be an element of advanced dementia  · Neuro psychiatrist was consulted - At this time, patient does not appear to have capacity to make fully informed medical decisions  Unspecified Neurocognitive Disorder  · Pending safe disposition plan      Unwitnessed fall  Assessment & Plan  · Patient had unwitnessed fall on 08/28/2022  · CT head was negative for acute intracranial abnormality  Mild chronic microangiopathic changes     · Continue to monitor    Depression with anxiety  Assessment & Plan  · Patient has appeared somewhat anxious about his health  · Family states there may be an element of depression possibly secondary to dementia  · Continue Lexapro 5 mg oral daily  · Haldol 1 mg IM every 6 hours as needed for agitation    Stage 3a chronic kidney disease [facility])  Assessment & Plan  Lab Results   Component Value Date    EGFR 62 09/17/2022    EGFR 47 09/11/2022    EGFR 35 09/10/2022    CREATININE 1 05 09/17/2022    CREATININE 1 32 09/11/2022    CREATININE 1 68 (H) 09/10/2022   · Creatinine stable, 1 05  · Trend BMP  · Avoid hypotension and nephrotoxic agents  · Urinary retention protocol  · Strict intake and output      Atrial fibrillation (HCC)  Assessment & Plan  · Rates are controlled  · Continue home Eliquis 2 5 mg PO BID    Hyperlipidemia  Assessment & Plan  · Continue home statin therapy    Type 2 diabetes mellitus with hyperglycemia [facility])  Assessment & Plan  Lab Results   Component Value Date    HGBA1C 8 1 (H) 08/09/2022       Recent Labs     09/17/22  1038 09/17/22  1523 09/17/22 2010 09/18/22  0542   POCGLU 371* 415* 297* 120       Blood Sugar Average: Last 72 hrs:  (P) 259 5   · Patient was on metformin 1000 b i d   · Continue Lantus 10 units daily at bedtime, recently increased Humalog to 7 units t i d  A c   · Continue sliding scale insulin with Accu-Cheks while inpatient  · Diabetic diet  · Patient has been having labile blood glucose levels during hospital stay    Primary hypertension  Assessment & Plan  · BP acceptable  · Continue home beta-blocker for atrial fibrillation  · Hydralazine 5 mg IV q6 hours PRN SBP > 180 mmHg  · Monitor blood pressures      VTE Pharmacologic Prophylaxis:   Pharmacologic: Apixaban (Eliquis)  Mechanical VTE Prophylaxis in Place: Yes    Patient Centered Rounds: I have performed bedside rounds with nursing staff today  Discussions with Specialists or Other Care Team Provider:  Discussed with nurse    Education and Discussions with Family / Patient:  Discussed with patient    Time Spent for Care: 30 minutes  More than 50% of total time spent on counseling and coordination of care as described above  Current Length of Stay: 22 day(s)    Current Patient Status: Inpatient   Certification Statement: The patient will continue to require additional inpatient hospital stay due to Patient is medically clear, pending safe disposition plan    Discharge Plan / Estimated Discharge Date:  Pending      Code Status: Level 1 - Full Code      Subjective:   Patient was seen and examined at bedside  Patient was sitting in the chair  Patient has been eating intermittently it during hospitalization therefore blood sugar has been fluctuating  No acute overnight changes  Objective:     Vitals:   Temp (24hrs), Av 3 °F (36 3 °C), Min:97 °F (36 1 °C), Max:97 6 °F (36 4 °C)    Temp:  [97 °F (36 1 °C)-97 6 °F (36 4 °C)] 97 2 °F (36 2 °C)  HR:  [75-86] 75  Resp:  [18] 18  BP: (136-152)/(74-91) 152/91  SpO2:  [97 %] 97 %  Body mass index is 18 91 kg/m²  Input and Output Summary (last 24 hours):        Intake/Output Summary (Last 24 hours) at 2022 1055  Last data filed at 2022 2130  Gross per 24 hour   Intake 920 ml   Output --   Net 920 ml       Physical Exam:     Physical Exam  General: breathing well on room air, no acute distress  HEENT: NC/AT, PERRL, EOM - normal  Neck: Supple  Pulm/Chest: Normal chest wall expansion, clear breath sounds on both side, no wheezing/rhonchi or crackles appreciated  CVS: RRR, normal S1&S2, no murmur appreciated, capillary refill <2s  Abd: soft, non tender, non distended, bowel sounds +  MSK: move all 4 extremities spontaneously  Skin: warm  CNS: no acute focal neuro deficit      Additional Data:     Labs:    Results from last 7 days   Lab Units 09/17/22  0600   WBC Thousand/uL 9 50   HEMOGLOBIN g/dL 10 0*   HEMATOCRIT % 31 2*   PLATELETS Thousands/uL 220   NEUTROS PCT % 63   LYMPHS PCT % 18   MONOS PCT % 16*   EOS PCT % 2     Results from last 7 days   Lab Units 09/17/22  0600   POTASSIUM mmol/L 4 3   CHLORIDE mmol/L 106   CO2 mmol/L 29   BUN mg/dL 39*   CREATININE mg/dL 1 05   CALCIUM mg/dL 8 5           * I Have Reviewed All Lab Data Listed Above  * Additional Pertinent Lab Tests Reviewed: [address] Admission Reviewed    Imaging:      I have reviewed pertinent imaging        Recent Cultures (last 7 days):           Last 24 Hours Medication List:   Current Facility-Administered Medications   Medication Dose Route Frequency Provider Last Rate    acetaminophen  650 mg Oral Q4H PRN Eden [place], DO      apixaban  2 5 mg Oral BID Eden [place], DO      escitalopram  5 mg Oral Daily Eden [place], DO      haloperidol lactate  1 mg Intramuscular Q6H PRN Eden [place], DO      hydrALAZINE  5 mg Intravenous Q6H PRN Eden [place], DO      insulin glargine  10 Units Subcutaneous HS Eden [place], DO      insulin lispro  1-5 Units Subcutaneous TID AC Eden Burt, DO      insulin lispro  1-5 Units Subcutaneous HS Eden [place], DO      insulin lispro  7 Units Subcutaneous TID With Meals Saúl Chaparro MD      melatonin  3 mg Oral HS Marissa Ennis PA-C      metoprolol succinate  50 mg Oral BID Eden [place], DO      ondansetron  4 mg Intravenous Q6H PRN Eden Burt, DO      pantoprazole  20 mg Oral Daily Eden Burt, DO      pravastatin  80 mg Oral Daily With Dinner Eden Burt, DO          Today, Patient Was Seen By: Saúl Chaparro MD    ** Please Note: Dragon 360 Dictation voice to text software may have been used in the creation of this document   **

## 2022-09-19 LAB
GLUCOSE SERPL-MCNC: 123 MG/DL (ref 65–140)
GLUCOSE SERPL-MCNC: 175 MG/DL (ref 65–140)
GLUCOSE SERPL-MCNC: 175 MG/DL (ref 65–140)
GLUCOSE SERPL-MCNC: 183 MG/DL (ref 65–140)

## 2022-09-19 PROCEDURE — 82948 REAGENT STRIP/BLOOD GLUCOSE: CPT

## 2022-09-19 PROCEDURE — 99232 SBSQ HOSP IP/OBS MODERATE 35: CPT | Performed by: INTERNAL MEDICINE

## 2022-09-19 RX ADMIN — INSULIN LISPRO 7 UNITS: 100 INJECTION, SOLUTION INTRAVENOUS; SUBCUTANEOUS at 17:02

## 2022-09-19 RX ADMIN — PANTOPRAZOLE SODIUM 20 MG: 20 TABLET, DELAYED RELEASE ORAL at 08:20

## 2022-09-19 RX ADMIN — INSULIN LISPRO 7 UNITS: 100 INJECTION, SOLUTION INTRAVENOUS; SUBCUTANEOUS at 12:03

## 2022-09-19 RX ADMIN — PRAVASTATIN SODIUM 80 MG: 40 TABLET ORAL at 17:01

## 2022-09-19 RX ADMIN — INSULIN LISPRO 1 UNITS: 100 INJECTION, SOLUTION INTRAVENOUS; SUBCUTANEOUS at 17:01

## 2022-09-19 RX ADMIN — INSULIN LISPRO 1 UNITS: 100 INJECTION, SOLUTION INTRAVENOUS; SUBCUTANEOUS at 12:03

## 2022-09-19 RX ADMIN — INSULIN LISPRO 1 UNITS: 100 INJECTION, SOLUTION INTRAVENOUS; SUBCUTANEOUS at 21:49

## 2022-09-19 RX ADMIN — METOPROLOL SUCCINATE 50 MG: 50 TABLET, EXTENDED RELEASE ORAL at 08:19

## 2022-09-19 RX ADMIN — APIXABAN 2.5 MG: 2.5 TABLET, FILM COATED ORAL at 08:20

## 2022-09-19 RX ADMIN — INSULIN GLARGINE 10 UNITS: 100 INJECTION, SOLUTION SUBCUTANEOUS at 21:48

## 2022-09-19 RX ADMIN — INSULIN LISPRO 7 UNITS: 100 INJECTION, SOLUTION INTRAVENOUS; SUBCUTANEOUS at 08:23

## 2022-09-19 RX ADMIN — APIXABAN 2.5 MG: 2.5 TABLET, FILM COATED ORAL at 17:01

## 2022-09-19 RX ADMIN — ESCITALOPRAM OXALATE 5 MG: 5 TABLET, FILM COATED ORAL at 08:19

## 2022-09-19 RX ADMIN — MELATONIN TAB 3 MG 3 MG: 3 TAB at 21:48

## 2022-09-19 RX ADMIN — METOPROLOL SUCCINATE 50 MG: 50 TABLET, EXTENDED RELEASE ORAL at 20:29

## 2022-09-19 NOTE — ASSESSMENT & PLAN NOTE
· Possibly due to advancing dementia as evidenced by increasing confusion and agitation  · Neuropsychology has deemed the patient incapacitated  · Patient states he lives at home alone and has been having difficulty with ambulation  · No recent traumatic events  · Laboratory analysis grossly unremarkable  · Hemodynamically stable saturating well on room air  · Case management consultation for safe disposition planning  · Patient is medically clear, awaiting safe disposition plan  · PT/OT recommended post acute rehab services  · Patient and the family would prefer home health care with 24/7 care  · Willing to consider short-term rehab temporarily  · Ongoing discussions with family  · Patient now has guardian  · Case management following as family his arranging 24/7 care

## 2022-09-19 NOTE — ASSESSMENT & PLAN NOTE
Malnutrition Findings:   Adult Malnutrition type: Chronic illness  Adult Degree of Malnutrition: Other severe protein calorie malnutrition  Malnutrition Characteristics: Inadequate energy, Weight loss          continue nutritional supplement         360 Statement: Severe pro/singh malnutrition r/t depression as evidenced by 12% unplanned weight loss since 2/24/22, consuming < 75% energy intake compared to estimated energy needs > 1 month  Treated with Enlive bid    BMI Findings: Body mass index is 18 54 kg/m²

## 2022-09-19 NOTE — PROGRESS NOTES
[facility]  Progress Note - Rigo Coombs  1933, 80 y o  male MRN: [id number]  Unit/Bed#: 7T [facility] 716-01 Encounter: [id number]  Primary Care Provider: Yusra HUNTER DO   Date and time admitted to hospital: 8/27/2022  5:56 AM    * General weakness  Assessment & Plan  · Possibly due to advancing dementia as evidenced by increasing confusion and agitation  · Neuropsychology has deemed the patient incapacitated  · Patient states he lives at home alone and has been having difficulty with ambulation  · No recent traumatic events  · Laboratory analysis grossly unremarkable  · Hemodynamically stable saturating well on room air  · Case management consultation for safe disposition planning  · Patient is medically clear, awaiting safe disposition plan  · PT/OT recommended post acute rehab services  · Patient and the family would prefer home health care with 24/7 care  · Willing to consider short-term rehab temporarily  · Ongoing discussions with family  · Patient lacks capacity so pending guardianship/POA process    Primary hypertension  Assessment & Plan  · BP acceptable  · Continue home beta-blocker for atrial fibrillation  · Hydralazine 5 mg IV q6 hours PRN SBP > 180 mmHg  · Monitor blood pressures    Depression with anxiety  Assessment & Plan  · Patient has appeared somewhat anxious about his health  · Family states there may be an element of depression possibly secondary to dementia  · Continue Lexapro 5 mg oral daily  · Haldol 1 mg IM every 6 hours as needed for agitation    Type 2 diabetes mellitus with hyperglycemia [facility])  Assessment & Plan  Lab Results   Component Value Date    HGBA1C 8 1 (H) 08/09/2022       Recent Labs     09/18/22  1109 09/18/22  1541 09/18/22  2019 09/19/22  0527   POCGLU 330* 236* 402* 123       Blood Sugar Average: Last 72 hrs:  (P) 175 [id number]   · Patient was on metformin 1000 b i d   · Continue Lantus 10 units daily at bedtime, recently increased Humalog to 7 units t i d  A c   · Continue sliding scale insulin with Accu-Cheks while inpatient  · Diabetic diet  · Patient has been having labile blood glucose levels during hospital stay    Hyperlipidemia  Assessment & Plan  · Continue home statin therapy    Atrial fibrillation [facility])  Assessment & Plan  · Rates are controlled  · Continue home Eliquis 2 5 mg PO BID    Stage 3a chronic kidney disease [facility])  Assessment & Plan  Lab Results   Component Value Date    EGFR 62 09/17/2022    EGFR 47 09/11/2022    EGFR 35 09/10/2022    CREATININE 1 05 09/17/2022    CREATININE 1 32 09/11/2022    CREATININE 1 68 (H) 09/10/2022   · Creatinine stable, 1 05  · Trend BMP  · Avoid hypotension and nephrotoxic agents  · Urinary retention protocol  · Strict intake and output      Severe protein-calorie malnutrition ([address] )  Assessment & Plan  Malnutrition Findings:   Adult Malnutrition type: Chronic illness  Adult Degree of Malnutrition: Other severe protein calorie malnutrition  Malnutrition Characteristics: Inadequate energy, Weight loss          continue nutritional supplement         360 Statement: Severe pro/singh malnutrition r/t depression as evidenced by 12% unplanned weight loss since 2/24/22, consuming < 75% energy intake compared to estimated energy needs > 1 month  Treated with Enlive bid    BMI Findings: Body mass index is 18 54 kg/m²  Confusion  Assessment & Plan  · Patient seems to have episode of confusion  · Discussed with the George Fragoso- patient does not have official diagnosis of dementia before  However it seems to be an element of advanced dementia  · Neuro psychiatrist was consulted - At this time, patient does not appear to have capacity to make fully informed medical decisions   Unspecified Neurocognitive Disorder  · Pending safe disposition plan      Unwitnessed fall  Assessment & Plan  · Patient had unwitnessed fall on 08/28/2022  · CT head was negative for acute intracranial abnormality  Mild chronic microangiopathic changes  · Continue to monitor    VTE Pharmacologic Prophylaxis: Eliquis    Pharmacologic: Apixaban (Eliquis)     Mechanical VTE Prophylaxis in Place: Yes    Patient Centered Rounds: I have performed bedside rounds with nursing staff today  Discussions with Specialists or Other Care Team Provider:  Discussed with nurse    Education and Discussions with Family / Patient:  Discussed with patient    Time Spent for Care: 30 minutes  More than 50% of total time spent on counseling and coordination of care as described above  Current Length of Stay: 23 day(s)    Current Patient Status: Inpatient     Certification Statement: The patient will continue to require additional inpatient hospital stay due to Patient is medically clear, pending safe disposition plan    Discharge Plan / Estimated Discharge Date:  Pending POA/guardianship      Code Status: Level 1 - Full Code      Subjective:   Patient seen and examined at bedside  No acute events overnight  Denies chest pain, SOB, diaphoresis, nausea/vomiting/diarrhea, fevers/chills  Objective:     Vitals:   Temp (24hrs), Av 5 °F (36 4 °C), Min:97 1 °F (36 2 °C), Max:97 8 °F (36 6 °C)    Temp:  [97 1 °F (36 2 °C)-97 8 °F (36 6 °C)] 97 8 °F (36 6 °C)  HR:  [83-99] 83  Resp:  [18] 18  BP: (129-142)/(84-87) 142/87  SpO2:  [98 %-99 %] 99 %  Body mass index is 18 54 kg/m²  Input and Output Summary (last 24 hours):        Intake/Output Summary (Last 24 hours) at 2022 0700  Last data filed at 2022 2101  Gross per 24 hour   Intake 360 ml   Output --   Net 360 ml       Physical Exam:     Physical Exam     General: breathing well on room air, no acute distress  HEENT: NC/AT, PERRL, EOM - normal  Neck: Supple  Pulm/Chest: Normal chest wall expansion, clear breath sounds on both side, no wheezing/rhonchi or crackles appreciated  CVS: RRR, normal S1&S2, no murmur appreciated, capillary refill <2s  Abd: soft, non tender, non distended, bowel sounds +  MSK: move all 4 extremities spontaneously  Skin: warm  CNS: no acute focal neuro deficit      Additional Data:     Labs:    Results from last 7 days   Lab Units 09/17/22  0600   WBC Thousand/uL 9 50   HEMOGLOBIN g/dL 10 0*   HEMATOCRIT % 31 2*   PLATELETS Thousands/uL 220   NEUTROS PCT % 63   LYMPHS PCT % 18   MONOS PCT % 16*   EOS PCT % 2     Results from last 7 days   Lab Units 09/17/22  0600   POTASSIUM mmol/L 4 3   CHLORIDE mmol/L 106   CO2 mmol/L 29   BUN mg/dL 39*   CREATININE mg/dL 1 05   CALCIUM mg/dL 8 5           * I Have Reviewed All Lab Data Listed Above  * Additional Pertinent Lab Tests Reviewed: [address] Admission Reviewed    Imaging:      I have reviewed pertinent imaging        Recent Cultures (last 7 days):           Last 24 Hours Medication List:   Current Facility-Administered Medications   Medication Dose Route Frequency Provider Last Rate    acetaminophen  650 mg Oral Q4H PRN Christina Citron, DO      apixaban  2 5 mg Oral BID Christina Citron, DO      escitalopram  5 mg Oral Daily Christina Citron, DO      haloperidol lactate  1 mg Intramuscular Q6H PRN Christina Citron, DO      hydrALAZINE  5 mg Intravenous Q6H PRN Christina Citron, DO      insulin glargine  10 Units Subcutaneous HS Christina Citron, DO      insulin lispro  1-5 Units Subcutaneous TID AC Christina Citron, DO      insulin lispro  1-5 Units Subcutaneous HS Christina Citron, DO      insulin lispro  7 Units Subcutaneous TID With Meals Shruthi Liu MD      melatonin  3 mg Oral HS David Morales PA-C      metoprolol succinate  50 mg Oral BID Christina Citron, DO      ondansetron  4 mg Intravenous Q6H PRN Christina Citron, DO      pantoprazole  20 mg Oral Daily Christina Citron, DO      pravastatin  80 mg Oral Daily With Dinner Christina Asif DO          Today, Patient Was Seen By: Christina Asif DO    ** Please Note: Dragon 360 Dictation voice to text software may have been used in the creation of this document   **

## 2022-09-19 NOTE — ASSESSMENT & PLAN NOTE
Lab Results   Component Value Date    HGBA1C 8 1 (H) 08/09/2022       Recent Labs     09/18/22  1109 09/18/22  1541 09/18/22 2019 09/19/22  0527   POCGLU 330* 236* 402* 123       Blood Sugar Average: Last 72 hrs:  (P) 269 [id number]   · Patient was on metformin 1000 b i d   · Continue Lantus 10 units daily at bedtime, recently increased Humalog to 7 units t i d  A c   · Continue sliding scale insulin with Accu-Cheks while inpatient  · Diabetic diet  · Patient has been having labile blood glucose levels during hospital stay

## 2022-09-19 NOTE — CASE MANAGEMENT
Case Management Discharge Planning Note    Patient name Verlin Schwab  Location 7T [facility] 716/7[facility] 716-01 MRN [id number]  : 1933 Date 2022       Current Admission Date: 2022  Current Admission Diagnosis:General weakness   Patient Active Problem List    Diagnosis Date Noted    Severe protein-calorie malnutrition ([facility] ) 2022    Unwitnessed fall 2022    Confusion 2022    Depression with anxiety 2022    General weakness 2022    Stage 3a chronic kidney disease ([facility] ) 2022    [facility] 2021    Gross hematuria 10/28/2021    Hematemesis 10/26/2021    Onychomycosis 2020    Anemia 2018    Atrial fibrillation ([facility] ) 2013    Primary hypertension 2012    Inguinal hernia 2012    Type 2 diabetes mellitus with hyperglycemia ([place] [address] ) 2012    Hyperlipidemia 2012      LOS (days): 23  Geometric Mean LOS (GMLOS) (days): 4 50  Days to GMLOS:-18 6     OBJECTIVE:  Risk of Unplanned Readmission Score: 22 81         Current admission status: Inpatient   Preferred Pharmacy:   [facility]  for [address] [place] [address], [address] [place] [address]  [address] [place] [address]  Phone: [phone] Fax: [phone]    [facility] [id number] [facility] [id number] - [address]  [address] [place] [id number]  Phone: [phone] Fax: [phone]    Primary Care Provider: Dwayne Mackay DO    Primary Insurance: MEDICARE  Secondary Insurance: [facility] DETAILS: CM was notified at morning rounds that pt Is medically cleared to be discharged today  CM called Yoselyn Grullon Ph: [phone]  [place] stated that she is still in process of finding 24hrs visiting Nurse for the patient  CM requested [place] to expedite the process     CM department will continue to follow through pt's D/C

## 2022-09-19 NOTE — PLAN OF CARE
Problem: Potential for Falls  Goal: Patient will remain free of falls  Description: INTERVENTIONS:  - Educate patient/family on patient safety including physical limitations  - Instruct patient to call for assistance with activity   - Consult OT/PT to assist with strengthening/mobility   - Keep Call bell within reach  - Keep bed low and locked with side rails adjusted as appropriate  - Keep care items and personal belongings within reach  - Initiate and maintain comfort rounds  - Offer Toileting every 2 Hours, in advance of need  - Initiate/Maintain bed alarm  - Obtain necessary fall risk management equipment: walker  - Apply yellow socks and bracelet for high fall risk patients  - Consider moving patient to room near nurses station  Outcome: Progressing     Problem: MOBILITY - ADULT  Goal: Maintain or return to baseline ADL function  Description: INTERVENTIONS:  -  Assess patient's ability to carry out ADLs; assess patient's baseline for ADL function and identify physical deficits which impact ability to perform ADLs (bathing, care of mouth/teeth, toileting, grooming, dressing, etc )  - Assess/evaluate cause of self-care deficits   - Assess range of motion  - Assess patient's mobility; develop plan if impaired  - Assess patient's need for assistive devices and provide as appropriate  - Encourage maximum independence but intervene and supervise when necessary  - Involve family in performance of ADLs  - Assess for home care needs following discharge   - Consider OT consult to assist with ADL evaluation and planning for discharge  - Provide patient education as appropriate  Outcome: Progressing     Problem: DISCHARGE PLANNING  Goal: Discharge to home or other facility with appropriate resources  Description: INTERVENTIONS:  - Identify barriers to discharge w/patient and caregiver  - Arrange for needed discharge resources and transportation as appropriate  - Identify discharge learning needs (meds, wound care, etc )  - Arrange for interpretive services to assist at discharge as needed  - Refer to Case Management Department for coordinating discharge planning if the patient needs post-hospital services based on physician/advanced practitioner order or complex needs related to functional status, cognitive ability, or social support system  Outcome: Progressing     Problem: Knowledge Deficit  Goal: Patient/family/caregiver demonstrates understanding of disease process, treatment plan, medications, and discharge instructions  Description: Complete learning assessment and assess knowledge base  Interventions:  - Provide teaching at level of understanding  - Provide teaching via preferred learning methods  Outcome: Progressing     Problem: Nutrition/Hydration-ADULT  Goal: Nutrient/Hydration intake appropriate for improving, restoring or maintaining nutritional needs  Description: Monitor and assess patient's nutrition/hydration status for malnutrition  Collaborate with interdisciplinary team and initiate plan and interventions as ordered  Monitor patient's weight and dietary intake as ordered or per policy  Utilize nutrition screening tool and intervene as necessary  Determine patient's food preferences and provide high-protein, high-caloric foods as appropriate       INTERVENTIONS:  - Monitor oral intake, urinary output, labs, and treatment plans  - Assess nutrition and hydration status and recommend course of action  - Evaluate amount of meals eaten  - Assist patient with eating if necessary   - Allow adequate time for meals  - Recommend/ encourage appropriate diets, oral nutritional supplements, and vitamin/mineral supplements  - Order, calculate, and assess calorie counts as needed  - Recommend, monitor, and adjust tube feedings and TPN/PPN based on assessed needs  - Assess need for intravenous fluids  - Provide specific nutrition/hydration education as appropriate  - Include patient/family/caregiver in decisions related to nutrition  Outcome: Progressing     Problem: METABOLIC, FLUID AND ELECTROLYTES - ADULT  Goal: Glucose maintained within target range  Description: INTERVENTIONS:  - Monitor Blood Glucose as ordered  - Assess for signs and symptoms of hyperglycemia and hypoglycemia  - Administer ordered medications to maintain glucose within target range  - Assess nutritional intake and initiate nutrition service referral as needed  Outcome: Progressing

## 2022-09-19 NOTE — PLAN OF CARE
Problem: Potential for Falls  Goal: Patient will remain free of falls  Description: INTERVENTIONS:  - Educate patient/family on patient safety including physical limitations  - Instruct patient to call for assistance with activity   - Consult OT/PT to assist with strengthening/mobility   - Keep Call bell within reach  - Keep bed low and locked with side rails adjusted as appropriate  - Keep care items and personal belongings within reach  - Initiate and maintain comfort rounds  - Offer Toileting every 2 Hours, in advance of need  - Initiate/Maintain bed alarm  - Obtain necessary fall risk management equipment: walker  - Apply yellow socks and bracelet for high fall risk patients  - Consider moving patient to room near nurses station  Outcome: Progressing     Problem: MOBILITY - ADULT  Goal: Maintain or return to baseline ADL function  Description: INTERVENTIONS:  -  Assess patient's ability to carry out ADLs; assess patient's baseline for ADL function and identify physical deficits which impact ability to perform ADLs (bathing, care of mouth/teeth, toileting, grooming, dressing, etc )  - Assess/evaluate cause of self-care deficits   - Assess range of motion  - Assess patient's mobility; develop plan if impaired  - Assess patient's need for assistive devices and provide as appropriate  - Encourage maximum independence but intervene and supervise when necessary  - Involve family in performance of ADLs  - Assess for home care needs following discharge   - Consider OT consult to assist with ADL evaluation and planning for discharge  - Provide patient education as appropriate  Outcome: Progressing  Goal: Maintains/Returns to pre admission functional level  Description: INTERVENTIONS:  - Perform BMAT or MOVE assessment daily    - Set and communicate daily mobility goal to care team and patient/family/caregiver     - Collaborate with rehabilitation services on mobility goals if consulted  - Ambulate patient 2 times a day  - Out of bed to chair 3 times a day   - Out of bed for meals 3 times a day  - Out of bed for toileting  - Record patient progress and toleration of activity level   Outcome: Progressing     Problem: PAIN - ADULT  Goal: Verbalizes/displays adequate comfort level or baseline comfort level  Description: Interventions:  - Encourage patient to monitor pain and request assistance  - Assess pain using appropriate pain scale  - Administer analgesics based on type and severity of pain and evaluate response  - Implement non-pharmacological measures as appropriate and evaluate response  - Consider cultural and social influences on pain and pain management  - Notify physician/advanced practitioner if interventions unsuccessful or patient reports new pain  Outcome: Progressing     Problem: INFECTION - ADULT  Goal: Absence or prevention of progression during hospitalization  Description: INTERVENTIONS:  - Assess and monitor for signs and symptoms of infection  - Monitor lab/diagnostic results  - Monitor all insertion sites  - Administer medications as ordered  - Instruct and encourage patient and family to use good hand hygiene technique  - Identify and instruct in appropriate isolation precautions for identified infection/condition  Outcome: Progressing  Goal: Absence of fever/infection during neutropenic period  Description: INTERVENTIONS:  - Monitor WBC    Outcome: Progressing     Problem: SAFETY ADULT  Goal: Patient will remain free of falls  Description: INTERVENTIONS:  - Educate patient/family on patient safety including physical limitations  - Instruct patient to call for assistance with activity   - Consult OT/PT to assist with strengthening/mobility   - Keep Call bell within reach  - Keep bed low and locked with side rails adjusted as appropriate  - Keep care items and personal belongings within reach  - Initiate and maintain comfort rounds  - Offer Toileting every 2 Hours, in advance of need  - Initiate/Maintain bed alarm  - Obtain necessary fall risk management equipment: walker  - Apply yellow socks and bracelet for high fall risk patients  - Consider moving patient to room near nurses station  Outcome: Progressing  Goal: Maintain or return to baseline ADL function  Description: INTERVENTIONS:  -  Assess patient's ability to carry out ADLs; assess patient's baseline for ADL function and identify physical deficits which impact ability to perform ADLs (bathing, care of mouth/teeth, toileting, grooming, dressing, etc )  - Assess/evaluate cause of self-care deficits   - Assess range of motion  - Assess patient's mobility; develop plan if impaired  - Assess patient's need for assistive devices and provide as appropriate  - Encourage maximum independence but intervene and supervise when necessary  - Involve family in performance of ADLs  - Assess for home care needs following discharge   - Consider OT consult to assist with ADL evaluation and planning for discharge  - Provide patient education as appropriate  Outcome: Progressing  Goal: Maintains/Returns to pre admission functional level  Description: INTERVENTIONS:  - Perform BMAT or MOVE assessment daily    - Set and communicate daily mobility goal to care team and patient/family/caregiver     - Collaborate with rehabilitation services on mobility goals if consulted  - Ambulate patient 2 times a day  - Out of bed to chair 3 times a day   - Out of bed for meals 3 times a day  - Out of bed for toileting  - Record patient progress and toleration of activity level   Outcome: Progressing     Problem: DISCHARGE PLANNING  Goal: Discharge to home or other facility with appropriate resources  Description: INTERVENTIONS:  - Identify barriers to discharge w/patient and caregiver  - Arrange for needed discharge resources and transportation as appropriate  - Identify discharge learning needs (meds, wound care, etc )  - Arrange for interpretive services to assist at discharge as needed  - Refer to Case Management Department for coordinating discharge planning if the patient needs post-hospital services based on physician/advanced practitioner order or complex needs related to functional status, cognitive ability, or social support system  Outcome: Progressing     Problem: Knowledge Deficit  Goal: Patient/family/caregiver demonstrates understanding of disease process, treatment plan, medications, and discharge instructions  Description: Complete learning assessment and assess knowledge base  Interventions:  - Provide teaching at level of understanding  - Provide teaching via preferred learning methods  Outcome: Progressing     Problem: Prexisting or High Potential for Compromised Skin Integrity  Goal: Skin integrity is maintained or improved  Description: INTERVENTIONS:  - Identify patients at risk for skin breakdown  - Assess and monitor skin integrity  - Assess and monitor nutrition and hydration status  - Monitor labs   - Assess for incontinence   - Turn and reposition patient  - Assist with mobility/ambulation  - Relieve pressure over bony prominences  - Avoid friction and shearing  - Provide appropriate hygiene as needed including keeping skin clean and dry  - Evaluate need for skin moisturizer/barrier cream  - Collaborate with interdisciplinary team   - Patient/family teaching  - Consider wound care consult   Outcome: Progressing     Problem: Nutrition/Hydration-ADULT  Goal: Nutrient/Hydration intake appropriate for improving, restoring or maintaining nutritional needs  Description: Monitor and assess patient's nutrition/hydration status for malnutrition  Collaborate with interdisciplinary team and initiate plan and interventions as ordered  Monitor patient's weight and dietary intake as ordered or per policy  Utilize nutrition screening tool and intervene as necessary  Determine patient's food preferences and provide high-protein, high-caloric foods as appropriate       INTERVENTIONS:  - Monitor oral intake, urinary output, labs, and treatment plans  - Assess nutrition and hydration status and recommend course of action  - Evaluate amount of meals eaten  - Assist patient with eating if necessary   - Allow adequate time for meals  - Recommend/ encourage appropriate diets, oral nutritional supplements, and vitamin/mineral supplements  - Order, calculate, and assess calorie counts as needed  - Recommend, monitor, and adjust tube feedings and TPN/PPN based on assessed needs  - Assess need for intravenous fluids  - Provide specific nutrition/hydration education as appropriate  - Include patient/family/caregiver in decisions related to nutrition  Outcome: Progressing     Problem: CONFUSION/THOUGHT DISTURBANCE  Goal: Thought disturbances (confusion, delirium, depression, dementia or psychosis) are managed to maintain or return to baseline mental status and functional level  Description: INTERVENTIONS:  - Assess for possible contributors to  thought disturbance, including but not limited to medications, infection, impaired vision or hearing, underlying metabolic abnormalities, dehydration, respiratory compromise,  psychiatric diagnoses and notify attending PHYSICAN/AP  - Monitor and intervene to maintain adequate nutrition, hydration, elimination, sleep and activity  - Decrease environmental stimuli, including noise as appropriate  - Provide frequent contacts to provide refocusing, direction and reassurance as needed  Approach patient calmly with eye contact and at their level    - [place] high risk fall precautions, aspiration precautions and other safety measures, as indicated  - If delirium suspected, notify physician/AP of change in condition and request immediate in-person evaluation  - Pursue consults as appropriate including Geriatric (campus dependent), OT for cognitive evaluation/activity planning, psychiatric, pastoral care, etc   Outcome: Progressing     Problem: BEHAVIOR  Goal: Pt/Family maintain appropriate behavior and adhere to behavioral management agreement, if implemented  Description: INTERVENTIONS:  - Assess the family dynamic   - Encourage verbalization of thoughts and concerns in a socially appropriate manner  - Assess patient/family's coping skills and non-compliant behavior (including use of illegal substances)  - Utilize positive, consistent limit setting strategies supporting safety of patient, staff and others  - Initiate consult with Case Management, Spiritual Care or other ancillary services as appropriate  - If a patient's/visitor's behavior jeopardizes the safety of the patient, staff, or others, refer to organization procedure     - Notify Security of behavior or suspected illegal substances which indicate the need for search of the patient and/or belongings  - Encourage participation in the decision making process about a behavioral management agreement; implement if patient meets criteria  Outcome: Progressing     Problem: METABOLIC, FLUID AND ELECTROLYTES - ADULT  Goal: Glucose maintained within target range  Description: INTERVENTIONS:  - Monitor Blood Glucose as ordered  - Assess for signs and symptoms of hyperglycemia and hypoglycemia  - Administer ordered medications to maintain glucose within target range  - Assess nutritional intake and initiate nutrition service referral as needed  Outcome: Progressing     Problem: GASTROINTESTINAL - ADULT  Goal: Maintains adequate nutritional intake  Description: INTERVENTIONS:  - Monitor percentage of each meal consumed  - Identify factors contributing to decreased intake, treat as appropriate  - Assist with meals as needed  - Monitor I&O, weight, and lab values if indicated  - Obtain nutrition services referral as needed  Outcome: Progressing

## 2022-09-19 NOTE — ASSESSMENT & PLAN NOTE
· Patient seems to have episode of confusion  · Discussed with the [facility] and [place]- patient does not have official diagnosis of dementia before  However it seems to be an element of advanced dementia  · Neuro psychiatrist was consulted - At this time, patient does not appear to have capacity to make fully informed medical decisions   Unspecified Neurocognitive Disorder  · Pending safe disposition plan

## 2022-09-20 LAB
ANION GAP SERPL CALCULATED.3IONS-SCNC: 5 MMOL/L (ref 5–14)
BASOPHILS # BLD AUTO: 0.05 THOUSANDS/ΜL (ref 0–0.1)
BASOPHILS NFR BLD AUTO: 1 % (ref 0–1)
BUN SERPL-MCNC: 39 MG/DL (ref 5–25)
CALCIUM SERPL-MCNC: 8.4 MG/DL (ref 8.4–10.2)
CHLORIDE SERPL-SCNC: 107 MMOL/L (ref 96–108)
CO2 SERPL-SCNC: 29 MMOL/L (ref 21–32)
CREAT SERPL-MCNC: 1.17 MG/DL (ref 0.7–1.5)
EOSINOPHIL # BLD AUTO: 0.14 THOUSAND/ΜL (ref 0–0.61)
EOSINOPHIL NFR BLD AUTO: 2 % (ref 0–6)
ERYTHROCYTE [DISTWIDTH] IN BLOOD BY AUTOMATED COUNT: 12.9 % (ref 11.6–15.1)
GFR SERPL CREATININE-BSD FRML MDRD: 54 ML/MIN/1.73SQ M
GLUCOSE SERPL-MCNC: 166 MG/DL (ref 65–140)
GLUCOSE SERPL-MCNC: 168 MG/DL (ref 65–140)
GLUCOSE SERPL-MCNC: 238 MG/DL (ref 65–140)
GLUCOSE SERPL-MCNC: 79 MG/DL (ref 65–140)
GLUCOSE SERPL-MCNC: 82 MG/DL (ref 70–99)
HCT VFR BLD AUTO: 30.5 % (ref 36.5–49.3)
HGB BLD-MCNC: 9.9 G/DL (ref 12–17)
IMM GRANULOCYTES # BLD AUTO: 0.02 THOUSAND/UL (ref 0–0.2)
IMM GRANULOCYTES NFR BLD AUTO: 0 % (ref 0–2)
LYMPHOCYTES # BLD AUTO: 1.88 THOUSANDS/ΜL (ref 0.6–4.47)
LYMPHOCYTES NFR BLD AUTO: 23 % (ref 14–44)
MAGNESIUM SERPL-MCNC: 1.9 MG/DL (ref 1.6–2.3)
MCH RBC QN AUTO: 34.6 PG (ref 26.8–34.3)
MCHC RBC AUTO-ENTMCNC: 32.5 G/DL (ref 31.4–37.4)
MCV RBC AUTO: 107 FL (ref 82–98)
MONOCYTES # BLD AUTO: 1.28 THOUSAND/ΜL (ref 0.17–1.22)
MONOCYTES NFR BLD AUTO: 16 % (ref 4–12)
NEUTROPHILS # BLD AUTO: 4.72 THOUSANDS/ΜL (ref 1.85–7.62)
NEUTS SEG NFR BLD AUTO: 58 % (ref 43–75)
NRBC BLD AUTO-RTO: 0 /100 WBCS
PHOSPHATE SERPL-MCNC: 4.1 MG/DL (ref 2.5–4.8)
PLATELET # BLD AUTO: 208 THOUSANDS/UL (ref 149–390)
PMV BLD AUTO: 10.5 FL (ref 8.9–12.7)
POTASSIUM SERPL-SCNC: 4 MMOL/L (ref 3.5–5.3)
RBC # BLD AUTO: 2.86 MILLION/UL (ref 3.88–5.62)
SODIUM SERPL-SCNC: 141 MMOL/L (ref 135–147)
WBC # BLD AUTO: 8.09 THOUSAND/UL (ref 4.31–10.16)

## 2022-09-20 PROCEDURE — 83735 ASSAY OF MAGNESIUM: CPT | Performed by: INTERNAL MEDICINE

## 2022-09-20 PROCEDURE — 99232 SBSQ HOSP IP/OBS MODERATE 35: CPT | Performed by: INTERNAL MEDICINE

## 2022-09-20 PROCEDURE — 80048 BASIC METABOLIC PNL TOTAL CA: CPT | Performed by: INTERNAL MEDICINE

## 2022-09-20 PROCEDURE — 82948 REAGENT STRIP/BLOOD GLUCOSE: CPT

## 2022-09-20 PROCEDURE — 85025 COMPLETE CBC W/AUTO DIFF WBC: CPT | Performed by: INTERNAL MEDICINE

## 2022-09-20 PROCEDURE — 84100 ASSAY OF PHOSPHORUS: CPT | Performed by: INTERNAL MEDICINE

## 2022-09-20 RX ADMIN — PRAVASTATIN SODIUM 80 MG: 40 TABLET ORAL at 17:12

## 2022-09-20 RX ADMIN — METOPROLOL SUCCINATE 50 MG: 50 TABLET, EXTENDED RELEASE ORAL at 09:08

## 2022-09-20 RX ADMIN — INSULIN LISPRO 7 UNITS: 100 INJECTION, SOLUTION INTRAVENOUS; SUBCUTANEOUS at 12:01

## 2022-09-20 RX ADMIN — ESCITALOPRAM OXALATE 5 MG: 5 TABLET, FILM COATED ORAL at 09:07

## 2022-09-20 RX ADMIN — PANTOPRAZOLE SODIUM 20 MG: 20 TABLET, DELAYED RELEASE ORAL at 09:09

## 2022-09-20 RX ADMIN — INSULIN LISPRO 7 UNITS: 100 INJECTION, SOLUTION INTRAVENOUS; SUBCUTANEOUS at 17:12

## 2022-09-20 RX ADMIN — INSULIN LISPRO 1 UNITS: 100 INJECTION, SOLUTION INTRAVENOUS; SUBCUTANEOUS at 17:11

## 2022-09-20 RX ADMIN — INSULIN LISPRO 7 UNITS: 100 INJECTION, SOLUTION INTRAVENOUS; SUBCUTANEOUS at 09:10

## 2022-09-20 RX ADMIN — APIXABAN 2.5 MG: 2.5 TABLET, FILM COATED ORAL at 17:12

## 2022-09-20 RX ADMIN — INSULIN LISPRO 2 UNITS: 100 INJECTION, SOLUTION INTRAVENOUS; SUBCUTANEOUS at 12:01

## 2022-09-20 RX ADMIN — APIXABAN 2.5 MG: 2.5 TABLET, FILM COATED ORAL at 09:09

## 2022-09-20 NOTE — PROGRESS NOTES
[facility]  Progress Note - Nancy Summers  1933, 80 y o  male MRN: [id number]  Unit/Bed#: 7T [facility] Encounter: [id number]  Primary Care Provider: Giovanni LUGO DO   Date and time admitted to hospital: 8/27/2022  5:56 AM    * General weakness  Assessment & Plan  · Possibly due to advancing dementia as evidenced by increasing confusion and agitation  · Neuropsychology has deemed the patient incapacitated  · Patient states he lives at home alone and has been having difficulty with ambulation  · No recent traumatic events  · Laboratory analysis grossly unremarkable  · Hemodynamically stable saturating well on room air  · Case management consultation for safe disposition planning  · Patient is medically clear, awaiting safe disposition plan  · PT/OT recommended post acute rehab services  · Patient and the family would prefer home health care with 24/7 care  · Willing to consider short-term rehab temporarily  · Ongoing discussions with family  · Patient now has guardian  · Case management following as family his arranging 24/7 care    Primary hypertension  Assessment & Plan  · BP acceptable  · Continue home beta-blocker for atrial fibrillation  · Hydralazine 5 mg IV q6 hours PRN SBP > 180 mmHg  · Monitor blood pressures    Depression with anxiety  Assessment & Plan  · Patient has appeared somewhat anxious about his health  · Family states there may be an element of depression possibly secondary to dementia  · Continue Lexapro 5 mg oral daily  · Haldol 1 mg IM every 6 hours as needed for agitation    Type 2 diabetes mellitus with hyperglycemia [facility])  Assessment & Plan  Lab Results   Component Value Date    HGBA1C 8 1 (H) 08/09/2022       Recent Labs     09/18/22  1109 09/18/22  1541 09/18/22 2019 09/19/22  0527   POCGLU 330* 236* 402* 123       Blood Sugar Average: Last 72 hrs:  (P) 491 [id number]   · Patient was on metformin 1000 b i d   · Continue Lantus 10 units daily at bedtime, recently increased Humalog to 7 units t i d  A c   · Continue sliding scale insulin with Accu-Cheks while inpatient  · Diabetic diet  · Patient has been having labile blood glucose levels during hospital stay    Hyperlipidemia  Assessment & Plan  · Continue home statin therapy    Atrial fibrillation [facility])  Assessment & Plan  · Rates are controlled  · Continue home Eliquis 2 5 mg PO BID    Stage 3a chronic kidney disease [facility])  Assessment & Plan  Lab Results   Component Value Date    EGFR 62 09/17/2022    EGFR 47 09/11/2022    EGFR 35 09/10/2022    CREATININE 1 05 09/17/2022    CREATININE 1 32 09/11/2022    CREATININE 1 68 (H) 09/10/2022   · Creatinine stable, 1 05  · Trend BMP  · Avoid hypotension and nephrotoxic agents  · Urinary retention protocol  · Strict intake and output      Severe protein-calorie malnutrition ([address] )  Assessment & Plan  Malnutrition Findings:   Adult Malnutrition type: Chronic illness  Adult Degree of Malnutrition: Other severe protein calorie malnutrition  Malnutrition Characteristics: Inadequate energy, Weight loss          continue nutritional supplement         360 Statement: Severe pro/singh malnutrition r/t depression as evidenced by 12% unplanned weight loss since 2/24/22, consuming < 75% energy intake compared to estimated energy needs > 1 month  Treated with Enlive bid    BMI Findings: Body mass index is 18 54 kg/m²  Confusion  Assessment & Plan  · Patient seems to have episode of confusion  · Discussed with the Eunice Lawrence and Aaron Ellison- patient does not have official diagnosis of dementia before  However it seems to be an element of advanced dementia  · Neuro psychiatrist was consulted - At this time, patient does not appear to have capacity to make fully informed medical decisions   Unspecified Neurocognitive Disorder  · Pending safe disposition plan      Unwitnessed fall  Assessment & Plan  · Patient had unwitnessed fall on 08/28/2022  · CT head was negative for acute intracranial abnormality  Mild chronic microangiopathic changes  · Continue to monitor      VTE Pharmacologic Prophylaxis: Eliquis    Patient Centered Rounds:  Patient care rounds were performed with nursing    Discussions with Specialists or Other Care Team Provider:  Case management    Education and Discussions with Family / Patient:  Spoke with patient at bedside    Time Spent for Care: 30  More than 50% of total time spent on counseling and coordination of care as described above  Current Length of Stay: 24 day(s)    Current Patient Status: Inpatient     Certification Statement: The patient will continue to require additional inpatient hospital stay due to placement to  home care    Discharge Plan:   home care    Code Status: Level 1 - Full Code      Subjective:   Patient seen and examined at bedside  No acute events overnight  Denies chest pain, SOB, diaphoresis, nausea/vomiting/diarrhea, fevers/chills  Objective:     Vitals:   Temp (24hrs), Av °F (36 1 °C), Min:96 6 °F (35 9 °C), Max:97 2 °F (36 2 °C)    Temp:  [96 6 °F (35 9 °C)-97 2 °F (36 2 °C)] 97 2 °F (36 2 °C)  HR:  [77-87] 82  Resp:  [18] 18  BP: (109-154)/(57-82) 119/71  SpO2:  [95 %-99 %] 95 %  Body mass index is 18 51 kg/m²  Input and Output Summary (last 24 hours): Intake/Output Summary (Last 24 hours) at 2022 0655  Last data filed at 2022 1300  Gross per 24 hour   Intake 600 ml   Output 200 ml   Net 400 ml       Physical Exam:     Physical Exam  Vitals and nursing note reviewed  Constitutional:       Appearance: He is well-developed  HENT:      Head: Normocephalic and atraumatic  Eyes:      Conjunctiva/sclera: Conjunctivae normal    Cardiovascular:      Rate and Rhythm: Normal rate and regular rhythm  Heart sounds: No murmur heard  Pulmonary:      Effort: Pulmonary effort is normal  No respiratory distress  Breath sounds: Normal breath sounds     Abdominal:      Palpations: Abdomen is soft  Tenderness: There is no abdominal tenderness  Musculoskeletal:      Cervical back: Neck supple  Skin:     General: Skin is warm and dry  Neurological:      Mental Status: He is alert  Mental status is at baseline  He is disoriented  Additional Data:     Labs:  I have reviewed pertinent results     Results from last 7 days   Lab Units 09/20/22  0523   WBC Thousand/uL 8 09   HEMOGLOBIN g/dL 9 9*   HEMATOCRIT % 30 5*   PLATELETS Thousands/uL 208   NEUTROS PCT % 58   LYMPHS PCT % 23   MONOS PCT % 16*   EOS PCT % 2     Results from last 7 days   Lab Units 09/17/22  0600   SODIUM mmol/L 140   POTASSIUM mmol/L 4 3   CHLORIDE mmol/L 106   CO2 mmol/L 29   BUN mg/dL 39*   CREATININE mg/dL 1 05   ANION GAP mmol/L 5   CALCIUM mg/dL 8 5   GLUCOSE RANDOM mg/dL 140*         Results from last 7 days   Lab Units 09/20/22  0611 09/19/22  2033 09/19/22  1626 09/19/22  1108 09/19/22  0527 09/18/22 2019 09/18/22  1541 09/18/22  1109 09/18/22  0542 09/17/22 2010 09/17/22  1523 09/17/22  1038   POC GLUCOSE mg/dl 79 175* 183* 175* 123 402* 236* 330* 120 297* 415* 371*                 Imaging: I have reviewed pertinent imaging       Recent Cultures (last 7 days):           Last 24 Hours Medication List:   Current Facility-Administered Medications   Medication Dose Route Frequency Provider Last Rate    acetaminophen  650 mg Oral Q4H PRN Celestina Rings, DO      apixaban  2 5 mg Oral BID Celestina Rings, DO      escitalopram  5 mg Oral Daily Celestina Rings, DO      haloperidol lactate  1 mg Intramuscular Q6H PRN Celestina Rings, DO      hydrALAZINE  5 mg Intravenous Q6H PRN Celestina Rings, DO      insulin glargine  10 Units Subcutaneous HS Celestina Rings, DO      insulin lispro  1-5 Units Subcutaneous TID AC Harpreet King, DO      insulin lispro  1-5 Units Subcutaneous HS Celestina Rings, DO      insulin lispro  7 Units Subcutaneous TID With Meals Brittney Mendez MD      melatonin  3 mg Oral HS Deysi Sanchez PA-C      metoprolol succinate  50 mg Oral BID Susy Sierra DO      ondansetron  4 mg Intravenous Q6H PRN Susy Sierra DO      pantoprazole  20 mg Oral Daily Susy Sierra DO      pravastatin  80 mg Oral Daily With [employer], DO          Today, Patient Was Seen By: Susy Sierra DO    ** Please Note: Dictation voice to text software may have been used in the creation of this document   **

## 2022-09-20 NOTE — PLAN OF CARE
Problem: Potential for Falls  Goal: Patient will remain free of falls  Description: INTERVENTIONS:  - Educate patient/family on patient safety including physical limitations  - Instruct patient to call for assistance with activity   - Consult OT/PT to assist with strengthening/mobility   - Keep Call bell within reach  - Keep bed low and locked with side rails adjusted as appropriate  - Keep care items and personal belongings within reach  - Initiate and maintain comfort rounds  - Offer Toileting every 2 Hours, in advance of need  - Initiate/Maintain bed alarm  - Obtain necessary fall risk management equipment: walker  - Apply yellow socks and bracelet for high fall risk patients  - Consider moving patient to room near nurses station  Outcome: Progressing     Problem: MOBILITY - ADULT  Goal: Maintain or return to baseline ADL function  Description: INTERVENTIONS:  -  Assess patient's ability to carry out ADLs; assess patient's baseline for ADL function and identify physical deficits which impact ability to perform ADLs (bathing, care of mouth/teeth, toileting, grooming, dressing, etc )  - Assess/evaluate cause of self-care deficits   - Assess range of motion  - Assess patient's mobility; develop plan if impaired  - Assess patient's need for assistive devices and provide as appropriate  - Encourage maximum independence but intervene and supervise when necessary  - Involve family in performance of ADLs  - Assess for home care needs following discharge   - Consider OT consult to assist with ADL evaluation and planning for discharge  - Provide patient education as appropriate  Outcome: Progressing  Goal: Maintains/Returns to pre admission functional level  Description: INTERVENTIONS:  - Perform BMAT or MOVE assessment daily    - Set and communicate daily mobility goal to care team and patient/family/caregiver     - Collaborate with rehabilitation services on mobility goals if consulted  - Ambulate patient 2 times a day  - Out of bed to chair 3 times a day   - Out of bed for meals 3 times a day  - Out of bed for toileting  - Record patient progress and toleration of activity level   Outcome: Progressing     Problem: PAIN - ADULT  Goal: Verbalizes/displays adequate comfort level or baseline comfort level  Description: Interventions:  - Encourage patient to monitor pain and request assistance  - Assess pain using appropriate pain scale  - Administer analgesics based on type and severity of pain and evaluate response  - Implement non-pharmacological measures as appropriate and evaluate response  - Consider cultural and social influences on pain and pain management  - Notify physician/advanced practitioner if interventions unsuccessful or patient reports new pain  Outcome: Progressing     Problem: INFECTION - ADULT  Goal: Absence or prevention of progression during hospitalization  Description: INTERVENTIONS:  - Assess and monitor for signs and symptoms of infection  - Monitor lab/diagnostic results  - Monitor all insertion sites  - Administer medications as ordered  - Instruct and encourage patient and family to use good hand hygiene technique  - Identify and instruct in appropriate isolation precautions for identified infection/condition  Outcome: Progressing  Goal: Absence of fever/infection during neutropenic period  Description: INTERVENTIONS:  - Monitor WBC    Outcome: Progressing     Problem: SAFETY ADULT  Goal: Patient will remain free of falls  Description: INTERVENTIONS:  - Educate patient/family on patient safety including physical limitations  - Instruct patient to call for assistance with activity   - Consult OT/PT to assist with strengthening/mobility   - Keep Call bell within reach  - Keep bed low and locked with side rails adjusted as appropriate  - Keep care items and personal belongings within reach  - Initiate and maintain comfort rounds  - Offer Toileting every 2 Hours, in advance of need  - Initiate/Maintain bed alarm  - Obtain necessary fall risk management equipment: walker  - Apply yellow socks and bracelet for high fall risk patients  - Consider moving patient to room near nurses station  Outcome: Progressing  Goal: Maintain or return to baseline ADL function  Description: INTERVENTIONS:  -  Assess patient's ability to carry out ADLs; assess patient's baseline for ADL function and identify physical deficits which impact ability to perform ADLs (bathing, care of mouth/teeth, toileting, grooming, dressing, etc )  - Assess/evaluate cause of self-care deficits   - Assess range of motion  - Assess patient's mobility; develop plan if impaired  - Assess patient's need for assistive devices and provide as appropriate  - Encourage maximum independence but intervene and supervise when necessary  - Involve family in performance of ADLs  - Assess for home care needs following discharge   - Consider OT consult to assist with ADL evaluation and planning for discharge  - Provide patient education as appropriate  Outcome: Progressing  Goal: Maintains/Returns to pre admission functional level  Description: INTERVENTIONS:  - Perform BMAT or MOVE assessment daily    - Set and communicate daily mobility goal to care team and patient/family/caregiver     - Collaborate with rehabilitation services on mobility goals if consulted  - Ambulate patient 2 times a day  - Out of bed to chair 3 times a day   - Out of bed for meals 3 times a day  - Out of bed for toileting  - Record patient progress and toleration of activity level   Outcome: Progressing     Problem: DISCHARGE PLANNING  Goal: Discharge to home or other facility with appropriate resources  Description: INTERVENTIONS:  - Identify barriers to discharge w/patient and caregiver  - Arrange for needed discharge resources and transportation as appropriate  - Identify discharge learning needs (meds, wound care, etc )  - Arrange for interpretive services to assist at discharge as needed  - Refer to Case Management Department for coordinating discharge planning if the patient needs post-hospital services based on physician/advanced practitioner order or complex needs related to functional status, cognitive ability, or social support system  Outcome: Progressing     Problem: Knowledge Deficit  Goal: Patient/family/caregiver demonstrates understanding of disease process, treatment plan, medications, and discharge instructions  Description: Complete learning assessment and assess knowledge base  Interventions:  - Provide teaching at level of understanding  - Provide teaching via preferred learning methods  Outcome: Progressing     Problem: Prexisting or High Potential for Compromised Skin Integrity  Goal: Skin integrity is maintained or improved  Description: INTERVENTIONS:  - Identify patients at risk for skin breakdown  - Assess and monitor skin integrity  - Assess and monitor nutrition and hydration status  - Monitor labs   - Assess for incontinence   - Turn and reposition patient  - Assist with mobility/ambulation  - Relieve pressure over bony prominences  - Avoid friction and shearing  - Provide appropriate hygiene as needed including keeping skin clean and dry  - Evaluate need for skin moisturizer/barrier cream  - Collaborate with interdisciplinary team   - Patient/family teaching  - Consider wound care consult   Outcome: Progressing     Problem: Nutrition/Hydration-ADULT  Goal: Nutrient/Hydration intake appropriate for improving, restoring or maintaining nutritional needs  Description: Monitor and assess patient's nutrition/hydration status for malnutrition  Collaborate with interdisciplinary team and initiate plan and interventions as ordered  Monitor patient's weight and dietary intake as ordered or per policy  Utilize nutrition screening tool and intervene as necessary  Determine patient's food preferences and provide high-protein, high-caloric foods as appropriate       INTERVENTIONS:  - Monitor oral intake, urinary output, labs, and treatment plans  - Assess nutrition and hydration status and recommend course of action  - Evaluate amount of meals eaten  - Assist patient with eating if necessary   - Allow adequate time for meals  - Recommend/ encourage appropriate diets, oral nutritional supplements, and vitamin/mineral supplements  - Order, calculate, and assess calorie counts as needed  - Recommend, monitor, and adjust tube feedings and TPN/PPN based on assessed needs  - Assess need for intravenous fluids  - Provide specific nutrition/hydration education as appropriate  - Include patient/family/caregiver in decisions related to nutrition  Outcome: Progressing     Problem: CONFUSION/THOUGHT DISTURBANCE  Goal: Thought disturbances (confusion, delirium, depression, dementia or psychosis) are managed to maintain or return to baseline mental status and functional level  Description: INTERVENTIONS:  - Assess for possible contributors to  thought disturbance, including but not limited to medications, infection, impaired vision or hearing, underlying metabolic abnormalities, dehydration, respiratory compromise,  psychiatric diagnoses and notify attending PHYSICAN/AP  - Monitor and intervene to maintain adequate nutrition, hydration, elimination, sleep and activity  - Decrease environmental stimuli, including noise as appropriate  - Provide frequent contacts to provide refocusing, direction and reassurance as needed  Approach patient calmly with eye contact and at their level    - [place] high risk fall precautions, aspiration precautions and other safety measures, as indicated  - If delirium suspected, notify physician/AP of change in condition and request immediate in-person evaluation  - Pursue consults as appropriate including Geriatric (campus dependent), OT for cognitive evaluation/activity planning, psychiatric, pastoral care, etc   Outcome: Progressing     Problem: BEHAVIOR  Goal: Pt/Family maintain appropriate behavior and adhere to behavioral management agreement, if implemented  Description: INTERVENTIONS:  - Assess the family dynamic   - Encourage verbalization of thoughts and concerns in a socially appropriate manner  - Assess patient/family's coping skills and non-compliant behavior (including use of illegal substances)  - Utilize positive, consistent limit setting strategies supporting safety of patient, staff and others  - Initiate consult with Case Management, Spiritual Care or other ancillary services as appropriate  - If a patient's/visitor's behavior jeopardizes the safety of the patient, staff, or others, refer to organization procedure     - Notify Security of behavior or suspected illegal substances which indicate the need for search of the patient and/or belongings  - Encourage participation in the decision making process about a behavioral management agreement; implement if patient meets criteria  Outcome: Progressing     Problem: METABOLIC, FLUID AND ELECTROLYTES - ADULT  Goal: Glucose maintained within target range  Description: INTERVENTIONS:  - Monitor Blood Glucose as ordered  - Assess for signs and symptoms of hyperglycemia and hypoglycemia  - Administer ordered medications to maintain glucose within target range  - Assess nutritional intake and initiate nutrition service referral as needed  Outcome: Progressing     Problem: GASTROINTESTINAL - ADULT  Goal: Maintains adequate nutritional intake  Description: INTERVENTIONS:  - Monitor percentage of each meal consumed  - Identify factors contributing to decreased intake, treat as appropriate  - Assist with meals as needed  - Monitor I&O, weight, and lab values if indicated  - Obtain nutrition services referral as needed  Outcome: Progressing

## 2022-09-20 NOTE — CASE MANAGEMENT
Case Management Discharge Planning Note    Patient name Esvin Standing  Location 7T [facility] 716/7T [facility] 716-01 MRN [id number]  : 1933 Date 2022       Current Admission Date: 2022  Current Admission Diagnosis:General weakness   Patient Active Problem List    Diagnosis Date Noted    Severe protein-calorie malnutrition ([facility] ) 2022    Unwitnessed fall 2022    Confusion 2022    Depression with anxiety 2022    General weakness 2022    Stage 3a chronic kidney disease ([facility] ) 2022    Thrombocytopenia ([place] [address] ) 2021    Gross hematuria 10/28/2021    Hematemesis 10/26/2021    Onychomycosis 2020    Anemia 2018    Atrial fibrillation ([place] [address] ) 2013    Primary hypertension 2012    Inguinal hernia 2012    Type 2 diabetes mellitus with hyperglycemia ([place] [address] ) 2012    Hyperlipidemia 2012      LOS (days): 24  Geometric Mean LOS (GMLOS) (days): 4 50  Days to GMLOS:-19 7     OBJECTIVE:  Risk of Unplanned Readmission Score: 25 11         Current admission status: Inpatient   Preferred Pharmacy:   [facility]  for [address] [place] [address]  [address] [facility] [address] [address]  Phone: [phone] Fax: [phone]    [facility] [address] [place] #[id number] [facility]la [facility] - [address]  [address] [facility] [address]  Phone: [phone] Fax: [phone]    Primary Care Provider: Dwayne Peña DO    Primary Insurance: MEDICARE  Secondary Insurance: [place] FREEMAN    DISCHARGE DETAILS: CM was notified at morning rounds that pt is medically cleared to be discharged  CM was notified that [address] [place] [address] (previously [facility]) is willing to evaluate pt today and they could Administer medication to the pt once discharged home  CM faxed pt's clinicals to fax:  [phone]    Kitty Duggan  from [employer] Ph: [address] [place] [address] will continue to follow through pt's D/C

## 2022-09-20 NOTE — PLAN OF CARE
Problem: Potential for Falls  Goal: Patient will remain free of falls  Description: INTERVENTIONS:  - Educate patient/family on patient safety including physical limitations  - Instruct patient to call for assistance with activity   - Consult OT/PT to assist with strengthening/mobility   - Keep Call bell within reach  - Keep bed low and locked with side rails adjusted as appropriate  - Keep care items and personal belongings within reach  - Initiate and maintain comfort rounds  - Offer Toileting every 2 Hours, in advance of need  - Initiate/Maintain bed alarm  - Obtain necessary fall risk management equipment: walker  - Apply yellow socks and bracelet for high fall risk patients  - Consider moving patient to room near nurses station  Outcome: Progressing     Problem: MOBILITY - ADULT  Goal: Maintain or return to baseline ADL function  Description: INTERVENTIONS:  -  Assess patient's ability to carry out ADLs; assess patient's baseline for ADL function and identify physical deficits which impact ability to perform ADLs (bathing, care of mouth/teeth, toileting, grooming, dressing, etc )  - Assess/evaluate cause of self-care deficits   - Assess range of motion  - Assess patient's mobility; develop plan if impaired  - Assess patient's need for assistive devices and provide as appropriate  - Encourage maximum independence but intervene and supervise when necessary  - Involve family in performance of ADLs  - Assess for home care needs following discharge   - Consider OT consult to assist with ADL evaluation and planning for discharge  - Provide patient education as appropriate  Outcome: Progressing  Goal: Maintains/Returns to pre admission functional level  Description: INTERVENTIONS:  - Perform BMAT or MOVE assessment daily    - Set and communicate daily mobility goal to care team and patient/family/caregiver     - Collaborate with rehabilitation services on mobility goals if consulted  - Ambulate patient 2 times a day  - Out of bed to chair 3 times a day   - Out of bed for meals 3 times a day  - Out of bed for toileting  - Record patient progress and toleration of activity level   Outcome: Progressing     Problem: PAIN - ADULT  Goal: Verbalizes/displays adequate comfort level or baseline comfort level  Description: Interventions:  - Encourage patient to monitor pain and request assistance  - Assess pain using appropriate pain scale  - Administer analgesics based on type and severity of pain and evaluate response  - Implement non-pharmacological measures as appropriate and evaluate response  - Consider cultural and social influences on pain and pain management  - Notify physician/advanced practitioner if interventions unsuccessful or patient reports new pain  Outcome: Progressing     Problem: INFECTION - ADULT  Goal: Absence or prevention of progression during hospitalization  Description: INTERVENTIONS:  - Assess and monitor for signs and symptoms of infection  - Monitor lab/diagnostic results  - Monitor all insertion sites  - Administer medications as ordered  - Instruct and encourage patient and family to use good hand hygiene technique  - Identify and instruct in appropriate isolation precautions for identified infection/condition  Outcome: Progressing  Goal: Absence of fever/infection during neutropenic period  Description: INTERVENTIONS:  - Monitor WBC    Outcome: Progressing     Problem: SAFETY ADULT  Goal: Patient will remain free of falls  Description: INTERVENTIONS:  - Educate patient/family on patient safety including physical limitations  - Instruct patient to call for assistance with activity   - Consult OT/PT to assist with strengthening/mobility   - Keep Call bell within reach  - Keep bed low and locked with side rails adjusted as appropriate  - Keep care items and personal belongings within reach  - Initiate and maintain comfort rounds  - Offer Toileting every 2 Hours, in advance of need  - Initiate/Maintain bed alarm  - Obtain necessary fall risk management equipment: walker  - Apply yellow socks and bracelet for high fall risk patients  - Consider moving patient to room near nurses station  Outcome: Progressing  Goal: Maintain or return to baseline ADL function  Description: INTERVENTIONS:  -  Assess patient's ability to carry out ADLs; assess patient's baseline for ADL function and identify physical deficits which impact ability to perform ADLs (bathing, care of mouth/teeth, toileting, grooming, dressing, etc )  - Assess/evaluate cause of self-care deficits   - Assess range of motion  - Assess patient's mobility; develop plan if impaired  - Assess patient's need for assistive devices and provide as appropriate  - Encourage maximum independence but intervene and supervise when necessary  - Involve family in performance of ADLs  - Assess for home care needs following discharge   - Consider OT consult to assist with ADL evaluation and planning for discharge  - Provide patient education as appropriate  Outcome: Progressing  Goal: Maintains/Returns to pre admission functional level  Description: INTERVENTIONS:  - Perform BMAT or MOVE assessment daily    - Set and communicate daily mobility goal to care team and patient/family/caregiver     - Collaborate with rehabilitation services on mobility goals if consulted  - Ambulate patient 2 times a day  - Out of bed to chair 3 times a day   - Out of bed for meals 3 times a day  - Out of bed for toileting  - Record patient progress and toleration of activity level   Outcome: Progressing     Problem: DISCHARGE PLANNING  Goal: Discharge to home or other facility with appropriate resources  Description: INTERVENTIONS:  - Identify barriers to discharge w/patient and caregiver  - Arrange for needed discharge resources and transportation as appropriate  - Identify discharge learning needs (meds, wound care, etc )  - Arrange for interpretive services to assist at discharge as needed  - Refer to Case Management Department for coordinating discharge planning if the patient needs post-hospital services based on physician/advanced practitioner order or complex needs related to functional status, cognitive ability, or social support system  Outcome: Progressing     Problem: Knowledge Deficit  Goal: Patient/family/caregiver demonstrates understanding of disease process, treatment plan, medications, and discharge instructions  Description: Complete learning assessment and assess knowledge base  Interventions:  - Provide teaching at level of understanding  - Provide teaching via preferred learning methods  Outcome: Progressing     Problem: Prexisting or High Potential for Compromised Skin Integrity  Goal: Skin integrity is maintained or improved  Description: INTERVENTIONS:  - Identify patients at risk for skin breakdown  - Assess and monitor skin integrity  - Assess and monitor nutrition and hydration status  - Monitor labs   - Assess for incontinence   - Turn and reposition patient  - Assist with mobility/ambulation  - Relieve pressure over bony prominences  - Avoid friction and shearing  - Provide appropriate hygiene as needed including keeping skin clean and dry  - Evaluate need for skin moisturizer/barrier cream  - Collaborate with interdisciplinary team   - Patient/family teaching  - Consider wound care consult   Outcome: Progressing     Problem: Nutrition/Hydration-ADULT  Goal: Nutrient/Hydration intake appropriate for improving, restoring or maintaining nutritional needs  Description: Monitor and assess patient's nutrition/hydration status for malnutrition  Collaborate with interdisciplinary team and initiate plan and interventions as ordered  Monitor patient's weight and dietary intake as ordered or per policy  Utilize nutrition screening tool and intervene as necessary  Determine patient's food preferences and provide high-protein, high-caloric foods as appropriate       INTERVENTIONS:  - Monitor oral intake, urinary output, labs, and treatment plans  - Assess nutrition and hydration status and recommend course of action  - Evaluate amount of meals eaten  - Assist patient with eating if necessary   - Allow adequate time for meals  - Recommend/ encourage appropriate diets, oral nutritional supplements, and vitamin/mineral supplements  - Order, calculate, and assess calorie counts as needed  - Recommend, monitor, and adjust tube feedings and TPN/PPN based on assessed needs  - Assess need for intravenous fluids  - Provide specific nutrition/hydration education as appropriate  - Include patient/family/caregiver in decisions related to nutrition  Outcome: Progressing     Problem: CONFUSION/THOUGHT DISTURBANCE  Goal: Thought disturbances (confusion, delirium, depression, dementia or psychosis) are managed to maintain or return to baseline mental status and functional level  Description: INTERVENTIONS:  - Assess for possible contributors to  thought disturbance, including but not limited to medications, infection, impaired vision or hearing, underlying metabolic abnormalities, dehydration, respiratory compromise,  psychiatric diagnoses and notify attending PHYSICAN/AP  - Monitor and intervene to maintain adequate nutrition, hydration, elimination, sleep and activity  - Decrease environmental stimuli, including noise as appropriate  - Provide frequent contacts to provide refocusing, direction and reassurance as needed  Approach patient calmly with eye contact and at their level    - [place] high risk fall precautions, aspiration precautions and other safety measures, as indicated  - If delirium suspected, notify physician/AP of change in condition and request immediate in-person evaluation  - Pursue consults as appropriate including Geriatric (campus dependent), OT for cognitive evaluation/activity planning, psychiatric, pastoral care, etc   Outcome: Progressing     Problem: BEHAVIOR  Goal: Pt/Family maintain appropriate behavior and adhere to behavioral management agreement, if implemented  Description: INTERVENTIONS:  - Assess the family dynamic   - Encourage verbalization of thoughts and concerns in a socially appropriate manner  - Assess patient/family's coping skills and non-compliant behavior (including use of illegal substances)  - Utilize positive, consistent limit setting strategies supporting safety of patient, staff and others  - Initiate consult with Case Management, Spiritual Care or other ancillary services as appropriate  - If a patient's/visitor's behavior jeopardizes the safety of the patient, staff, or others, refer to organization procedure     - Notify Security of behavior or suspected illegal substances which indicate the need for search of the patient and/or belongings  - Encourage participation in the decision making process about a behavioral management agreement; implement if patient meets criteria  Outcome: Progressing     Problem: METABOLIC, FLUID AND ELECTROLYTES - ADULT  Goal: Glucose maintained within target range  Description: INTERVENTIONS:  - Monitor Blood Glucose as ordered  - Assess for signs and symptoms of hyperglycemia and hypoglycemia  - Administer ordered medications to maintain glucose within target range  - Assess nutritional intake and initiate nutrition service referral as needed  Outcome: Progressing     Problem: GASTROINTESTINAL - ADULT  Goal: Maintains adequate nutritional intake  Description: INTERVENTIONS:  - Monitor percentage of each meal consumed  - Identify factors contributing to decreased intake, treat as appropriate  - Assist with meals as needed  - Monitor I&O, weight, and lab values if indicated  - Obtain nutrition services referral as needed  Outcome: Progressing

## 2022-09-21 ENCOUNTER — HOME HEALTH ADMISSION (OUTPATIENT)
Dept: HOME HEALTH SERVICES | Facility: HOME HEALTHCARE | Age: 87
End: 2022-09-21
Payer: MEDICARE

## 2022-09-21 VITALS
SYSTOLIC BLOOD PRESSURE: 146 MMHG | HEART RATE: 94 BPM | RESPIRATION RATE: 18 BRPM | BODY MASS INDEX: 19.04 KG/M2 | OXYGEN SATURATION: 98 % | HEIGHT: 71 IN | WEIGHT: 136.02 LBS | TEMPERATURE: 97.9 F | DIASTOLIC BLOOD PRESSURE: 81 MMHG

## 2022-09-21 LAB
DME PARACHUTE DELIVERY DATE ACTUAL: NORMAL
DME PARACHUTE DELIVERY DATE EXPECTED: NORMAL
DME PARACHUTE DELIVERY DATE REQUESTED: NORMAL
DME PARACHUTE ITEM DESCRIPTION: NORMAL
DME PARACHUTE ORDER STATUS: NORMAL
DME PARACHUTE SUPPLIER NAME: NORMAL
DME PARACHUTE SUPPLIER PHONE: NORMAL
GLUCOSE SERPL-MCNC: 127 MG/DL (ref 65–140)
GLUCOSE SERPL-MCNC: 194 MG/DL (ref 65–140)
GLUCOSE SERPL-MCNC: 212 MG/DL (ref 65–140)

## 2022-09-21 PROCEDURE — 99239 HOSP IP/OBS DSCHRG MGMT >30: CPT | Performed by: INTERNAL MEDICINE

## 2022-09-21 PROCEDURE — 82948 REAGENT STRIP/BLOOD GLUCOSE: CPT

## 2022-09-21 RX ORDER — ESCITALOPRAM OXALATE 5 MG/1
5 TABLET ORAL DAILY
Qty: 30 TABLET | Refills: 2 | Status: SHIPPED | OUTPATIENT
Start: 2022-09-22 | End: 2022-09-28

## 2022-09-21 RX ADMIN — INSULIN LISPRO 2 UNITS: 100 INJECTION, SOLUTION INTRAVENOUS; SUBCUTANEOUS at 11:33

## 2022-09-21 RX ADMIN — INSULIN LISPRO 1 UNITS: 100 INJECTION, SOLUTION INTRAVENOUS; SUBCUTANEOUS at 07:22

## 2022-09-21 RX ADMIN — METOPROLOL SUCCINATE 50 MG: 50 TABLET, EXTENDED RELEASE ORAL at 09:16

## 2022-09-21 RX ADMIN — ESCITALOPRAM OXALATE 5 MG: 5 TABLET, FILM COATED ORAL at 09:15

## 2022-09-21 RX ADMIN — PANTOPRAZOLE SODIUM 20 MG: 20 TABLET, DELAYED RELEASE ORAL at 09:16

## 2022-09-21 RX ADMIN — APIXABAN 2.5 MG: 2.5 TABLET, FILM COATED ORAL at 09:16

## 2022-09-21 RX ADMIN — INSULIN LISPRO 7 UNITS: 100 INJECTION, SOLUTION INTRAVENOUS; SUBCUTANEOUS at 07:23

## 2022-09-21 RX ADMIN — INSULIN LISPRO 7 UNITS: 100 INJECTION, SOLUTION INTRAVENOUS; SUBCUTANEOUS at 11:34

## 2022-09-21 NOTE — DISCHARGE SUMMARY
[facility]  Discharge- Maru Claire  1933, 80 y o  male MRN: [id number]  Unit/Bed#: 7T [facility] 716-01 Encounter: [id number]  Primary Care Provider: Avril DELGADO DO   Date and time admitted to hospital: 8/27/2022  5:56 AM    * General weakness  Assessment & Plan  · Possibly due to advancing dementia as evidenced by increasing confusion and agitation  · Neuropsychology has deemed the patient incapacitated  · Patient states he lives at home alone and has been having difficulty with ambulation  · No recent traumatic events  · Laboratory analysis grossly unremarkable  · Hemodynamically stable saturating well on room air  · Case management consultation for safe disposition planning  · Patient is medically clear, awaiting safe disposition plan  · PT/OT recommended post acute rehab services  · Patient and the family would prefer home health care with 24/7 care  · Willing to consider short-term rehab temporarily  · Ongoing discussions with family  · Patient now has guardian  · Case management following as family his arranging 24/7 care    Primary hypertension  Assessment & Plan  · BP acceptable  · Continue home beta-blocker for atrial fibrillation  · Hydralazine 5 mg IV q6 hours PRN SBP > 180 mmHg  · Monitor blood pressures    Depression with anxiety  Assessment & Plan  · Patient has appeared somewhat anxious about his health  · Family states there may be an element of depression possibly secondary to dementia  · Continue Lexapro 5 mg oral daily  · Haldol 1 mg IM every 6 hours as needed for agitation    Type 2 diabetes mellitus with hyperglycemia [facility])  Assessment & Plan  Lab Results   Component Value Date    HGBA1C 8 1 (H) 08/09/2022       Recent Labs     09/20/22  1118 09/20/22  1543 09/20/22 2010 09/21/22  0535   POCGLU 238* 168* 166* 194*       Blood Sugar Average: Last 72 hrs:  (P) 845 [id number]   · Patient was on metformin 1000 b i d   · Continue Lantus 10 units daily at bedtime, recently increased Humalog to 7 units t i d  A c   · Continue sliding scale insulin with Accu-Cheks while inpatient  · Diabetic diet  · Patient has been having labile blood glucose levels during hospital stay    Hyperlipidemia  Assessment & Plan  · Continue home statin therapy    Atrial fibrillation [facility])  Assessment & Plan  · Rates are controlled  · Continue home Eliquis 2 5 mg PO BID    Stage 3a chronic kidney disease [facility])  Assessment & Plan  Lab Results   Component Value Date    EGFR 54 09/20/2022    EGFR 62 09/17/2022    EGFR 47 09/11/2022    CREATININE 1 17 09/20/2022    CREATININE 1 05 09/17/2022    CREATININE 1 32 09/11/2022   · Creatinine stable, 1 05  · Trend BMP  · Avoid hypotension and nephrotoxic agents  · Urinary retention protocol  · Strict intake and output      Severe protein-calorie malnutrition ([address] )  Assessment & Plan  Malnutrition Findings:   Adult Malnutrition type: Chronic illness  Adult Degree of Malnutrition: Other severe protein calorie malnutrition  Malnutrition Characteristics: Inadequate energy, Weight loss          continue nutritional supplement         360 Statement: Severe pro/singh malnutrition r/t depression as evidenced by 12% unplanned weight loss since 2/24/22, consuming < 75% energy intake compared to estimated energy needs > 1 month  Treated with Enlive bid    BMI Findings: Body mass index is 18 97 kg/m²  Confusion  Assessment & Plan  · Patient seems to have episode of confusion  · Discussed with the [facility] and C[facility] Weaver- patient does not have official diagnosis of dementia before  However it seems to be an element of advanced dementia  · Neuro psychiatrist was consulted - At this time, patient does not appear to have capacity to make fully informed medical decisions   Unspecified Neurocognitive Disorder  · Pending safe disposition plan      Unwitnessed fall  Assessment & Plan  · Patient had unwitnessed fall on 08/28/2022  · CT head was negative for acute intracranial abnormality  Mild chronic microangiopathic changes  · Continue to monitor      Discharging Physician / Practitioner: Filiberto Bentley DO  PCP: Bonifacio HONG DO  Admission Date:   Admission Orders (From admission, onward)     Ordered        08/27/22 0711  INPATIENT ADMISSION  Once                      Discharge Date: 09/21/22    Medical Problems             Resolved Problems  Date Reviewed: 9/21/2022   None                 Consultations During Hospital Stay:  PT/OT    Procedures Performed:  Not applicable    Significant Findings / Test Results:     XR chest portable    Result Date: 8/27/2022  Impression: Bilateral lower lobe opacities suggesting combination of scarring and or chronic interstitial lung disease, progressed at the right lung base  Workstation performed: [id number]     CT head wo contrast    Result Date: 8/28/2022  Impression: No acute intracranial abnormality  Mild chronic microangiopathic changes  Workstation performed: [id number]       Incidental Findings:  Not applicable    Test Results Pending at Discharge (will require follow up): Not applicable     Outpatient Tests Requested:  Not applicable    Reason for Admission:  Ambulatory dysfunction    Hospital Course:     Sharri Kennedy  is a 80 y o  male with a past medical history significant for atrial fibrillation on Eliquis, hypertension, type 2 diabetes mellitus, hyperlipidemia, stage IIIA CKD who presents with difficult living situation  The patient states that he lives alone at home and has been difficulty with ambulating  In the ED, all vital signs were found to be stable  Laboratory analysis grossly unremarkable  Patient was admitted for PT/OT evaluation and treatment and potential placement by case management  The patient remained hemodynamically stable and saturating well on room air throughout his hospital course  Guardianship was obtained  24/7 care was obtained by case management  The patient was strongly encouraged to resume all preadmission medications at all preadmission dosages  He was strongly encouraged to follow-up with his PCP within 7-14 days  Family was brought up to par  Condition at Discharge: stable     Discharge Day Visit / Exam:     Subjective: Patient seen and examined at bedside  No acute events overnight  Denies chest pain, SOB, diaphoresis, nausea/vomiting/diarrhea, fevers/chills  Vitals: Blood Pressure: 146/81 (09/21/22 0916)  Pulse: 94 (09/21/22 0916)  Temperature: 97 9 °F (36 6 °C) (09/21/22 0738)  Temp Source: Temporal (09/21/22 0738)  Respirations: 18 (09/21/22 0738)  Height: 5' 11" (180 3 cm) (08/27/22 8118)  Weight - Scale: 61 7 kg (136 lb 0 4 oz) (09/21/22 0553)  SpO2: 98 % (09/21/22 0738)     Exam:   Physical Exam  Vitals and nursing note reviewed  Constitutional:       Appearance: He is well-developed  HENT:      Head: Normocephalic and atraumatic  Eyes:      Conjunctiva/sclera: Conjunctivae normal    Cardiovascular:      Rate and Rhythm: Normal rate and regular rhythm  Heart sounds: No murmur heard  Pulmonary:      Effort: Pulmonary effort is normal  No respiratory distress  Breath sounds: Normal breath sounds  Abdominal:      Palpations: Abdomen is soft  Tenderness: There is no abdominal tenderness  Musculoskeletal:      Cervical back: Neck supple  Skin:     General: Skin is warm and dry  Neurological:      Mental Status: He is alert  Discharge instructions/Information to patient and family:   See after visit summary for information provided to patient and family  Provisions for Follow-Up Care:  See after visit summary for information related to follow-up care and any pertinent home health orders  Disposition:     24/7 care  Resume pre-admission medications  Start Lexapro ; prescription sent to pharmacy     Discharge Statement:  I spent 60 minutes discharging the patient   This time was spent on the day of discharge  I had direct contact with the patient on the day of discharge  Greater than 50% of the total time was spent examining patient, answering all patient questions, arranging and discussing plan of care with patient as well as directly providing post-discharge instructions  Additional time then spent on discharge activities  Discharge Medications:  See after visit summary for reconciled discharge medications provided to patient and family        ** Please Note: This note has been constructed using a voice recognition system **

## 2022-09-21 NOTE — NURSING NOTE
Patient refusing meds at this time inspite of explanation  Patient screaming saying to leave him alone

## 2022-09-21 NOTE — ASSESSMENT & PLAN NOTE
Malnutrition Findings:   Adult Malnutrition type: Chronic illness  Adult Degree of Malnutrition: Other severe protein calorie malnutrition  Malnutrition Characteristics: Inadequate energy, Weight loss          continue nutritional supplement         360 Statement: Severe pro/singh malnutrition r/t depression as evidenced by 12% unplanned weight loss since 2/24/22, consuming < 75% energy intake compared to estimated energy needs > 1 month  Treated with Enlive bid    BMI Findings: Body mass index is 18 97 kg/m²

## 2022-09-21 NOTE — PROGRESS NOTES
[facility]  Progress Note - Cristóbal Myers  1933, 80 y o  male MRN: [id number]  Unit/Bed#: 7T [facility] 716-01 Encounter: [id number]  Primary Care Provider: Reyna DELATORRE DO   Date and time admitted to hospital: 8/27/2022  5:56 AM    * General weakness  Assessment & Plan  · Possibly due to advancing dementia as evidenced by increasing confusion and agitation  · Neuropsychology has deemed the patient incapacitated  · Patient states he lives at home alone and has been having difficulty with ambulation  · No recent traumatic events  · Laboratory analysis grossly unremarkable  · Hemodynamically stable saturating well on room air  · Case management consultation for safe disposition planning  · Patient is medically clear, awaiting safe disposition plan  · PT/OT recommended post acute rehab services  · Patient and the family would prefer home health care with 24/7 care  · Willing to consider short-term rehab temporarily  · Ongoing discussions with family  · Patient now has guardian  · Case management following as family his arranging 24/7 care    Primary hypertension  Assessment & Plan  · BP acceptable  · Continue home beta-blocker for atrial fibrillation  · Hydralazine 5 mg IV q6 hours PRN SBP > 180 mmHg  · Monitor blood pressures    Depression with anxiety  Assessment & Plan  · Patient has appeared somewhat anxious about his health  · Family states there may be an element of depression possibly secondary to dementia  · Continue Lexapro 5 mg oral daily  · Haldol 1 mg IM every 6 hours as needed for agitation    Type 2 diabetes mellitus with hyperglycemia [facility])  Assessment & Plan  Lab Results   Component Value Date    HGBA1C 8 1 (H) 08/09/2022       Recent Labs     09/20/22  1118 09/20/22  1543 09/20/22 2010 09/21/22  0535   POCGLU 238* 168* 166* 194*       Blood Sugar Average: Last 72 hrs:  (P) 122 [id number]   · Patient was on metformin 1000 b i d   · Continue Lantus 10 units daily at bedtime, recently increased Humalog to 7 units t i d  A c   · Continue sliding scale insulin with Accu-Cheks while inpatient  · Diabetic diet  · Patient has been having labile blood glucose levels during hospital stay    Hyperlipidemia  Assessment & Plan  · Continue home statin therapy    Atrial fibrillation [facility])  Assessment & Plan  · Rates are controlled  · Continue home Eliquis 2 5 mg PO BID    Stage 3a chronic kidney disease [facility])  Assessment & Plan  Lab Results   Component Value Date    EGFR 54 09/20/2022    EGFR 62 09/17/2022    EGFR 47 09/11/2022    CREATININE 1 17 09/20/2022    CREATININE 1 05 09/17/2022    CREATININE 1 32 09/11/2022   · Creatinine stable, 1 05  · Trend BMP  · Avoid hypotension and nephrotoxic agents  · Urinary retention protocol  · Strict intake and output      Severe protein-calorie malnutrition ([address] )  Assessment & Plan  Malnutrition Findings:   Adult Malnutrition type: Chronic illness  Adult Degree of Malnutrition: Other severe protein calorie malnutrition  Malnutrition Characteristics: Inadequate energy, Weight loss          continue nutritional supplement         360 Statement: Severe pro/singh malnutrition r/t depression as evidenced by 12% unplanned weight loss since 2/24/22, consuming < 75% energy intake compared to estimated energy needs > 1 month  Treated with Enlive bid    BMI Findings: Body mass index is 18 97 kg/m²  Confusion  Assessment & Plan  · Patient seems to have episode of confusion  · Discussed with the Princess Sears- patient does not have official diagnosis of dementia before  However it seems to be an element of advanced dementia  · Neuro psychiatrist was consulted - At this time, patient does not appear to have capacity to make fully informed medical decisions   Unspecified Neurocognitive Disorder  · Pending safe disposition plan      Unwitnessed fall  Assessment & Plan  · Patient had unwitnessed fall on 08/28/2022  · CT head was negative for acute intracranial abnormality  Mild chronic microangiopathic changes  · Continue to monitor    VTE Pharmacologic Prophylaxis: Eliquis    Patient Centered Rounds:  Patient care rounds were performed with nursing    Discussions with Specialists or Other Care Team Provider:  Case management    Education and Discussions with Family / Patient:  Spoke with patient at bedside    Time Spent for Care: 30  More than 50% of total time spent on counseling and coordination of care as described above  Current Length of Stay: 25 day(s)    Current Patient Status: Inpatient     Certification Statement: The patient will continue to require additional inpatient hospital stay due to placement to  home care    Discharge Plan:   home care    Code Status: Level 1 - Full Code      Subjective:   Patient seen and examined at bedside  No acute events overnight  Denies chest pain, SOB, diaphoresis, nausea/vomiting/diarrhea, fevers/chills  Objective:     Vitals:   Temp (24hrs), Av 6 °F (36 4 °C), Min:97 1 °F (36 2 °C), Max:97 9 °F (36 6 °C)    Temp:  [97 1 °F (36 2 °C)-97 9 °F (36 6 °C)] 97 9 °F (36 6 °C)  HR:  [] 90  Resp:  [18] 18  BP: (113-174)/() 117/71  SpO2:  [97 %-99 %] 99 %  Body mass index is 18 97 kg/m²  Input and Output Summary (last 24 hours): Intake/Output Summary (Last 24 hours) at 2022 0716  Last data filed at 2022 1728  Gross per 24 hour   Intake 480 ml   Output --   Net 480 ml       Physical Exam:     Physical Exam  Vitals and nursing note reviewed  Constitutional:       Appearance: He is well-developed  HENT:      Head: Normocephalic and atraumatic  Eyes:      Conjunctiva/sclera: Conjunctivae normal    Cardiovascular:      Rate and Rhythm: Normal rate and regular rhythm  Heart sounds: No murmur heard  Pulmonary:      Effort: Pulmonary effort is normal  No respiratory distress  Breath sounds: Normal breath sounds     Abdominal:      Palpations: Abdomen is soft  Tenderness: There is no abdominal tenderness  Musculoskeletal:      Cervical back: Neck supple  Skin:     General: Skin is warm and dry  Neurological:      Mental Status: He is alert  Mental status is at baseline  He is disoriented  Additional Data:     Labs:  I have reviewed pertinent results     Results from last 7 days   Lab Units 09/20/22  0523   WBC Thousand/uL 8 09   HEMOGLOBIN g/dL 9 9*   HEMATOCRIT % 30 5*   PLATELETS Thousands/uL 208   NEUTROS PCT % 58   LYMPHS PCT % 23   MONOS PCT % 16*   EOS PCT % 2     Results from last 7 days   Lab Units 09/20/22  0523   SODIUM mmol/L 141   POTASSIUM mmol/L 4 0   CHLORIDE mmol/L 107   CO2 mmol/L 29   BUN mg/dL 39*   CREATININE mg/dL 1 17   ANION GAP mmol/L 5   CALCIUM mg/dL 8 4   GLUCOSE RANDOM mg/dL 82         Results from last 7 days   Lab Units 09/21/22  0535 09/20/22 2010 09/20/22  1543 09/20/22  1118 09/20/22  0611 09/19/22  2033 09/19/22  1626 09/19/22  1108 09/19/22  0527 09/18/22  2019 09/18/22  1541 09/18/22  1109   POC GLUCOSE mg/dl 194* 166* 168* 238* 79 175* 183* 175* 123 402* 236* 330*                 Imaging: I have reviewed pertinent imaging       Recent Cultures (last 7 days):           Last 24 Hours Medication List:   Current Facility-Administered Medications   Medication Dose Route Frequency Provider Last Rate    acetaminophen  650 mg Oral Q4H PRN Nile De Los Santos, DO      apixaban  2 5 mg Oral BID Nile De Los Santos, DO      escitalopram  5 mg Oral Daily Nile De Los Santos DO      haloperidol lactate  1 mg Intramuscular Q6H PRN Nile De Los Santos, DO      hydrALAZINE  5 mg Intravenous Q6H PRN Nile De Los Santos, DO      insulin glargine  10 Units Subcutaneous HS Nile De Los Santos, DO      insulin lispro  1-5 Units Subcutaneous TID CARSON King, DO      insulin lispro  1-5 Units Subcutaneous HS Nile De Los Santos, DO      insulin lispro  7 Units Subcutaneous TID With Meals Ruthy López MD      melatonin  3 mg Oral HS David Morales PA-C      metoprolol succinate  50 mg Oral BID Christina Asif,       ondansetron  4 mg Intravenous Q6H PRN Christina Laya, DO      pantoprazole  20 mg Oral Daily Christinachavez Saldivaron, DO      pravastatin  80 mg Oral Daily With [facility],           Today, Patient Was Seen By: Christina Asif DO    ** Please Note: Dictation voice to text software may have been used in the creation of this document   **

## 2022-09-21 NOTE — PLAN OF CARE
Problem: Potential for Falls  Goal: Patient will remain free of falls  Description: INTERVENTIONS:  - Educate patient/family on patient safety including physical limitations  - Instruct patient to call for assistance with activity   - Consult OT/PT to assist with strengthening/mobility   - Keep Call bell within reach  - Keep bed low and locked with side rails adjusted as appropriate  - Keep care items and personal belongings within reach  - Initiate and maintain comfort rounds  - Offer Toileting every 2 Hours, in advance of need  - Initiate/Maintain bed alarm  - Obtain necessary fall risk management equipment: walker  - Apply yellow socks and bracelet for high fall risk patients  - Consider moving patient to room near nurses station  Outcome: Adequate for Discharge     Problem: MOBILITY - ADULT  Goal: Maintain or return to baseline ADL function  Description: INTERVENTIONS:  -  Assess patient's ability to carry out ADLs; assess patient's baseline for ADL function and identify physical deficits which impact ability to perform ADLs (bathing, care of mouth/teeth, toileting, grooming, dressing, etc )  - Assess/evaluate cause of self-care deficits   - Assess range of motion  - Assess patient's mobility; develop plan if impaired  - Assess patient's need for assistive devices and provide as appropriate  - Encourage maximum independence but intervene and supervise when necessary  - Involve family in performance of ADLs  - Assess for home care needs following discharge   - Consider OT consult to assist with ADL evaluation and planning for discharge  - Provide patient education as appropriate  Outcome: Adequate for Discharge  Goal: Maintains/Returns to pre admission functional level  Description: INTERVENTIONS:  - Perform BMAT or MOVE assessment daily    - Set and communicate daily mobility goal to care team and patient/family/caregiver     - Collaborate with rehabilitation services on mobility goals if consulted  - Ambulate patient 2 times a day  - Out of bed to chair 3 times a day   - Out of bed for meals 3 times a day  - Out of bed for toileting  - Record patient progress and toleration of activity level   Outcome: Adequate for Discharge     Problem: PAIN - ADULT  Goal: Verbalizes/displays adequate comfort level or baseline comfort level  Description: Interventions:  - Encourage patient to monitor pain and request assistance  - Assess pain using appropriate pain scale  - Administer analgesics based on type and severity of pain and evaluate response  - Implement non-pharmacological measures as appropriate and evaluate response  - Consider cultural and social influences on pain and pain management  - Notify physician/advanced practitioner if interventions unsuccessful or patient reports new pain  Outcome: Adequate for Discharge     Problem: INFECTION - ADULT  Goal: Absence or prevention of progression during hospitalization  Description: INTERVENTIONS:  - Assess and monitor for signs and symptoms of infection  - Monitor lab/diagnostic results  - Monitor all insertion sites  - Administer medications as ordered  - Instruct and encourage patient and family to use good hand hygiene technique  - Identify and instruct in appropriate isolation precautions for identified infection/condition  Outcome: Adequate for Discharge  Goal: Absence of fever/infection during neutropenic period  Description: INTERVENTIONS:  - Monitor WBC    Outcome: Adequate for Discharge     Problem: SAFETY ADULT  Goal: Patient will remain free of falls  Description: INTERVENTIONS:  - Educate patient/family on patient safety including physical limitations  - Instruct patient to call for assistance with activity   - Consult OT/PT to assist with strengthening/mobility   - Keep Call bell within reach  - Keep bed low and locked with side rails adjusted as appropriate  - Keep care items and personal belongings within reach  - Initiate and maintain comfort rounds  - Offer Toileting every 2 Hours, in advance of need  - Initiate/Maintain bed alarm  - Obtain necessary fall risk management equipment: walker  - Apply yellow socks and bracelet for high fall risk patients  - Consider moving patient to room near nurses station  Outcome: Adequate for Discharge  Goal: Maintain or return to baseline ADL function  Description: INTERVENTIONS:  -  Assess patient's ability to carry out ADLs; assess patient's baseline for ADL function and identify physical deficits which impact ability to perform ADLs (bathing, care of mouth/teeth, toileting, grooming, dressing, etc )  - Assess/evaluate cause of self-care deficits   - Assess range of motion  - Assess patient's mobility; develop plan if impaired  - Assess patient's need for assistive devices and provide as appropriate  - Encourage maximum independence but intervene and supervise when necessary  - Involve family in performance of ADLs  - Assess for home care needs following discharge   - Consider OT consult to assist with ADL evaluation and planning for discharge  - Provide patient education as appropriate  Outcome: Adequate for Discharge  Goal: Maintains/Returns to pre admission functional level  Description: INTERVENTIONS:  - Perform BMAT or MOVE assessment daily    - Set and communicate daily mobility goal to care team and patient/family/caregiver     - Collaborate with rehabilitation services on mobility goals if consulted  - Ambulate patient 2 times a day  - Out of bed to chair 3 times a day   - Out of bed for meals 3 times a day  - Out of bed for toileting  - Record patient progress and toleration of activity level   Outcome: Adequate for Discharge     Problem: DISCHARGE PLANNING  Goal: Discharge to home or other facility with appropriate resources  Description: INTERVENTIONS:  - Identify barriers to discharge w/patient and caregiver  - Arrange for needed discharge resources and transportation as appropriate  - Identify discharge learning needs (meds, wound care, etc )  - Arrange for interpretive services to assist at discharge as needed  - Refer to Case Management Department for coordinating discharge planning if the patient needs post-hospital services based on physician/advanced practitioner order or complex needs related to functional status, cognitive ability, or social support system  Outcome: Adequate for Discharge     Problem: Knowledge Deficit  Goal: Patient/family/caregiver demonstrates understanding of disease process, treatment plan, medications, and discharge instructions  Description: Complete learning assessment and assess knowledge base  Interventions:  - Provide teaching at level of understanding  - Provide teaching via preferred learning methods  Outcome: Adequate for Discharge     Problem: Prexisting or High Potential for Compromised Skin Integrity  Goal: Skin integrity is maintained or improved  Description: INTERVENTIONS:  - Identify patients at risk for skin breakdown  - Assess and monitor skin integrity  - Assess and monitor nutrition and hydration status  - Monitor labs   - Assess for incontinence   - Turn and reposition patient  - Assist with mobility/ambulation  - Relieve pressure over bony prominences  - Avoid friction and shearing  - Provide appropriate hygiene as needed including keeping skin clean and dry  - Evaluate need for skin moisturizer/barrier cream  - Collaborate with interdisciplinary team   - Patient/family teaching  - Consider wound care consult   Outcome: Adequate for Discharge     Problem: Nutrition/Hydration-ADULT  Goal: Nutrient/Hydration intake appropriate for improving, restoring or maintaining nutritional needs  Description: Monitor and assess patient's nutrition/hydration status for malnutrition  Collaborate with interdisciplinary team and initiate plan and interventions as ordered  Monitor patient's weight and dietary intake as ordered or per policy   Utilize nutrition screening tool and intervene as necessary  Determine patient's food preferences and provide high-protein, high-caloric foods as appropriate  INTERVENTIONS:  - Monitor oral intake, urinary output, labs, and treatment plans  - Assess nutrition and hydration status and recommend course of action  - Evaluate amount of meals eaten  - Assist patient with eating if necessary   - Allow adequate time for meals  - Recommend/ encourage appropriate diets, oral nutritional supplements, and vitamin/mineral supplements  - Order, calculate, and assess calorie counts as needed  - Recommend, monitor, and adjust tube feedings and TPN/PPN based on assessed needs  - Assess need for intravenous fluids  - Provide specific nutrition/hydration education as appropriate  - Include patient/family/caregiver in decisions related to nutrition  Outcome: Adequate for Discharge     Problem: CONFUSION/THOUGHT DISTURBANCE  Goal: Thought disturbances (confusion, delirium, depression, dementia or psychosis) are managed to maintain or return to baseline mental status and functional level  Description: INTERVENTIONS:  - Assess for possible contributors to  thought disturbance, including but not limited to medications, infection, impaired vision or hearing, underlying metabolic abnormalities, dehydration, respiratory compromise,  psychiatric diagnoses and notify attending PHYSICAN/AP  - Monitor and intervene to maintain adequate nutrition, hydration, elimination, sleep and activity  - Decrease environmental stimuli, including noise as appropriate  - Provide frequent contacts to provide refocusing, direction and reassurance as needed  Approach patient calmly with eye contact and at their level    - [place] high risk fall precautions, aspiration precautions and other safety measures, as indicated  - If delirium suspected, notify physician/AP of change in condition and request immediate in-person evaluation  - Pursue consults as appropriate including Geriatric (campus dependent), OT for cognitive evaluation/activity planning, psychiatric, pastoral care, etc   Outcome: Adequate for Discharge     Problem: BEHAVIOR  Goal: Pt/Family maintain appropriate behavior and adhere to behavioral management agreement, if implemented  Description: INTERVENTIONS:  - Assess the family dynamic   - Encourage verbalization of thoughts and concerns in a socially appropriate manner  - Assess patient/family's coping skills and non-compliant behavior (including use of illegal substances)  - Utilize positive, consistent limit setting strategies supporting safety of patient, staff and others  - Initiate consult with Case Management, Spiritual Care or other ancillary services as appropriate  - If a patient's/visitor's behavior jeopardizes the safety of the patient, staff, or others, refer to organization procedure     - Notify Security of behavior or suspected illegal substances which indicate the need for search of the patient and/or belongings  - Encourage participation in the decision making process about a behavioral management agreement; implement if patient meets criteria  Outcome: Adequate for Discharge     Problem: METABOLIC, FLUID AND ELECTROLYTES - ADULT  Goal: Glucose maintained within target range  Description: INTERVENTIONS:  - Monitor Blood Glucose as ordered  - Assess for signs and symptoms of hyperglycemia and hypoglycemia  - Administer ordered medications to maintain glucose within target range  - Assess nutritional intake and initiate nutrition service referral as needed  Outcome: Adequate for Discharge     Problem: GASTROINTESTINAL - ADULT  Goal: Maintains adequate nutritional intake  Description: INTERVENTIONS:  - Monitor percentage of each meal consumed  - Identify factors contributing to decreased intake, treat as appropriate  - Assist with meals as needed  - Monitor I&O, weight, and lab values if indicated  - Obtain nutrition services referral as needed  Outcome: Adequate for Discharge

## 2022-09-21 NOTE — ASSESSMENT & PLAN NOTE
Lab Results   Component Value Date    EGFR 54 09/20/2022    EGFR 62 09/17/2022    EGFR 47 09/11/2022    CREATININE 1 17 09/20/2022    CREATININE 1 05 09/17/2022    CREATININE 1 32 09/11/2022   · Creatinine stable, 1 05  · Trend BMP  · Avoid hypotension and nephrotoxic agents  · Urinary retention protocol  · Strict intake and output

## 2022-09-21 NOTE — ASSESSMENT & PLAN NOTE
· Patient seems to have episode of confusion  · Discussed with the Thomas Alejandro and Tay Reyes- patient does not have official diagnosis of dementia before  However it seems to be an element of advanced dementia  · Neuro psychiatrist was consulted - At this time, patient does not appear to have capacity to make fully informed medical decisions   Unspecified Neurocognitive Disorder  · Pending safe disposition plan

## 2022-09-21 NOTE — ASSESSMENT & PLAN NOTE
Lab Results   Component Value Date    HGBA1C 8 1 (H) 08/09/2022       Recent Labs     09/20/22  1118 09/20/22  1543 09/20/22 2010 09/21/22  0535   POCGLU 238* 168* 166* 194*       Blood Sugar Average: Last 72 hrs:  (P) 983 [id number]   · Patient was on metformin 1000 b i d   · Continue Lantus 10 units daily at bedtime, recently increased Humalog to 7 units t i d  A c   · Continue sliding scale insulin with Accu-Cheks while inpatient  · Diabetic diet  · Patient has been having labile blood glucose levels during hospital stay

## 2022-09-21 NOTE — CASE MANAGEMENT
Case Management Discharge Planning Note    Patient name Guillermo Benitez  Location 7T [facility] 716/7T [facility] 716-01 MRN [id number]  : 1933 Date 2022       Current Admission Date: 2022  Current Admission Diagnosis:General weakness   Patient Active Problem List    Diagnosis Date Noted    Severe protein-calorie malnutrition ([facility] ) 2022    Unwitnessed fall 2022    Confusion 2022    Depression with anxiety 2022    General weakness 2022    Stage 3a chronic kidney disease ([facility] ) 2022    Thrombocytopenia ([place] [address] ) 2021    Gross hematuria 10/28/2021    Hematemesis 10/26/2021    Onychomycosis 2020    Anemia 2018    Atrial fibrillation ([facility] ) 2013    Primary hypertension 2012    Inguinal hernia 2012    Type 2 diabetes mellitus with hyperglycemia ([place] [address] ) 2012    Hyperlipidemia 2012      LOS (days): 25  Geometric Mean LOS (GMLOS) (days): 4 50  Days to GMLOS:-20 7     OBJECTIVE:  Risk of Unplanned Readmission Score: 25 44         Current admission status: Inpatient   Preferred Pharmacy:   [facility]  for [address] [place] [address], [address] [place] [address]  [address] [place] [address]  Phone: [phone] Fax: [phone]    [facility] [address] [address] [facility] [id number] [facility] [place] - [address]  [address] [facility] [address] [address]  Phone: [phone] Fax: [phone]    Primary Care Provider: Dwayne Melendez DO    Primary Insurance: MEDICARE  Secondary Insurance: [address] DETAILS: CM was notified at morning rounds that pt is medically cleared to be discharged today  Pt will be returning back to her previous environment  CM was notified that pt will need a hospital bed delivered  CM sent referral and pt's hospital bed was sent  Pt and pt's Family refused PT recommendation of Post acute rehab  CM sent referral for Home health care services   Pt is accepted by [facility] visiting Nurse, PT/OT  JULIO was notified  by Pipe Sanz from [facility] agency that pt's 24hrs Nurse care will start today  Pt is accepted by [facility])  CM faxed pt's AVS to fax num:  [phone]  Transportation: CM sent referral through round trip and pt  time is scheduled for 1630 today       CM department will continue to follow through pt's D/C

## 2022-09-21 NOTE — NURSING NOTE
All security belongings have been returned to pt family members previously  Nothing remains in safe  Call bell remains in place

## 2022-09-21 NOTE — NURSING NOTE
All belonging returned to patient  Patient in no distress and has not complaints at this time  Education paperwork provided to patient   Patient escorted to home with transport team

## 2022-09-22 ENCOUNTER — TRANSITIONAL CARE MANAGEMENT (OUTPATIENT)
Dept: INTERNAL MEDICINE CLINIC | Facility: CLINIC | Age: 87
End: 2022-09-22

## 2022-09-22 ENCOUNTER — HOME CARE VISIT (OUTPATIENT)
Dept: HOME HEALTH SERVICES | Facility: HOME HEALTHCARE | Age: 87
End: 2022-09-22

## 2022-09-22 NOTE — CASE COMMUNICATION
Agreeable to [facility] services and no other SN agencies in home: yes  spoke to pt preeti Colón    Communication to the physician regarding delay:     Insurance, copays, HB status reviewed: yes    Verified address and phone number: yes    Requested that patient secure pets: yes  2 small dogs    Medication bottles and DC instructions in home: yes    Wound care/IV supplies in home:     CG present to learn:     Other concerns patient/family would li ke to address at visit:

## 2022-09-23 ENCOUNTER — HOME CARE VISIT (OUTPATIENT)
Dept: HOME HEALTH SERVICES | Facility: HOME HEALTHCARE | Age: 87
End: 2022-09-23

## 2022-09-24 ENCOUNTER — HOME CARE VISIT (OUTPATIENT)
Dept: HOME HEALTH SERVICES | Facility: HOME HEALTHCARE | Age: 87
End: 2022-09-24
Payer: MEDICARE

## 2022-09-24 PROCEDURE — G0299 HHS/HOSPICE OF RN EA 15 MIN: HCPCS

## 2022-09-24 PROCEDURE — 400013 VN SOC

## 2022-09-24 PROCEDURE — 10330081 VN NO-PAY CLAIM PROCEDURE

## 2022-09-25 VITALS
TEMPERATURE: 98.1 F | DIASTOLIC BLOOD PRESSURE: 60 MMHG | RESPIRATION RATE: 18 BRPM | OXYGEN SATURATION: 97 % | SYSTOLIC BLOOD PRESSURE: 115 MMHG | HEART RATE: 83 BPM

## 2022-09-25 NOTE — CASE COMMUNICATION
[facility] has admitted your patient to [address] [place] [address] service with the following disciplines:      SN, PT and OT  This report is informational only, no responses is needed  Primary focus of home health care AFIB  FALL  Patient stated goals of care CG GOAL IS TO MAKE PT MORE IND  Anticipated visit pattern and next visit date 3W1 2W8  NEXT VISIT MONDAY  See medication list WNL  Significant clinical findings LIVE IN CG WITH LEGAL G Kaylan Adilene  UNSURE IF THERE IS ANY FAMILY PRESENT  Potential barriers to goal achievement NON VERBAL DURING SOC  Other pertinent information NA  Thank you for allowing us to participate in the care of your patient

## 2022-09-26 ENCOUNTER — HOME CARE VISIT (OUTPATIENT)
Dept: HOME HEALTH SERVICES | Facility: HOME HEALTHCARE | Age: 87
End: 2022-09-26
Payer: MEDICARE

## 2022-09-26 VITALS — OXYGEN SATURATION: 100 % | DIASTOLIC BLOOD PRESSURE: 70 MMHG | SYSTOLIC BLOOD PRESSURE: 124 MMHG | HEART RATE: 72 BPM

## 2022-09-26 PROCEDURE — G0299 HHS/HOSPICE OF RN EA 15 MIN: HCPCS

## 2022-09-26 PROCEDURE — G0151 HHCP-SERV OF PT,EA 15 MIN: HCPCS

## 2022-09-27 ENCOUNTER — TELEPHONE (OUTPATIENT)
Dept: INTERNAL MEDICINE CLINIC | Facility: CLINIC | Age: 87
End: 2022-09-27

## 2022-09-27 NOTE — TELEPHONE ENCOUNTER
Court appointed guardian   Anjana Sauceda  Cell phone [phone]  Office # to make appt [phone]

## 2022-09-27 NOTE — PROGRESS NOTES
Virtual TCM Visit:    Verification of patient location:    Patient is located in the following state in which I hold an active license [place]        Assessment/Plan:     Patient's court appointed guardian, Erick Ramirez, and the  of the patient's niece, Cynthia Dallas, are present on today's call  Since discharge, they report that Mike Pierre has essentially been bed-bound  He is incontinent of urine, has not been eating or taking his medications  Cynthia Dallas believes that he has given up"  Has been getting home PT and nursing services  Mike Pierre appears to be asleep during today's call and non communicative     We reviewed below hospital course as below  Symptoms of paranoia, confusion/agitation possibly related to new diagnosis of dementia, though symptoms appear to have progressed rapidly  Mike Pierre is not doing well at home    -We reviewed post discharge medication list   I recommend discontinuation of Lexapro, Zocor, Actos  Reduce metformin to 1000 mg daily  -Stephanie Barnett and Kaylee Farias bring up the prospect of hospice  Stephanie Barnett states that he has discussed this with several family members and that they are agreeable to the idea of hospice  I did review concept of hospice care, that is appropriate for patient's with a suspected prognosis of less than 6 months, care focused on comfort, no her wrote measures, lab draws, hospitalization  Stephanie Barnett and Kaylee Farias would like to proceed with hospice evaluation, which I think is reasonable  Will place referral         Problem List Items Addressed This Visit        Endocrine    Type 2 diabetes mellitus with hyperglycemia ([address] )    Relevant Medications    metFORMIN (GLUCOPHAGE) 1000 MG tablet      Other Visit Diagnoses     Dementia with behavioral disturbance, unspecified dementia type ([facility] )    -  Primary    Relevant Orders    Ambulatory Referral to Hospice    Failure to thrive in adult        Relevant Orders    Ambulatory Referral to Hospice           Seen today for virtual TCM appointment  He has a medical history of hypertension, type 2 diabetes, hyperlipidemia, iron deficiency anemia in the setting of GI bleed, permanent atrial fibrillation, chronic kidney disease stage 3, suspected dementia    Presented to the hospital with ambulatory dysfunction, paranoia, and difficulty taking care of himself at home  Lab evaluation was unremarkable  CT of the head showed mild chronic angio pathic changes, no acute intracranial abnormalities  Evaluated by neuropsych and patient did not have medical capacity to make medical decisions  Seen by PT/OT and case management for potential placement  Guardianship of the patient was obtained  24/7 care was arranged by case management        Reason for visit is TCM    Encounter provider Dwayne Melendez DO       Provider located at [facility] [address]      Recent Visits  Date Type Provider Dept   09/27/22 Telephone Dominic Rubin [facility]   Showing recent visits within past 7 days and meeting all other requirements  Today's Visits  Date Type Provider Dept   09/28/22 Telemedicine Dwayne Melendez DO [facility]   Showing today's visits and meeting all other requirements  Future Appointments  No visits were found meeting these conditions  Showing future appointments within next 150 days and meeting all other requirements       After connecting through [employer], the patient was identified by name and date of birth  Guillermo Benitez  was informed that this is a telemedicine visit and that the visit is being conducted through [facility] and patient was informed this is a secure, HIPAA-complaint platform  He agrees to proceed     My office door was closed  No one else was in the room  He acknowledged consent and understanding of privacy and security of the video platform   The patient has agreed to participate and understands they can discontinue the visit at any time  Patient is aware this is a billable service  Subjective:     Patient ID: Palomo Forman  is a 80 y o  male  HPI    Review of Systems      Objective: There were no vitals filed for this visit  Physical Exam        Transitional Care Management Review:  Palomo Forman  is a 80 y o  male here for TCM follow up  During the TCM phone call patient stated:    TCM Call     Date and time call was made  9/22/2022  8:59 AM    Patient was hospitialized at  [place] [place] [address]    Date of Admission  08/27/22    Date of discharge  09/21/22    Diagnosis  General weakness    Disposition  Home    Were the patients medications reviewed and updated  Yes    Current Symptoms  None      TCM Call     Post hospital issues  None    Should patient be enrolled in anticoag monitoring? No    Scheduled for follow up? Yes    Did you obtain your prescribed medications  Yes    Do you need help managing your prescriptions or medications  No    Is transportation to your appointment needed  No    I have advised the patient to call PCP with any new or worsening symptoms  Lisha Morin(MA)          I spent minutes with the patient during this visit  Dwayne Rebollar, DO      VIRTUAL VISIT DISCLAIMER    Palomo Forman  verbally agrees to participate in [facility]  Pt is aware that [facility] could be limited without vital signs or the ability to perform a full hands-on physical exam  [facility]  understands he or the provider may request at any time to terminate the video visit and request the patient to seek care or treatment in person

## 2022-09-28 ENCOUNTER — HOME CARE VISIT (OUTPATIENT)
Dept: HOME HEALTH SERVICES | Facility: HOME HEALTHCARE | Age: 87
End: 2022-09-28
Payer: MEDICARE

## 2022-09-28 ENCOUNTER — TELEMEDICINE (OUTPATIENT)
Dept: INTERNAL MEDICINE CLINIC | Facility: CLINIC | Age: 87
End: 2022-09-28
Payer: MEDICARE

## 2022-09-28 VITALS
SYSTOLIC BLOOD PRESSURE: 130 MMHG | DIASTOLIC BLOOD PRESSURE: 60 MMHG | TEMPERATURE: 98.2 F | RESPIRATION RATE: 16 BRPM | OXYGEN SATURATION: 96 % | HEART RATE: 97 BPM

## 2022-09-28 DIAGNOSIS — R62.7 FAILURE TO THRIVE IN ADULT: ICD-10-CM

## 2022-09-28 DIAGNOSIS — E11.65 TYPE 2 DIABETES MELLITUS WITH HYPERGLYCEMIA, WITHOUT LONG-TERM CURRENT USE OF INSULIN (HCC): ICD-10-CM

## 2022-09-28 DIAGNOSIS — F03.91 DEMENTIA WITH BEHAVIORAL DISTURBANCE, UNSPECIFIED DEMENTIA TYPE: Primary | ICD-10-CM

## 2022-09-28 PROCEDURE — G0151 HHCP-SERV OF PT,EA 15 MIN: HCPCS

## 2022-09-28 PROCEDURE — G0299 HHS/HOSPICE OF RN EA 15 MIN: HCPCS

## 2022-09-28 PROCEDURE — 99495 TRANSJ CARE MGMT MOD F2F 14D: CPT | Performed by: INTERNAL MEDICINE

## 2022-09-28 NOTE — CASE COMMUNICATION
Arrived at patients home 9 28 for OT evaluation  Pt in hospital bed and refused to get OOB  Per paid caregiver pt does not eat and drinks minimal throughout the day  Pt does not get OOB for caregiver  MSW making visit tomorrow 9 29 22 to see if pt is appropriate for hospice  No OT evaluation completed due to not medically appropriate at this time  Please re order in the future if pt is willing to participate

## 2022-09-29 ENCOUNTER — HOME CARE VISIT (OUTPATIENT)
Dept: HOME HEALTH SERVICES | Facility: HOME HEALTHCARE | Age: 87
End: 2022-09-29
Payer: MEDICARE

## 2022-09-29 ENCOUNTER — HOSPICE ADMISSION (OUTPATIENT)
Dept: HOME HOSPICE | Facility: HOSPICE | Age: 87
End: 2022-09-29

## 2022-09-29 VITALS
DIASTOLIC BLOOD PRESSURE: 64 MMHG | OXYGEN SATURATION: 98 % | RESPIRATION RATE: 16 BRPM | SYSTOLIC BLOOD PRESSURE: 108 MMHG | HEART RATE: 84 BPM | TEMPERATURE: 96.9 F

## 2022-09-29 VITALS — OXYGEN SATURATION: 98 % | HEART RATE: 61 BPM | SYSTOLIC BLOOD PRESSURE: 124 MMHG | DIASTOLIC BLOOD PRESSURE: 76 MMHG

## 2022-09-29 PROCEDURE — G0155 HHCP-SVS OF CSW,EA 15 MIN: HCPCS

## 2022-09-30 ENCOUNTER — HOME CARE VISIT (OUTPATIENT)
Dept: HOME HEALTH SERVICES | Facility: HOME HEALTHCARE | Age: 87
End: 2022-09-30
Payer: MEDICARE

## 2022-09-30 PROCEDURE — G0299 HHS/HOSPICE OF RN EA 15 MIN: HCPCS

## 2022-10-01 ENCOUNTER — HOME CARE VISIT (OUTPATIENT)
Dept: HOME HEALTH SERVICES | Facility: HOME HEALTHCARE | Age: 87
End: 2022-10-01
Payer: MEDICARE

## 2022-10-01 VITALS
OXYGEN SATURATION: 98 % | SYSTOLIC BLOOD PRESSURE: 106 MMHG | RESPIRATION RATE: 16 BRPM | HEART RATE: 97 BPM | DIASTOLIC BLOOD PRESSURE: 68 MMHG | TEMPERATURE: 97.8 F

## 2022-10-01 VITALS
BODY MASS INDEX: 18.97 KG/M2 | RESPIRATION RATE: 18 BRPM | DIASTOLIC BLOOD PRESSURE: 86 MMHG | WEIGHT: 136 LBS | SYSTOLIC BLOOD PRESSURE: 132 MMHG | HEART RATE: 101 BPM

## 2022-10-01 PROCEDURE — G0299 HHS/HOSPICE OF RN EA 15 MIN: HCPCS

## 2022-10-01 NOTE — CASE COMMUNICATION
pt discharged from home health services as of 9/30  pt to be opened to home hospice services on 10/1

## 2022-10-03 ENCOUNTER — HOME CARE VISIT (OUTPATIENT)
Dept: HOME HEALTH SERVICES | Facility: HOME HEALTHCARE | Age: 87
End: 2022-10-03
Payer: MEDICARE

## 2022-10-03 ENCOUNTER — HOME CARE VISIT (OUTPATIENT)
Dept: HOME HOSPICE | Facility: HOSPICE | Age: 87
End: 2022-10-03
Payer: MEDICARE

## 2022-10-03 PROCEDURE — G0299 HHS/HOSPICE OF RN EA 15 MIN: HCPCS

## 2022-10-03 PROCEDURE — G0180 MD CERTIFICATION HHA PATIENT: HCPCS | Performed by: INTERNAL MEDICINE

## 2022-10-04 ENCOUNTER — HOME CARE VISIT (OUTPATIENT)
Dept: HOME HOSPICE | Facility: HOSPICE | Age: 87
End: 2022-10-04
Payer: MEDICARE

## 2022-10-05 ENCOUNTER — HOME CARE VISIT (OUTPATIENT)
Dept: HOME HOSPICE | Facility: HOSPICE | Age: 87
End: 2022-10-05
Payer: MEDICARE

## 2022-10-05 PROCEDURE — G0155 HHCP-SVS OF CSW,EA 15 MIN: HCPCS

## 2022-10-06 ENCOUNTER — HOME CARE VISIT (OUTPATIENT)
Dept: HOME HOSPICE | Facility: HOSPICE | Age: 87
End: 2022-10-06
Payer: MEDICARE

## 2022-10-07 ENCOUNTER — HOME CARE VISIT (OUTPATIENT)
Dept: HOME HEALTH SERVICES | Facility: HOME HEALTHCARE | Age: 87
End: 2022-10-07
Payer: MEDICARE

## 2022-10-07 PROCEDURE — G0299 HHS/HOSPICE OF RN EA 15 MIN: HCPCS

## 2022-10-11 ENCOUNTER — HOME CARE VISIT (OUTPATIENT)
Dept: HOME HEALTH SERVICES | Facility: HOME HEALTHCARE | Age: 87
End: 2022-10-11
Payer: MEDICARE

## 2022-10-11 PROCEDURE — G0299 HHS/HOSPICE OF RN EA 15 MIN: HCPCS

## 2022-10-13 VITALS
SYSTOLIC BLOOD PRESSURE: 112 MMHG | RESPIRATION RATE: 20 BRPM | HEART RATE: 108 BPM | TEMPERATURE: 98.2 F | DIASTOLIC BLOOD PRESSURE: 68 MMHG | OXYGEN SATURATION: 96 %

## 2022-10-17 ENCOUNTER — HOME CARE VISIT (OUTPATIENT)
Dept: HOME HEALTH SERVICES | Facility: HOME HEALTHCARE | Age: 87
End: 2022-10-17
Payer: MEDICARE

## 2022-10-17 PROCEDURE — G0299 HHS/HOSPICE OF RN EA 15 MIN: HCPCS

## 2022-10-22 VITALS
HEART RATE: 99 BPM | RESPIRATION RATE: 16 BRPM | SYSTOLIC BLOOD PRESSURE: 112 MMHG | DIASTOLIC BLOOD PRESSURE: 74 MMHG | TEMPERATURE: 98.4 F

## 2022-10-23 ENCOUNTER — HOME CARE VISIT (OUTPATIENT)
Dept: HOME HEALTH SERVICES | Facility: HOME HEALTHCARE | Age: 87
End: 2022-10-23
Payer: MEDICARE

## 2022-10-23 VITALS — SYSTOLIC BLOOD PRESSURE: 108 MMHG | HEART RATE: 68 BPM | RESPIRATION RATE: 16 BRPM | DIASTOLIC BLOOD PRESSURE: 64 MMHG

## 2022-10-23 PROCEDURE — G0299 HHS/HOSPICE OF RN EA 15 MIN: HCPCS

## 2022-10-28 ENCOUNTER — HOME CARE VISIT (OUTPATIENT)
Dept: HOME HEALTH SERVICES | Facility: HOME HEALTHCARE | Age: 87
End: 2022-10-28

## 2022-11-02 ENCOUNTER — HOME CARE VISIT (OUTPATIENT)
Dept: HOME HEALTH SERVICES | Facility: HOME HEALTHCARE | Age: 87
End: 2022-11-02

## 2022-11-02 VITALS
RESPIRATION RATE: 16 BRPM | OXYGEN SATURATION: 97 % | TEMPERATURE: 97.7 F | DIASTOLIC BLOOD PRESSURE: 64 MMHG | HEART RATE: 92 BPM | SYSTOLIC BLOOD PRESSURE: 122 MMHG

## 2022-11-07 ENCOUNTER — HOME CARE VISIT (OUTPATIENT)
Dept: HOME HOSPICE | Facility: HOSPICE | Age: 87
End: 2022-11-07

## 2022-11-08 ENCOUNTER — HOME CARE VISIT (OUTPATIENT)
Dept: HOME HEALTH SERVICES | Facility: HOME HEALTHCARE | Age: 87
End: 2022-11-08

## 2022-11-08 VITALS — OXYGEN SATURATION: 96 % | RESPIRATION RATE: 20 BRPM | TEMPERATURE: 97.9 F | HEART RATE: 104 BPM

## 2022-11-11 ENCOUNTER — RA CDI HCC (OUTPATIENT)
Dept: OTHER | Facility: HOSPITAL | Age: 87
End: 2022-11-11

## 2022-11-13 ENCOUNTER — HOME CARE VISIT (OUTPATIENT)
Dept: HOME HEALTH SERVICES | Facility: HOME HEALTHCARE | Age: 87
End: 2022-11-13

## 2022-11-15 ENCOUNTER — HOME CARE VISIT (OUTPATIENT)
Dept: HOME HOSPICE | Facility: HOSPICE | Age: 87
End: 2022-11-15

## 2022-11-20 ENCOUNTER — HOME CARE VISIT (OUTPATIENT)
Dept: HOME HOSPICE | Facility: HOSPICE | Age: 87
End: 2022-11-20

## 2022-11-21 ENCOUNTER — HOME CARE VISIT (OUTPATIENT)
Dept: HOME HEALTH SERVICES | Facility: HOME HEALTHCARE | Age: 87
End: 2022-11-21

## 2022-11-21 VITALS
HEART RATE: 92 BPM | DIASTOLIC BLOOD PRESSURE: 60 MMHG | SYSTOLIC BLOOD PRESSURE: 112 MMHG | OXYGEN SATURATION: 96 % | RESPIRATION RATE: 16 BRPM | TEMPERATURE: 98.2 F

## 2022-11-28 ENCOUNTER — HOME CARE VISIT (OUTPATIENT)
Dept: HOME HEALTH SERVICES | Facility: HOME HEALTHCARE | Age: 87
End: 2022-11-28

## 2022-11-28 VITALS
OXYGEN SATURATION: 93 % | HEART RATE: 94 BPM | TEMPERATURE: 97.7 F | DIASTOLIC BLOOD PRESSURE: 68 MMHG | SYSTOLIC BLOOD PRESSURE: 112 MMHG | RESPIRATION RATE: 16 BRPM

## 2022-12-05 ENCOUNTER — HOME CARE VISIT (OUTPATIENT)
Dept: HOME HEALTH SERVICES | Facility: HOME HEALTHCARE | Age: 87
End: 2022-12-05

## 2022-12-05 VITALS
RESPIRATION RATE: 28 BRPM | SYSTOLIC BLOOD PRESSURE: 136 MMHG | TEMPERATURE: 98.1 F | OXYGEN SATURATION: 89 % | HEART RATE: 111 BPM | DIASTOLIC BLOOD PRESSURE: 72 MMHG

## 2022-12-07 ENCOUNTER — HOME CARE VISIT (OUTPATIENT)
Dept: HOME HOSPICE | Facility: HOSPICE | Age: 87
End: 2022-12-07

## 2022-12-07 ENCOUNTER — HOME CARE VISIT (OUTPATIENT)
Dept: HOME HEALTH SERVICES | Facility: HOME HEALTHCARE | Age: 87
End: 2022-12-07

## 2022-12-07 VITALS
RESPIRATION RATE: 20 BRPM | HEART RATE: 96 BPM | DIASTOLIC BLOOD PRESSURE: 48 MMHG | SYSTOLIC BLOOD PRESSURE: 102 MMHG | TEMPERATURE: 98.2 F

## 2022-12-09 ENCOUNTER — HOME CARE VISIT (OUTPATIENT)
Dept: HOME HEALTH SERVICES | Facility: HOME HEALTHCARE | Age: 87
End: 2022-12-09

## 2022-12-09 RX ADMIN — MORPHINE SULFATE 4 MG: 100 SOLUTION, CONCENTRATE ORAL at 11:20

## 2022-12-12 ENCOUNTER — HOME CARE VISIT (OUTPATIENT)
Dept: HOME HEALTH SERVICES | Facility: HOME HEALTHCARE | Age: 87
End: 2022-12-12

## 2022-12-13 VITALS
DIASTOLIC BLOOD PRESSURE: 64 MMHG | HEART RATE: 96 BPM | RESPIRATION RATE: 12 BRPM | TEMPERATURE: 98 F | SYSTOLIC BLOOD PRESSURE: 112 MMHG

## 2022-12-15 ENCOUNTER — HOME CARE VISIT (OUTPATIENT)
Dept: HOME HOSPICE | Facility: HOSPICE | Age: 87
End: 2022-12-15

## 2022-12-15 ENCOUNTER — HOME CARE VISIT (OUTPATIENT)
Dept: HOME HEALTH SERVICES | Facility: HOME HEALTHCARE | Age: 87
End: 2022-12-15

## 2022-12-15 VITALS
TEMPERATURE: 97.7 F | DIASTOLIC BLOOD PRESSURE: 86 MMHG | RESPIRATION RATE: 18 BRPM | HEART RATE: 104 BPM | SYSTOLIC BLOOD PRESSURE: 120 MMHG

## 2022-12-27 ENCOUNTER — HOME CARE VISIT (OUTPATIENT)
Dept: HOME HOSPICE | Facility: HOSPICE | Age: 87
End: 2022-12-27

## 2023-09-08 NOTE — ASSESSMENT & PLAN NOTE
Physician provider: Marjan Lorenz MD  Reason for today's call: Medication clearance  Last office visit: [place] [facility] called requesting to have a clearance for Pt to hold blood thinner-Eliquis 3 days prior, and 10 days after Colonoscopy and Endoscopy scheduled for: 10.11.23 w/Dr. Declan Asif. · Rates are controlled  · Continue home Eliquis 2 5 mg PO BID